# Patient Record
Sex: FEMALE | Race: WHITE | Employment: OTHER | URBAN - METROPOLITAN AREA
[De-identification: names, ages, dates, MRNs, and addresses within clinical notes are randomized per-mention and may not be internally consistent; named-entity substitution may affect disease eponyms.]

---

## 2017-09-03 ENCOUNTER — APPOINTMENT (OUTPATIENT)
Dept: CT IMAGING | Facility: HOSPITAL | Age: 72
End: 2017-09-03
Attending: EMERGENCY MEDICINE
Payer: COMMERCIAL

## 2017-09-03 ENCOUNTER — HOSPITAL ENCOUNTER (EMERGENCY)
Facility: HOSPITAL | Age: 72
Discharge: HOME | End: 2017-09-03
Attending: EMERGENCY MEDICINE
Payer: COMMERCIAL

## 2017-09-03 VITALS
BODY MASS INDEX: 15.64 KG/M2 | WEIGHT: 85 LBS | HEIGHT: 62 IN | DIASTOLIC BLOOD PRESSURE: 60 MMHG | SYSTOLIC BLOOD PRESSURE: 93 MMHG | HEART RATE: 80 BPM | OXYGEN SATURATION: 92 % | TEMPERATURE: 99 F | RESPIRATION RATE: 14 BRPM
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
## 2017-09-03 DIAGNOSIS — S09.90XA CLOSED HEAD INJURY, INITIAL ENCOUNTER: ICD-10-CM

## 2017-09-03 PROCEDURE — 99284 EMERGENCY DEPT VISIT MOD MDM: CPT

## 2017-09-03 PROCEDURE — 70450 CT HEAD/BRAIN W/O DYE: CPT | Performed by: EMERGENCY MEDICINE

## 2017-09-03 RX ORDER — ACETAMINOPHEN 500 MG
TABLET ORAL
Status: DISCONTINUED
Start: 2017-09-03 | End: 2017-09-03

## 2017-09-03 RX ORDER — DESVENLAFAXINE 100 MG/1
100 TABLET, EXTENDED RELEASE ORAL DAILY
COMMUNITY

## 2017-09-03 RX ORDER — ACETAMINOPHEN 500 MG
1000 TABLET ORAL ONCE
Status: COMPLETED | OUTPATIENT
Start: 2017-09-03 | End: 2017-09-03
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
## 2017-09-03 NOTE — ED NOTES
Pt sitting up on stretcher stating she feels fine. Family stating pt lives on TV dinners once daily and drinks squirt all day long. Pt has an MD in [facility] . Family asking if she can have IV fluids because she doesn't drink anything but soda.  md updated
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
## 2017-09-03 NOTE — ED NOTES
Pt resting on cart with family at bedside. Pt updated to plan for discharge. IV removed with cath intact and bandage placed. DC papers given to patient. Aware of f/u care and cond to return to the ER with.  No questions, home with daughters and to stay with

## 2017-09-21 ENCOUNTER — APPOINTMENT (OUTPATIENT)
Dept: GENERAL RADIOLOGY | Age: 72
DRG: 190 | End: 2017-09-21
Payer: MEDICARE

## 2017-09-21 ENCOUNTER — APPOINTMENT (OUTPATIENT)
Dept: CT IMAGING | Age: 72
DRG: 190 | End: 2017-09-21
Payer: MEDICARE

## 2017-09-21 ENCOUNTER — HOSPITAL ENCOUNTER (INPATIENT)
Age: 72
LOS: 4 days | Discharge: HOME HEALTH | DRG: 190 | End: 2017-09-25
Attending: FAMILY MEDICINE | Admitting: OPHTHALMOLOGY
Payer: MEDICARE

## 2017-09-21 DIAGNOSIS — J18.9 PNEUMONIA DUE TO ORGANISM: Primary | ICD-10-CM

## 2017-09-21 DIAGNOSIS — J44.1 COPD EXACERBATION (HCC): ICD-10-CM

## 2017-09-21 PROBLEM — D72.825 BANDEMIA: Status: ACTIVE | Noted: 2017-09-21

## 2017-09-21 PROBLEM — R63.4 WEIGHT LOSS, NON-INTENTIONAL: Status: ACTIVE | Noted: 2017-09-21

## 2017-09-21 PROBLEM — J96.21 ACUTE ON CHRONIC RESPIRATORY FAILURE WITH HYPOXIA (HCC): Status: ACTIVE | Noted: 2017-09-21

## 2017-09-21 LAB
ALBUMIN SERPL-MCNC: 3.6 G/DL (ref 3.5–5.1)
ALLEN TEST: POSITIVE
ALP BLD-CCNC: 76 U/L (ref 38–126)
ALT SERPL-CCNC: 13 U/L (ref 11–66)
ANION GAP SERPL CALCULATED.3IONS-SCNC: 14 MEQ/L (ref 8–16)
ANISOCYTOSIS: ABNORMAL
AST SERPL-CCNC: 22 U/L (ref 5–40)
BANDED NEUTROPHILS ABSOLUTE COUNT: 0.9 THOU/MM3
BANDS PRESENT: 4 %
BASE EXCESS (CALCULATED): 2.9 MMOL/L (ref -2.5–2.5)
BASOPHILS # BLD: 0 %
BASOPHILS ABSOLUTE: 0 THOU/MM3 (ref 0–0.1)
BILIRUB SERPL-MCNC: 0.6 MG/DL (ref 0.3–1.2)
BILIRUBIN DIRECT: < 0.2 MG/DL (ref 0–0.3)
BUN BLDV-MCNC: 9 MG/DL (ref 7–22)
CALCIUM SERPL-MCNC: 9.2 MG/DL (ref 8.5–10.5)
CHLORIDE BLD-SCNC: 96 MEQ/L (ref 98–111)
CO2: 28 MEQ/L (ref 23–33)
COLLECTED BY:: ABNORMAL
CREAT SERPL-MCNC: 0.7 MG/DL (ref 0.4–1.2)
D-DIMER QUANTITATIVE: 723 NG/ML FEU (ref 0–500)
DEVICE: ABNORMAL
DIFFERENTIAL, MANUAL: NORMAL
EOSINOPHIL # BLD: 1 %
EOSINOPHILS ABSOLUTE: 0.2 THOU/MM3 (ref 0–0.4)
FLU A ANTIGEN: NEGATIVE
FLU B ANTIGEN: NEGATIVE
FOLATE: 11.8 NG/ML (ref 4.8–24.2)
GFR SERPL CREATININE-BSD FRML MDRD: 82 ML/MIN/1.73M2
GLUCOSE BLD-MCNC: 116 MG/DL (ref 70–108)
HCO3: 27 MMOL/L (ref 23–28)
HCT VFR BLD CALC: 45.2 % (ref 37–47)
HEMOGLOBIN: 15.1 GM/DL (ref 12–16)
IFIO2: 4
LYMPHOCYTES # BLD: 7 %
LYMPHOCYTES ABSOLUTE: 1.6 THOU/MM3 (ref 1–4.8)
MCH RBC QN AUTO: 31.5 PG (ref 27–31)
MCHC RBC AUTO-ENTMCNC: 33.4 GM/DL (ref 33–37)
MCV RBC AUTO: 94.5 FL (ref 81–99)
MONOCYTES # BLD: 7 %
MONOCYTES ABSOLUTE: 1.6 THOU/MM3 (ref 0.4–1.3)
NUCLEATED RED BLOOD CELLS: 0 /100 WBC
O2 SATURATION: 91 %
OSMOLALITY CALCULATION: 275.3 MOSMOL/KG (ref 275–300)
PCO2: 40 MMHG (ref 35–45)
PDW BLD-RTO: 15.8 % (ref 11.5–14.5)
PH BLOOD GAS: 7.44 (ref 7.35–7.45)
PLATELET # BLD: 313 THOU/MM3 (ref 130–400)
PLATELET ESTIMATE: ADEQUATE
PMV BLD AUTO: 8.7 MCM (ref 7.4–10.4)
PO2: 58 MMHG (ref 71–104)
POTASSIUM SERPL-SCNC: 4.5 MEQ/L (ref 3.5–5.2)
PROCALCITONIN: 0.67 NG/ML (ref 0.01–0.09)
PROCALCITONIN: 0.76 NG/ML (ref 0.01–0.09)
RBC # BLD: 4.78 MILL/MM3 (ref 4.2–5.4)
RBC # BLD: NORMAL 10*6/UL
SEG NEUTROPHILS: 81 %
SEGMENTED NEUTROPHILS ABSOLUTE COUNT: 19 THOU/MM3 (ref 1.8–7.7)
SMUDGE CELLS: ABNORMAL
SODIUM BLD-SCNC: 138 MEQ/L (ref 135–145)
SOURCE, BLOOD GAS: ABNORMAL
TOTAL PROTEIN: 6.9 G/DL (ref 6.1–8)
TROPONIN T: < 0.01 NG/ML
VITAMIN B-12: 404 PG/ML (ref 211–911)
WBC # BLD: 23.5 THOU/MM3 (ref 4.8–10.8)

## 2017-09-21 PROCEDURE — 6360000004 HC RX CONTRAST MEDICATION: Performed by: FAMILY MEDICINE

## 2017-09-21 PROCEDURE — 80053 COMPREHEN METABOLIC PANEL: CPT

## 2017-09-21 PROCEDURE — 82746 ASSAY OF FOLIC ACID SERUM: CPT

## 2017-09-21 PROCEDURE — 87040 BLOOD CULTURE FOR BACTERIA: CPT

## 2017-09-21 PROCEDURE — 93005 ELECTROCARDIOGRAM TRACING: CPT | Performed by: FAMILY MEDICINE

## 2017-09-21 PROCEDURE — 87804 INFLUENZA ASSAY W/OPTIC: CPT

## 2017-09-21 PROCEDURE — 84484 ASSAY OF TROPONIN QUANT: CPT

## 2017-09-21 PROCEDURE — 99222 1ST HOSP IP/OBS MODERATE 55: CPT | Performed by: OPHTHALMOLOGY

## 2017-09-21 PROCEDURE — 85025 COMPLETE CBC W/AUTO DIFF WBC: CPT

## 2017-09-21 PROCEDURE — 82248 BILIRUBIN DIRECT: CPT

## 2017-09-21 PROCEDURE — 71010 XR CHEST PORTABLE: CPT

## 2017-09-21 PROCEDURE — 82607 VITAMIN B-12: CPT

## 2017-09-21 PROCEDURE — 6370000000 HC RX 637 (ALT 250 FOR IP): Performed by: FAMILY MEDICINE

## 2017-09-21 PROCEDURE — 82803 BLOOD GASES ANY COMBINATION: CPT

## 2017-09-21 PROCEDURE — 36600 WITHDRAWAL OF ARTERIAL BLOOD: CPT

## 2017-09-21 PROCEDURE — 94640 AIRWAY INHALATION TREATMENT: CPT

## 2017-09-21 PROCEDURE — 85379 FIBRIN DEGRADATION QUANT: CPT

## 2017-09-21 PROCEDURE — 36415 COLL VENOUS BLD VENIPUNCTURE: CPT

## 2017-09-21 PROCEDURE — 71275 CT ANGIOGRAPHY CHEST: CPT

## 2017-09-21 PROCEDURE — 84145 PROCALCITONIN (PCT): CPT

## 2017-09-21 PROCEDURE — 99285 EMERGENCY DEPT VISIT HI MDM: CPT

## 2017-09-21 PROCEDURE — 1200000000 HC SEMI PRIVATE

## 2017-09-21 PROCEDURE — 2580000003 HC RX 258: Performed by: FAMILY MEDICINE

## 2017-09-21 PROCEDURE — 6360000002 HC RX W HCPCS: Performed by: FAMILY MEDICINE

## 2017-09-21 RX ORDER — ACETAMINOPHEN 325 MG/1
650 TABLET ORAL EVERY 4 HOURS PRN
Status: DISCONTINUED | OUTPATIENT
Start: 2017-09-21 | End: 2017-09-25 | Stop reason: HOSPADM

## 2017-09-21 RX ORDER — PREDNISONE 20 MG/1
40 TABLET ORAL ONCE
Status: COMPLETED | OUTPATIENT
Start: 2017-09-21 | End: 2017-09-21

## 2017-09-21 RX ORDER — PANTOPRAZOLE SODIUM 40 MG/1
40 TABLET, DELAYED RELEASE ORAL
Status: DISCONTINUED | OUTPATIENT
Start: 2017-09-22 | End: 2017-09-22

## 2017-09-21 RX ORDER — IPRATROPIUM BROMIDE AND ALBUTEROL SULFATE 2.5; .5 MG/3ML; MG/3ML
1 SOLUTION RESPIRATORY (INHALATION) ONCE
Status: COMPLETED | OUTPATIENT
Start: 2017-09-21 | End: 2017-09-21

## 2017-09-21 RX ORDER — DESVENLAFAXINE 100 MG/1
100 TABLET, EXTENDED RELEASE ORAL DAILY
Status: DISCONTINUED | OUTPATIENT
Start: 2017-09-22 | End: 2017-09-25 | Stop reason: HOSPADM

## 2017-09-21 RX ORDER — AZITHROMYCIN 250 MG/1
500 TABLET, FILM COATED ORAL ONCE
Status: COMPLETED | OUTPATIENT
Start: 2017-09-21 | End: 2017-09-21

## 2017-09-21 RX ORDER — MORPHINE SULFATE 2 MG/ML
2 INJECTION, SOLUTION INTRAMUSCULAR; INTRAVENOUS EVERY 4 HOURS PRN
Status: DISCONTINUED | OUTPATIENT
Start: 2017-09-21 | End: 2017-09-22

## 2017-09-21 RX ORDER — ONDANSETRON 2 MG/ML
4 INJECTION INTRAMUSCULAR; INTRAVENOUS EVERY 6 HOURS PRN
Status: DISCONTINUED | OUTPATIENT
Start: 2017-09-21 | End: 2017-09-25 | Stop reason: HOSPADM

## 2017-09-21 RX ORDER — IPRATROPIUM BROMIDE AND ALBUTEROL SULFATE 2.5; .5 MG/3ML; MG/3ML
1 SOLUTION RESPIRATORY (INHALATION)
Status: DISCONTINUED | OUTPATIENT
Start: 2017-09-22 | End: 2017-09-25

## 2017-09-21 RX ORDER — ARIPIPRAZOLE 10 MG/1
10 TABLET ORAL DAILY
Status: DISCONTINUED | OUTPATIENT
Start: 2017-09-22 | End: 2017-09-25 | Stop reason: HOSPADM

## 2017-09-21 RX ORDER — 0.9 % SODIUM CHLORIDE 0.9 %
500 INTRAVENOUS SOLUTION INTRAVENOUS ONCE
Status: DISCONTINUED | OUTPATIENT
Start: 2017-09-21 | End: 2017-09-21

## 2017-09-21 RX ORDER — 0.9 % SODIUM CHLORIDE 0.9 %
1000 INTRAVENOUS SOLUTION INTRAVENOUS ONCE
Status: COMPLETED | OUTPATIENT
Start: 2017-09-21 | End: 2017-09-21

## 2017-09-21 RX ADMIN — IOPAMIDOL 80 ML: 755 INJECTION, SOLUTION INTRAVENOUS at 18:43

## 2017-09-21 RX ADMIN — PREDNISONE 40 MG: 20 TABLET ORAL at 17:21

## 2017-09-21 RX ADMIN — CEFTRIAXONE 1 G: 1 INJECTION, POWDER, FOR SOLUTION INTRAMUSCULAR; INTRAVENOUS at 19:14

## 2017-09-21 RX ADMIN — IPRATROPIUM BROMIDE AND ALBUTEROL SULFATE 1 AMPULE: .5; 3 SOLUTION RESPIRATORY (INHALATION) at 16:38

## 2017-09-21 RX ADMIN — AZITHROMYCIN 500 MG: 250 TABLET, FILM COATED ORAL at 17:21

## 2017-09-21 RX ADMIN — SODIUM CHLORIDE 1000 ML: 9 INJECTION, SOLUTION INTRAVENOUS at 19:14

## 2017-09-21 ASSESSMENT — ENCOUNTER SYMPTOMS
COLOR CHANGE: 0
COUGH: 1
ABDOMINAL PAIN: 0
SORE THROAT: 0
SHORTNESS OF BREATH: 1
CHEST TIGHTNESS: 1
APNEA: 0
TROUBLE SWALLOWING: 0
BACK PAIN: 0
WHEEZING: 1
DIARRHEA: 0
VOMITING: 0
STRIDOR: 0
NAUSEA: 0

## 2017-09-22 PROBLEM — E43 SEVERE MALNUTRITION (HCC): Chronic | Status: ACTIVE | Noted: 2017-09-22

## 2017-09-22 LAB
ANION GAP SERPL CALCULATED.3IONS-SCNC: 10 MEQ/L (ref 8–16)
BUN BLDV-MCNC: 11 MG/DL (ref 7–22)
CALCIUM SERPL-MCNC: 9.1 MG/DL (ref 8.5–10.5)
CHLORIDE BLD-SCNC: 100 MEQ/L (ref 98–111)
CO2: 30 MEQ/L (ref 23–33)
CREAT SERPL-MCNC: 0.6 MG/DL (ref 0.4–1.2)
GFR SERPL CREATININE-BSD FRML MDRD: > 90 ML/MIN/1.73M2
GLUCOSE BLD-MCNC: 110 MG/DL (ref 70–108)
HCT VFR BLD CALC: 40.2 % (ref 37–47)
HEMOGLOBIN: 13.3 GM/DL (ref 12–16)
LACTIC ACID: 1.2 MMOL/L (ref 0.5–2.2)
LV EF: 53 %
LVEF MODALITY: NORMAL
MAGNESIUM: 1.9 MG/DL (ref 1.6–2.4)
MCH RBC QN AUTO: 31.4 PG (ref 27–31)
MCHC RBC AUTO-ENTMCNC: 33 GM/DL (ref 33–37)
MCV RBC AUTO: 95.1 FL (ref 81–99)
PDW BLD-RTO: 15 % (ref 11.5–14.5)
PLATELET # BLD: 265 THOU/MM3 (ref 130–400)
PMV BLD AUTO: 8.2 MCM (ref 7.4–10.4)
POTASSIUM SERPL-SCNC: 4 MEQ/L (ref 3.5–5.2)
RBC # BLD: 4.23 MILL/MM3 (ref 4.2–5.4)
SODIUM BLD-SCNC: 140 MEQ/L (ref 135–145)
WBC # BLD: 18.1 THOU/MM3 (ref 4.8–10.8)

## 2017-09-22 PROCEDURE — 6370000000 HC RX 637 (ALT 250 FOR IP): Performed by: OPHTHALMOLOGY

## 2017-09-22 PROCEDURE — G8987 SELF CARE CURRENT STATUS: HCPCS

## 2017-09-22 PROCEDURE — 93306 TTE W/DOPPLER COMPLETE: CPT

## 2017-09-22 PROCEDURE — 6360000002 HC RX W HCPCS: Performed by: INTERNAL MEDICINE

## 2017-09-22 PROCEDURE — G8988 SELF CARE GOAL STATUS: HCPCS

## 2017-09-22 PROCEDURE — 6370000000 HC RX 637 (ALT 250 FOR IP): Performed by: INTERNAL MEDICINE

## 2017-09-22 PROCEDURE — 97166 OT EVAL MOD COMPLEX 45 MIN: CPT

## 2017-09-22 PROCEDURE — 83605 ASSAY OF LACTIC ACID: CPT

## 2017-09-22 PROCEDURE — 6370000000 HC RX 637 (ALT 250 FOR IP): Performed by: FAMILY MEDICINE

## 2017-09-22 PROCEDURE — 94640 AIRWAY INHALATION TREATMENT: CPT

## 2017-09-22 PROCEDURE — 36415 COLL VENOUS BLD VENIPUNCTURE: CPT

## 2017-09-22 PROCEDURE — 80048 BASIC METABOLIC PNL TOTAL CA: CPT

## 2017-09-22 PROCEDURE — 83735 ASSAY OF MAGNESIUM: CPT

## 2017-09-22 PROCEDURE — 99232 SBSQ HOSP IP/OBS MODERATE 35: CPT | Performed by: INTERNAL MEDICINE

## 2017-09-22 PROCEDURE — 85027 COMPLETE CBC AUTOMATED: CPT

## 2017-09-22 PROCEDURE — 2580000003 HC RX 258: Performed by: OPHTHALMOLOGY

## 2017-09-22 PROCEDURE — 2700000000 HC OXYGEN THERAPY PER DAY

## 2017-09-22 PROCEDURE — 97110 THERAPEUTIC EXERCISES: CPT

## 2017-09-22 PROCEDURE — 6360000002 HC RX W HCPCS: Performed by: OPHTHALMOLOGY

## 2017-09-22 PROCEDURE — 1200000000 HC SEMI PRIVATE

## 2017-09-22 RX ORDER — AZITHROMYCIN 250 MG/1
250 TABLET, FILM COATED ORAL DAILY
Status: DISCONTINUED | OUTPATIENT
Start: 2017-09-22 | End: 2017-09-25 | Stop reason: HOSPADM

## 2017-09-22 RX ORDER — FAMOTIDINE 20 MG/1
20 TABLET, FILM COATED ORAL DAILY
Status: DISCONTINUED | OUTPATIENT
Start: 2017-09-22 | End: 2017-09-25 | Stop reason: HOSPADM

## 2017-09-22 RX ORDER — BENZONATATE 100 MG/1
100 CAPSULE ORAL 3 TIMES DAILY PRN
Status: DISCONTINUED | OUTPATIENT
Start: 2017-09-22 | End: 2017-09-25 | Stop reason: HOSPADM

## 2017-09-22 RX ORDER — PERMETHRIN 50 MG/G
CREAM TOPICAL ONCE
Status: COMPLETED | OUTPATIENT
Start: 2017-09-22 | End: 2017-09-22

## 2017-09-22 RX ORDER — NICOTINE 21 MG/24HR
1 PATCH, TRANSDERMAL 24 HOURS TRANSDERMAL DAILY
Status: DISCONTINUED | OUTPATIENT
Start: 2017-09-22 | End: 2017-09-25 | Stop reason: HOSPADM

## 2017-09-22 RX ORDER — DIPHENHYDRAMINE HCL 25 MG
25 TABLET ORAL EVERY 6 HOURS PRN
Status: DISCONTINUED | OUTPATIENT
Start: 2017-09-22 | End: 2017-09-23 | Stop reason: CLARIF

## 2017-09-22 RX ORDER — GUAIFENESIN 600 MG/1
600 TABLET, EXTENDED RELEASE ORAL 2 TIMES DAILY
Status: DISCONTINUED | OUTPATIENT
Start: 2017-09-22 | End: 2017-09-25 | Stop reason: HOSPADM

## 2017-09-22 RX ADMIN — BENZONATATE 100 MG: 100 CAPSULE ORAL at 21:01

## 2017-09-22 RX ADMIN — AZITHROMYCIN 250 MG: 250 TABLET, FILM COATED ORAL at 17:41

## 2017-09-22 RX ADMIN — CEFTRIAXONE 1 G: 1 INJECTION, POWDER, FOR SOLUTION INTRAMUSCULAR; INTRAVENOUS at 16:55

## 2017-09-22 RX ADMIN — IPRATROPIUM BROMIDE AND ALBUTEROL SULFATE 1 AMPULE: .5; 3 SOLUTION RESPIRATORY (INHALATION) at 21:11

## 2017-09-22 RX ADMIN — DIPHENHYDRAMINE HCL 25 MG: 25 TABLET ORAL at 22:53

## 2017-09-22 RX ADMIN — IPRATROPIUM BROMIDE AND ALBUTEROL SULFATE 1 AMPULE: .5; 3 SOLUTION RESPIRATORY (INHALATION) at 09:58

## 2017-09-22 RX ADMIN — GUAIFENESIN 600 MG: 600 TABLET, EXTENDED RELEASE ORAL at 21:01

## 2017-09-22 RX ADMIN — BENZONATATE 100 MG: 100 CAPSULE ORAL at 11:32

## 2017-09-22 RX ADMIN — FAMOTIDINE 20 MG: 20 TABLET, FILM COATED ORAL at 10:07

## 2017-09-22 RX ADMIN — GUAIFENESIN 600 MG: 600 TABLET, EXTENDED RELEASE ORAL at 10:07

## 2017-09-22 RX ADMIN — PERMETHRIN: 50 CREAM TOPICAL at 17:53

## 2017-09-22 RX ADMIN — IPRATROPIUM BROMIDE AND ALBUTEROL SULFATE 1 AMPULE: .5; 3 SOLUTION RESPIRATORY (INHALATION) at 13:35

## 2017-09-22 RX ADMIN — ARIPIPRAZOLE 10 MG: 10 TABLET ORAL at 09:09

## 2017-09-22 RX ADMIN — ENOXAPARIN SODIUM 30 MG: 30 INJECTION SUBCUTANEOUS at 09:09

## 2017-09-22 RX ADMIN — DESVENLAFAXINE SUCCINATE 100 MG: 100 TABLET, EXTENDED RELEASE ORAL at 09:09

## 2017-09-22 ASSESSMENT — PAIN SCALES - GENERAL: PAINLEVEL_OUTOF10: 0

## 2017-09-23 PROBLEM — E43 SEVERE PROTEIN-CALORIE MALNUTRITION (GOMEZ: LESS THAN 60% OF STANDARD WEIGHT) (HCC): Status: ACTIVE | Noted: 2017-09-22

## 2017-09-23 PROBLEM — B85.0 HEAD LICE: Status: ACTIVE | Noted: 2017-09-23

## 2017-09-23 PROBLEM — J96.01 ACUTE RESPIRATORY FAILURE WITH HYPOXIA (HCC): Status: ACTIVE | Noted: 2017-09-21

## 2017-09-23 PROCEDURE — 99232 SBSQ HOSP IP/OBS MODERATE 35: CPT | Performed by: INTERNAL MEDICINE

## 2017-09-23 PROCEDURE — 6360000002 HC RX W HCPCS: Performed by: OPHTHALMOLOGY

## 2017-09-23 PROCEDURE — 6370000000 HC RX 637 (ALT 250 FOR IP): Performed by: INTERNAL MEDICINE

## 2017-09-23 PROCEDURE — 94640 AIRWAY INHALATION TREATMENT: CPT

## 2017-09-23 PROCEDURE — 1200000000 HC SEMI PRIVATE

## 2017-09-23 PROCEDURE — 2580000003 HC RX 258: Performed by: OPHTHALMOLOGY

## 2017-09-23 PROCEDURE — 6360000002 HC RX W HCPCS: Performed by: INTERNAL MEDICINE

## 2017-09-23 PROCEDURE — 2700000000 HC OXYGEN THERAPY PER DAY

## 2017-09-23 PROCEDURE — 6370000000 HC RX 637 (ALT 250 FOR IP): Performed by: OPHTHALMOLOGY

## 2017-09-23 RX ORDER — LANOLIN ALCOHOL/MO/W.PET/CERES
6 CREAM (GRAM) TOPICAL NIGHTLY PRN
Status: DISCONTINUED | OUTPATIENT
Start: 2017-09-23 | End: 2017-09-25 | Stop reason: HOSPADM

## 2017-09-23 RX ADMIN — IPRATROPIUM BROMIDE AND ALBUTEROL SULFATE 1 AMPULE: .5; 3 SOLUTION RESPIRATORY (INHALATION) at 22:49

## 2017-09-23 RX ADMIN — BENZONATATE 100 MG: 100 CAPSULE ORAL at 17:32

## 2017-09-23 RX ADMIN — IPRATROPIUM BROMIDE AND ALBUTEROL SULFATE 1 AMPULE: .5; 3 SOLUTION RESPIRATORY (INHALATION) at 09:43

## 2017-09-23 RX ADMIN — IPRATROPIUM BROMIDE AND ALBUTEROL SULFATE 1 AMPULE: .5; 3 SOLUTION RESPIRATORY (INHALATION) at 13:39

## 2017-09-23 RX ADMIN — GUAIFENESIN 600 MG: 600 TABLET, EXTENDED RELEASE ORAL at 20:07

## 2017-09-23 RX ADMIN — CEFTRIAXONE 1 G: 1 INJECTION, POWDER, FOR SOLUTION INTRAMUSCULAR; INTRAVENOUS at 18:07

## 2017-09-23 RX ADMIN — IPRATROPIUM BROMIDE AND ALBUTEROL SULFATE 1 AMPULE: .5; 3 SOLUTION RESPIRATORY (INHALATION) at 18:02

## 2017-09-23 RX ADMIN — ENOXAPARIN SODIUM 30 MG: 30 INJECTION SUBCUTANEOUS at 11:46

## 2017-09-23 RX ADMIN — AZITHROMYCIN 250 MG: 250 TABLET, FILM COATED ORAL at 17:32

## 2017-09-23 RX ADMIN — ARIPIPRAZOLE 10 MG: 10 TABLET ORAL at 08:35

## 2017-09-23 RX ADMIN — GUAIFENESIN 600 MG: 600 TABLET, EXTENDED RELEASE ORAL at 08:35

## 2017-09-23 RX ADMIN — ACETAMINOPHEN 650 MG: 325 TABLET ORAL at 20:24

## 2017-09-23 RX ADMIN — BENZONATATE 100 MG: 100 CAPSULE ORAL at 04:34

## 2017-09-23 RX ADMIN — FAMOTIDINE 20 MG: 20 TABLET, FILM COATED ORAL at 08:35

## 2017-09-23 RX ADMIN — DESVENLAFAXINE SUCCINATE 100 MG: 100 TABLET, EXTENDED RELEASE ORAL at 08:35

## 2017-09-23 ASSESSMENT — PAIN SCALES - GENERAL
PAINLEVEL_OUTOF10: 0
PAINLEVEL_OUTOF10: 3
PAINLEVEL_OUTOF10: 0

## 2017-09-24 PROCEDURE — 6360000002 HC RX W HCPCS: Performed by: INTERNAL MEDICINE

## 2017-09-24 PROCEDURE — 1200000000 HC SEMI PRIVATE

## 2017-09-24 PROCEDURE — 94640 AIRWAY INHALATION TREATMENT: CPT

## 2017-09-24 PROCEDURE — 2700000000 HC OXYGEN THERAPY PER DAY

## 2017-09-24 PROCEDURE — 6370000000 HC RX 637 (ALT 250 FOR IP): Performed by: INTERNAL MEDICINE

## 2017-09-24 PROCEDURE — 6370000000 HC RX 637 (ALT 250 FOR IP): Performed by: OPHTHALMOLOGY

## 2017-09-24 PROCEDURE — 99232 SBSQ HOSP IP/OBS MODERATE 35: CPT | Performed by: INTERNAL MEDICINE

## 2017-09-24 RX ORDER — CEPHALEXIN 500 MG/1
500 CAPSULE ORAL EVERY 8 HOURS SCHEDULED
Status: DISCONTINUED | OUTPATIENT
Start: 2017-09-24 | End: 2017-09-25 | Stop reason: HOSPADM

## 2017-09-24 RX ADMIN — IPRATROPIUM BROMIDE AND ALBUTEROL SULFATE 1 AMPULE: .5; 3 SOLUTION RESPIRATORY (INHALATION) at 21:24

## 2017-09-24 RX ADMIN — CEPHALEXIN 500 MG: 500 CAPSULE ORAL at 16:45

## 2017-09-24 RX ADMIN — ENOXAPARIN SODIUM 30 MG: 30 INJECTION SUBCUTANEOUS at 09:15

## 2017-09-24 RX ADMIN — BENZONATATE 100 MG: 100 CAPSULE ORAL at 09:13

## 2017-09-24 RX ADMIN — BENZONATATE 100 MG: 100 CAPSULE ORAL at 16:45

## 2017-09-24 RX ADMIN — GUAIFENESIN 600 MG: 600 TABLET, EXTENDED RELEASE ORAL at 21:07

## 2017-09-24 RX ADMIN — CEPHALEXIN 500 MG: 500 CAPSULE ORAL at 21:07

## 2017-09-24 RX ADMIN — AZITHROMYCIN 250 MG: 250 TABLET, FILM COATED ORAL at 16:45

## 2017-09-24 RX ADMIN — IPRATROPIUM BROMIDE AND ALBUTEROL SULFATE 1 AMPULE: .5; 3 SOLUTION RESPIRATORY (INHALATION) at 12:54

## 2017-09-24 RX ADMIN — DESVENLAFAXINE SUCCINATE 100 MG: 100 TABLET, EXTENDED RELEASE ORAL at 09:13

## 2017-09-24 RX ADMIN — IPRATROPIUM BROMIDE AND ALBUTEROL SULFATE 1 AMPULE: .5; 3 SOLUTION RESPIRATORY (INHALATION) at 07:58

## 2017-09-24 RX ADMIN — ARIPIPRAZOLE 10 MG: 10 TABLET ORAL at 09:13

## 2017-09-24 RX ADMIN — Medication 6 MG: at 21:08

## 2017-09-24 RX ADMIN — Medication 6 MG: at 00:56

## 2017-09-24 RX ADMIN — FAMOTIDINE 20 MG: 20 TABLET, FILM COATED ORAL at 09:13

## 2017-09-24 RX ADMIN — IPRATROPIUM BROMIDE AND ALBUTEROL SULFATE 1 AMPULE: .5; 3 SOLUTION RESPIRATORY (INHALATION) at 16:03

## 2017-09-24 RX ADMIN — GUAIFENESIN 600 MG: 600 TABLET, EXTENDED RELEASE ORAL at 09:13

## 2017-09-24 RX ADMIN — BENZONATATE 100 MG: 100 CAPSULE ORAL at 21:07

## 2017-09-24 RX ADMIN — Medication: at 10:00

## 2017-09-24 ASSESSMENT — PAIN SCALES - GENERAL
PAINLEVEL_OUTOF10: 0
PAINLEVEL_OUTOF10: 0

## 2017-09-25 VITALS
WEIGHT: 81.9 LBS | DIASTOLIC BLOOD PRESSURE: 76 MMHG | SYSTOLIC BLOOD PRESSURE: 143 MMHG | HEART RATE: 100 BPM | OXYGEN SATURATION: 90 % | RESPIRATION RATE: 18 BRPM | BODY MASS INDEX: 15.07 KG/M2 | TEMPERATURE: 97.6 F | HEIGHT: 62 IN

## 2017-09-25 LAB
BASOPHILS # BLD: 0.4 %
BASOPHILS ABSOLUTE: 0 THOU/MM3 (ref 0–0.1)
EOSINOPHIL # BLD: 7.6 %
EOSINOPHILS ABSOLUTE: 0.9 THOU/MM3 (ref 0–0.4)
HCT VFR BLD CALC: 41.3 % (ref 37–47)
HEMOGLOBIN: 13.6 GM/DL (ref 12–16)
LYMPHOCYTES # BLD: 15.6 %
LYMPHOCYTES ABSOLUTE: 1.8 THOU/MM3 (ref 1–4.8)
MCH RBC QN AUTO: 31.5 PG (ref 27–31)
MCHC RBC AUTO-ENTMCNC: 33.1 GM/DL (ref 33–37)
MCV RBC AUTO: 95.2 FL (ref 81–99)
MONOCYTES # BLD: 8.2 %
MONOCYTES ABSOLUTE: 1 THOU/MM3 (ref 0.4–1.3)
NUCLEATED RED BLOOD CELLS: 0 /100 WBC
PDW BLD-RTO: 14.5 % (ref 11.5–14.5)
PLATELET # BLD: 317 THOU/MM3 (ref 130–400)
PMV BLD AUTO: 8.4 MCM (ref 7.4–10.4)
PROCALCITONIN: 0.23 NG/ML (ref 0.01–0.09)
RBC # BLD: 4.33 MILL/MM3 (ref 4.2–5.4)
RBC # BLD: NORMAL 10*6/UL
SEG NEUTROPHILS: 68.2 %
SEGMENTED NEUTROPHILS ABSOLUTE COUNT: 8 THOU/MM3 (ref 1.8–7.7)
WBC # BLD: 11.8 THOU/MM3 (ref 4.8–10.8)

## 2017-09-25 PROCEDURE — 84145 PROCALCITONIN (PCT): CPT

## 2017-09-25 PROCEDURE — 94761 N-INVAS EAR/PLS OXIMETRY MLT: CPT

## 2017-09-25 PROCEDURE — 97110 THERAPEUTIC EXERCISES: CPT

## 2017-09-25 PROCEDURE — 6370000000 HC RX 637 (ALT 250 FOR IP): Performed by: INTERNAL MEDICINE

## 2017-09-25 PROCEDURE — 97161 PT EVAL LOW COMPLEX 20 MIN: CPT

## 2017-09-25 PROCEDURE — 6370000000 HC RX 637 (ALT 250 FOR IP): Performed by: OPHTHALMOLOGY

## 2017-09-25 PROCEDURE — 99232 SBSQ HOSP IP/OBS MODERATE 35: CPT | Performed by: INTERNAL MEDICINE

## 2017-09-25 PROCEDURE — 36415 COLL VENOUS BLD VENIPUNCTURE: CPT

## 2017-09-25 PROCEDURE — G8978 MOBILITY CURRENT STATUS: HCPCS

## 2017-09-25 PROCEDURE — G8979 MOBILITY GOAL STATUS: HCPCS

## 2017-09-25 PROCEDURE — 85025 COMPLETE CBC W/AUTO DIFF WBC: CPT

## 2017-09-25 RX ORDER — DEXAMETHASONE 1.5 MG/1
3 TABLET ORAL
Qty: 6 TABLET | Refills: 0 | Status: SHIPPED | OUTPATIENT
Start: 2017-09-25 | End: 2017-09-28

## 2017-09-25 RX ORDER — NICOTINE 21 MG/24HR
1 PATCH, TRANSDERMAL 24 HOURS TRANSDERMAL DAILY
Qty: 30 PATCH | Refills: 3 | Status: ON HOLD | COMMUNITY
Start: 2017-09-25 | End: 2017-12-18 | Stop reason: HOSPADM

## 2017-09-25 RX ORDER — CEPHALEXIN 500 MG/1
500 CAPSULE ORAL EVERY 8 HOURS SCHEDULED
Qty: 15 CAPSULE | Refills: 0 | Status: ON HOLD | OUTPATIENT
Start: 2017-09-25 | End: 2017-12-17 | Stop reason: ALTCHOICE

## 2017-09-25 RX ORDER — ALBUTEROL SULFATE 90 UG/1
2 AEROSOL, METERED RESPIRATORY (INHALATION) EVERY 6 HOURS PRN
Status: DISCONTINUED | OUTPATIENT
Start: 2017-09-25 | End: 2017-09-25 | Stop reason: HOSPADM

## 2017-09-25 RX ORDER — LANOLIN ALCOHOL/MO/W.PET/CERES
6 CREAM (GRAM) TOPICAL NIGHTLY PRN
Refills: 3 | COMMUNITY
Start: 2017-09-25 | End: 2019-09-13

## 2017-09-25 RX ORDER — ALBUTEROL SULFATE 90 UG/1
2 AEROSOL, METERED RESPIRATORY (INHALATION) 4 TIMES DAILY
Qty: 1 INHALER | Refills: 3 | Status: SHIPPED | OUTPATIENT
Start: 2017-09-25 | End: 2019-06-27 | Stop reason: SDUPTHER

## 2017-09-25 RX ADMIN — CEPHALEXIN 500 MG: 500 CAPSULE ORAL at 08:03

## 2017-09-25 RX ADMIN — FAMOTIDINE 20 MG: 20 TABLET, FILM COATED ORAL at 08:03

## 2017-09-25 RX ADMIN — BENZONATATE 100 MG: 100 CAPSULE ORAL at 08:03

## 2017-09-25 RX ADMIN — DESVENLAFAXINE SUCCINATE 100 MG: 100 TABLET, EXTENDED RELEASE ORAL at 08:03

## 2017-09-25 RX ADMIN — ARIPIPRAZOLE 10 MG: 10 TABLET ORAL at 08:03

## 2017-09-25 RX ADMIN — GUAIFENESIN 600 MG: 600 TABLET, EXTENDED RELEASE ORAL at 08:03

## 2017-09-25 ASSESSMENT — PAIN SCALES - GENERAL
PAINLEVEL_OUTOF10: 0
PAINLEVEL_OUTOF10: 8

## 2017-09-26 ENCOUNTER — APPOINTMENT (OUTPATIENT)
Dept: GENERAL RADIOLOGY | Age: 72
End: 2017-09-26
Payer: MEDICARE

## 2017-09-26 ENCOUNTER — HOSPITAL ENCOUNTER (EMERGENCY)
Age: 72
Discharge: HOME | End: 2017-09-26
Attending: FAMILY MEDICINE
Payer: MEDICARE

## 2017-09-26 VITALS
RESPIRATION RATE: 20 BRPM | HEART RATE: 100 BPM | TEMPERATURE: 98.4 F | OXYGEN SATURATION: 95 % | HEIGHT: 62 IN | SYSTOLIC BLOOD PRESSURE: 113 MMHG | BODY MASS INDEX: 14.91 KG/M2 | WEIGHT: 81 LBS | DIASTOLIC BLOOD PRESSURE: 78 MMHG

## 2017-09-26 DIAGNOSIS — J18.9 CAP (COMMUNITY ACQUIRED PNEUMONIA): Primary | ICD-10-CM

## 2017-09-26 DIAGNOSIS — J44.0 CHRONIC OBSTRUCTIVE PULMONARY DISEASE WITH ACUTE LOWER RESPIRATORY INFECTION (HCC): ICD-10-CM

## 2017-09-26 LAB
ALBUMIN SERPL-MCNC: 3.9 G/DL (ref 3.5–5.1)
ALLEN TEST: POSITIVE
ALP BLD-CCNC: 75 U/L (ref 38–126)
ALT SERPL-CCNC: 19 U/L (ref 11–66)
ANION GAP SERPL CALCULATED.3IONS-SCNC: 19 MEQ/L (ref 8–16)
ANISOCYTOSIS: ABNORMAL
AST SERPL-CCNC: 21 U/L (ref 5–40)
BASE EXCESS (CALCULATED): 4.8 MMOL/L (ref -2.5–2.5)
BASOPHILS # BLD: 0.4 %
BASOPHILS ABSOLUTE: 0.1 THOU/MM3 (ref 0–0.1)
BILIRUB SERPL-MCNC: 0.3 MG/DL (ref 0.3–1.2)
BUN BLDV-MCNC: 12 MG/DL (ref 7–22)
CALCIUM SERPL-MCNC: 9.8 MG/DL (ref 8.5–10.5)
CHLORIDE BLD-SCNC: 94 MEQ/L (ref 98–111)
CO2: 28 MEQ/L (ref 23–33)
COLLECTED BY:: ABNORMAL
CREAT SERPL-MCNC: 0.6 MG/DL (ref 0.4–1.2)
DEVICE: ABNORMAL
EKG ATRIAL RATE: 110 BPM
EKG P AXIS: 80 DEGREES
EKG P-R INTERVAL: 128 MS
EKG Q-T INTERVAL: 310 MS
EKG QRS DURATION: 68 MS
EKG QTC CALCULATION (BAZETT): 419 MS
EKG R AXIS: 5 DEGREES
EKG T AXIS: 53 DEGREES
EKG VENTRICULAR RATE: 110 BPM
EOSINOPHIL # BLD: 3.7 %
EOSINOPHILS ABSOLUTE: 0.5 THOU/MM3 (ref 0–0.4)
GFR SERPL CREATININE-BSD FRML MDRD: > 90 ML/MIN/1.73M2
GLUCOSE BLD-MCNC: 89 MG/DL (ref 70–108)
HCO3: 30 MMOL/L (ref 23–28)
HCT VFR BLD CALC: 43.3 % (ref 37–47)
HEMOGLOBIN: 14.4 GM/DL (ref 12–16)
IFIO2: 5
LACTIC ACID: 1.1 MMOL/L (ref 0.5–2.2)
LYMPHOCYTES # BLD: 18.3 %
LYMPHOCYTES ABSOLUTE: 2.7 THOU/MM3 (ref 1–4.8)
MAGNESIUM: 2.1 MG/DL (ref 1.6–2.4)
MCH RBC QN AUTO: 31.2 PG (ref 27–31)
MCHC RBC AUTO-ENTMCNC: 33.3 GM/DL (ref 33–37)
MCV RBC AUTO: 93.8 FL (ref 81–99)
MONOCYTES # BLD: 6.6 %
MONOCYTES ABSOLUTE: 1 THOU/MM3 (ref 0.4–1.3)
NUCLEATED RED BLOOD CELLS: 0 /100 WBC
O2 SATURATION: 89 %
OSMOLALITY CALCULATION: 280.5 MOSMOL/KG (ref 275–300)
PCO2: 43 MMHG (ref 35–45)
PDW BLD-RTO: 15.1 % (ref 11.5–14.5)
PH BLOOD GAS: 7.45 (ref 7.35–7.45)
PLATELET # BLD: 403 THOU/MM3 (ref 130–400)
PMV BLD AUTO: 8.4 MCM (ref 7.4–10.4)
PO2: 55 MMHG (ref 71–104)
POTASSIUM SERPL-SCNC: 4.2 MEQ/L (ref 3.5–5.2)
RBC # BLD: 4.61 MILL/MM3 (ref 4.2–5.4)
RBC # BLD: NORMAL 10*6/UL
SEG NEUTROPHILS: 71 %
SEGMENTED NEUTROPHILS ABSOLUTE COUNT: 10.5 THOU/MM3 (ref 1.8–7.7)
SODIUM BLD-SCNC: 141 MEQ/L (ref 135–145)
SOURCE, BLOOD GAS: ABNORMAL
TOTAL PROTEIN: 7.7 G/DL (ref 6.1–8)
TROPONIN T: < 0.01 NG/ML
WBC # BLD: 14.8 THOU/MM3 (ref 4.8–10.8)

## 2017-09-26 PROCEDURE — 80053 COMPREHEN METABOLIC PANEL: CPT

## 2017-09-26 PROCEDURE — 82803 BLOOD GASES ANY COMBINATION: CPT

## 2017-09-26 PROCEDURE — 83735 ASSAY OF MAGNESIUM: CPT

## 2017-09-26 PROCEDURE — 93005 ELECTROCARDIOGRAM TRACING: CPT | Performed by: PHYSICIAN ASSISTANT

## 2017-09-26 PROCEDURE — 36415 COLL VENOUS BLD VENIPUNCTURE: CPT

## 2017-09-26 PROCEDURE — 94640 AIRWAY INHALATION TREATMENT: CPT

## 2017-09-26 PROCEDURE — 87040 BLOOD CULTURE FOR BACTERIA: CPT

## 2017-09-26 PROCEDURE — 71020 XR CHEST STANDARD TWO VW: CPT

## 2017-09-26 PROCEDURE — 83605 ASSAY OF LACTIC ACID: CPT

## 2017-09-26 PROCEDURE — 6370000000 HC RX 637 (ALT 250 FOR IP): Performed by: PHYSICIAN ASSISTANT

## 2017-09-26 PROCEDURE — 36600 WITHDRAWAL OF ARTERIAL BLOOD: CPT

## 2017-09-26 PROCEDURE — 85025 COMPLETE CBC W/AUTO DIFF WBC: CPT

## 2017-09-26 PROCEDURE — 99285 EMERGENCY DEPT VISIT HI MDM: CPT

## 2017-09-26 PROCEDURE — 84484 ASSAY OF TROPONIN QUANT: CPT

## 2017-09-26 RX ORDER — IPRATROPIUM BROMIDE AND ALBUTEROL SULFATE 2.5; .5 MG/3ML; MG/3ML
1 SOLUTION RESPIRATORY (INHALATION) ONCE
Status: COMPLETED | OUTPATIENT
Start: 2017-09-26 | End: 2017-09-26

## 2017-09-26 RX ORDER — ALBUTEROL SULFATE 2.5 MG/3ML
SOLUTION RESPIRATORY (INHALATION)
Status: DISCONTINUED
Start: 2017-09-26 | End: 2017-09-26 | Stop reason: HOSPADM

## 2017-09-26 RX ADMIN — IPRATROPIUM BROMIDE AND ALBUTEROL SULFATE 1 AMPULE: .5; 3 SOLUTION RESPIRATORY (INHALATION) at 13:12

## 2017-09-26 ASSESSMENT — ENCOUNTER SYMPTOMS
COUGH: 0
COLOR CHANGE: 0
NAUSEA: 0
SHORTNESS OF BREATH: 1
EYE REDNESS: 0
PHOTOPHOBIA: 0
STRIDOR: 0
DIARRHEA: 0
VOMITING: 0
FACIAL SWELLING: 0
EYE DISCHARGE: 0
RHINORRHEA: 0
ABDOMINAL DISTENTION: 0

## 2017-09-27 LAB
BLOOD CULTURE, ROUTINE: NORMAL
BLOOD CULTURE, ROUTINE: NORMAL
EKG ATRIAL RATE: 118 BPM
EKG P AXIS: 84 DEGREES
EKG P-R INTERVAL: 116 MS
EKG Q-T INTERVAL: 298 MS
EKG QRS DURATION: 72 MS
EKG QTC CALCULATION (BAZETT): 417 MS
EKG R AXIS: 106 DEGREES
EKG T AXIS: 82 DEGREES
EKG VENTRICULAR RATE: 118 BPM

## 2017-10-02 LAB
BLOOD CULTURE, ROUTINE: NORMAL
BLOOD CULTURE, ROUTINE: NORMAL

## 2017-12-16 ENCOUNTER — HOSPITAL ENCOUNTER (INPATIENT)
Age: 72
LOS: 3 days | Discharge: HOME | DRG: 189 | End: 2017-12-19
Attending: INTERNAL MEDICINE | Admitting: FAMILY MEDICINE
Payer: MEDICARE

## 2017-12-16 ENCOUNTER — APPOINTMENT (OUTPATIENT)
Dept: GENERAL RADIOLOGY | Age: 72
DRG: 189 | End: 2017-12-16
Payer: MEDICARE

## 2017-12-16 DIAGNOSIS — J18.9 COMMUNITY ACQUIRED PNEUMONIA OF RIGHT MIDDLE LOBE OF LUNG: ICD-10-CM

## 2017-12-16 DIAGNOSIS — J44.1 CHRONIC OBSTRUCTIVE PULMONARY DISEASE WITH ACUTE EXACERBATION (HCC): ICD-10-CM

## 2017-12-16 DIAGNOSIS — R09.02 HYPOXIA: ICD-10-CM

## 2017-12-16 DIAGNOSIS — Z72.0 TOBACCO ABUSE: ICD-10-CM

## 2017-12-16 PROBLEM — J44.9 COPD (CHRONIC OBSTRUCTIVE PULMONARY DISEASE) (HCC): Status: ACTIVE | Noted: 2017-12-16

## 2017-12-16 LAB
ALBUMIN SERPL-MCNC: 4.2 G/DL (ref 3.5–5.1)
ALLEN TEST: POSITIVE
ALP BLD-CCNC: 74 U/L (ref 38–126)
ALT SERPL-CCNC: 12 U/L (ref 11–66)
AMPHETAMINE+METHAMPHETAMINE URINE SCREEN: NEGATIVE
ANION GAP SERPL CALCULATED.3IONS-SCNC: 11 MEQ/L (ref 8–16)
AST SERPL-CCNC: 19 U/L (ref 5–40)
BACTERIA: ABNORMAL /HPF
BARBITURATE QUANTITATIVE URINE: NEGATIVE
BASE EXCESS (CALCULATED): 4.6 MMOL/L (ref -2.5–2.5)
BASOPHILS # BLD: 0.4 %
BASOPHILS ABSOLUTE: 0 THOU/MM3 (ref 0–0.1)
BENZODIAZEPINE QUANTITATIVE URINE: NEGATIVE
BILIRUB SERPL-MCNC: 0.6 MG/DL (ref 0.3–1.2)
BILIRUBIN URINE: NEGATIVE
BLOOD, URINE: NEGATIVE
BUN BLDV-MCNC: 6 MG/DL (ref 7–22)
CALCIUM SERPL-MCNC: 9.5 MG/DL (ref 8.5–10.5)
CANNABINOID QUANTITATIVE URINE: NEGATIVE
CASTS 2: ABNORMAL /LPF
CASTS UA: ABNORMAL /LPF
CHARACTER, URINE: ABNORMAL
CHLORIDE BLD-SCNC: 96 MEQ/L (ref 98–111)
CO2: 31 MEQ/L (ref 23–33)
COCAINE METABOLITE QUANTITATIVE URINE: NEGATIVE
COLLECTED BY:: ABNORMAL
COLOR: YELLOW
CREAT SERPL-MCNC: 0.6 MG/DL (ref 0.4–1.2)
CRYSTALS, UA: ABNORMAL
DEVICE: ABNORMAL
EKG ATRIAL RATE: 82 BPM
EKG P AXIS: 76 DEGREES
EKG P-R INTERVAL: 122 MS
EKG Q-T INTERVAL: 340 MS
EKG QRS DURATION: 72 MS
EKG QTC CALCULATION (BAZETT): 397 MS
EKG R AXIS: 67 DEGREES
EKG T AXIS: 66 DEGREES
EKG VENTRICULAR RATE: 82 BPM
EOSINOPHIL # BLD: 0.3 %
EOSINOPHILS ABSOLUTE: 0 THOU/MM3 (ref 0–0.4)
EPITHELIAL CELLS, UA: ABNORMAL /HPF
FLU A ANTIGEN: NEGATIVE
FLU B ANTIGEN: NEGATIVE
GFR SERPL CREATININE-BSD FRML MDRD: > 90 ML/MIN/1.73M2
GLUCOSE BLD-MCNC: 99 MG/DL (ref 70–108)
GLUCOSE URINE: NEGATIVE MG/DL
HCO3: 29 MMOL/L (ref 23–28)
HCT VFR BLD CALC: 46.6 % (ref 37–47)
HEMOGLOBIN: 15.6 GM/DL (ref 12–16)
KETONES, URINE: NEGATIVE
LACTIC ACID, SEPSIS: 1.1 MMOL/L (ref 0.5–1.9)
LEUKOCYTE ESTERASE, URINE: NEGATIVE
LYMPHOCYTES # BLD: 20.4 %
LYMPHOCYTES ABSOLUTE: 2.4 THOU/MM3 (ref 1–4.8)
MCH RBC QN AUTO: 31 PG (ref 27–31)
MCHC RBC AUTO-ENTMCNC: 33.5 GM/DL (ref 33–37)
MCV RBC AUTO: 92.5 FL (ref 81–99)
MISCELLANEOUS 2: ABNORMAL
MONOCYTES # BLD: 7.3 %
MONOCYTES ABSOLUTE: 0.9 THOU/MM3 (ref 0.4–1.3)
NITRITE, URINE: NEGATIVE
NUCLEATED RED BLOOD CELLS: 0 /100 WBC
O2 SATURATION: 86 %
OPIATES, URINE: NEGATIVE
OSMOLALITY CALCULATION: 273.3 MOSMOL/KG (ref 275–300)
OXYCODONE: NEGATIVE
PCO2: 42 MMHG (ref 35–45)
PDW BLD-RTO: 14 % (ref 11.5–14.5)
PH BLOOD GAS: 7.45 (ref 7.35–7.45)
PH UA: 6
PHENCYCLIDINE QUANTITATIVE URINE: NEGATIVE
PLATELET # BLD: 258 THOU/MM3 (ref 130–400)
PMV BLD AUTO: 8.5 MCM (ref 7.4–10.4)
PO2: 50 MMHG (ref 71–104)
POTASSIUM SERPL-SCNC: 3.7 MEQ/L (ref 3.5–5.2)
PROTEIN UA: 30
RBC # BLD: 5.04 MILL/MM3 (ref 4.2–5.4)
RBC URINE: ABNORMAL /HPF
RENAL EPITHELIAL, UA: ABNORMAL
SEG NEUTROPHILS: 71.6 %
SEGMENTED NEUTROPHILS ABSOLUTE COUNT: 8.6 THOU/MM3 (ref 1.8–7.7)
SODIUM BLD-SCNC: 138 MEQ/L (ref 135–145)
SOURCE, BLOOD GAS: ABNORMAL
SPECIFIC GRAVITY, URINE: 1.01 (ref 1–1.03)
TOTAL PROTEIN: 7.3 G/DL (ref 6.1–8)
TROPONIN T: < 0.01 NG/ML
UROBILINOGEN, URINE: 1 EU/DL
WBC # BLD: 12 THOU/MM3 (ref 4.8–10.8)
WBC UA: ABNORMAL /HPF
YEAST: ABNORMAL

## 2017-12-16 PROCEDURE — 36415 COLL VENOUS BLD VENIPUNCTURE: CPT

## 2017-12-16 PROCEDURE — 36600 WITHDRAWAL OF ARTERIAL BLOOD: CPT

## 2017-12-16 PROCEDURE — 85025 COMPLETE CBC W/AUTO DIFF WBC: CPT

## 2017-12-16 PROCEDURE — 93005 ELECTROCARDIOGRAM TRACING: CPT

## 2017-12-16 PROCEDURE — 80053 COMPREHEN METABOLIC PANEL: CPT

## 2017-12-16 PROCEDURE — 87040 BLOOD CULTURE FOR BACTERIA: CPT

## 2017-12-16 PROCEDURE — 81001 URINALYSIS AUTO W/SCOPE: CPT

## 2017-12-16 PROCEDURE — 94640 AIRWAY INHALATION TREATMENT: CPT

## 2017-12-16 PROCEDURE — 6370000000 HC RX 637 (ALT 250 FOR IP): Performed by: INTERNAL MEDICINE

## 2017-12-16 PROCEDURE — 71020 XR CHEST STANDARD TWO VW: CPT

## 2017-12-16 PROCEDURE — 1200000003 HC TELEMETRY R&B

## 2017-12-16 PROCEDURE — 84484 ASSAY OF TROPONIN QUANT: CPT

## 2017-12-16 PROCEDURE — 99222 1ST HOSP IP/OBS MODERATE 55: CPT | Performed by: FAMILY MEDICINE

## 2017-12-16 PROCEDURE — 80307 DRUG TEST PRSMV CHEM ANLYZR: CPT

## 2017-12-16 PROCEDURE — 6360000002 HC RX W HCPCS: Performed by: INTERNAL MEDICINE

## 2017-12-16 PROCEDURE — 83605 ASSAY OF LACTIC ACID: CPT

## 2017-12-16 PROCEDURE — 87804 INFLUENZA ASSAY W/OPTIC: CPT

## 2017-12-16 PROCEDURE — 99285 EMERGENCY DEPT VISIT HI MDM: CPT

## 2017-12-16 PROCEDURE — 82803 BLOOD GASES ANY COMBINATION: CPT

## 2017-12-16 RX ORDER — IPRATROPIUM BROMIDE AND ALBUTEROL SULFATE 2.5; .5 MG/3ML; MG/3ML
1 SOLUTION RESPIRATORY (INHALATION) ONCE
Status: COMPLETED | OUTPATIENT
Start: 2017-12-16 | End: 2017-12-16

## 2017-12-16 RX ORDER — ACETYLCYSTEINE 200 MG/ML
600 SOLUTION ORAL; RESPIRATORY (INHALATION) ONCE
Status: COMPLETED | OUTPATIENT
Start: 2017-12-16 | End: 2017-12-16

## 2017-12-16 RX ADMIN — IPRATROPIUM BROMIDE AND ALBUTEROL SULFATE 1 AMPULE: .5; 3 SOLUTION RESPIRATORY (INHALATION) at 17:09

## 2017-12-16 RX ADMIN — ACETYLCYSTEINE 600 MG: 200 INHALANT RESPIRATORY (INHALATION) at 17:10

## 2017-12-16 RX ADMIN — IPRATROPIUM BROMIDE AND ALBUTEROL SULFATE 1 AMPULE: .5; 3 SOLUTION RESPIRATORY (INHALATION) at 17:10

## 2017-12-16 ASSESSMENT — ENCOUNTER SYMPTOMS
EYE PAIN: 0
BACK PAIN: 0
SHORTNESS OF BREATH: 1
ABDOMINAL PAIN: 0
SORE THROAT: 0
EYE DISCHARGE: 0
RHINORRHEA: 0
WHEEZING: 0
DIARRHEA: 0
NAUSEA: 0
COUGH: 1
VOMITING: 0

## 2017-12-16 NOTE — ED NOTES
Patient resting quietly in cot with family and respiratory at bedside. Patient currently receiving breathing treatment. Denies any pain. Will continue to monitor.       Sil Barillas RN  12/16/17 1293

## 2017-12-16 NOTE — ED PROVIDER NOTES
[facility]  eMERGENCY dEPARTMENT eNCOUnter          [address] [facility]       Chief Complaint   Patient presents with    Cough    Fatigue       Nurses Notes reviewed and I agree except as noted in the HPI. HISTORY OF PRESENT ILLNESS    Francheska Munoz is a 67 y.o. female who presents to the Emergency Department for the evaluation of cough and shortness of breath. The patient has a history of COPD. She smokes up to one pack of cigarettes per day and has done so for at least 50 years. The patient was admitted on 9/21 of this year with pneumonia for a 5 day stay. She was discharged to [address] [place] [address] for 3 weeks following this and was on oxygen. She has not been on oxygen since she has been back home. Daughter is concerned patient's O2 level is too low again. Patient has had this cough for the past 3 to 4 days which is nonproductive. She reports chest heaviness as well. Patient received both her pneumonia and influenza vaccinations this year. Daughter states the patient did not smoke during her stay at the nursing home, but started back up again as soon as she arrived back to her own residence. Patient denies fever, chills, nausea, or vomiting. The HPI was provided by the patient and her daughter at bedside. REVIEW OF SYSTEMS     Review of Systems   Constitutional: Negative for appetite change, chills, fatigue and fever. HENT: Negative for congestion, ear pain, rhinorrhea and sore throat. Eyes: Negative for pain, discharge and visual disturbance. Respiratory: Positive for cough and shortness of breath. Negative for wheezing. Cardiovascular: Positive for chest pain (heaviness). Negative for palpitations and leg swelling. Gastrointestinal: Negative for abdominal pain, diarrhea, nausea and vomiting. Genitourinary: Negative for difficulty urinating, dysuria and vaginal discharge. Musculoskeletal: Negative for arthralgias, back pain, joint swelling and neck pain. normal. Right eye exhibits no discharge. Left eye exhibits no discharge. No scleral icterus. Neck: Normal range of motion. Neck supple. No JVD present. Cardiovascular: Normal rate, regular rhythm and normal heart sounds. Pulmonary/Chest: Effort normal. No stridor. No respiratory distress. She has decreased breath sounds (no air movement). Abdominal: Soft. She exhibits no distension. Musculoskeletal: Normal range of motion. She exhibits no edema. Neurological: She is alert and oriented to person, place, and time. She exhibits normal muscle tone. GCS eye subscore is 4. GCS verbal subscore is 5. GCS motor subscore is 6. Skin: Skin is warm and dry. She is not diaphoretic. No erythema. Psychiatric: She has a normal mood and affect. Her behavior is normal.   Nursing note and vitals reviewed. DIAGNOSTIC RESULTS     EKG: All EKG's are interpreted by the Emergency Department Physician who either signs or Co-signs this chart in the absence of a cardiologist.Normal sinus rhythm, bilateral atrial enlargement  Ventricular rate 82  OR interval 122 ms  QRS duration 72 ms  QTc interval 397 ms    RADIOLOGY: non-plain film images(s) such as CT, Ultrasound and MRI are read by the radiologist.    XR CHEST STANDARD (2 VW)   Final Result   1. Hyperinflation and biapical fibrotic emphysematous changes are demonstrated. Findings are likely related to COPD in the appropriate clinical setting. 2. Ill-defined irregular opacity at the right suprahilar region. This may represent focal fibrotic scarring. However, cannot exclude underlying lesion. Consider further evaluation with contrast-enhanced chest CT. **This report has been created using voice recognition software. It may contain minor errors which are inherent in voice recognition technology. **      Final report electronically signed by Dr. Meche Cordova on 12/16/2017 5:04 PM          LABS:   Labs Reviewed   CBC WITH AUTO DIFFERENTIAL - Abnormal; Notable for the following:        Result Value    WBC 12.0 (*)     Segs Absolute 8.6 (*)     All other components within normal limits   COMPREHENSIVE METABOLIC PANEL - Abnormal; Notable for the following:     BUN 6 (*)     Chloride 96 (*)     All other components within normal limits   BLOOD GAS, ARTERIAL - Abnormal; Notable for the following:     PO2 50 (*)     HCO3 29 (*)     Base Excess (Calculated) 4.6 (*)     All other components within normal limits   OSMOLALITY - Abnormal; Notable for the following:     Osmolality Calc 273.3 (*)     All other components within normal limits   URINE WITH REFLEXED MICRO - Abnormal; Notable for the following:     Protein, UA 30 (*)     Character, Urine CLOUDY (*)     All other components within normal limits   RAPID INFLUENZA A/B ANTIGENS   CULTURE BLOOD #1   CULTURE BLOOD #2   TROPONIN   LACTATE, SEPSIS   URINE DRUG SCREEN   ANION GAP   GLOMERULAR FILTRATION RATE, ESTIMATED       EMERGENCY DEPARTMENT COURSE:   Vitals:    Vitals:    12/16/17 1652 12/16/17 1724 12/16/17 1954 12/16/17 1956   BP: 124/67 115/72  106/61   Pulse: 94 99  92   Resp: 18 18  20   Temp: 98 °F (36.7 °C)      TempSrc: Oral      SpO2: 94% 98% (!) 86% 94%   Weight: 83 lb (37.6 kg)      Height: 5' 2\" (1.575 m)        4:25 PM: The patient was seen and evaluated. Appropriate labs and imaging have been ordered. The patient is being given breathing treatment for symptom control. Patient was hypoxic in the emergency department, patient has restarted her smoking and seems to be in COPD exacerbation. For this reason patient will be admitted by Dr. Yohannes Gutierrez for further workup and management. All of patient's lab of the testing that reviewed discussed with the admitting physician and also with the patient and the daughter in the room. CRITICAL CARE:       CONSULTS:  Dr. Elva Velasco:  None    FINAL IMPRESSION      1. Chronic obstructive pulmonary disease with acute exacerbation ([address]    2.  Hypoxia DISPOSITION/PLAN   Admit    PATIENT REFERRED TO:  No follow-up provider specified. DISCHARGE MEDICATIONS:  New Prescriptions    No medications on file       (Please note that portions of this note were completed with a voice recognition program.  Efforts were made to edit the dictations but occasionally words are mis-transcribed.)    Scribe:  Signed by: Micah Tomlinson, 12/16/17 4:25 PM Scribing for and in the presence of Nivia Iverson MD    Provider:  I personally performed the services described in the documentation, reviewed and edited the documentation which was dictated to the scribe in my presence, and it accurately records my words and actions.     Nivia Iverson MD 12/16/17 8:37 PM                      Nivia Iverson MD  12/16/17 1217       Nivia Iverson MD  12/16/17 7051

## 2017-12-17 ENCOUNTER — APPOINTMENT (OUTPATIENT)
Dept: CT IMAGING | Age: 72
DRG: 189 | End: 2017-12-17
Payer: MEDICARE

## 2017-12-17 PROBLEM — D72.829 LEUKOCYTOSIS: Status: ACTIVE | Noted: 2017-12-17

## 2017-12-17 PROBLEM — J44.1 CHRONIC OBSTRUCTIVE PULMONARY DISEASE WITH ACUTE EXACERBATION (HCC): Status: ACTIVE | Noted: 2017-12-16

## 2017-12-17 LAB
ANION GAP SERPL CALCULATED.3IONS-SCNC: 13 MEQ/L (ref 8–16)
BASOPHILS # BLD: 0.7 %
BASOPHILS ABSOLUTE: 0.1 THOU/MM3 (ref 0–0.1)
BUN BLDV-MCNC: 8 MG/DL (ref 7–22)
CALCIUM SERPL-MCNC: 8.8 MG/DL (ref 8.5–10.5)
CHLORIDE BLD-SCNC: 100 MEQ/L (ref 98–111)
CO2: 28 MEQ/L (ref 23–33)
CREAT SERPL-MCNC: 0.5 MG/DL (ref 0.4–1.2)
D-DIMER QUANTITATIVE: 403 NG/ML FEU (ref 0–500)
EOSINOPHIL # BLD: 0.9 %
EOSINOPHILS ABSOLUTE: 0.1 THOU/MM3 (ref 0–0.4)
GFR SERPL CREATININE-BSD FRML MDRD: > 90 ML/MIN/1.73M2
GLUCOSE BLD-MCNC: 103 MG/DL (ref 70–108)
HCT VFR BLD CALC: 44.2 % (ref 37–47)
HEMOGLOBIN: 14.8 GM/DL (ref 12–16)
INR BLD: 1.2 (ref 0.85–1.13)
LYMPHOCYTES # BLD: 17.8 %
LYMPHOCYTES ABSOLUTE: 1.9 THOU/MM3 (ref 1–4.8)
MCH RBC QN AUTO: 31.1 PG (ref 27–31)
MCHC RBC AUTO-ENTMCNC: 33.5 GM/DL (ref 33–37)
MCV RBC AUTO: 92.9 FL (ref 81–99)
MONOCYTES # BLD: 7.1 %
MONOCYTES ABSOLUTE: 0.7 THOU/MM3 (ref 0.4–1.3)
NUCLEATED RED BLOOD CELLS: 0 /100 WBC
OSMOLALITY CALCULATION: 279.8 MOSMOL/KG (ref 275–300)
PDW BLD-RTO: 13.8 % (ref 11.5–14.5)
PLATELET # BLD: 235 THOU/MM3 (ref 130–400)
PMV BLD AUTO: 8.4 MCM (ref 7.4–10.4)
POTASSIUM SERPL-SCNC: 3.9 MEQ/L (ref 3.5–5.2)
PROCALCITONIN: 0.05 NG/ML (ref 0.01–0.09)
RBC # BLD: 4.76 MILL/MM3 (ref 4.2–5.4)
SEG NEUTROPHILS: 73.5 %
SEGMENTED NEUTROPHILS ABSOLUTE COUNT: 7.7 THOU/MM3 (ref 1.8–7.7)
SODIUM BLD-SCNC: 141 MEQ/L (ref 135–145)
TROPONIN T: < 0.01 NG/ML
WBC # BLD: 10.5 THOU/MM3 (ref 4.8–10.8)

## 2017-12-17 PROCEDURE — 85379 FIBRIN DEGRADATION QUANT: CPT

## 2017-12-17 PROCEDURE — 2700000000 HC OXYGEN THERAPY PER DAY

## 2017-12-17 PROCEDURE — 6360000002 HC RX W HCPCS

## 2017-12-17 PROCEDURE — 99231 SBSQ HOSP IP/OBS SF/LOW 25: CPT | Performed by: INTERNAL MEDICINE

## 2017-12-17 PROCEDURE — 6370000000 HC RX 637 (ALT 250 FOR IP): Performed by: INTERNAL MEDICINE

## 2017-12-17 PROCEDURE — 85610 PROTHROMBIN TIME: CPT

## 2017-12-17 PROCEDURE — 85025 COMPLETE CBC W/AUTO DIFF WBC: CPT

## 2017-12-17 PROCEDURE — 80048 BASIC METABOLIC PNL TOTAL CA: CPT

## 2017-12-17 PROCEDURE — 94669 MECHANICAL CHEST WALL OSCILL: CPT

## 2017-12-17 PROCEDURE — 1200000003 HC TELEMETRY R&B

## 2017-12-17 PROCEDURE — 6370000000 HC RX 637 (ALT 250 FOR IP): Performed by: NURSE PRACTITIONER

## 2017-12-17 PROCEDURE — 36415 COLL VENOUS BLD VENIPUNCTURE: CPT

## 2017-12-17 PROCEDURE — 6370000000 HC RX 637 (ALT 250 FOR IP): Performed by: FAMILY MEDICINE

## 2017-12-17 PROCEDURE — 84145 PROCALCITONIN (PCT): CPT

## 2017-12-17 PROCEDURE — 99223 1ST HOSP IP/OBS HIGH 75: CPT | Performed by: INTERNAL MEDICINE

## 2017-12-17 PROCEDURE — 6360000004 HC RX CONTRAST MEDICATION: Performed by: NURSE PRACTITIONER

## 2017-12-17 PROCEDURE — 84484 ASSAY OF TROPONIN QUANT: CPT

## 2017-12-17 PROCEDURE — 94640 AIRWAY INHALATION TREATMENT: CPT

## 2017-12-17 PROCEDURE — 2580000003 HC RX 258: Performed by: FAMILY MEDICINE

## 2017-12-17 PROCEDURE — 6360000002 HC RX W HCPCS: Performed by: FAMILY MEDICINE

## 2017-12-17 PROCEDURE — APPSS45 APP SPLIT SHARED TIME 31-45 MINUTES: Performed by: NURSE PRACTITIONER

## 2017-12-17 PROCEDURE — 71260 CT THORAX DX C+: CPT

## 2017-12-17 RX ORDER — LANOLIN ALCOHOL/MO/W.PET/CERES
6 CREAM (GRAM) TOPICAL NIGHTLY PRN
Status: DISCONTINUED | OUTPATIENT
Start: 2017-12-17 | End: 2017-12-18 | Stop reason: SDUPTHER

## 2017-12-17 RX ORDER — ALBUTEROL SULFATE 90 UG/1
2 AEROSOL, METERED RESPIRATORY (INHALATION) 4 TIMES DAILY
Status: DISCONTINUED | OUTPATIENT
Start: 2017-12-17 | End: 2017-12-17

## 2017-12-17 RX ORDER — IPRATROPIUM BROMIDE AND ALBUTEROL SULFATE 2.5; .5 MG/3ML; MG/3ML
1 SOLUTION RESPIRATORY (INHALATION) EVERY 4 HOURS PRN
Status: DISCONTINUED | OUTPATIENT
Start: 2017-12-17 | End: 2017-12-19 | Stop reason: HOSPADM

## 2017-12-17 RX ORDER — ONDANSETRON 2 MG/ML
4 INJECTION INTRAMUSCULAR; INTRAVENOUS EVERY 6 HOURS PRN
Status: DISCONTINUED | OUTPATIENT
Start: 2017-12-17 | End: 2017-12-19 | Stop reason: HOSPADM

## 2017-12-17 RX ORDER — DESVENLAFAXINE 100 MG/1
100 TABLET, EXTENDED RELEASE ORAL DAILY
Status: DISCONTINUED | OUTPATIENT
Start: 2017-12-17 | End: 2017-12-19 | Stop reason: HOSPADM

## 2017-12-17 RX ORDER — NICOTINE 21 MG/24HR
1 PATCH, TRANSDERMAL 24 HOURS TRANSDERMAL DAILY
Status: DISCONTINUED | OUTPATIENT
Start: 2017-12-17 | End: 2017-12-19 | Stop reason: HOSPADM

## 2017-12-17 RX ORDER — PREDNISONE 20 MG/1
40 TABLET ORAL DAILY
Status: DISCONTINUED | OUTPATIENT
Start: 2017-12-17 | End: 2017-12-19 | Stop reason: HOSPADM

## 2017-12-17 RX ORDER — ACETAMINOPHEN 325 MG/1
650 TABLET ORAL EVERY 4 HOURS PRN
Status: DISCONTINUED | OUTPATIENT
Start: 2017-12-17 | End: 2017-12-19 | Stop reason: HOSPADM

## 2017-12-17 RX ORDER — ARIPIPRAZOLE 10 MG/1
10 TABLET ORAL DAILY
Status: DISCONTINUED | OUTPATIENT
Start: 2017-12-17 | End: 2017-12-19 | Stop reason: HOSPADM

## 2017-12-17 RX ORDER — NICOTINE 21 MG/24HR
1 PATCH, TRANSDERMAL 24 HOURS TRANSDERMAL DAILY
Status: DISCONTINUED | OUTPATIENT
Start: 2017-12-17 | End: 2017-12-17

## 2017-12-17 RX ORDER — SODIUM CHLORIDE 0.9 % (FLUSH) 0.9 %
10 SYRINGE (ML) INJECTION EVERY 12 HOURS SCHEDULED
Status: DISCONTINUED | OUTPATIENT
Start: 2017-12-17 | End: 2017-12-19 | Stop reason: HOSPADM

## 2017-12-17 RX ORDER — IPRATROPIUM BROMIDE AND ALBUTEROL SULFATE 2.5; .5 MG/3ML; MG/3ML
1 SOLUTION RESPIRATORY (INHALATION)
Status: DISCONTINUED | OUTPATIENT
Start: 2017-12-17 | End: 2017-12-19 | Stop reason: HOSPADM

## 2017-12-17 RX ORDER — SODIUM CHLORIDE 0.9 % (FLUSH) 0.9 %
10 SYRINGE (ML) INJECTION PRN
Status: DISCONTINUED | OUTPATIENT
Start: 2017-12-17 | End: 2017-12-19 | Stop reason: HOSPADM

## 2017-12-17 RX ORDER — LEVOFLOXACIN 5 MG/ML
500 INJECTION, SOLUTION INTRAVENOUS EVERY 24 HOURS
Status: DISCONTINUED | OUTPATIENT
Start: 2017-12-17 | End: 2017-12-17

## 2017-12-17 RX ADMIN — IPRATROPIUM BROMIDE AND ALBUTEROL SULFATE 1 AMPULE: .5; 3 SOLUTION RESPIRATORY (INHALATION) at 12:55

## 2017-12-17 RX ADMIN — DESVENLAFAXINE SUCCINATE 100 MG: 100 TABLET, FILM COATED, EXTENDED RELEASE ORAL at 09:50

## 2017-12-17 RX ADMIN — Medication 10 ML: at 09:51

## 2017-12-17 RX ADMIN — ARIPIPRAZOLE 10 MG: 10 TABLET ORAL at 09:49

## 2017-12-17 RX ADMIN — Medication 10 ML: at 20:03

## 2017-12-17 RX ADMIN — ENOXAPARIN SODIUM 30 MG: 30 INJECTION SUBCUTANEOUS at 09:50

## 2017-12-17 RX ADMIN — ALBUTEROL SULFATE 2 PUFF: 90 AEROSOL, METERED RESPIRATORY (INHALATION) at 09:10

## 2017-12-17 RX ADMIN — IPRATROPIUM BROMIDE AND ALBUTEROL SULFATE 1 AMPULE: .5; 3 SOLUTION RESPIRATORY (INHALATION) at 17:38

## 2017-12-17 RX ADMIN — LEVOFLOXACIN 500 MG: 5 INJECTION, SOLUTION INTRAVENOUS at 02:26

## 2017-12-17 RX ADMIN — PREDNISONE 40 MG: 20 TABLET ORAL at 16:59

## 2017-12-17 RX ADMIN — IOPAMIDOL 70 ML: 755 INJECTION, SOLUTION INTRAVENOUS at 11:06

## 2017-12-17 RX ADMIN — IPRATROPIUM BROMIDE AND ALBUTEROL SULFATE 1 AMPULE: .5; 3 SOLUTION RESPIRATORY (INHALATION) at 22:29

## 2017-12-17 NOTE — CONSULTS
pulmonary disease) ([facility]     Depression     Pneumonia       Past Surgical History           Procedure Laterality Date    TONSILLECTOMY       Diet    DIET GENERAL;  Allergies    Review of patient's allergies indicates no known allergies. Social History     Social History     Social History    Marital status:      Spouse name: N/A    Number of children: N/A    Years of education: N/A     Occupational History    Not on file. Social History Main Topics    Smoking status: Current Every Day Smoker     Packs/day: 0.50     Years: 56.00     Types: Cigarettes    Smokeless tobacco: Never Used    Alcohol use No    Drug use: No    Sexual activity: No     Other Topics Concern    Not on file     Social History Narrative    No narrative on file     Family History    History reviewed. No pertinent family history. Sleep History    No prior history  ROS    General/Constitutional: No recent loss of weight or appetite changes. No fever or chills. HENT: Negative. Eyes: Negative. Upper respiratory tract: No nasal stuffiness or post nasal drip. Lower respiratory tract/ lungs: + cough but no sputum production. No hemoptysis. Cardiovascular: No palpitations, chest pain or edema. Gastrointestinal: No nausea or vomiting. Neurological: No focal neurological weakness. Extremities: No tenderness. Musculoskeletal: No complaints. Genitourinary: No complaints. Hematological: Negative. Denies easy buising  Skin: No itching.   Meds    Current Medications    sodium chloride flush  10 mL Intravenous 2 times per day    nicotine  1 patch Transdermal Daily    ARIPiprazole  10 mg Oral Daily    albuterol sulfate HFA  2 puff Inhalation 4x Daily    desvenlafaxine succinate  100 mg Oral Daily    levofloxacin  500 mg Intravenous Q24H    enoxaparin  30 mg Subcutaneous Daily     ipratropium-albuterol, sodium chloride flush, acetaminophen, magnesium hydroxide, ondansetron, melatonin  IV Drips/Infusions     Vitals 12/16/17   1704  12/17/17   0649   NA  138  141   K  3.7  3.9   CL  96*  100   CO2  31  28   BUN  6*  8   CREATININE  0.6  0.5   GLUCOSE  99  103   CALCIUM  9.5  8.8     LFT  Recent Labs      12/16/17   1704   AST  19   ALT  12   BILITOT  0.6   ALKPHOS  74     TROP  Lab Results   Component Value Date    TROPONINT < 0.010 12/17/2017    TROPONINT < 0.010 12/17/2017    TROPONINT < 0.010 12/16/2017     BNP  No results found for: PROBNP  D-Dimer  Lab Results   Component Value Date    DDIMER 403.00 12/17/2017     Lactic Acid  No results for input(s): LACTA in the last 72 hours. INR  Recent Labs      12/17/17   0649   INR  1.20*     PTT  No results for input(s): APTT in the last 72 hours. Glucose  No results for input(s): POCGLU in the last 72 hours. UA Recent Labs      12/16/17   1852   PHUR  6.0   COLORU  YELLOW   PROTEINU  30*   BLOODU  NEGATIVE   RBCUA  3-5   WBCUA  0-2   BACTERIA  NONE   NITRU  NEGATIVE   GLUCOSEU  NEGATIVE   BILIRUBINUR  NEGATIVE   UROBILINOGEN  1.0   KETUA  NEGATIVE   . PFTs   No prior  Echo 9/22/17      Normal left ventricle size and systolic function. Ejection fraction was   estimated at 50 to 55 %. There were no regional left ventricular wall   motion abnormalities and wall thickness was within normal limits.  E/A flow reversal noted. Suggestive of diastolic dysfunction.   Left atrial size was normal.   Moderately dilated right ventricle.   Leaflets exhibited mildly increased thickness and mildly reduced cuspal   separation of the aortic valve.   The mitral valve structure was normal with normal leaflet separation.   DOPPLER: The transmitral velocity was within the normal range with no   evidence for mitral stenosis.   Moderate tricuspid regurgitation visualized.   Pulmonary systolic pressure Moderately increased.  and is estimated at   55-60 mmHg.   No evidence of any pericardial effusion.      Mitral Valve   The mitral valve structure was normal with normal leaflet separation.   DOPPLER: The transmitral velocity was within the normal range with no   evidence for mitral stenosis.      Aortic Valve   Leaflets exhibited mildly increased thickness and mildly reduced cuspal   separation of the aortic valve.      Tricuspid Valve   Moderate tricuspid regurgitation visualized.   Pulmonary systolic pressure Moderately increased. and is estimated at   55-60 mmHg.      Left Atrium   Left atrial size was normal.      Left Ventricle   Normal left ventricle size and systolic function. Ejection fraction was   estimated at 50 to 55 %. There were no regional left ventricular wall   motion abnormalities and wall thickness was within normal limits.  E/A flow reversal noted. Suggestive of diastolic dysfunction.      Right Atrium   Mildly enlarged right atrium size.      Right Ventricle   Moderately dilated right ventricle.      Pericardial Effusion   No evidence of any pericardial effusion. Cultures    Blood cultures x 2: No growth preliminary  Influenza A/B: Negative  Procalcitonin  Lab Results   Component Value Date    PROCAL 0.05 12/17/2017    PROCAL 0.23 09/25/2017    PROCAL 0.76 09/21/2017     Radiology    CXR      CXR 12/16/17   1. Hyperinflation and biapical fibrotic emphysematous changes are demonstrated. Findings are likely related to COPD in the appropriate clinical setting. 2. Ill-defined irregular opacity at the right suprahilar region. This may represent focal fibrotic scarring. However, cannot exclude underlying lesion. Consider further evaluation with contrast-enhanced chest CT. CT Scans      CT chest 9/21/17   1.   Negative for pulmonary emboli. 2.  Early patchy infiltrate consistent with pneumonia in the left upper lobe and left lower lobe. 3.  Atelectasis versus mild patchy infiltrate in the medial basilar segment of the right lower lobe.        (See actual reports for details)    Assessment   Cough  Acute hypoxic respiratory failure  COPD no signs of clear exacerbation  Continued Nicotine dependence  Hx of pneumonia treated in 9/2017 no clear infiltrates at this time  Chronic diastolic HF  Mod TR with mod PAH  Protein calorie malnutrition  Full Code  Recommendations   Repeat CT chest with contrast  Hold on ATB and steroids  Continue DuoNebs with IS/Acapella  Nicotine patch  Nutrition consult  DVT prophylaxis    Thank you for the consult and allowing us to participate in the care of your patient. Case discussed with nurse and patient/Dr. Wilmer Ray. Questions and concerns addressed. Meds and Orders reviewed. Electronically signed by     Sully Horta CNP on 12/17/2017 at 8:52 AM    Attending Addendum:   Pt seen and examined, agree with above. 67year old female, heavy active smoking history. Admitted with hypoxia and worsening respiratory symptoms. Exam: Cachectic appearing, diminished breath sounds but no wheezing. CT chest personally reviewed, centrilobular emphysema. Assessment:  1. Acute respiratory failure, hypoxic  2. Acute exac of COPD, severe underlying emphysema at baseline  3. Pulmonary cachexia  4. Tobacco dependence    Plan:  -Poor respiratory reserve due to severe baseline untreated COPD. Will start treatment for an acute exac with prednisone; recommend a 5-7 day course.  Continue bronchodilators, supplemental oxygen, pulmonary toileting  -On empiric levaquin, recommend a short five day course   -Wean FIO2 as tolerated, anticipate need for oxygen at discharge given her prior history from September admission  -Counseled on tobacco cessation, on nicotine patch  -Optimize nutritional status  -Would benefit from outpatient follow up with pulmonary for further workup of lung disease

## 2017-12-17 NOTE — ED NOTES
Pt reassessed. Resting on cot, respirations even and unlabored. Pt's oxygen turned down to 2 L at this time via NC, will continue to monitor. Updated on POC, waiting on an inpatient bed, no complaints. SR up, call light in reach, telemetry continued, family at bedside, will continue to monitor.      Stuart Rodriguez RN  12/16/17 [id number]

## 2017-12-17 NOTE — ED NOTES
Pt reassessed. Resting/sleeping on cot, respirations even and unlabored. Updated family member on POC. SR up x2, call light in reach, telemetry continued, will continue to monitor.      Adelia Hansen RN  12/16/17 [id number]

## 2017-12-17 NOTE — H&P
History & Physical    Patient:  Amina Hendrickson  YOB: 1945  Date of Service: 12/16/2017  MRN: [id number]   Acct:  [de-identified]   Primary Care Physician: Prabha Charles CNP    Chief Complaint: Cough, Fatigue    History of Present Illness:   History obtained from chart review and the patient. The patient is a 67 y.o. female with  COPD, Depression who presents to the Emergency Department for the evaluation of cough and shortness of breath. The patient was admitted on 9/21 of this year with pneumonia for a 5 day stay. Per ED records, patient's daughter mentioned 02 sat being low but unsure what the reading was. Patient has not been on oxygen for the past 3 weeks. Additionally, patient has had a cough for the past 3 to 4 days which is non productive. She reports chest heaviness but denies diaphoresis, nausea, vomiting, diarrhea, abdominal pain, dysuria, fever or chills. Pulse ox while in the ED was as low as 86% on room air. Sh was promply placed on oxygen. Patient admits to smoking 1 ppd x > 50 yrs. ABGs was normal except for p02 of 50. CXR showed COPD. Patient is being admitted for further evaluation and treatment. Past Medical History:        Diagnosis Date    COPD (chronic obstructive pulmonary disease) ([facility], [place] [address]     Depression        Past Surgical History:        Procedure Laterality Date    TONSILLECTOMY         Home Medications:   No current facility-administered medications on file prior to encounter.       Current Outpatient Prescriptions on File Prior to Encounter   Medication Sig Dispense Refill    albuterol sulfate  (90 Base) MCG/ACT inhaler Inhale 2 puffs into the lungs 4 times daily Use spacer 1 Inhaler 3    cephALEXin (KEFLEX) 500 MG capsule Take 1 capsule by mouth every 8 hours 15 capsule 0    nicotine (NICODERM CQ) 14 MG/24HR Place 1 patch onto the skin daily 30 patch 3    melatonin 3 MG TABS tablet Take 2 tablets by mouth nightly as needed (sleep)  3    ARIPiprazole (ABILIFY) 10 MG tablet Take 10 mg by mouth daily      desvenlafaxine succinate (PRISTIQ) 50 MG TB24 extended release tablet Take 100 mg by mouth daily          Allergies:  Review of patient's allergies indicates no known allergies. Social History:    reports that she has been smoking Cigarettes. She has a 28.00 pack-year smoking history. She has never used smokeless tobacco. She reports that she does not drink alcohol or use drugs. Family History:   History reviewed. No pertinent family history. Review of systems:  Constitutional: no fever, no night sweats, no fatigue  Head: no headache, no head injury, no migranes. Eye: no blurring of vision, no double vision. Ears: no hearing difficulty, no tinnitus  Mouth/throat: no ulceration, dental caries, dysphagia  Lungs:+ cough, + shortness of breath, no wheeze  CVS: no palpitation, no chest pain, + shortness of breath  GI: no abdominal pain, no nausea , no vomiting, no constipation  RADHA: no dysuria, frequency and urgency, no hematuria, no kidney stones  Musculoskeletal: no joint pain, swelling , stiffness  Endocrine: no polyuria, polydypsia, no cold or heat intolerence  Hematology: no anemia, no easy brusing or bleeding, no hx of clotting disorder  Dermatology: no skin rash, no eczema, no prurities,  Psychiatry: no depression, no anxiety,no panic attacks, no suicide ideation  Neurology: no syncope, no seizures, no numbness or tingling of hands, no numbness or tingling of feet, no paresis    10 point review of systems completed, all other than noted above are negative. Vitals:   Vitals:    12/16/17 2259   BP: (!) 104/58   Pulse: 76   Resp: 18   Temp:    SpO2: 96%      BMI: Body mass index is 15.18 kg/m².                 Exam:  Physical Examination: General appearance -  alert, ill appearing, cachectic, and in no distress  Mental status - alert, oriented to person, place, and time  Neck - supple, no significant adenopathy, no JVD, or carotid bruits  Chest - diminished but clear breath sounds to auscultation, no wheezes, rales or rhonchi, symmetric air entry  Heart - normal rate, regular rhythm, normal S1, S2, no murmurs, rubs, clicks or gallops  Abdomen - soft, nontender, nondistended, no masses or organomegaly  Neurological - alert, oriented, normal speech, no focal findings or movement disorder noted  Musculoskeletal - no joint tenderness, deformity or swelling  Extremities - peripheral pulses normal, no pedal edema, no clubbing or cyanosis  Skin - normal coloration and turgor, no rashes, no suspicious skin lesions noted      Review of Labs and Diagnostic Testing:    Recent Results (from the past 24 hour(s))   Rapid influenza A/B antigens    Collection Time: 12/16/17  4:56 PM   Result Value Ref Range    Flu A Antigen NEGATIVE NEGATIVE    Flu B Antigen NEGATIVE NEGATIVE   CBC auto differential    Collection Time: 12/16/17  5:04 PM   Result Value Ref Range    WBC 12.0 (H) 4.8 - 10.8 thou/mm3    RBC 5.04 4.20 - 5.40 mill/mm3    Hemoglobin 15.6 12.0 - 16.0 gm/dl    Hematocrit 46.6 37.0 - 47.0 %    MCV 92.5 81.0 - 99.0 fL    MCH 31.0 27.0 - 31.0 pg    MCHC 33.5 33.0 - 37.0 gm/dl    RDW 14.0 11.5 - 14.5 %    Platelets 249 542 - 016 thou/mm3    MPV 8.5 7.4 - 10.4 mcm    Seg Neutrophils 71.6 %    Lymphocytes 20.4 %    Monocytes 7.3 %    Eosinophils 0.3 %    Basophils 0.4 %    nRBC 0 /100 wbc    Segs Absolute 8.6 (H) 1.8 - 7.7 thou/mm3    Lymphocytes # 2.4 1.0 - 4.8 thou/mm3    Monocytes # 0.9 0.4 - 1.3 thou/mm3    Eosinophils # 0.0 0.0 - 0.4 thou/mm3    Basophils # 0.0 0.0 - 0.1 thou/mm3   Comprehensive Metabolic Panel    Collection Time: 12/16/17  5:04 PM   Result Value Ref Range    Glucose 99 70 - 108 mg/dL    CREATININE 0.6 0.4 - 1.2 mg/dL    BUN 6 (L) 7 - 22 mg/dL    Sodium 138 135 - 145 meq/L    Potassium 3.7 3.5 - 5.2 meq/L    Chloride 96 (L) 98 - 111 meq/L    CO2 31 23 - 33 meq/L    Calcium 9.5 8.5 - 10.5 mg/dL    AST 19 5 - 40 U/L    Alkaline NEGATIVE NEGATIVE    Leukocyte Esterase, Urine NEGATIVE NEGATIVE    Color, UA YELLOW STRAW-YELL    Character, Urine CLOUDY (A) CLEAR-SL C    RBC, UA 3-5 0-2/hpf /hpf    WBC, UA 0-2 0-4/hpf /hpf    Epi Cells 0-2 3-5/hpf /hpf    Bacteria, UA NONE FEW/NONE S /hpf    Casts UA 4-8 HYALINE NONE SEEN /lpf    Crystals NONE SEEN NONE SEEN    Renal Epithelial, Urine NONE SEEN NONE SEEN    Yeast, UA NONE SEEN NONE SEEN    CASTS 2 NONE SEEN NONE SEEN /lpf    MISCELLANEOUS 2 NONE SEEN    EKG SOB    Collection Time: 12/16/17  9:35 PM   Result Value Ref Range    Ventricular Rate 82 BPM    Atrial Rate 82 BPM    P-R Interval 122 ms    QRS Duration 72 ms    Q-T Interval 340 ms    QTc Calculation (Bazett) 397 ms    P Axis 76 degrees    R Axis 67 degrees    T Axis 66 degrees       Radiology:     Xr Chest Standard (2 Vw)    Result Date: 12/16/2017  PROCEDURE: XR CHEST STANDARD TWO VW CLINICAL INFORMATION: COPD,  COMPARISON: Chest x-ray dated 9/26/2017. TECHNIQUE:  PA and lateral chest x-ray  FINDINGS: The cardiomediastinal silhouette appears within normal limits. There is an ill-defined irregular opacity at the right suprahilar region. No other focal consolidation, pleural effusion or pneumothorax is seen. There is hyperinflation. There is diffuse osteopenia. There are fibroemphysematous changes demonstrated at the apices. 1. Hyperinflation and biapical fibrotic emphysematous changes are demonstrated. Findings are likely related to COPD in the appropriate clinical setting. 2. Ill-defined irregular opacity at the right suprahilar region. This may represent focal fibrotic scarring. However, cannot exclude underlying lesion. Consider further evaluation with contrast-enhanced chest CT. This report has been created using voice recognition software. It may contain minor errors which are inherent in voice recognition technology.    Final report electronically signed by Dr. Jerrell Benitez on 12/16/2017 5:04 PM        EKG: Normal sinus rhythm  Biatrial enlargement      Assessment / Plan:    1. Hypoxia, poss d/t #2, r/o PE- D Dimer, ABGs, duonebs prn, Pulm consult  2. COPD (chronic obstructive pulmonary disease) ([address]- duonebs prn, Levaquin, procalcitonin, pulse ox to keep 02 > 92 %  3. Tobacco abuse- cessation counseling, Nicotine patch  4.    Leukocytosis, poss d/t #2- CXR, UA, procalcitonin, lactic acid, blood cultures, monitor CBC      Dispo: Admit, duonebs, Levaquin, await pending labs, Pulm consult. Hospitalist service will follow.         DVT prophylaxis: [x] Lovenox                                 [] SCDs                                 [] SQ Heparin                                 [] Encourage ambulation, low risk for DVT, no chemical or mechanical prophylaxis necessary              [] Already on Anticoagulation                Anticipated Disposition upon discharge: [] Home                                                                         [x] Home with Home Health                                                                         [] [facility]                                                                         [] [address]          Electronically signed by Sarah Smith DO on 12/16/2017 at 11:32 PM

## 2017-12-17 NOTE — ED NOTES
Pt reassessed. Pt 86% on RA. Pt placed on 3 L of oxygen via NC at this time. Pt resting on cot, respirations labored some, productive moist cough heard. Lung sounds diminished bilaterally and some expiratory wheezing heard on right side. Pt given something to eat and drink at this time per verbal order from Dr. Des Chris. Pt and family updated on POC, will be admitted, no complaints. SR up x2, call light in reach, telemetry continued, will continue to monitor.      Doroteo Cobos RN  12/16/17 2006

## 2017-12-18 PROCEDURE — 99406 BEHAV CHNG SMOKING 3-10 MIN: CPT | Performed by: NURSE PRACTITIONER

## 2017-12-18 PROCEDURE — 99232 SBSQ HOSP IP/OBS MODERATE 35: CPT | Performed by: INTERNAL MEDICINE

## 2017-12-18 PROCEDURE — 6370000000 HC RX 637 (ALT 250 FOR IP): Performed by: NURSE PRACTITIONER

## 2017-12-18 PROCEDURE — 6370000000 HC RX 637 (ALT 250 FOR IP): Performed by: INTERNAL MEDICINE

## 2017-12-18 PROCEDURE — 6360000002 HC RX W HCPCS

## 2017-12-18 PROCEDURE — 2580000003 HC RX 258: Performed by: FAMILY MEDICINE

## 2017-12-18 PROCEDURE — APPNB30 APP NON BILLABLE TIME 0-30 MINS: Performed by: NURSE PRACTITIONER

## 2017-12-18 PROCEDURE — APPSS30 APP SPLIT SHARED TIME 16-30 MINUTES: Performed by: NURSE PRACTITIONER

## 2017-12-18 PROCEDURE — 94669 MECHANICAL CHEST WALL OSCILL: CPT

## 2017-12-18 PROCEDURE — 94640 AIRWAY INHALATION TREATMENT: CPT

## 2017-12-18 PROCEDURE — 6370000000 HC RX 637 (ALT 250 FOR IP): Performed by: FAMILY MEDICINE

## 2017-12-18 PROCEDURE — 1200000003 HC TELEMETRY R&B

## 2017-12-18 RX ORDER — ALBUTEROL SULFATE 2.5 MG/3ML
2.5 SOLUTION RESPIRATORY (INHALATION) EVERY 6 HOURS PRN
Qty: 3 PACKAGE | Refills: 6 | Status: ON HOLD | OUTPATIENT
Start: 2017-12-18 | End: 2018-12-04 | Stop reason: ALTCHOICE

## 2017-12-18 RX ORDER — NICOTINE 21 MG/24HR
1 PATCH, TRANSDERMAL 24 HOURS TRANSDERMAL DAILY
Qty: 30 PATCH | Refills: 3 | Status: SHIPPED | OUTPATIENT
Start: 2017-12-19 | End: 2018-02-26 | Stop reason: ALTCHOICE

## 2017-12-18 RX ORDER — LEVOFLOXACIN 250 MG/1
250 TABLET ORAL DAILY
Status: DISCONTINUED | OUTPATIENT
Start: 2017-12-18 | End: 2017-12-18

## 2017-12-18 RX ORDER — UREA 10 %
6 LOTION (ML) TOPICAL NIGHTLY PRN
Status: DISCONTINUED | OUTPATIENT
Start: 2017-12-18 | End: 2017-12-19 | Stop reason: HOSPADM

## 2017-12-18 RX ORDER — IPRATROPIUM BROMIDE AND ALBUTEROL SULFATE 2.5; .5 MG/3ML; MG/3ML
1 SOLUTION RESPIRATORY (INHALATION) EVERY 4 HOURS
Qty: 360 ML | Refills: 3 | Status: SHIPPED | OUTPATIENT
Start: 2017-12-18 | End: 2017-12-18 | Stop reason: HOSPADM

## 2017-12-18 RX ORDER — LEVOFLOXACIN 500 MG/1
500 TABLET, FILM COATED ORAL DAILY
Status: DISCONTINUED | OUTPATIENT
Start: 2017-12-18 | End: 2017-12-18

## 2017-12-18 RX ADMIN — PREDNISONE 40 MG: 20 TABLET ORAL at 08:13

## 2017-12-18 RX ADMIN — IPRATROPIUM BROMIDE AND ALBUTEROL SULFATE 1 AMPULE: .5; 3 SOLUTION RESPIRATORY (INHALATION) at 21:16

## 2017-12-18 RX ADMIN — Medication 10 ML: at 08:14

## 2017-12-18 RX ADMIN — IPRATROPIUM BROMIDE AND ALBUTEROL SULFATE 1 AMPULE: .5; 3 SOLUTION RESPIRATORY (INHALATION) at 13:32

## 2017-12-18 RX ADMIN — IPRATROPIUM BROMIDE AND ALBUTEROL SULFATE 1 AMPULE: .5; 3 SOLUTION RESPIRATORY (INHALATION) at 17:24

## 2017-12-18 RX ADMIN — ENOXAPARIN SODIUM 30 MG: 30 INJECTION SUBCUTANEOUS at 08:18

## 2017-12-18 RX ADMIN — ARIPIPRAZOLE 10 MG: 10 TABLET ORAL at 08:13

## 2017-12-18 RX ADMIN — IPRATROPIUM BROMIDE AND ALBUTEROL SULFATE 1 AMPULE: .5; 3 SOLUTION RESPIRATORY (INHALATION) at 10:05

## 2017-12-18 RX ADMIN — Medication 10 ML: at 20:48

## 2017-12-18 RX ADMIN — DESVENLAFAXINE SUCCINATE 100 MG: 100 TABLET, FILM COATED, EXTENDED RELEASE ORAL at 08:13

## 2017-12-18 ASSESSMENT — PAIN SCALES - GENERAL
PAINLEVEL_OUTOF10: 0
PAINLEVEL_OUTOF10: 0

## 2017-12-18 NOTE — PLAN OF CARE
Problem: Nutrition  Goal: Optimal nutrition therapy  Outcome: Ongoing  Nutrition Problem: Severe malnutrition, in context of chronic illness  Intervention: Food and/or Nutrient Delivery: Continue current diet, Start ONS, Vitamin Supplement  Nutritional Goals: Pt will consume 50% or more of meals and ONS during LOS

## 2017-12-18 NOTE — PROGRESS NOTES
Hospitalist Progress Note    Patient:  Carlie Ren      Unit/Bed:6K-27/027-A    YOB: 1945    MRN: [id number]       Acct: [de-identified]     PCP: Mohit Rachel CNP    Date of Admission: 12/16/2017    Chief Complaint: Cough and shortness of breath. Hospital Course: 67-year-old female with a history of COPD and depression were presented with [place] [id number].. Complained of cough and shortness of breath. The symptoms have lasted for about 4 days. The cough is nonproductive. It's associated with shortness of breath and wheezing. The patient denies fevers and chills. She was hypoxemic at presentation. The patient has a long-standing history of nicotine abuse. We discussed the importance of nicotine cessation. Subjective: Improved with less shortness of breath. Still dependant on oxgen, smokes still      Medications:  Reviewed    Infusion Medications    Scheduled Medications    sodium chloride flush  10 mL Intravenous 2 times per day    ARIPiprazole  10 mg Oral Daily    desvenlafaxine succinate  100 mg Oral Daily    enoxaparin  30 mg Subcutaneous Daily    ipratropium-albuterol  1 ampule Inhalation Q4H WA    nicotine  1 patch Transdermal Daily    predniSONE  40 mg Oral Daily     PRN Meds: melatonin, ipratropium-albuterol, sodium chloride flush, acetaminophen, magnesium hydroxide, ondansetron      Intake/Output Summary (Last 24 hours) at 12/18/17 1204  Last data filed at 12/18/17 0832   Gross per 24 hour   Intake              820 ml   Output                0 ml   Net              820 ml       Diet:  DIET GENERAL;    Exam:  BP (!) 103/58   Pulse 89   Temp 97.4 °F (36.3 °C) (Oral)   Resp 18   Ht 5' 2\" (1.575 m)   Wt 83 lb 12.8 oz (38 kg)   SpO2 (!) 85%   BMI 15.33 kg/m²     General appearance: No apparent distress, appears stated age and cooperative. HENT: Normocephalic and atraumatic. Oropharyngeal exam unremarkable. Eyes: Pupils equal, round, and reactive to light. Conjunctivae/corneas clear. Neck: Supple, with full range of motion. No jugular venous distention. Trachea midline. Respiratory:  Normal respiratory effort. Clear to auscultation, bilaterally without Rales/Wheezes/Rhonchi. Cardiovascular: Regular rate and rhythm with normal S1/S2 without murmurs, rubs or gallops. Abdomen: Soft, non-tender, non-distended with normal bowel sounds. Musculoskeletal: No clubbing, cyanosis or edema bilaterally. Full range of motion without deformity. Skin: Skin color, texture, turgor normal.  No rashes or lesions. Neurologic:  Neurovascularly intact without any focal sensory/motor deficits. Cranial nerves: II-XII intact, grossly non-focal.  Psychiatric: Alert and oriented, thought content appropriate, normal insight  Capillary Refill: Brisk,< 3 seconds   Peripheral Pulses: +2 palpable, equal bilaterally       Labs:   Recent Labs      12/16/17   1704  12/17/17   0649   WBC  12.0*  10.5   HGB  15.6  14.8   HCT  46.6  44.2   PLT  258  235     Recent Labs      12/16/17   1704  12/17/17   0649   NA  138  141   K  3.7  3.9   CL  96*  100   CO2  31  28   BUN  6*  8   CREATININE  0.6  0.5   CALCIUM  9.5  8.8     Recent Labs      12/16/17   1704   AST  19   ALT  12   BILITOT  0.6   ALKPHOS  74     Recent Labs      12/17/17   0649   INR  1.20*     No results for input(s): Luiz Parker in the last 72 hours. Urinalysis:      Lab Results   Component Value Date    NITRU NEGATIVE 12/16/2017    WBCUA 0-2 12/16/2017    BACTERIA NONE 12/16/2017    RBCUA 3-5 12/16/2017    BLOODU NEGATIVE 12/16/2017    SPECGRAV 1.008 04/18/2017    GLUCOSEU NEGATIVE 12/16/2017       Radiology:  CT CHEST W CONTRAST   Final Result   1. No acute intrathoracic findings. 2. Chronic lung disease. Final report electronically signed by Dr. Davonte Buck on 12/17/2017 11:44 AM      XR CHEST STANDARD (2 VW)   Final Result   1. Hyperinflation and biapical fibrotic emphysematous changes are demonstrated.  Findings are likely related to COPD in the appropriate clinical setting. 2. Ill-defined irregular opacity at the right suprahilar region. This may represent focal fibrotic scarring. However, cannot exclude underlying lesion. Consider further evaluation with contrast-enhanced chest CT. **This report has been created using voice recognition software. It may contain minor errors which are inherent in voice recognition technology. **      Final report electronically signed by Dr. Kanika Mercedes on 12/16/2017 5:04 PM          Diet: DIET GENERAL;    DVT prophylaxis: [x] Lovenox                                 [] SCDs                                 [] SQ Heparin                                 [] Encourage ambulation           [] Already on Anticoagulation     Disposition:    [x] Home       [] TCU       [] Rehab       [] Psych       [] SNF       [] [place]       [] Other-    Code Status: Full Code    PT/OT Eval Status: Underway. Assessment/Plan:    Anticipated Discharge in : 1-2 days. Active Hospital Problems    Diagnosis Date Noted    Leukocytosis [D72.829] 12/17/2017    Hypoxia [R09.02] 12/16/2017    Chronic obstructive pulmonary disease with acute exacerbation (HCC) [J44.1] 12/16/2017    Tobacco abuse [Z72.0] 12/16/2017   acute resp failure with hypoxia    1. COPD exacerbation: Patient is on steroids and breathing treatments. She is slowly responding. Medication will be continued. No need for antibiotics at this point. She needs to quit smoking. Keep her on telemetry. 2. Hypoxemia: Due to #1 above. Responded to supplemental oxygen.     Highly likely will need oxygen when she is going home  Need to quit smoking completely\    Home oxygen evisabela wilson        Electronically signed by Tao Grullon MD on 12/18/2017 at 12:04 PM

## 2017-12-18 NOTE — PROGRESS NOTES
Hospitalist Progress Note    Patient:  Con Talamantes      Unit/Bed:6K-27/027-A    YOB: 1945    MRN: [id number]       Acct: [de-identified]     PCP: Nila Whittaker CNP    Date of Admission: 12/16/2017    Chief Complaint: Cough and shortness of breath. Hospital Course: 41-year-old female with a history of COPD and depression were presented with [place] [id number].. Complained of cough and shortness of breath. The symptoms have lasted for about 4 days. The cough is nonproductive. It's associated with shortness of breath and wheezing. The patient denies fevers and chills. She was hypoxemic at presentation. The patient has a long-standing history of nicotine abuse. We discussed the importance of nicotine cessation. Subjective: Improved with less shortness of breath. Still coughing. No fever or chills. Medications:  Reviewed    Infusion Medications    Scheduled Medications    sodium chloride flush  10 mL Intravenous 2 times per day    ARIPiprazole  10 mg Oral Daily    desvenlafaxine succinate  100 mg Oral Daily    enoxaparin  30 mg Subcutaneous Daily    ipratropium-albuterol  1 ampule Inhalation Q4H WA    nicotine  1 patch Transdermal Daily    predniSONE  40 mg Oral Daily     PRN Meds: ipratropium-albuterol, sodium chloride flush, acetaminophen, magnesium hydroxide, ondansetron, melatonin      Intake/Output Summary (Last 24 hours) at 12/17/17 2355  Last data filed at 12/17/17 2137   Gross per 24 hour   Intake             1030 ml   Output                0 ml   Net             1030 ml       Diet:  DIET GENERAL;    Exam:  BP (!) 106/57   Pulse 90   Temp 96.8 °F (36 °C) (Axillary)   Resp 18   Ht 5' 2\" (1.575 m)   Wt 80 lb 9.6 oz (36.6 kg)   SpO2 92%   BMI 14.74 kg/m²     General appearance: No apparent distress, appears stated age and cooperative. HENT: Normocephalic and atraumatic. Oropharyngeal exam unremarkable. Eyes: Pupils equal, round, and reactive to light. Conjunctivae/corneas clear. Neck: Supple, with full range of motion. No jugular venous distention. Trachea midline. Respiratory:  Normal respiratory effort. Clear to auscultation, bilaterally without Rales/Wheezes/Rhonchi. Cardiovascular: Regular rate and rhythm with normal S1/S2 without murmurs, rubs or gallops. Abdomen: Soft, non-tender, non-distended with normal bowel sounds. Musculoskeletal: No clubbing, cyanosis or edema bilaterally. Full range of motion without deformity. Skin: Skin color, texture, turgor normal.  No rashes or lesions. Neurologic:  Neurovascularly intact without any focal sensory/motor deficits. Cranial nerves: II-XII intact, grossly non-focal.  Psychiatric: Alert and oriented, thought content appropriate, normal insight  Capillary Refill: Brisk,< 3 seconds   Peripheral Pulses: +2 palpable, equal bilaterally       Labs:   Recent Labs      12/16/17   1704  12/17/17   0649   WBC  12.0*  10.5   HGB  15.6  14.8   HCT  46.6  44.2   PLT  258  235     Recent Labs      12/16/17   1704  12/17/17   0649   NA  138  141   K  3.7  3.9   CL  96*  100   CO2  31  28   BUN  6*  8   CREATININE  0.6  0.5   CALCIUM  9.5  8.8     Recent Labs      12/16/17   1704   AST  19   ALT  12   BILITOT  0.6   ALKPHOS  74     Recent Labs      12/17/17   0649   INR  1.20*     No results for input(s): Ozzy Leaks in the last 72 hours. Urinalysis:    Lab Results   Component Value Date    NITRU NEGATIVE 12/16/2017    WBCUA 0-2 12/16/2017    BACTERIA NONE 12/16/2017    RBCUA 3-5 12/16/2017    BLOODU NEGATIVE 12/16/2017    SPECGRAV 1.008 04/18/2017    GLUCOSEU NEGATIVE 12/16/2017       Radiology:  CT CHEST W CONTRAST   Final Result   1. No acute intrathoracic findings. 2. Chronic lung disease. Final report electronically signed by Dr. Carrillo Jeter on 12/17/2017 11:44 AM      XR CHEST STANDARD (2 VW)   Final Result   1. Hyperinflation and biapical fibrotic emphysematous changes are demonstrated.  Findings

## 2017-12-18 NOTE — PLAN OF CARE
Problem: Respiratory  Goal: No pulmonary complications  Outcome: Ongoing  Lungs diminished and clear. On 2L satting 90%. Will monitor  Goal: Supplemental O2 requirements decreased  Outcome: Ongoing  Patient still requires 2L O2 but wears none at home. Will attempt to wean O2  Goal: Agreement to quit smoking  Outcome: Ongoing  Patient says that she \"better quit\" when asked about readiness. Will reinforce need to quit and continue to educate. Problem: Musculor/Skeletal Functional Status  Goal: Absence of falls  Outcome: Ongoing  No falls noted this shift. Continue falling star program. Bed alarm on, bed in low position. Call light and personal belongings in reach. Patient uses call light appropriately. Problem: Intellectual/Education/Knowledge Deficit  Goal: Teaching initiated upon admission  Outcome: Ongoing  Continuing to educate patient on breathing tx, antibiotics, and need to quit smoking. Problem: Discharge Planning:  Goal: Discharged to appropriate level of care  Discharged to appropriate level of care   Outcome: Ongoing  Patient will return home at discharge. Will monitor needs. Problem: Risk for Impaired Skin Integrity  Goal: Tissue integrity - skin and mucous membranes  Structural intactness and normal physiological function of skin and  mucous membranes. Outcome: Ongoing  No breakdown this shift. Patient turns self and walks occasionally. Will monitor    Problem: Pain:  Goal: Patient's pain/discomfort is manageable  Patient's pain/discomfort is manageable  Outcome: Ongoing  Denies pain this shift. PRN tylenol available. Will monitor    Problem: Falls - Risk of:  Goal: Will remain free from falls  Will remain free from falls  Outcome: Ongoing  No falls noted this shift. Continue falling star program. Bed alarm on, bed in low position. Call light and personal belongings in reach. Patient uses call light appropriately. Comments: Care plan reviewed with patient.   Patient verbalizes understanding of the plan of care and contribute to goal setting.

## 2017-12-18 NOTE — PROGRESS NOTES
productive  Denies increased SOB  Remains Afebrile  94% on RA  Cultures negative to date     Meds    Current Medications    sodium chloride flush  10 mL Intravenous 2 times per day    ARIPiprazole  10 mg Oral Daily    desvenlafaxine succinate  100 mg Oral Daily    enoxaparin  30 mg Subcutaneous Daily    ipratropium-albuterol  1 ampule Inhalation Q4H WA    nicotine  1 patch Transdermal Daily    predniSONE  40 mg Oral Daily     melatonin, ipratropium-albuterol, sodium chloride flush, acetaminophen, magnesium hydroxide, ondansetron  IV Drips/Infusions     Vitals    Vitals    height is 5' 2\" (1.575 m) and weight is 83 lb 12.8 oz (38 kg). Her oral temperature is 97.4 °F (36.3 °C). Her blood pressure is 103/58 (abnormal) and her pulse is 89. Her respiration is 18 and oxygen saturation is 93%. O2 Flow Rate (L/min): 1 L/min  I/O    Intake/Output Summary (Last 24 hours) at 12/18/17 0941  Last data filed at 12/18/17 3047   Gross per 24 hour   Intake              820 ml   Output                0 ml   Net              820 ml     Patient Vitals for the past 96 hrs (Last 3 readings):   Weight   12/18/17 0345 83 lb 12.8 oz (38 kg)   12/17/17 0059 80 lb 9.6 oz (36.6 kg)   12/16/17 1652 83 lb (37.6 kg)     Exam   Constitutional: Patient appears thin and malnourished, but no acute distress. Head: Normocephalic and atraumatic. Neck: Neck supple. No JVD or tracheal deviation present. Cardiovascular: Regular rate, regular rhythm, S1 and S2 with no murmur. No peripheral edema. Pulmonary/Chest: Normal effort with diminished but clear breath sounds. No stridor. No respiratory distress. Abdominal: Soft. Bowel sounds audible. No distension or tenderness to palpation. Musculoskeletal: Moves all extremities. Neurological: Patient is alert and oriented to person, place, and time. Skin: Skin is warm and dry.       Labs   ABG  Lab Results   Component Value Date    PH 7.45 12/16/2017    PO2 50 12/16/2017    PCO2 42 separation.   DOPPLER: The transmitral velocity was within the normal range with no   evidence for mitral stenosis.   Moderate tricuspid regurgitation visualized.   Pulmonary systolic pressure Moderately increased. and is estimated at   55-60 mmHg.   No evidence of any pericardial effusion.      Mitral Valve   The mitral valve structure was normal with normal leaflet separation.   DOPPLER: The transmitral velocity was within the normal range with no   evidence for mitral stenosis.      Aortic Valve   Leaflets exhibited mildly increased thickness and mildly reduced cuspal   separation of the aortic valve.      Tricuspid Valve   Moderate tricuspid regurgitation visualized.   Pulmonary systolic pressure Moderately increased. and is estimated at   55-60 mmHg.      Left Atrium   Left atrial size was normal.      Left Ventricle   Normal left ventricle size and systolic function. Ejection fraction was   estimated at 50 to 55 %. There were no regional left ventricular wall   motion abnormalities and wall thickness was within normal limits.  E/A flow reversal noted. Suggestive of diastolic dysfunction.      Right Atrium   Mildly enlarged right atrium size.      Right Ventricle   Moderately dilated right ventricle.      Pericardial Effusion   No evidence of any pericardial effusion. Cultures    Blood cultures x 2: No growth preliminary  Influenza A/B: Negative    Procalcitonin  Lab Results   Component Value Date    PROCAL 0.05 12/17/2017    PROCAL 0.23 09/25/2017    PROCAL 0.76 09/21/2017     Radiology    CXR      CXR 12/16/17   1. Hyperinflation and biapical fibrotic emphysematous changes are demonstrated. Findings are likely related to COPD in the appropriate clinical setting. 2. Ill-defined irregular opacity at the right suprahilar region. This may represent focal fibrotic scarring. However, cannot exclude underlying lesion. Consider further evaluation with contrast-enhanced chest CT.        CT Scans    CT chest

## 2017-12-18 NOTE — PLAN OF CARE
Problem: Impaired respiratory status  Goal: Lung sounds clear or within normal limits for patient  Outcome: Ongoing  Duoneb Q4 WA, Acapella Q4 WA, PD&P Daily to improve breath sounds.

## 2017-12-18 NOTE — CARE COORDINATION
12/18/17, 1:25 PM      Star Thomson       Admitted from: ER 12/16/2017/ 1600 Hospital day: 2   Location: -27/027-A Reason for admit: Hypoxia [R09.02] Status: IP   Admit order signed?: yes  PMH:  has a past medical history of COPD (chronic obstructive pulmonary disease) ([address] Depression; and Pneumonia. Medications:  Scheduled Meds:   sodium chloride flush  10 mL Intravenous 2 times per day    ARIPiprazole  10 mg Oral Daily    desvenlafaxine succinate  100 mg Oral Daily    enoxaparin  30 mg Subcutaneous Daily    ipratropium-albuterol  1 ampule Inhalation Q4H WA    nicotine  1 patch Transdermal Daily    predniSONE  40 mg Oral Daily     Continuous Infusions:    Pertinent Info/Orders/Treatment Plan:  Duonebs, po steroids. Pulm following- attempt to wean oxygen if able. Plan home oxygen evaluation tomorrow per Dr. Burt Holstein. Diet: DIET GENERAL;   DVT Prophylaxis: Lovenox  Smoking status:  reports that she has been smoking Cigarettes. She has a 28.00 pack-year smoking history. She has never used smokeless tobacco.   Influenza Vaccination Screening Completed: yes  Pneumonia Vaccination Screening Completed: yes  Core measures: vte  PCP: Artur Keene CNP  Readmission:   no  Risk Score: 6.25     Discharge Planning  Current Residence:  Private Residence  Living Arrangements:  Alone   Support Systems:  Children  Current Services PTA:     Potential Assistance Needed:  N/A  Potential Assistance Purchasing Medications:  No  Does patient want to participate in local refill/ meds to beds program?  No  Type of Home Care Services:  None  Patient expects to be discharged to:  [facility]  Expected Discharge date:  12/19/17  Follow Up Appointment: Best Day/ Time: Tuesday PM    Discharge Plan: Spoke with Michelle Veras, she plans return to her apartment in the [facility] at discharge. She was recently discharged from an [facility], but no services were lined up by [facility]. Michelle Veras denies need for [facility] at this time.   Pulmonology has stated

## 2017-12-19 VITALS
HEART RATE: 85 BPM | OXYGEN SATURATION: 93 % | WEIGHT: 82.31 LBS | RESPIRATION RATE: 16 BRPM | TEMPERATURE: 97.4 F | DIASTOLIC BLOOD PRESSURE: 64 MMHG | HEIGHT: 62 IN | BODY MASS INDEX: 15.15 KG/M2 | SYSTOLIC BLOOD PRESSURE: 108 MMHG

## 2017-12-19 PROCEDURE — 2580000003 HC RX 258: Performed by: FAMILY MEDICINE

## 2017-12-19 PROCEDURE — APPSS30 APP SPLIT SHARED TIME 16-30 MINUTES: Performed by: NURSE PRACTITIONER

## 2017-12-19 PROCEDURE — 6360000002 HC RX W HCPCS

## 2017-12-19 PROCEDURE — 6370000000 HC RX 637 (ALT 250 FOR IP): Performed by: NURSE PRACTITIONER

## 2017-12-19 PROCEDURE — 94761 N-INVAS EAR/PLS OXIMETRY MLT: CPT

## 2017-12-19 PROCEDURE — 99232 SBSQ HOSP IP/OBS MODERATE 35: CPT | Performed by: INTERNAL MEDICINE

## 2017-12-19 PROCEDURE — 94640 AIRWAY INHALATION TREATMENT: CPT

## 2017-12-19 PROCEDURE — 94669 MECHANICAL CHEST WALL OSCILL: CPT

## 2017-12-19 PROCEDURE — 6370000000 HC RX 637 (ALT 250 FOR IP): Performed by: FAMILY MEDICINE

## 2017-12-19 PROCEDURE — 99238 HOSP IP/OBS DSCHRG MGMT 30/<: CPT | Performed by: HOSPITALIST

## 2017-12-19 PROCEDURE — 94667 MNPJ CHEST WALL 1ST: CPT

## 2017-12-19 RX ORDER — PREDNISONE 20 MG/1
40 TABLET ORAL DAILY
Qty: 6 TABLET | Refills: 0 | Status: SHIPPED | OUTPATIENT
Start: 2017-12-20 | End: 2017-12-23

## 2017-12-19 RX ADMIN — ENOXAPARIN SODIUM 30 MG: 30 INJECTION SUBCUTANEOUS at 10:10

## 2017-12-19 RX ADMIN — IPRATROPIUM BROMIDE AND ALBUTEROL SULFATE 1 AMPULE: .5; 3 SOLUTION RESPIRATORY (INHALATION) at 08:53

## 2017-12-19 RX ADMIN — DESVENLAFAXINE SUCCINATE 100 MG: 100 TABLET, FILM COATED, EXTENDED RELEASE ORAL at 10:05

## 2017-12-19 RX ADMIN — ARIPIPRAZOLE 10 MG: 10 TABLET ORAL at 10:05

## 2017-12-19 RX ADMIN — IPRATROPIUM BROMIDE AND ALBUTEROL SULFATE 1 AMPULE: .5; 3 SOLUTION RESPIRATORY (INHALATION) at 13:04

## 2017-12-19 RX ADMIN — IPRATROPIUM BROMIDE AND ALBUTEROL SULFATE 1 AMPULE: .5; 3 SOLUTION RESPIRATORY (INHALATION) at 16:26

## 2017-12-19 RX ADMIN — Medication 10 ML: at 10:11

## 2017-12-19 RX ADMIN — PREDNISONE 40 MG: 20 TABLET ORAL at 10:05

## 2017-12-19 NOTE — PROGRESS NOTES
Patient is mobile in her home and is requiring portable O2, Dr Collin Carreon informed and is in agreement of O2 eval and patient going home with portable O2.

## 2017-12-19 NOTE — PROGRESS NOTES
separation.   DOPPLER: The transmitral velocity was within the normal range with no   evidence for mitral stenosis.   Moderate tricuspid regurgitation visualized.   Pulmonary systolic pressure Moderately increased. and is estimated at   55-60 mmHg.   No evidence of any pericardial effusion.      Mitral Valve   The mitral valve structure was normal with normal leaflet separation.   DOPPLER: The transmitral velocity was within the normal range with no   evidence for mitral stenosis.      Aortic Valve   Leaflets exhibited mildly increased thickness and mildly reduced cuspal   separation of the aortic valve.      Tricuspid Valve   Moderate tricuspid regurgitation visualized.   Pulmonary systolic pressure Moderately increased. and is estimated at   55-60 mmHg.      Left Atrium   Left atrial size was normal.      Left Ventricle   Normal left ventricle size and systolic function. Ejection fraction was   estimated at 50 to 55 %. There were no regional left ventricular wall   motion abnormalities and wall thickness was within normal limits.  E/A flow reversal noted. Suggestive of diastolic dysfunction.      Right Atrium   Mildly enlarged right atrium size.      Right Ventricle   Moderately dilated right ventricle.      Pericardial Effusion   No evidence of any pericardial effusion. Cultures    Blood cultures x 2: No growth preliminary  Influenza A/B: Negative    Procalcitonin  Lab Results   Component Value Date    PROCAL 0.05 12/17/2017    PROCAL 0.23 09/25/2017    PROCAL 0.76 09/21/2017     Radiology    CXR      CXR 12/16/17   1. Hyperinflation and biapical fibrotic emphysematous changes are demonstrated. Findings are likely related to COPD in the appropriate clinical setting. 2. Ill-defined irregular opacity at the right suprahilar region. This may represent focal fibrotic scarring. However, cannot exclude underlying lesion. Consider further evaluation with contrast-enhanced chest CT.        CT Scans    CT chest 12/17/17   1. No acute intrathoracic findings. 2. Chronic lung disease. CT chest 9/21/17   1.   Negative for pulmonary emboli. 2.  Early patchy infiltrate consistent with pneumonia in the left upper lobe and left lower lobe. 3.  Atelectasis versus mild patchy infiltrate in the medial basilar segment of the right lower lobe. (See actual reports for details)    Assessment   COPD exacerbation  Acute hypoxic respiratory failure  Continued Nicotine dependence  Recent CAP: Repeat CT scan with resolved infiltrates. Chronic diastolic HF  Mod TR with mod PAH  Protein calorie malnutrition  Deconditioned  Full Code  Recommendations   Continue Prednisone for 7 days  Home O2 evaluation pending  She has an abluterol HFA inhaler for home  Plan for home with Spiriva and Albuterol Nebulizers  Plan for outpatient PFT's and CXR in 6 weeks and follow up with me    Marcos Zavala was evaluated today and a DME order was entered for a nebulizer compressor in order to administer Albuterol due to the diagnosis of COPD. The need for this equipment and treatment was discussed with the patient and she understands and is in agreement. Case discussed with nurse and patient/Dr. Mauri Koyanagi. Questions and concerns addressed. Meds and Orders reviewed. Electronically signed by     Josie Dubose CNP on 12/19/2017 at 9:07 AM     On supplemental oxygen via [place]  Feels better  Motivated not to resume smoking  Would DC on Spiriva and Albuterol  Complete 10 days of prednisone  Will see in office with PFTs  Supplemental oxygen as per home oxygen eval    Patient seen and examined independently by me. Above discussed and I agree with Vandana Coles CNP note Also see my additional comments. Labs, cultures, and radiographs when available were reviewed. Changes were made in the orders as necessary. I discussed patient concerns with Milton ELAINE and instructions were given. Respiratory care issues addressed.   Please see our orders

## 2017-12-19 NOTE — PROGRESS NOTES
Spoke with [facility] home medical supplies at . They are working on setting patient up with home oxygen prior to discharge at this time.

## 2017-12-19 NOTE — DISCHARGE SUMMARY
cooperative. HEENT:  Normal cephalic, atraumatic without obvious deformity. Pupils equal, round, and reactive to light. Extra ocular muscles intact. Conjunctivae/corneas clear. Neck: Supple, with full range of motion. No jugular venous distention. Trachea midline. Respiratory:  Decreased breath sounds with scattered wheezes  Cardiovascular:  Regular rate and rhythm with normal S1/S2 without murmurs, rubs or gallops. Abdomen: Soft, non-tender, non-distended with normal bowel sounds. Musculoskeletal:  No clubbing, cyanosis or edema bilaterally. Full range of motion without deformity. Skin: Skin color, texture, turgor normal.  No rashes or lesions. Neurologic:  Neurovascularly intact without any focal sensory/motor deficits. Cranial nerves: II-XII intact, grossly non-focal.  Psychiatric:  Alert and oriented, thought content appropriate, normal insight  Capillary Refill: Brisk,< 3 seconds   Peripheral Pulses: +2 palpable, equal bilaterally       Labs: For convenience and continuity at follow-up the following most recent labs are provided:      CBC:    Lab Results   Component Value Date    WBC 10.5 12/17/2017    HGB 14.8 12/17/2017    HCT 44.2 12/17/2017     12/17/2017       Renal:  Lab Results   Component Value Date     12/17/2017    K 3.9 12/17/2017     12/17/2017    CO2 28 12/17/2017    BUN 8 12/17/2017    CREATININE 0.5 12/17/2017    CALCIUM 8.8 12/17/2017         Significant Diagnostic Studies    Radiology:   CT CHEST W CONTRAST   Final Result   1. No acute intrathoracic findings. 2. Chronic lung disease. Final report electronically signed by Dr. Sean Otto on 12/17/2017 11:44 AM      XR CHEST STANDARD (2 VW)   Final Result   1. Hyperinflation and biapical fibrotic emphysematous changes are demonstrated. Findings are likely related to COPD in the appropriate clinical setting. 2. Ill-defined irregular opacity at the right suprahilar region.  This may represent focal fibrotic (sleep)             nicotine (NICODERM CQ) 21 MG/24HR  Place 1 patch onto the skin daily             predniSONE (DELTASONE) 20 MG tablet  Take 2 tablets by mouth daily for 3 days             tiotropium (SPIRIVA HANDIHALER) 18 MCG inhalation capsule  Inhale 1 capsule into the lungs daily                 Time Spent on discharge is more than 30 minutes in the examination, evaluation, counseling and review of medications and discharge plan. Signed: Thank you Anna Lockhart CNP for the opportunity to be involved in this patient's care.     Electronically signed by Tim Iglesias MD on 12/19/2017 at 11:12 AM

## 2017-12-19 NOTE — PLAN OF CARE
Problem: Impaired respiratory status  Goal: Lung sounds clear or within normal limits for patient  Outcome: Ongoing  Pt had no questions on purpose or side effects of medication. Will continue with treatments to help improve aeration t/o lungs.

## 2017-12-19 NOTE — FLOWSHEET NOTE
12/18/17 1545   Encounter Summary   Services provided to: Patient   Referral/Consult From: [address] [facility]iting Yes  (12/18)   Complexity of Encounter Moderate   Length of Encounter 15 minutes   Spiritual Assessment Completed Yes   Spiritual/Buddhism   Type Spiritual support   Grief and Life Adjustment   Type New Diagnosis; Adjustment to illness   Assessment Approachable; Anxious   Intervention Nurtured hope;Discussed illness/injury and it's impact; Discussed relationship with God   Outcome Expressed gratitude;Engaged in conversation   12/18/17- Patient was sitting up in her bed watching TV when this staff approached. This staff introduced the purpose for the contact. O: Patient explained she is a [place] and feels very much at peace in her relationship with God. She explained that her  knows she is here and has been in to see her already. A: This staff offered prayer and continued support. Patient was accepting for this behavior and was stated she is looking forward to future visits. P: Continued support and encouragement would be welcomed.

## 2017-12-19 NOTE — CARE COORDINATION
12/19/17, 3:00 PM    Discharge plan discussed by  and . Discharge plan reviewed with patient/ family. Patient/ family verbalize understanding of discharge plan and are in agreement with plan. Understanding was demonstrated using the teach back method. Services After Discharge  Services At/After Discharge: [facility])   IMM Letter  IMM Letter given to Patient/Family/Significant other/Guardian/POA/by[de-identified] RN  IMM Letter date given[de-identified] 12/17/17  IMM Letter time given[de-identified] 18     SW met with pt to discuss discharge POC, physician gave order for [place]. Pt requested [facility], referral completed with Angela Oh. Pt also qualified for home O2, pt wanted  [facility]. SW left message with [facility] regarding Home O2 and Nebulizer. AGNIESZKA Mckeon.

## 2017-12-19 NOTE — PLAN OF CARE
Problem: Impaired respiratory status  Goal: Lung sounds clear or within normal limits for patient  Outcome: Met This Shift  Breath sounds are clear t/o pre and post Duoneb tx

## 2017-12-19 NOTE — PROGRESS NOTES
Discharge AVS faxed to [facility] home health at this time at [phone]. UPdated their staff on patient's discharge. All belongings collected and sent home with patient. Patient left with daughter and on oxygen via nasal cannula. [facility] medical to meet at home with additional equipment.

## 2017-12-22 LAB
BLOOD CULTURE, ROUTINE: NORMAL
BLOOD CULTURE, ROUTINE: NORMAL

## 2017-12-28 ENCOUNTER — TELEPHONE (OUTPATIENT)
Dept: PULMONOLOGY | Age: 72
End: 2017-12-28

## 2017-12-28 DIAGNOSIS — J44.1 CHRONIC OBSTRUCTIVE PULMONARY DISEASE WITH ACUTE EXACERBATION (HCC): Primary | ICD-10-CM

## 2018-01-22 ENCOUNTER — HOSPITAL ENCOUNTER (OUTPATIENT)
Dept: PULMONOLOGY | Age: 73
Discharge: HOME | End: 2018-01-22

## 2018-01-29 ENCOUNTER — HOSPITAL ENCOUNTER (OUTPATIENT)
Dept: PULMONOLOGY | Age: 73
Discharge: HOME | End: 2018-01-29
Payer: MEDICARE

## 2018-01-29 DIAGNOSIS — Z72.0 TOBACCO ABUSE: ICD-10-CM

## 2018-01-29 DIAGNOSIS — J44.1 CHRONIC OBSTRUCTIVE PULMONARY DISEASE WITH ACUTE EXACERBATION (HCC): ICD-10-CM

## 2018-01-29 PROCEDURE — 94729 DIFFUSING CAPACITY: CPT

## 2018-01-29 PROCEDURE — 94060 EVALUATION OF WHEEZING: CPT

## 2018-01-29 PROCEDURE — 94726 PLETHYSMOGRAPHY LUNG VOLUMES: CPT

## 2018-02-26 ENCOUNTER — OFFICE VISIT (OUTPATIENT)
Dept: PULMONOLOGY | Age: 73
End: 2018-02-26
Payer: MEDICARE

## 2018-02-26 VITALS
HEART RATE: 78 BPM | TEMPERATURE: 97.1 F | BODY MASS INDEX: 17.66 KG/M2 | WEIGHT: 96 LBS | DIASTOLIC BLOOD PRESSURE: 68 MMHG | HEIGHT: 62 IN | OXYGEN SATURATION: 91 % | SYSTOLIC BLOOD PRESSURE: 120 MMHG

## 2018-02-26 DIAGNOSIS — J44.9 CHRONIC OBSTRUCTIVE PULMONARY DISEASE, UNSPECIFIED COPD TYPE (HCC): Primary | ICD-10-CM

## 2018-02-26 DIAGNOSIS — J96.11 CHRONIC RESPIRATORY FAILURE WITH HYPOXIA (HCC): ICD-10-CM

## 2018-02-26 PROCEDURE — 99214 OFFICE O/P EST MOD 30 MIN: CPT | Performed by: NURSE PRACTITIONER

## 2018-02-26 ASSESSMENT — ENCOUNTER SYMPTOMS
COUGH: 0
SINUS PAIN: 0
BLURRED VISION: 0
DOUBLE VISION: 0
NAUSEA: 0
SPUTUM PRODUCTION: 0
HEMOPTYSIS: 0
ORTHOPNEA: 0
SHORTNESS OF BREATH: 0
VOMITING: 0
WHEEZING: 0
DIARRHEA: 0
SORE THROAT: 0

## 2018-11-21 ENCOUNTER — HOSPITAL ENCOUNTER (OUTPATIENT)
Dept: WOMENS IMAGING | Age: 73
Discharge: HOME | End: 2018-11-21
Payer: MEDICARE

## 2018-11-21 ENCOUNTER — HOSPITAL ENCOUNTER (OUTPATIENT)
Age: 73
Discharge: HOME | End: 2018-11-21
Payer: MEDICARE

## 2018-11-21 ENCOUNTER — HOSPITAL ENCOUNTER (OUTPATIENT)
Dept: GENERAL RADIOLOGY | Age: 73
Discharge: HOME | End: 2018-11-21
Payer: MEDICARE

## 2018-11-21 DIAGNOSIS — J44.9 CHRONIC OBSTRUCTIVE PULMONARY DISEASE, UNSPECIFIED COPD TYPE (HCC): ICD-10-CM

## 2018-11-21 DIAGNOSIS — Z12.11 ENCOUNTER FOR COLORECTAL CANCER SCREENING: ICD-10-CM

## 2018-11-21 DIAGNOSIS — Z12.31 ENCOUNTER FOR SCREENING MAMMOGRAM FOR BREAST CANCER: ICD-10-CM

## 2018-11-21 DIAGNOSIS — R06.02 SHORTNESS OF BREATH: ICD-10-CM

## 2018-11-21 DIAGNOSIS — Z12.31 VISIT FOR SCREENING MAMMOGRAM: ICD-10-CM

## 2018-11-21 DIAGNOSIS — Z12.12 ENCOUNTER FOR COLORECTAL CANCER SCREENING: ICD-10-CM

## 2018-11-21 PROCEDURE — 77063 BREAST TOMOSYNTHESIS BI: CPT

## 2018-11-21 PROCEDURE — 71046 X-RAY EXAM CHEST 2 VIEWS: CPT

## 2018-12-04 ENCOUNTER — HOSPITAL ENCOUNTER (INPATIENT)
Age: 73
LOS: 3 days | Discharge: HOME HEALTH | DRG: 189 | End: 2018-12-07
Attending: FAMILY MEDICINE | Admitting: HOSPITALIST
Payer: MEDICARE

## 2018-12-04 ENCOUNTER — APPOINTMENT (OUTPATIENT)
Dept: CT IMAGING | Age: 73
DRG: 189 | End: 2018-12-04
Payer: MEDICARE

## 2018-12-04 DIAGNOSIS — R91.8 LUNG NODULES: ICD-10-CM

## 2018-12-04 DIAGNOSIS — J18.9 PNEUMONIA DUE TO ORGANISM: ICD-10-CM

## 2018-12-04 DIAGNOSIS — J44.1 COPD EXACERBATION (HCC): Primary | ICD-10-CM

## 2018-12-04 PROBLEM — J96.22 ACUTE ON CHRONIC RESPIRATORY FAILURE WITH HYPOXIA AND HYPERCAPNIA (HCC): Status: ACTIVE | Noted: 2017-09-21

## 2018-12-04 PROBLEM — R63.6 PATIENT UNDERWEIGHT: Status: ACTIVE | Noted: 2018-12-04

## 2018-12-04 PROBLEM — E87.20 LACTIC ACIDOSIS: Status: ACTIVE | Noted: 2018-12-04

## 2018-12-04 PROBLEM — F17.200 TOBACCO USE DISORDER: Status: ACTIVE | Noted: 2017-12-16

## 2018-12-04 PROBLEM — R65.10 SIRS (SYSTEMIC INFLAMMATORY RESPONSE SYNDROME) (HCC): Status: ACTIVE | Noted: 2018-12-04

## 2018-12-04 LAB
ALBUMIN SERPL-MCNC: 3.6 G/DL (ref 3.5–5.1)
ALLEN TEST: POSITIVE
ALLEN TEST: POSITIVE
ALP BLD-CCNC: 84 U/L (ref 38–126)
ALT SERPL-CCNC: 11 U/L (ref 11–66)
AMORPHOUS: ABNORMAL
ANION GAP SERPL CALCULATED.3IONS-SCNC: 19 MEQ/L (ref 8–16)
APTT: 33.4 SECONDS (ref 22–38)
AST SERPL-CCNC: 15 U/L (ref 5–40)
BACTERIA: ABNORMAL /HPF
BASE EXCESS (CALCULATED): 3.6 MMOL/L (ref -2.5–2.5)
BASE EXCESS (CALCULATED): 4.4 MMOL/L (ref -2.5–2.5)
BASOPHILS # BLD: 0.2 %
BASOPHILS ABSOLUTE: 0 THOU/MM3 (ref 0–0.1)
BILIRUB SERPL-MCNC: 0.5 MG/DL (ref 0.3–1.2)
BILIRUBIN URINE: NEGATIVE
BLOOD, URINE: NEGATIVE
BUN BLDV-MCNC: 10 MG/DL (ref 7–22)
CALCIUM SERPL-MCNC: 9.8 MG/DL (ref 8.5–10.5)
CASTS 2: ABNORMAL /LPF
CASTS UA: ABNORMAL /LPF
CHARACTER, URINE: CLEAR
CHLORIDE BLD-SCNC: 96 MEQ/L (ref 98–111)
CO2: 26 MEQ/L (ref 23–33)
COLLECTED BY:: ABNORMAL
COLLECTED BY:: ABNORMAL
COLOR: YELLOW
CREAT SERPL-MCNC: 0.8 MG/DL (ref 0.4–1.2)
CRYSTALS, UA: ABNORMAL
DEVICE: ABNORMAL
DEVICE: ABNORMAL
EKG ATRIAL RATE: 136 BPM
EKG P AXIS: 75 DEGREES
EKG P-R INTERVAL: 112 MS
EKG Q-T INTERVAL: 258 MS
EKG QRS DURATION: 74 MS
EKG QTC CALCULATION (BAZETT): 388 MS
EKG R AXIS: 126 DEGREES
EKG T AXIS: 76 DEGREES
EKG VENTRICULAR RATE: 136 BPM
EOSINOPHIL # BLD: 0 %
EOSINOPHILS ABSOLUTE: 0 THOU/MM3 (ref 0–0.4)
EPITHELIAL CELLS, UA: ABNORMAL /HPF
ERYTHROCYTE [DISTWIDTH] IN BLOOD BY AUTOMATED COUNT: 13.6 % (ref 11.5–14.5)
ERYTHROCYTE [DISTWIDTH] IN BLOOD BY AUTOMATED COUNT: 47.1 FL (ref 35–45)
FLU A ANTIGEN: NEGATIVE
FLU B ANTIGEN: NEGATIVE
GFR SERPL CREATININE-BSD FRML MDRD: 70 ML/MIN/1.73M2
GLUCOSE BLD-MCNC: 146 MG/DL (ref 70–108)
GLUCOSE URINE: NEGATIVE MG/DL
HCO3: 31 MMOL/L (ref 23–28)
HCO3: 33 MMOL/L (ref 23–28)
HCT VFR BLD CALC: 47.5 % (ref 37–47)
HEMOGLOBIN: 15.4 GM/DL (ref 12–16)
IFIO2: 35
IFIO2: 5
IMMATURE GRANS (ABS): 0.17 THOU/MM3 (ref 0–0.07)
IMMATURE GRANULOCYTES: 0.8 %
INR BLD: 1.24 (ref 0.85–1.13)
KETONES, URINE: NEGATIVE
LACTIC ACID, SEPSIS: 2.4 MMOL/L (ref 0.5–1.9)
LACTIC ACID, SEPSIS: 3.3 MMOL/L (ref 0.5–1.9)
LACTIC ACID: 2.3 MMOL/L (ref 0.5–2.2)
LEUKOCYTE ESTERASE, URINE: NEGATIVE
LYMPHOCYTES # BLD: 5 %
LYMPHOCYTES ABSOLUTE: 1.1 THOU/MM3 (ref 1–4.8)
MCH RBC QN AUTO: 30.9 PG (ref 26–33)
MCHC RBC AUTO-ENTMCNC: 32.4 GM/DL (ref 32.2–35.5)
MCV RBC AUTO: 95.2 FL (ref 81–99)
MISCELLANEOUS 2: ABNORMAL
MODE: ABNORMAL
MONOCYTES # BLD: 5.7 %
MONOCYTES ABSOLUTE: 1.3 THOU/MM3 (ref 0.4–1.3)
MRSA SCREEN RT-PCR: NEGATIVE
NITRITE, URINE: NEGATIVE
NUCLEATED RED BLOOD CELLS: 0 /100 WBC
O2 SATURATION: 96 %
O2 SATURATION: 96 %
OSMOLALITY CALCULATION: 282.9 MOSMOL/KG (ref 275–300)
PCO2: 60 MMHG (ref 35–45)
PCO2: 63 MMHG (ref 35–45)
PH BLOOD GAS: 7.33 (ref 7.35–7.45)
PH BLOOD GAS: 7.33 (ref 7.35–7.45)
PH UA: 5
PLATELET # BLD: 301 THOU/MM3 (ref 130–400)
PMV BLD AUTO: 10 FL (ref 9.4–12.4)
PO2: 89 MMHG (ref 71–104)
PO2: 91 MMHG (ref 71–104)
POTASSIUM SERPL-SCNC: 4.1 MEQ/L (ref 3.5–5.2)
PRO-BNP: 870.5 PG/ML (ref 0–900)
PROCALCITONIN: 0.27 NG/ML (ref 0.01–0.09)
PROTEIN UA: 30
RBC # BLD: 4.99 MILL/MM3 (ref 4.2–5.4)
RBC URINE: ABNORMAL /HPF
RENAL EPITHELIAL, UA: ABNORMAL
SEG NEUTROPHILS: 88.3 %
SEGMENTED NEUTROPHILS ABSOLUTE COUNT: 19.5 THOU/MM3 (ref 1.8–7.7)
SET PEEP: 5 MMHG
SET PRESS SUPP: 7 CMH2O
SODIUM BLD-SCNC: 141 MEQ/L (ref 135–145)
SOURCE, BLOOD GAS: ABNORMAL
SOURCE, BLOOD GAS: ABNORMAL
SPECIFIC GRAVITY, URINE: > 1.03 (ref 1–1.03)
TOTAL PROTEIN: 7.7 G/DL (ref 6.1–8)
TROPONIN T: < 0.01 NG/ML
UROBILINOGEN, URINE: 0.2 EU/DL
VANCOMYCIN RESISTANT ENTEROCOCCUS: POSITIVE
WBC # BLD: 22.1 THOU/MM3 (ref 4.8–10.8)
WBC UA: ABNORMAL /HPF
YEAST: ABNORMAL

## 2018-12-04 PROCEDURE — 93010 ELECTROCARDIOGRAM REPORT: CPT | Performed by: NUCLEAR MEDICINE

## 2018-12-04 PROCEDURE — 87641 MR-STAPH DNA AMP PROBE: CPT

## 2018-12-04 PROCEDURE — 87500 VANOMYCIN DNA AMP PROBE: CPT

## 2018-12-04 PROCEDURE — 83880 ASSAY OF NATRIURETIC PEPTIDE: CPT

## 2018-12-04 PROCEDURE — 80053 COMPREHEN METABOLIC PANEL: CPT

## 2018-12-04 PROCEDURE — 81001 URINALYSIS AUTO W/SCOPE: CPT

## 2018-12-04 PROCEDURE — 6370000000 HC RX 637 (ALT 250 FOR IP): Performed by: INTERNAL MEDICINE

## 2018-12-04 PROCEDURE — 36600 WITHDRAWAL OF ARTERIAL BLOOD: CPT

## 2018-12-04 PROCEDURE — 2580000003 HC RX 258: Performed by: FAMILY MEDICINE

## 2018-12-04 PROCEDURE — 2060000000 HC ICU INTERMEDIATE R&B

## 2018-12-04 PROCEDURE — 6360000002 HC RX W HCPCS: Performed by: FAMILY MEDICINE

## 2018-12-04 PROCEDURE — 87147 CULTURE TYPE IMMUNOLOGIC: CPT

## 2018-12-04 PROCEDURE — 87040 BLOOD CULTURE FOR BACTERIA: CPT

## 2018-12-04 PROCEDURE — 84484 ASSAY OF TROPONIN QUANT: CPT

## 2018-12-04 PROCEDURE — 94640 AIRWAY INHALATION TREATMENT: CPT

## 2018-12-04 PROCEDURE — 96365 THER/PROPH/DIAG IV INF INIT: CPT

## 2018-12-04 PROCEDURE — 2709999900 HC NON-CHARGEABLE SUPPLY

## 2018-12-04 PROCEDURE — 85025 COMPLETE CBC W/AUTO DIFF WBC: CPT

## 2018-12-04 PROCEDURE — 99223 1ST HOSP IP/OBS HIGH 75: CPT | Performed by: INTERNAL MEDICINE

## 2018-12-04 PROCEDURE — 84145 PROCALCITONIN (PCT): CPT

## 2018-12-04 PROCEDURE — 99285 EMERGENCY DEPT VISIT HI MDM: CPT

## 2018-12-04 PROCEDURE — 87449 NOS EACH ORGANISM AG IA: CPT

## 2018-12-04 PROCEDURE — 87804 INFLUENZA ASSAY W/OPTIC: CPT

## 2018-12-04 PROCEDURE — 71275 CT ANGIOGRAPHY CHEST: CPT

## 2018-12-04 PROCEDURE — 93005 ELECTROCARDIOGRAM TRACING: CPT | Performed by: FAMILY MEDICINE

## 2018-12-04 PROCEDURE — 85730 THROMBOPLASTIN TIME PARTIAL: CPT

## 2018-12-04 PROCEDURE — 2580000003 HC RX 258: Performed by: INTERNAL MEDICINE

## 2018-12-04 PROCEDURE — 2700000000 HC OXYGEN THERAPY PER DAY

## 2018-12-04 PROCEDURE — 87081 CULTURE SCREEN ONLY: CPT

## 2018-12-04 PROCEDURE — 82803 BLOOD GASES ANY COMBINATION: CPT

## 2018-12-04 PROCEDURE — 96375 TX/PRO/DX INJ NEW DRUG ADDON: CPT

## 2018-12-04 PROCEDURE — 6360000002 HC RX W HCPCS: Performed by: INTERNAL MEDICINE

## 2018-12-04 PROCEDURE — 83605 ASSAY OF LACTIC ACID: CPT

## 2018-12-04 PROCEDURE — 36415 COLL VENOUS BLD VENIPUNCTURE: CPT

## 2018-12-04 PROCEDURE — 94660 CPAP INITIATION&MGMT: CPT

## 2018-12-04 PROCEDURE — 6360000004 HC RX CONTRAST MEDICATION: Performed by: FAMILY MEDICINE

## 2018-12-04 PROCEDURE — 85610 PROTHROMBIN TIME: CPT

## 2018-12-04 PROCEDURE — 87899 AGENT NOS ASSAY W/OPTIC: CPT

## 2018-12-04 RX ORDER — LANOLIN ALCOHOL/MO/W.PET/CERES
6 CREAM (GRAM) TOPICAL NIGHTLY PRN
Status: DISCONTINUED | OUTPATIENT
Start: 2018-12-04 | End: 2018-12-07 | Stop reason: HOSPADM

## 2018-12-04 RX ORDER — ALBUTEROL SULFATE 90 UG/1
2 AEROSOL, METERED RESPIRATORY (INHALATION) EVERY 4 HOURS PRN
Status: DISCONTINUED | OUTPATIENT
Start: 2018-12-04 | End: 2018-12-07 | Stop reason: HOSPADM

## 2018-12-04 RX ORDER — SODIUM CHLORIDE 0.9 % (FLUSH) 0.9 %
10 SYRINGE (ML) INJECTION EVERY 12 HOURS SCHEDULED
Status: DISCONTINUED | OUTPATIENT
Start: 2018-12-04 | End: 2018-12-07 | Stop reason: HOSPADM

## 2018-12-04 RX ORDER — AMITRIPTYLINE HYDROCHLORIDE 10 MG/1
10 TABLET, FILM COATED ORAL NIGHTLY
Status: DISCONTINUED | OUTPATIENT
Start: 2018-12-04 | End: 2018-12-07 | Stop reason: HOSPADM

## 2018-12-04 RX ORDER — GUAIFENESIN 600 MG/1
600 TABLET, EXTENDED RELEASE ORAL 2 TIMES DAILY
Status: DISCONTINUED | OUTPATIENT
Start: 2018-12-04 | End: 2018-12-07 | Stop reason: HOSPADM

## 2018-12-04 RX ORDER — ALBUTEROL SULFATE 2.5 MG/3ML
2.5 SOLUTION RESPIRATORY (INHALATION)
Status: DISCONTINUED | OUTPATIENT
Start: 2018-12-04 | End: 2018-12-07 | Stop reason: HOSPADM

## 2018-12-04 RX ORDER — PROMETHAZINE HYDROCHLORIDE AND CODEINE PHOSPHATE 6.25; 1 MG/5ML; MG/5ML
5 SYRUP ORAL EVERY 4 HOURS PRN
Status: DISCONTINUED | OUTPATIENT
Start: 2018-12-04 | End: 2018-12-07 | Stop reason: HOSPADM

## 2018-12-04 RX ORDER — 0.9 % SODIUM CHLORIDE 0.9 %
1000 INTRAVENOUS SOLUTION INTRAVENOUS ONCE
Status: COMPLETED | OUTPATIENT
Start: 2018-12-04 | End: 2018-12-04

## 2018-12-04 RX ORDER — IPRATROPIUM BROMIDE AND ALBUTEROL SULFATE 2.5; .5 MG/3ML; MG/3ML
1 SOLUTION RESPIRATORY (INHALATION)
Status: DISCONTINUED | OUTPATIENT
Start: 2018-12-04 | End: 2018-12-07 | Stop reason: HOSPADM

## 2018-12-04 RX ORDER — LEVOFLOXACIN 250 MG/1
250 TABLET ORAL DAILY
Status: DISCONTINUED | OUTPATIENT
Start: 2018-12-05 | End: 2018-12-05

## 2018-12-04 RX ORDER — SODIUM CHLORIDE 9 MG/ML
INJECTION, SOLUTION INTRAVENOUS CONTINUOUS
Status: ACTIVE | OUTPATIENT
Start: 2018-12-04 | End: 2018-12-05

## 2018-12-04 RX ORDER — ALBUTEROL SULFATE 2.5 MG/3ML
2.5 SOLUTION RESPIRATORY (INHALATION) ONCE
Status: COMPLETED | OUTPATIENT
Start: 2018-12-04 | End: 2018-12-04

## 2018-12-04 RX ORDER — FAMOTIDINE 20 MG/1
20 TABLET, FILM COATED ORAL 2 TIMES DAILY
Status: DISCONTINUED | OUTPATIENT
Start: 2018-12-04 | End: 2018-12-04

## 2018-12-04 RX ORDER — AMITRIPTYLINE HYDROCHLORIDE 10 MG/1
10 TABLET, FILM COATED ORAL NIGHTLY
Status: ON HOLD | COMMUNITY
End: 2021-12-08 | Stop reason: HOSPADM

## 2018-12-04 RX ORDER — LEVOFLOXACIN 500 MG/1
500 TABLET, FILM COATED ORAL DAILY
Status: DISCONTINUED | OUTPATIENT
Start: 2018-12-05 | End: 2018-12-04

## 2018-12-04 RX ORDER — ONDANSETRON 2 MG/ML
4 INJECTION INTRAMUSCULAR; INTRAVENOUS EVERY 6 HOURS PRN
Status: DISCONTINUED | OUTPATIENT
Start: 2018-12-04 | End: 2018-12-07 | Stop reason: HOSPADM

## 2018-12-04 RX ORDER — METHYLPREDNISOLONE SODIUM SUCCINATE 40 MG/ML
40 INJECTION, POWDER, LYOPHILIZED, FOR SOLUTION INTRAMUSCULAR; INTRAVENOUS EVERY 6 HOURS
Status: COMPLETED | OUTPATIENT
Start: 2018-12-04 | End: 2018-12-05

## 2018-12-04 RX ORDER — DESVENLAFAXINE 100 MG/1
100 TABLET, EXTENDED RELEASE ORAL DAILY
Status: DISCONTINUED | OUTPATIENT
Start: 2018-12-04 | End: 2018-12-07 | Stop reason: HOSPADM

## 2018-12-04 RX ORDER — 0.9 % SODIUM CHLORIDE 0.9 %
30 INTRAVENOUS SOLUTION INTRAVENOUS ONCE
Status: DISCONTINUED | OUTPATIENT
Start: 2018-12-04 | End: 2018-12-04

## 2018-12-04 RX ORDER — SODIUM CHLORIDE 0.9 % (FLUSH) 0.9 %
10 SYRINGE (ML) INJECTION PRN
Status: DISCONTINUED | OUTPATIENT
Start: 2018-12-04 | End: 2018-12-07 | Stop reason: HOSPADM

## 2018-12-04 RX ORDER — NICOTINE 21 MG/24HR
1 PATCH, TRANSDERMAL 24 HOURS TRANSDERMAL DAILY
Status: DISCONTINUED | OUTPATIENT
Start: 2018-12-04 | End: 2018-12-07 | Stop reason: HOSPADM

## 2018-12-04 RX ORDER — FAMOTIDINE 20 MG/1
20 TABLET, FILM COATED ORAL NIGHTLY
Status: DISCONTINUED | OUTPATIENT
Start: 2018-12-04 | End: 2018-12-07 | Stop reason: HOSPADM

## 2018-12-04 RX ORDER — METHYLPREDNISOLONE SODIUM SUCCINATE 125 MG/2ML
125 INJECTION, POWDER, LYOPHILIZED, FOR SOLUTION INTRAMUSCULAR; INTRAVENOUS ONCE
Status: COMPLETED | OUTPATIENT
Start: 2018-12-04 | End: 2018-12-04

## 2018-12-04 RX ORDER — ARIPIPRAZOLE 10 MG/1
10 TABLET ORAL DAILY
Status: DISCONTINUED | OUTPATIENT
Start: 2018-12-04 | End: 2018-12-07 | Stop reason: HOSPADM

## 2018-12-04 RX ORDER — LEVOFLOXACIN 5 MG/ML
500 INJECTION, SOLUTION INTRAVENOUS ONCE
Status: COMPLETED | OUTPATIENT
Start: 2018-12-04 | End: 2018-12-04

## 2018-12-04 RX ORDER — PREDNISONE 20 MG/1
40 TABLET ORAL DAILY
Status: DISCONTINUED | OUTPATIENT
Start: 2018-12-05 | End: 2018-12-07 | Stop reason: HOSPADM

## 2018-12-04 RX ADMIN — FAMOTIDINE 20 MG: 20 TABLET ORAL at 20:12

## 2018-12-04 RX ADMIN — IOPAMIDOL 80 ML: 755 INJECTION, SOLUTION INTRAVENOUS at 11:50

## 2018-12-04 RX ADMIN — ARIPIPRAZOLE 10 MG: 10 TABLET ORAL at 17:47

## 2018-12-04 RX ADMIN — SODIUM CHLORIDE 1000 ML: 9 INJECTION, SOLUTION INTRAVENOUS at 12:35

## 2018-12-04 RX ADMIN — METHYLPREDNISOLONE SODIUM SUCCINATE 125 MG: 125 INJECTION, POWDER, FOR SOLUTION INTRAMUSCULAR; INTRAVENOUS at 10:51

## 2018-12-04 RX ADMIN — AMITRIPTYLINE HYDROCHLORIDE 10 MG: 10 TABLET, FILM COATED ORAL at 20:12

## 2018-12-04 RX ADMIN — DESVENLAFAXINE SUCCINATE 100 MG: 100 TABLET, EXTENDED RELEASE ORAL at 20:12

## 2018-12-04 RX ADMIN — ALBUTEROL SULFATE 2.5 MG: 2.5 SOLUTION RESPIRATORY (INHALATION) at 11:28

## 2018-12-04 RX ADMIN — SODIUM CHLORIDE: 9 INJECTION, SOLUTION INTRAVENOUS at 16:16

## 2018-12-04 RX ADMIN — GUAIFENESIN 600 MG: 600 TABLET, EXTENDED RELEASE ORAL at 20:12

## 2018-12-04 RX ADMIN — LEVOFLOXACIN 500 MG: 5 INJECTION, SOLUTION INTRAVENOUS at 13:24

## 2018-12-04 RX ADMIN — SODIUM CHLORIDE: 9 INJECTION, SOLUTION INTRAVENOUS at 17:53

## 2018-12-04 RX ADMIN — IPRATROPIUM BROMIDE AND ALBUTEROL SULFATE 1 AMPULE: .5; 3 SOLUTION RESPIRATORY (INHALATION) at 18:57

## 2018-12-04 RX ADMIN — METHYLPREDNISOLONE SODIUM SUCCINATE 40 MG: 40 INJECTION, POWDER, FOR SOLUTION INTRAMUSCULAR; INTRAVENOUS at 17:47

## 2018-12-04 ASSESSMENT — ENCOUNTER SYMPTOMS
DIARRHEA: 0
RHINORRHEA: 0
CHEST TIGHTNESS: 1
CONSTIPATION: 0
VOMITING: 0
SHORTNESS OF BREATH: 1
ABDOMINAL PAIN: 0
BACK PAIN: 0
SORE THROAT: 0
ABDOMINAL DISTENTION: 0
COUGH: 1
WHEEZING: 1
STRIDOR: 0
NAUSEA: 0

## 2018-12-04 ASSESSMENT — PAIN SCALES - GENERAL
PAINLEVEL_OUTOF10: 0

## 2018-12-04 NOTE — ED TRIAGE NOTES
Pt to room via EMS for complaints of SOB. Pt with labored breathing, using accessory muscles. Pt is alert and oriented. States she's been coughing and feeling more short of breath over the last 3-4 days.

## 2018-12-04 NOTE — PROGRESS NOTES
Pt arrived in 4K 18 from ED. Complaints: Shortness of breath. IV normal saline infusing into the forearm left, condition patent and no redness at a rate of 500 mls/ hour with about 100 mls remaining in the bag. The best day to schedule a follow up Dr appointment is:  Monday a.m.       The patient is interested in [facility]. [facility] to [facility] program?:  Yes

## 2018-12-04 NOTE — H&P
succinate (PRISTIQ) 50 MG TB24 extended release tablet Take 100 mg by mouth daily    Yes Historical Provider, MD       Allergy(ies):  Spiriva handihaler [tiotropium bromide monohydrate]    Social History:  TOBACCO:  reports that she quit smoking about a year ago. Her smoking use included Cigarettes. She has a 28.00 pack-year smoking history. She has never used smokeless tobacco.  ETOH:  reports that she does not drink alcohol. Family History:  Family History   Problem Relation Age of Onset    Family history unknown: Yes       Review of Systems:  Pertinent positives are listed in HPI. At least 10-point ROS reviewed and were negative. Vitals and physical examination:  /68   Pulse 113   Temp 99 °F (37.2 °C) (Oral)   Resp 26   SpO2 96%   Gen/overall appearance: Not in acute distress. Alert. Oriented. Cachectic  Head: Normocephalic, atraumatic  Eyes: EOMI, good acuity  ENT: Oral mucosa moist  Neck: No JVD, thyromegaly  CVS: Nml S1S2, no MRG. Slightly tachycardic  Pulm: Expiratory wheezes present. Slightly tachypneic  Gastrointestinal: Soft, NT/ND, +BS  Musculoskeletal: No edema. Warm  Neuro: No focal deficit. Moves extremity spontaneously. Psychiatry: Appropriate affect. Not agitated. Skin: Warm, dry with normal turgor. No rash  Capillary refill: Brisk,< 3 seconds   Peripheral Pulses: +2 palpable, equal bilaterally      Labs/imaging/EKG:  CBC:   Recent Labs      12/04/18   1052   WBC  22.1*   HGB  15.4   PLT  301     BMP:    Recent Labs      12/04/18   1051   NA  141   K  4.1   CL  96*   CO2  26   BUN  10   CREATININE  0.8   GLUCOSE  146*     Hepatic:   Recent Labs      12/04/18   1051   AST  15   ALT  11   BILITOT  0.5   ALKPHOS  84       Xr Chest Standard (2 Vw)    Result Date: 11/21/2018  PROCEDURE: XR CHEST (2 VW) CLINICAL INFORMATION: Shortness of breath, COPD TECHNIQUE: PA and lateral chest radiographs.  COMPARISON: PA and lateral chest radiographs 12/16/2017 FINDINGS: Cardiomediastinal silhouette

## 2018-12-05 ENCOUNTER — APPOINTMENT (OUTPATIENT)
Dept: GENERAL RADIOLOGY | Age: 73
DRG: 189 | End: 2018-12-05
Payer: MEDICARE

## 2018-12-05 PROBLEM — E43 SEVERE MALNUTRITION (HCC): Status: ACTIVE | Noted: 2018-12-05

## 2018-12-05 LAB
ALBUMIN SERPL-MCNC: 2.7 G/DL (ref 3.5–5.1)
ALLEN TEST: POSITIVE
ALP BLD-CCNC: 65 U/L (ref 38–126)
ALT SERPL-CCNC: 12 U/L (ref 11–66)
ANION GAP SERPL CALCULATED.3IONS-SCNC: 8 MEQ/L (ref 8–16)
AST SERPL-CCNC: 19 U/L (ref 5–40)
BASE EXCESS (CALCULATED): 3.3 MMOL/L (ref -2.5–2.5)
BASOPHILS # BLD: 0.1 %
BASOPHILS ABSOLUTE: 0 THOU/MM3 (ref 0–0.1)
BILIRUB SERPL-MCNC: 0.3 MG/DL (ref 0.3–1.2)
BUN BLDV-MCNC: 13 MG/DL (ref 7–22)
CALCIUM SERPL-MCNC: 8.8 MG/DL (ref 8.5–10.5)
CHLORIDE BLD-SCNC: 104 MEQ/L (ref 98–111)
CO2: 29 MEQ/L (ref 23–33)
COLLECTED BY:: ABNORMAL
CREAT SERPL-MCNC: 0.6 MG/DL (ref 0.4–1.2)
DEVICE: ABNORMAL
EKG ATRIAL RATE: 115 BPM
EKG P AXIS: 68 DEGREES
EKG P-R INTERVAL: 174 MS
EKG Q-T INTERVAL: 290 MS
EKG QRS DURATION: 74 MS
EKG QTC CALCULATION (BAZETT): 401 MS
EKG R AXIS: 39 DEGREES
EKG T AXIS: 59 DEGREES
EKG VENTRICULAR RATE: 115 BPM
EOSINOPHIL # BLD: 0 %
EOSINOPHILS ABSOLUTE: 0 THOU/MM3 (ref 0–0.4)
ERYTHROCYTE [DISTWIDTH] IN BLOOD BY AUTOMATED COUNT: 13.7 % (ref 11.5–14.5)
ERYTHROCYTE [DISTWIDTH] IN BLOOD BY AUTOMATED COUNT: 50.4 FL (ref 35–45)
GFR SERPL CREATININE-BSD FRML MDRD: > 90 ML/MIN/1.73M2
GLUCOSE BLD-MCNC: 142 MG/DL (ref 70–108)
HCO3: 29 MMOL/L (ref 23–28)
HCT VFR BLD CALC: 40 % (ref 37–47)
HEMOGLOBIN: 12.7 GM/DL (ref 12–16)
IFIO2: 3
IMMATURE GRANS (ABS): 0.13 THOU/MM3 (ref 0–0.07)
IMMATURE GRANULOCYTES: 0.9 %
LACTIC ACID: 1.5 MMOL/L (ref 0.5–2.2)
LYMPHOCYTES # BLD: 6.2 %
LYMPHOCYTES ABSOLUTE: 0.9 THOU/MM3 (ref 1–4.8)
MAGNESIUM: 1.8 MG/DL (ref 1.6–2.4)
MCH RBC QN AUTO: 31.3 PG (ref 26–33)
MCHC RBC AUTO-ENTMCNC: 31.8 GM/DL (ref 32.2–35.5)
MCV RBC AUTO: 98.5 FL (ref 81–99)
MONOCYTES # BLD: 2.6 %
MONOCYTES ABSOLUTE: 0.4 THOU/MM3 (ref 0.4–1.3)
NUCLEATED RED BLOOD CELLS: 0 /100 WBC
O2 SATURATION: 98 %
PCO2: 45 MMHG (ref 35–45)
PH BLOOD GAS: 7.41 (ref 7.35–7.45)
PHOSPHORUS: 2.3 MG/DL (ref 2.4–4.7)
PLATELET # BLD: 236 THOU/MM3 (ref 130–400)
PMV BLD AUTO: 9.9 FL (ref 9.4–12.4)
PO2: 109 MMHG (ref 71–104)
POTASSIUM SERPL-SCNC: 5.6 MEQ/L (ref 3.5–5.2)
PROCALCITONIN: 0.38 NG/ML (ref 0.01–0.09)
RBC # BLD: 4.06 MILL/MM3 (ref 4.2–5.4)
SEG NEUTROPHILS: 90.2 %
SEGMENTED NEUTROPHILS ABSOLUTE COUNT: 13.4 THOU/MM3 (ref 1.8–7.7)
SODIUM BLD-SCNC: 141 MEQ/L (ref 135–145)
SOURCE, BLOOD GAS: ABNORMAL
TOTAL PROTEIN: 5.6 G/DL (ref 6.1–8)
WBC # BLD: 14.9 THOU/MM3 (ref 4.8–10.8)

## 2018-12-05 PROCEDURE — 94760 N-INVAS EAR/PLS OXIMETRY 1: CPT

## 2018-12-05 PROCEDURE — 36415 COLL VENOUS BLD VENIPUNCTURE: CPT

## 2018-12-05 PROCEDURE — 93010 ELECTROCARDIOGRAM REPORT: CPT | Performed by: NUCLEAR MEDICINE

## 2018-12-05 PROCEDURE — 82803 BLOOD GASES ANY COMBINATION: CPT

## 2018-12-05 PROCEDURE — 84100 ASSAY OF PHOSPHORUS: CPT

## 2018-12-05 PROCEDURE — 2580000003 HC RX 258: Performed by: INTERNAL MEDICINE

## 2018-12-05 PROCEDURE — 97166 OT EVAL MOD COMPLEX 45 MIN: CPT

## 2018-12-05 PROCEDURE — 99223 1ST HOSP IP/OBS HIGH 75: CPT | Performed by: INTERNAL MEDICINE

## 2018-12-05 PROCEDURE — 2060000000 HC ICU INTERMEDIATE R&B

## 2018-12-05 PROCEDURE — G8988 SELF CARE GOAL STATUS: HCPCS

## 2018-12-05 PROCEDURE — 36600 WITHDRAWAL OF ARTERIAL BLOOD: CPT

## 2018-12-05 PROCEDURE — G8987 SELF CARE CURRENT STATUS: HCPCS

## 2018-12-05 PROCEDURE — 83735 ASSAY OF MAGNESIUM: CPT

## 2018-12-05 PROCEDURE — 99233 SBSQ HOSP IP/OBS HIGH 50: CPT | Performed by: HOSPITALIST

## 2018-12-05 PROCEDURE — 6360000002 HC RX W HCPCS: Performed by: INTERNAL MEDICINE

## 2018-12-05 PROCEDURE — 6370000000 HC RX 637 (ALT 250 FOR IP): Performed by: INTERNAL MEDICINE

## 2018-12-05 PROCEDURE — 94640 AIRWAY INHALATION TREATMENT: CPT

## 2018-12-05 PROCEDURE — 2709999900 HC NON-CHARGEABLE SUPPLY

## 2018-12-05 PROCEDURE — 83605 ASSAY OF LACTIC ACID: CPT

## 2018-12-05 PROCEDURE — 93005 ELECTROCARDIOGRAM TRACING: CPT | Performed by: HOSPITALIST

## 2018-12-05 PROCEDURE — 71046 X-RAY EXAM CHEST 2 VIEWS: CPT

## 2018-12-05 PROCEDURE — 85025 COMPLETE CBC W/AUTO DIFF WBC: CPT

## 2018-12-05 PROCEDURE — 84145 PROCALCITONIN (PCT): CPT

## 2018-12-05 PROCEDURE — 94667 MNPJ CHEST WALL 1ST: CPT

## 2018-12-05 PROCEDURE — 2700000000 HC OXYGEN THERAPY PER DAY

## 2018-12-05 PROCEDURE — 80053 COMPREHEN METABOLIC PANEL: CPT

## 2018-12-05 RX ORDER — SODIUM CHLORIDE, SODIUM LACTATE, POTASSIUM CHLORIDE, AND CALCIUM CHLORIDE .6; .31; .03; .02 G/100ML; G/100ML; G/100ML; G/100ML
1000 INJECTION, SOLUTION INTRAVENOUS ONCE
Status: COMPLETED | OUTPATIENT
Start: 2018-12-05 | End: 2018-12-05

## 2018-12-05 RX ORDER — M-VIT,TX,IRON,MINS/CALC/FOLIC 27MG-0.4MG
1 TABLET ORAL
Status: DISCONTINUED | OUTPATIENT
Start: 2018-12-05 | End: 2018-12-07 | Stop reason: HOSPADM

## 2018-12-05 RX ADMIN — FAMOTIDINE 20 MG: 20 TABLET ORAL at 19:43

## 2018-12-05 RX ADMIN — METHYLPREDNISOLONE SODIUM SUCCINATE 40 MG: 40 INJECTION, POWDER, FOR SOLUTION INTRAMUSCULAR; INTRAVENOUS at 00:20

## 2018-12-05 RX ADMIN — Medication 2 PUFF: at 21:15

## 2018-12-05 RX ADMIN — LEVOFLOXACIN 250 MG: 250 TABLET, FILM COATED ORAL at 08:02

## 2018-12-05 RX ADMIN — DESVENLAFAXINE SUCCINATE 100 MG: 100 TABLET, EXTENDED RELEASE ORAL at 08:02

## 2018-12-05 RX ADMIN — GUAIFENESIN 600 MG: 600 TABLET, EXTENDED RELEASE ORAL at 08:03

## 2018-12-05 RX ADMIN — PREDNISONE 40 MG: 20 TABLET ORAL at 08:02

## 2018-12-05 RX ADMIN — SODIUM CHLORIDE, POTASSIUM CHLORIDE, SODIUM LACTATE AND CALCIUM CHLORIDE 1000 ML: 600; 310; 30; 20 INJECTION, SOLUTION INTRAVENOUS at 03:54

## 2018-12-05 RX ADMIN — Medication 2 PUFF: at 09:49

## 2018-12-05 RX ADMIN — Medication 6 MG: at 22:42

## 2018-12-05 RX ADMIN — IPRATROPIUM BROMIDE AND ALBUTEROL SULFATE 1 AMPULE: .5; 3 SOLUTION RESPIRATORY (INHALATION) at 21:15

## 2018-12-05 RX ADMIN — SODIUM CHLORIDE: 9 INJECTION, SOLUTION INTRAVENOUS at 06:41

## 2018-12-05 RX ADMIN — ARIPIPRAZOLE 10 MG: 10 TABLET ORAL at 08:03

## 2018-12-05 RX ADMIN — MULTIPLE VITAMINS W/ MINERALS TAB 1 TABLET: TAB at 13:13

## 2018-12-05 RX ADMIN — ENOXAPARIN SODIUM 30 MG: 30 INJECTION SUBCUTANEOUS at 19:45

## 2018-12-05 RX ADMIN — IPRATROPIUM BROMIDE AND ALBUTEROL SULFATE 1 AMPULE: .5; 3 SOLUTION RESPIRATORY (INHALATION) at 07:51

## 2018-12-05 RX ADMIN — IPRATROPIUM BROMIDE AND ALBUTEROL SULFATE 1 AMPULE: .5; 3 SOLUTION RESPIRATORY (INHALATION) at 15:56

## 2018-12-05 RX ADMIN — AMITRIPTYLINE HYDROCHLORIDE 10 MG: 10 TABLET, FILM COATED ORAL at 19:43

## 2018-12-05 RX ADMIN — GUAIFENESIN 600 MG: 600 TABLET, EXTENDED RELEASE ORAL at 19:43

## 2018-12-05 RX ADMIN — IPRATROPIUM BROMIDE AND ALBUTEROL SULFATE 1 AMPULE: .5; 3 SOLUTION RESPIRATORY (INHALATION) at 12:07

## 2018-12-05 ASSESSMENT — PAIN SCALES - GENERAL
PAINLEVEL_OUTOF10: 0
PAINLEVEL_OUTOF10: 0

## 2018-12-05 NOTE — PROGRESS NOTES
Hospitalist Progress Note    Patient:  Steve Bridges      Unit/Bed:4K-18/018-A    YOB: 1945    MRN: [id number]       Acct: [de-identified]     PCP: SHARONA Baer CNP    Date of Admission: 12/4/2018    Assessment/Plan:    1. COPD exacerbation. Improving SaO2 at 93% on 2 lpm. Will titrate down oxygen. No change in cough/sputum frequency, color and consistency. Ne fever. CXR nil acute. Stopped Abx. Otherwise CCM. Pulmonology also consulted by admitting team. Pt on home O2 at 1 lpm at rest and 3 with activity. Pulmonary rehab eval was also ordered. 2. Acute on chronic respiratory failure. Secondary to COPD exacerbation. Improving. 3. Leukocytosis 22k, subjective fever (afebrile here) and elevated procalcitonin. CT-chest unremarkable for infiltrate. Will obtain 2 sets of blood cultures, check antigen studies for Legionella and Strep. Received levaquin in ER; which stopped now  4. Lactic acidosis. Likely secondary to dehydration. Normalized now. 5. Tobacco use disorder. Unrepentant. Nicoderm patch ordered. Counseled about cessation. 6. Abnormal finding on CTA-chest with \"few nodular densities\" for which follow up CT-chest in 2 months was recommended. This could be arranged on follow up with pulm or with PCP  7. Severe Malnutrition: dietician consulted.         Expected discharge date:  1-2 days       Disposition:      [x] Home                             [] TCU                             [] Rehab                             [] Psych                             [] SNF                             [] [facility]                             [] Other-    Chief Complaint:   Chief Complaint   Patient presents with    Shortness of Breath    Cough    Fever       Hospital Course: Patient was seen, examined and the medical chart was reviewed thoroughly today. In summary, 75 y. o.female admitted overnight for ACOPDE. Subjective (past 24 hours):   active smoker. No fever/chills. symmetrical A/PROM bilat U/L extremities   Skin: No rashes or lesions. No edema  Neurologic:  CN II-XII intact. NL symmetrical reflexes. NL gait and stance. NL Cerebellar exam. Power 5/5 all muscle groups U/L extremities. Toes downgoing  Psychiatric: Alert and oriented, thought content appropriate, normal insight  Capillary Refill: Brisk,< 3 seconds   Peripheral Pulses: +2 palpable, equal bilaterally         Labs:   Recent Labs      12/04/18   1052  12/05/18   0456   WBC  22.1*  14.9*   HGB  15.4  12.7   HCT  47.5*  40.0   PLT  301  236     Recent Labs      12/04/18   1051  12/05/18   0456   NA  141  141   K  4.1  5.6*   CL  96*  104   CO2  26  29   BUN  10  13   CREATININE  0.8  0.6   CALCIUM  9.8  8.8   PHOS   --   2.3*     Recent Labs      12/04/18   1051  12/05/18   0456   AST  15  19   ALT  11  12   BILITOT  0.5  0.3   ALKPHOS  84  65     Recent Labs      12/04/18   1052   INR  1.24*     No results for input(s): Kaylyn Goes in the last 72 hours. Urinalysis:    Lab Results   Component Value Date    NITRU NEGATIVE 12/04/2018    WBCUA 2-4 12/04/2018    BACTERIA FEW 12/04/2018    RBCUA 0-2 12/04/2018    BLOODU NEGATIVE 12/04/2018    SPECGRAV 1.008 04/18/2017    GLUCOSEU NEGATIVE 12/04/2018       Radiology:  Xr Chest Standard (2 Vw)    Result Date: 12/5/2018  PROCEDURE: XR CHEST (2 VW) CLINICAL INFORMATION: Cutoff, shortness of breath TECHNIQUE: PA and lateral chest radiographs. COMPARISON: PA and lateral chest radiographs 11/21/2018 FINDINGS: Cardiomediastinal silhouette is within normal limits. Lungs are hyperinflated with flattening of both hemidiaphragms. Stable reticular opacities at the bilateral lung apices are likely secondary to scarring. Small nodular densities seen on CTA of the chest from 12/4/2018 are not visualized on the current study. There are no lung consolidations. Degenerative changes in the thoracic spine are stable. Soft tissues are unremarkable. 1. No acute intrathoracic process. The heart size is normal. There is mild coronary calcification. No pericardial effusion. There are no pleural effusions. There is no mediastinal, axillary or hilar adenopathy. There is mild emphysema. A few nodular densities are seen bilaterally. Largest of these involves the right lower lobe superiorly, where there is a 5 mm such opacity. A definite confluent infiltrate is not suggested. No suspicious osseous lesions are present. There are no suspicious findings in the imaged aspects of the upper abdomen. No pulmonary emboli. There are a few nodular densities which could be followed in 2 months if clinically indicated. **This report has been created using voice recognition software. It may contain minor errors which are inherent in voice recognition technology. ** Final report electronically signed by Dr. Nathaniel Walton on 12/4/2018 12:17 PM    [facility] Digital Screen Self Referral W Or Wo Cad Bilateral    Result Date: 11/21/2018  IMPRESSION: Mammogram BI-RADS: 0: Incomplete: Needs Additional Imaging 1. The nodular density in the left breast appears indeterminate. Spot compression and lateromedial views as well as a possible ultrasound are recommended. 2. The patient has been or will be contacted. #[id number] - [facility] DIGITAL SCREEN SELF REFERRAL W OR WO CAD BILATERAL BILATERAL DIGITAL SCREENING MAMMOGRAM 3D/2D WITH CAD: 11/21/2018 CLINICAL: Tomosynthesis. Self referred screening mammogram.  Comparison is made to exam dated:  12/22/2016 mammogram - [address] [facility] [address]. The tissue of both breasts is extremely dense, which lowers the sensitivity of mammography. Current study was also evaluated with a Computer Aided Detection (CAD) system. There are benign scattered calcifications both breasts. There is a nodular density in the left breast posterior depth upper region seen on the mediolateral oblique view only.   No other significant masses, calcifications, or other findings are seen in either breast.

## 2018-12-05 NOTE — PROGRESS NOTES
or bed rest, unable to position self  Pulmonary Function Test   []None available   []Normal   [x]Obstructive  []Restrictive   [x]Diffusion defect        LAB  Hematology:   Lab Results   Component Value Date    WBC 14.9 2018    RBC 4.06 2018    HGB 12.7 2018    HCT 40.0 2018     2018     Chemistry:   Lab Results   Component Value Date    PH 7.41 2018    PO2 109 2018    PCO2 45 2018    HCO3 29 2018    O2SAT 98 2018       Home pulmonary medications: 2-4 L O2 nightly and PRN, 2 puffs albuterol 4x daily   Home Bipap or CPAP No  Pulmonary Diagnosis COPD     [facility] RESPIRATORY ASSESSMENT PROGRAM (RAP)  30 kg and over      Patient Name: Julia [facility] Room#: 8I-73/[id number] : 1945     Admitting diagnosis: COPD exacerbation ([facility] [J44.1]      ASSESSMENT     Vitals  Pulse: 94   Resp: 16  BP: (!) 98/55  SpO2: 93 %  Temp: 96 °F (35.6 °C)  Breath Sounds: diminshed end expiratory wheezing  Comment: Pt sitting in bed during assessment with no complaints     PEF   Predicted: 369  Personal Best: unknown Pt actual: 100     Inspiratory Capacity:   Preoperative/predictive value: 1800 ml   33% of value: 594 ml      75% of value: 1350 ml   10 ml/kg of IBW: 501 ml   Patient's Actual Inspiratory Capacity: 500 ml    CARE PLAN  All Care Plan selections must be based on the approved algorithms located on line in the Policy and Procedures under Respiratory Assessment Adult Protocol Handbook    INDICATIONS FOR THERAPY BASED ON HISTORY AND ASSESSMENT  Bronchial Hygiene Goal: Improvement in sputum mobilization in patients with ineffective airway clearance. Reverse the atelectasis.   [] Atelectasis caused by mucus plugging     [] Chronic mucucillary clearance disorder (example, cystic fibrosis, bronchiectasis, chronic bronchitis)  [] Improve cough effectiveness (example neuromuscular disease, spinal cord injury, etc.)  [] Audible secretions unable to clear on own   [x] No recommended  []Mechanical Insufflation exsufflation  Volume expansion  [x]Incentive Spirometry  []EZPAP  []Metaneb  []No therapy recommended  Inhaled Medication  [x]HHN  [x]MDI  []Two hour continuous  []Metaneb   [] No therapy recommended  Oxygenation  [x]Nasal cannula  []Mask    []CPAP  []High flow   [] No therapy recommended                  BRONCHODILATOR ASSESSMENT SCORE  Score 0 1 2 3 4   Breath Sounds []  Normal or no wheezing with diminished bases [x]  End expiratory wheeze or slightly diminished []  Pronounced exp. wheeze []  Insp. & Exp. wheeze []  Absent or near absent   Dyspnea and level of distress   []  None, respiratory rate   12-20, regular pattern [x]  Only on exertion or increased respiratory rate 21-25 []  Mild dyspnea at rest, irregular breathing pattern respiratory rate 26-30 []  Moderate  use of accessory muscles, prolonged expiration respiratory rate 31-35 []  Severe    use of accessory muscles, respiratory rate   > 35   Peak flow []  > 80% []  70-80% []  60-69% []  50-59% [x]  <50%  or unable to perform   Total Score []  0-1 []  2-5 []  6-8 []  9-10 []  11-12   Frequency  Every 4 hours PRN for wheezing or increased respiratory distress Three times a day and Q4 PRN for wheezing or increased   respiratory distress Four times a day and Q 4 PRN  for wheezing or increased   respiratory distress Q 4 hours  Contact physician for a continuous treatment and  Iprotropium Bromide if indicated   Reassess Score No need  Every day Every day Every day  After treatment     ASSESSMENT OUTCOMES   Bronchial Hygiene   [] An increased (or decreased) volume of expectorated sputum    [] Improved chest x-ray  [] Patients subjective response (easier clearance of secretions)      [] Improved breath sounds  [] Improvement in gas exchange       [] Return of patient to home regime   [] Improvement in ventilator variables    [] Improvement in patient reported symptoms, such as dyspnea  [x] Other: pt ordered on acapella

## 2018-12-05 NOTE — CONSULTS
[facility]    Patient - Maritza Branham   MRN -  [id number]   [facility] # - [de-identified]   - 1945      Date of Admission -  2018 10:23 AM  Date of evaluation -  2018  Room - [facility] Collin Rubi MD Primary Care Physician - Portia Garza, APRN - CNP   Chief Complaint   SOB  Active Hospital Problem List      Active Hospital Problems    Diagnosis Date Noted    Lactic acidosis [E87.2] 2018    SIRS (systemic inflammatory response syndrome) ([facility] [R65.10] 2018    Patient underweight [R63.6] 2018    Leukocytosis [D72.829] 2017    COPD exacerbation ([facility] [J44.1] 2017    Tobacco use disorder [F17.200] 2017    History of head lice [Q82.0]     Acute on chronic respiratory failure with hypoxia and hypercapnia (HCC) [K81.61, J96.22] 2017     HPI   Maritza Branham is a 75 y.o. female presents to ED with 2 week h/o worsening SOB, non productive cough, weakness, slow but steady wt loss, daily smoker. ABGs with acute on chronic respiratory failure, on home oxygen  Past Medical History         Diagnosis Date    Chronic respiratory failure with hypoxia (HCC)     COPD (chronic obstructive pulmonary disease) (HCC)     Depression     Pneumonia       Past Surgical History           Procedure Laterality Date    COLONOSCOPY      TONSILLECTOMY       Diet    DIET GENERAL;  Allergies    Spiriva handihaler [tiotropium bromide monohydrate]  Social History     Social History     Social History    Marital status:      Spouse name: N/A    Number of children: 2    Years of education: N/A     Occupational History    Not on file.      Social History Main Topics    Smoking status: Former Smoker     Packs/day: 0.60     Years: 56.00     Types: Cigarettes    Smokeless tobacco: Never Used    Alcohol use No    Drug use: No    Sexual activity: No     Other Topics Concern   

## 2018-12-06 PROCEDURE — 99233 SBSQ HOSP IP/OBS HIGH 50: CPT | Performed by: HOSPITALIST

## 2018-12-06 PROCEDURE — 94668 MNPJ CHEST WALL SBSQ: CPT

## 2018-12-06 PROCEDURE — 6370000000 HC RX 637 (ALT 250 FOR IP): Performed by: INTERNAL MEDICINE

## 2018-12-06 PROCEDURE — 2060000000 HC ICU INTERMEDIATE R&B

## 2018-12-06 PROCEDURE — 2709999900 HC NON-CHARGEABLE SUPPLY

## 2018-12-06 PROCEDURE — 2700000000 HC OXYGEN THERAPY PER DAY

## 2018-12-06 PROCEDURE — 2580000003 HC RX 258: Performed by: INTERNAL MEDICINE

## 2018-12-06 PROCEDURE — 99232 SBSQ HOSP IP/OBS MODERATE 35: CPT | Performed by: INTERNAL MEDICINE

## 2018-12-06 PROCEDURE — APPSS30 APP SPLIT SHARED TIME 16-30 MINUTES: Performed by: NURSE PRACTITIONER

## 2018-12-06 PROCEDURE — 6360000002 HC RX W HCPCS: Performed by: INTERNAL MEDICINE

## 2018-12-06 PROCEDURE — 94640 AIRWAY INHALATION TREATMENT: CPT

## 2018-12-06 PROCEDURE — 94760 N-INVAS EAR/PLS OXIMETRY 1: CPT

## 2018-12-06 RX ADMIN — ARIPIPRAZOLE 10 MG: 10 TABLET ORAL at 08:13

## 2018-12-06 RX ADMIN — Medication 10 ML: at 21:28

## 2018-12-06 RX ADMIN — IPRATROPIUM BROMIDE AND ALBUTEROL SULFATE 1 AMPULE: .5; 3 SOLUTION RESPIRATORY (INHALATION) at 17:11

## 2018-12-06 RX ADMIN — GUAIFENESIN 600 MG: 600 TABLET, EXTENDED RELEASE ORAL at 08:13

## 2018-12-06 RX ADMIN — DESVENLAFAXINE SUCCINATE 100 MG: 100 TABLET, EXTENDED RELEASE ORAL at 08:13

## 2018-12-06 RX ADMIN — IPRATROPIUM BROMIDE AND ALBUTEROL SULFATE 1 AMPULE: .5; 3 SOLUTION RESPIRATORY (INHALATION) at 22:16

## 2018-12-06 RX ADMIN — Medication 10 ML: at 08:17

## 2018-12-06 RX ADMIN — Medication 6 MG: at 23:41

## 2018-12-06 RX ADMIN — Medication 2 PUFF: at 22:24

## 2018-12-06 RX ADMIN — PREDNISONE 40 MG: 20 TABLET ORAL at 08:13

## 2018-12-06 RX ADMIN — FAMOTIDINE 20 MG: 20 TABLET ORAL at 21:27

## 2018-12-06 RX ADMIN — IPRATROPIUM BROMIDE AND ALBUTEROL SULFATE 1 AMPULE: .5; 3 SOLUTION RESPIRATORY (INHALATION) at 12:15

## 2018-12-06 RX ADMIN — AMITRIPTYLINE HYDROCHLORIDE 10 MG: 10 TABLET, FILM COATED ORAL at 21:27

## 2018-12-06 RX ADMIN — GUAIFENESIN 600 MG: 600 TABLET, EXTENDED RELEASE ORAL at 21:27

## 2018-12-06 RX ADMIN — ENOXAPARIN SODIUM 30 MG: 30 INJECTION SUBCUTANEOUS at 21:28

## 2018-12-06 RX ADMIN — Medication 2 PUFF: at 08:26

## 2018-12-06 RX ADMIN — IPRATROPIUM BROMIDE AND ALBUTEROL SULFATE 1 AMPULE: .5; 3 SOLUTION RESPIRATORY (INHALATION) at 08:26

## 2018-12-06 RX ADMIN — MULTIPLE VITAMINS W/ MINERALS TAB 1 TABLET: TAB at 13:12

## 2018-12-06 ASSESSMENT — PAIN SCALES - GENERAL
PAINLEVEL_OUTOF10: 0
PAINLEVEL_OUTOF10: 0

## 2018-12-06 NOTE — PROGRESS NOTES
TROPONINT < 0.010 12/17/2017     BNP  Lab Results   Component Value Date    PROBNP 870.5 12/04/2018     D-Dimer  Lab Results   Component Value Date    DDIMER 403.00 12/17/2017     Lactic Acid  Recent Labs      12/04/18   2027  12/05/18   0850   LACTA  2.3*  1.5     INR  Recent Labs      12/04/18   1052   INR  1.24*     PTT  Recent Labs      12/04/18   1052   APTT  33.4     Glucose  No results for input(s): POCGLU in the last 72 hours. UA Recent Labs      12/04/18   1357   PHUR  5.0   COLORU  YELLOW   PROTEINU  30*   BLOODU  NEGATIVE   RBCUA  0-2   WBCUA  2-4   BACTERIA  FEW   NITRU  NEGATIVE   GLUCOSEU  NEGATIVE   BILIRUBINUR  NEGATIVE   UROBILINOGEN  0.2   KETUA  NEGATIVE   . Echo 9/22/17       Summary   Normal left ventricle size and systolic function. Ejection fraction was   estimated at 50 to 55 %. There were no regional left ventricular wall   motion abnormalities and wall thickness was within normal limits.  E/A flow reversal noted. Suggestive of diastolic dysfunction.   Left atrial size was normal.   Moderately dilated right ventricle.   Leaflets exhibited mildly increased thickness and mildly reduced cuspal   separation of the aortic valve.   The mitral valve structure was normal with normal leaflet separation.   DOPPLER: The transmitral velocity was within the normal range with no   evidence for mitral stenosis.   Moderate tricuspid regurgitation visualized.   Pulmonary systolic pressure Moderately increased. and is estimated at   55-60 mmHg.   No evidence of any pericardial effusion.     Cultures    Procalcitonin   Lab Results   Component Value Date    PROCAL 0.38 12/05/2018    PROCAL 0.27 12/04/2018    PROCAL 0.05 12/17/2017   Influenza A/B (-)  BC x 2 (-) to date  Strep/legionella antigens Pending  Urine (-)    Radiology    CXR    12/5/18    COMPARISON: PA and lateral chest radiographs 11/21/2018       FINDINGS: Cardiomediastinal silhouette is within normal limits.  Lungs are hyperinflated with details)                  [address] Problems    Diagnosis Date Noted    Severe malnutrition ([facility] [E43] 12/05/2018     Class: Chronic    Lactic acidosis [E87.2] 12/04/2018    SIRS (systemic inflammatory response syndrome) ([facility] [R65.10] 12/04/2018    Patient underweight [R63.6] 12/04/2018    Leukocytosis [D72.829] 12/17/2017    COPD exacerbation ([facility] [J44.1] 12/16/2017    Tobacco use disorder [F17.200] 12/16/2017    History of head lice [K04.3] 00/74/0025    Acute on chronic respiratory failure with hypoxia and hypercapnia (HCC) [B14.12, J96.22] 09/21/2017     Assessment and Plan   Severe COPD with acute exacerbation/bronchitis  Elevated WBC/PCT level  Severe diffusion impairment due to Emphysema  Centrilobular Emphysema  Acute on chronic respiratory failure with hypoxia and hypercarbia  Nicotine dependence with no desire to quit  Faint patchy lung nodules (? scarring, pneumonia or malignancy) needs CT chest in 3 months  Abnormal mammogram with left breast nodule  Wasting syndrome/pulmonary cachexia  Full Code    Agree with current management complete 5 days steroids/antibiotics  Home 02 evaluation  Continue BiPAP PRN  Nutritional support  Smoking cessation with nicotine patch  Home oxygen eval before D/C  Poor compliance with OP follow up, would re initiate COPD regiment (she was using Ventolin 4 times per day, allergic reaction to Spiriva with rash and itching in the past)  Pulmonary rehab, eval/RAP  I spent over 30 minutes with patient and daughter reviewing CT findings and answering many questions the daughter had. I went into detail on explaining that 2 things prolong the prognosis of emphysema: Quitting smoking and wearing oxygen.  Furthermore, I expressed to patient that if she has no desire to quit smoking, then she needs to consider her high risk of malignancy and decide whether she would want to even treat it if found or rather focus on quality and continue

## 2018-12-06 NOTE — PROGRESS NOTES
[facility] RESPIRATORY ASSESSMENT PROGRAM (RAP)  30 kg and over      Patient Name: Shayne Torres Room#: 0Q-95/788-E : 1945     Admitting diagnosis: COPD exacerbation ([address] [J44.1]      ASSESSMENT     Vitals  Pulse: 89   Resp: 20  BP: (!) 140/68  SpO2: 92 %  Temp: 97.5 °F (36.4 °C)  Breath Sounds: continues with expiratory wheezes ;scattered t/o improved from yesterday. Comment: Pt sitting in chair during assessment weaned to 1lpm NC. No respiratory complaints states she has plans for discharge tomorrow. PEF   Predicted: 369  Personal Best: unknown Pt actual: 100    Inspiratory Capacity:   Preoperative/predictive value: 1800 ml   33% of value: 594 ml      75% of value: 1350 ml   10 ml/kg of IBW: 501 ml   Patient's Actual Inspiratory Capacity: 500 ml    CARE PLAN  All Care Plan selections must be based on the approved algorithms located on line in the Policy and Procedures under Respiratory Assessment Adult Protocol Handbook    INDICATIONS FOR THERAPY BASED ON HISTORY AND ASSESSMENT  Bronchial Hygiene Goal: Improvement in sputum mobilization in patients with ineffective airway clearance. Reverse the atelectasis. [] Atelectasis caused by mucus plugging     [] Chronic mucucillary clearance disorder (example, cystic fibrosis, bronchiectasis, chronic bronchitis)  [x] Improve cough effectiveness (example neuromuscular disease, spinal cord injury, etc.)  [] Audible secretions unable to clear on own   [] No indications  Volume Expansion Goal: Prevent atelectasis, Absence or improvement in signs of atelectasis, improve respiratory muscle performance  [] Preoperative screening of patients at risk for post-operative complications to obtain baseline flow or volume. [] Restrictive lung defect associated with a dysfunctional diaphragm or involving the respiratory musculature. Example: Patients with inspiratory capacity < 2.5L, patients with neuromuscular disease and patients with spinal cord injury.    [] Presence []  Pronounced exp. wheeze []  Insp. & Exp. wheeze []  Absent or near absent   Dyspnea and level of distress   [x]  None, respiratory rate   12-20, regular pattern []  Only on exertion or increased respiratory rate 21-25 []  Mild dyspnea at rest, irregular breathing pattern respiratory rate 26-30 []  Moderate  use of accessory muscles, prolonged expiration respiratory rate 31-35 []  Severe    use of accessory muscles, respiratory rate   > 35   Peak flow []  > 80% []  70-80% []  60-69% []  50-59% [x]  <50%  or unable to perform   Total Score []  0-1 [x]  2-5 []  6-8 []  9-10 []  11-12   Frequency  Every 4 hours PRN for wheezing or increased respiratory distress Three times a day and Q4 PRN for wheezing or increased   respiratory distress Four times a day and Q 4 PRN  for wheezing or increased   respiratory distress Q 4 hours  Contact physician for a continuous treatment and  Iprotropium Bromide if indicated   Reassess Score No need  Every day Every day Every day  After treatment     ASSESSMENT OUTCOMES   Bronchial Hygiene   [] An increased (or decreased) volume of expectorated sputum    [] Improved chest x-ray  [] Patients subjective response (easier clearance of secretions)      [] Improved breath sounds  [] Improvement in gas exchange       [] Return of patient to home regime   [] Improvement in ventilator variables    [x] Improvement in patient reported symptoms, such as dyspnea  [x] Other: Pt states her cough does feel better today,will continue with encouraging Q1H WA acapella  . Volume Expansion  [] Decrease respiratory rate   [] Resolution of fever    [] Normal pulse rate    [] Improved breath sounds   [] Improved chest x-ray     [] Improved arterial oxygenation  [] Return of IC to 75% of preop/predicted goal or an increase to 15 ml/kg of ideal body weight   [x] Other: will continue with encouragement of IS, but does have poor technique despite proper instructions able to achieve only 500 ml  .     Inhaled

## 2018-12-06 NOTE — PROGRESS NOTES
[] Other-    Chief Complaint:   Chief Complaint   Patient presents with    Shortness of Breath    Cough    Fever       Hospital Course: Patient was seen, examined and the medical chart was reviewed thoroughly today. In summary, 75 y. o.female admitted overnight for ACOPDE. Subjective (past 24 hours):   Feeling much better today. Medications:  Reviewed    Infusion Medications     Scheduled Medications    therapeutic multivitamin-minerals  1 tablet Oral Lunch    mometasone-formoterol  2 puff Inhalation BID    amitriptyline  10 mg Oral Nightly    ARIPiprazole  10 mg Oral Daily    desvenlafaxine succinate  100 mg Oral Daily    sodium chloride flush  10 mL Intravenous 2 times per day    ipratropium-albuterol  1 ampule Inhalation Q4H WA    predniSONE  40 mg Oral Daily    guaiFENesin  600 mg Oral BID    nicotine  1 patch Transdermal Daily    famotidine  20 mg Oral Nightly    enoxaparin  30 mg Subcutaneous Q24H     PRN Meds: albuterol sulfate HFA, melatonin, sodium chloride flush, magnesium hydroxide, ondansetron, albuterol, promethazine-codeine      Intake/Output Summary (Last 24 hours) at 12/06/18 1845  Last data filed at 12/06/18 1427   Gross per 24 hour   Intake          1760.15 ml   Output             1775 ml   Net           -14.85 ml       Diet:  DIET GENERAL;  Dietary Nutrition Supplements: Standard High Calorie Oral Supplement    Exam:  /61   Pulse 104   Temp 97.5 °F (36.4 °C) (Oral)   Resp 20   Ht 5' 2\" (1.575 m)   Wt 83 lb 11.2 oz (38 kg)   SpO2 92%   BMI 15.31 kg/m²     General appearance: Cachetic/ + muscle wasting, A&O x3, Not ill or toxic, in no apparent distress  HEENT:  Normal cephalic, atraumatic without obvious deformity. Pupils equal, round, and reactive to light. Extra ocular muscles intact. Conjunctivae/corneas clear. Neck: Supple, with full range of motion. No jugular venous distention. Trachea midline. Respiratory:  Normal respiratory effort.  NL A/E

## 2018-12-07 VITALS
BODY MASS INDEX: 15.09 KG/M2 | OXYGEN SATURATION: 86 % | HEART RATE: 92 BPM | DIASTOLIC BLOOD PRESSURE: 76 MMHG | RESPIRATION RATE: 18 BRPM | HEIGHT: 62 IN | SYSTOLIC BLOOD PRESSURE: 134 MMHG | TEMPERATURE: 97 F | WEIGHT: 82 LBS

## 2018-12-07 LAB — MRSA SCREEN: NORMAL

## 2018-12-07 PROCEDURE — G8979 MOBILITY GOAL STATUS: HCPCS

## 2018-12-07 PROCEDURE — G8978 MOBILITY CURRENT STATUS: HCPCS

## 2018-12-07 PROCEDURE — 99232 SBSQ HOSP IP/OBS MODERATE 35: CPT | Performed by: HOSPITALIST

## 2018-12-07 PROCEDURE — 6370000000 HC RX 637 (ALT 250 FOR IP): Performed by: INTERNAL MEDICINE

## 2018-12-07 PROCEDURE — 97530 THERAPEUTIC ACTIVITIES: CPT

## 2018-12-07 PROCEDURE — 97161 PT EVAL LOW COMPLEX 20 MIN: CPT

## 2018-12-07 PROCEDURE — 94640 AIRWAY INHALATION TREATMENT: CPT

## 2018-12-07 PROCEDURE — 2580000003 HC RX 258: Performed by: INTERNAL MEDICINE

## 2018-12-07 PROCEDURE — G8980 MOBILITY D/C STATUS: HCPCS

## 2018-12-07 PROCEDURE — APPSS30 APP SPLIT SHARED TIME 16-30 MINUTES: Performed by: NURSE PRACTITIONER

## 2018-12-07 RX ORDER — IPRATROPIUM BROMIDE AND ALBUTEROL SULFATE 2.5; .5 MG/3ML; MG/3ML
3 SOLUTION RESPIRATORY (INHALATION) EVERY 6 HOURS PRN
Qty: 360 ML | Refills: 2 | Status: SHIPPED | OUTPATIENT
Start: 2018-12-07 | End: 2019-06-27 | Stop reason: SDUPTHER

## 2018-12-07 RX ORDER — RANITIDINE 150 MG/1
150 TABLET ORAL 2 TIMES DAILY
Qty: 60 TABLET | Refills: 3 | Status: SHIPPED | OUTPATIENT
Start: 2018-12-07 | End: 2019-03-29

## 2018-12-07 RX ORDER — PREDNISONE 20 MG/1
40 TABLET ORAL DAILY
Qty: 8 TABLET | Refills: 0 | Status: SHIPPED | OUTPATIENT
Start: 2018-12-07 | End: 2018-12-11 | Stop reason: ALTCHOICE

## 2018-12-07 RX ORDER — M-VIT,TX,IRON,MINS/CALC/FOLIC 27MG-0.4MG
1 TABLET ORAL
Qty: 30 TABLET | Refills: 3 | Status: ON HOLD | OUTPATIENT
Start: 2018-12-07 | End: 2022-04-20

## 2018-12-07 RX ORDER — NICOTINE 21 MG/24HR
1 PATCH, TRANSDERMAL 24 HOURS TRANSDERMAL DAILY
Qty: 30 PATCH | Refills: 3 | Status: SHIPPED | OUTPATIENT
Start: 2018-12-07 | End: 2019-09-13

## 2018-12-07 RX ADMIN — DESVENLAFAXINE SUCCINATE 100 MG: 100 TABLET, EXTENDED RELEASE ORAL at 08:47

## 2018-12-07 RX ADMIN — GUAIFENESIN 600 MG: 600 TABLET, EXTENDED RELEASE ORAL at 08:47

## 2018-12-07 RX ADMIN — PREDNISONE 40 MG: 20 TABLET ORAL at 08:47

## 2018-12-07 RX ADMIN — Medication 2 PUFF: at 08:09

## 2018-12-07 RX ADMIN — ARIPIPRAZOLE 10 MG: 10 TABLET ORAL at 08:47

## 2018-12-07 RX ADMIN — Medication 10 ML: at 08:47

## 2018-12-07 RX ADMIN — IPRATROPIUM BROMIDE AND ALBUTEROL SULFATE 1 AMPULE: .5; 3 SOLUTION RESPIRATORY (INHALATION) at 08:08

## 2018-12-07 ASSESSMENT — PAIN SCALES - GENERAL
PAINLEVEL_OUTOF10: 0
PAINLEVEL_OUTOF10: 0

## 2018-12-07 NOTE — PROGRESS NOTES
Discharge teaching and instructions for diagnosis/procedure of COPD Ex completed with patient using teachback method. AVS reviewed. Prescriptions sent up from outpatient pharmacy. Patient voiced understanding regarding prescriptions, follow up appointments, and care of self at home. Discharged in a wheelchair to  home with support and home health per family. Pt was unable to get home O2 tank from home and did not wish for us to attempt to get one for her transport. Educated on importance of use of oxygen and risk of going home without oxygen, pt understands risks and states she is willing to take those risks.

## 2018-12-07 NOTE — DISCHARGE INSTR - COC
acidosis E87.2    SIRS (systemic inflammatory response syndrome) (HCC) R65.10    Patient underweight R63.6    Severe malnutrition (HCC) E43    Lung nodules R91.8    Wasting syndrome (HCC) R64       Isolation/Infection:   Isolation          Contact        Patient Infection Status     Infection Encounter Level? Added Added By Resolved Resolved By Review Date Onset Date    VRE No 18 Deanna Willams RN        Feces PCR 2018          Nurse Assessment:  Last Vital Signs: /76   Pulse 92   Temp 97 °F (36.1 °C) (Oral)   Resp 18   Ht 5' 2\" (1.575 m)   Wt 82 lb (37.2 kg)   SpO2 (!) 86% Comment: Home O2 eval in progress  BMI 15.00 kg/m²     Last documented pain score (0-10 scale): Pain Level: 0  Last Weight:   Wt Readings from Last 1 Encounters:   18 82 lb (37.2 kg)     Mental Status:  {IP PT MENTAL STATUS:85708}    IV Access:  { DANIEL IV ACCESS:601490065}    Nursing Mobility/ADLs:  Walking   {[facility] DME WMJX:923367579}  Transfer  {[facility] DME JNRO:508104570}  Bathing  {[facility] DME GQQF:486618655}  Dressing  {[facility] DME BRYAN:095553560}  Toileting  {[facility] DME WTD}  Feeding  {[facility] DME KWGN:005070925}  Med Admin  {[facility] DME UBUP:869771998}  Med Delivery   {[facility] MED Delivery:615977818}    Wound Care Documentation and Therapy:        Elimination:  Continence:   · Bowel: {YES / T}  · Bladder: {YES / GW:79749}  Urinary Catheter: {Urinary Catheter:773841077}   Colostomy/Ileostomy/Ileal Conduit: {YES / WS:92558}       Date of Last BM: ***    Intake/Output Summary (Last 24 hours) at 18 1220  Last data filed at 18 0308   Gross per 24 hour   Intake              740 ml   Output             3000 ml   Net            -2260 ml     I/O last 3 completed shifts:   In: 1080 [P.O.:1080]  Out: 3000 [Urine:3000]    Safety Concerns:     [address] Safety Concerns:327851192}    Impairments/Disabilities:      [address] Impairments/Disabilities:597082871}    Nutrition Therapy:  Current Nutrition Therapy:   508 Jacey SANCHEZ

## 2018-12-07 NOTE — PROGRESS NOTES
[facility]    Patient - Maribell Solis,  Age - 75 y.o.    - 1945      Room Number - Panchito Jackson MD Primary Care Physician - Zayda Rosario, APRN - CNP   MRN -  [id number]   Mariano # - [de-identified]  Date of Admission -  2018 10:23 AM  Hospital Day - 3    Chief Complaint   Following for severe COPD  HPI   Maribell Solis is a 75 y.o. female presents to ED with 2 week h/o worsening SOB, non productive cough, weakness, slow but steady wt loss, daily smoker. ABGs with acute on chronic respiratory failure, on home oxygen  Past 24 hours, ROS   -Afebrile   -Stable on 1LPM requiring 2LPM with activity  -OOB to chair  -Denies any chest pain, fever, or chills. All other systems reviewed  Objective    Vitals    height is 5' 2\" (1.575 m) and weight is 82 lb (37.2 kg). Her oral temperature is 97 °F (36.1 °C). Her blood pressure is 134/76 and her pulse is 92. Her respiration is 18 and oxygen saturation is 86% (abnormal). O2 Flow Rate (L/min): 1 L/min  I/O    Intake/Output Summary (Last 24 hours) at 18 1157  Last data filed at 18 0308   Gross per 24 hour   Intake              740 ml   Output             3000 ml   Net            -2260 ml     Patient Vitals for the past 96 hrs (Last 3 readings):   Weight   18 0300 82 lb (37.2 kg)   18 0320 83 lb 11.2 oz (38 kg)   18 0309 70 lb 8 oz (32 kg)     Exam      Physical Exam   Constitutional: No distress on O2 1LPM. Patient appears frail and chronically ill. Head: Normocephalic and atraumatic. Mouth/Throat: Oropharynx is clear and moist.  No oral thrush. Eyes: Conjunctivae are normal. Pupils are equal, round. No scleral icterus. Neck: Neck supple. No tracheal deviation present. Cardiovascular: HRRR. S1 and S2 with no murmur, rub, or gallop. No peripheral edema  Pulmonary/Chest: Respirations easy and unlabored.  Normal effort with bilateral air entry, Results   Component Value Date    DDIMER 403.00 12/17/2017     Lactic Acid  Recent Labs      12/04/18 2027 12/05/18   0850   LACTA  2.3*  1.5     INR  No results for input(s): INR, PROTIME in the last 72 hours. PTT  No results for input(s): APTT in the last 72 hours. Glucose  No results for input(s): POCGLU in the last 72 hours. UA   Recent Labs      12/04/18   1357   PHUR  5.0   COLORU  YELLOW   PROTEINU  30*   BLOODU  NEGATIVE   RBCUA  0-2   WBCUA  2-4   BACTERIA  FEW   NITRU  NEGATIVE   GLUCOSEU  NEGATIVE   BILIRUBINUR  NEGATIVE   UROBILINOGEN  0.2   KETUA  NEGATIVE   . Echo 9/22/17       Summary   Normal left ventricle size and systolic function. Ejection fraction was   estimated at 50 to 55 %. There were no regional left ventricular wall   motion abnormalities and wall thickness was within normal limits.  E/A flow reversal noted. Suggestive of diastolic dysfunction.   Left atrial size was normal.   Moderately dilated right ventricle.   Leaflets exhibited mildly increased thickness and mildly reduced cuspal   separation of the aortic valve.   The mitral valve structure was normal with normal leaflet separation.   DOPPLER: The transmitral velocity was within the normal range with no   evidence for mitral stenosis.   Moderate tricuspid regurgitation visualized.   Pulmonary systolic pressure Moderately increased. and is estimated at   55-60 mmHg.   No evidence of any pericardial effusion.     Cultures    Procalcitonin   Lab Results   Component Value Date    PROCAL 0.38 12/05/2018    PROCAL 0.27 12/04/2018    PROCAL 0.05 12/17/2017   Influenza A/B (-)  BC x 2 (-) to date  Strep/legionella antigens Pending  Urine (-)    Radiology    CXR    12/5/18    COMPARISON: PA and lateral chest radiographs 11/21/2018       FINDINGS: Cardiomediastinal silhouette is within normal limits. Lungs are hyperinflated with flattening of both hemidiaphragms.  Stable reticular opacities at the bilateral lung apices are likely

## 2018-12-07 NOTE — DISCHARGE SUMMARY
CTA CHEST W WO CONTRAST   Final Result       No pulmonary emboli. There are a few nodular densities which could be followed in 2 months if clinically indicated. **This report has been created using voice recognition software. It may contain minor errors which are inherent in voice recognition technology. **      Final report electronically signed by Dr. Laren Rinne on 12/4/2018 12:17 PM             Consults:     IP CONSULT TO CASE MANAGEMENT  IP CONSULT TO PULMONOLOGY  IP CONSULT TO SPIRITUAL SERVICES  PULMONARY REHAB EVALUATION  IP CONSULT TO DIETITIAN  PULMONARY REHAB EVALUATION  IP CONSULT TO SOCIAL WORK    Disposition:    [x] Home with Home Health for PT/OT/Nursing aid       [] TCU       [] Rehab       [] Psych       [] SNF       [] [place]       [] Other-    Condition at Discharge: Stable    Code Status:  Full Code     Patient Instructions:    Discharge lab work: N/A  Activity: activity as tolerated  Diet: DIET GENERAL;  Dietary Nutrition Supplements: Standard High Calorie Oral Supplement      Follow-up visits:   SHARONA Salmeron - CNP  [phone]      staff-please s/u w/i [address] [facility], [address] [place] [address],[address]  [address] [place] [address] Road [facility] [address]    On 12/11/2018  Appointment on Tuesday, December 11th at 1:00 PM. This is for follow-up and portable oxygen concentrator fitting. Please take photo ID, insurance cards, and medications to appointment.           Discharge Medications:      Jesús Anthony   Home Medication Instructions ZPV:[id number]    Printed on:12/07/18 9520   Medication Information                      albuterol sulfate  (90 Base) MCG/ACT inhaler  Inhale 2 puffs into the lungs 4 times daily Use spacer             amitriptyline (ELAVIL) 10 MG tablet  Take 10 mg by mouth nightly             ARIPiprazole (ABILIFY) 10 MG tablet  Take 10 mg by mouth daily             desvenlafaxine succinate about 5 mm for which follow up CT-chest in 2 months was recommended. This could be arranged on follow up with pulm or with PCP. Pt and daughter were updated about this. 7. Abnormal screening mamogram: done Nov 22, 2018 which showed:   benign scattered calcifications both breasts.     There is a nodular density in the left breast posterior depth    upper region seen on the mediolateral oblique view only.      Will inform Gen Sx about findings with arrangements for early OP follow-up  8. Severe Malnutrition: Seen by dietician       Patient and daughter were updated about the treatment plan, all the questions and concerns were addressed. Alarming signs and symptoms to return to ED were explained in length. Laura Carter was evaluated today and a DME order was entered for a nebulizer compressor in order to administer Albuterol and Ipratropium due to the diagnosis of COPD. The need for this equipment and treatment was discussed with the patient and she understands and is in agreement. Patient was evaluated today for the diagnosis of COPD. I entered a DME order for home oxygen because the diagnosis and testing requires the patient to have supplemental oxygen. Condition will improve or be benefited by oxygen use. The patient is  able to perform good mobility in a home setting and therefore does require the use of a portable oxygen system. The need for this equipment was discussed with the patient and she understands and is in agreement. Signed: Thank you SHARONA Brar CNP for the opportunity to be involved in this patient's care.     Electronically signed by Kathy Anderson MD on 12/7/2018 at 7:20 AM

## 2018-12-07 NOTE — CARE COORDINATION
DISCHARGE BARRIERS  12/7/18, 10:32 AM    Reason for Referral: new HH and info on free meals    Anticipated Needs/Discharge Plan: Home alone with new Interim HH. Information given on [facility] services for home. Patient had questions about her Medicaid, SW spoke with Nima Ku in Public Benefits, patient has [facility]. SW updated patient and explained qualification and coverage. Patient thankful for the information.      Electronically signed by Cherilyn Aschoff, MSW, BERNIEW on 12/7/2018 at 10:32 AM

## 2018-12-07 NOTE — PROGRESS NOTES
mobility     Discharge Recommendations:  Discharge Recommendations: Home with assist PRN    Patient Education:  Patient Education: energy conservation for safety with walking longer distances and negotiating stairs    Equipment Recommendations:  Equipment Needed: No    Safety:  Type of devices: All fall risk precautions in place, Call light within reach, Left in bed, Patient at risk for falls, None    Plan:  Times per week: none    Goals:  Patient goals : return home  Short term goals  Time Frame for Short term goals: pt at [facility] with supervision. no further acute PT warranted    Evaluation Complexity: Based on the findings of patient history, examination, clinical presentation, and decision making during this evaluation, the evaluation of Laura Carter  is of low complexity. PT G-Codes  Functional Assessment Tool Used: [facility]  Functional Limitation: Mobility: Walking and moving around  Mobility: Walking and Moving Around Current Status (): At least 1 percent but less than 20 percent impaired, limited or restricted  Mobility: Walking and Moving Around Goal Status (): At least 1 percent but less than 20 percent impaired, limited or restricted  Mobility: Walking and Moving Around Discharge Status ():  At least 1 percent but less than 20 percent impaired, limited or restricted     AM-PAC Inpatient Mobility without Stair Climbing Raw Score : 19  AM-PAC Inpatient without Stair Climbing T-Scale Score : 56.04  Mobility Inpatient CMS 0-100% Score: 12.25  Mobility Inpatient without Stair CMS G-Code Modifier : AMAIRANI Ibrahim, SPT

## 2018-12-08 LAB
LEGIONELLA URINARY AG: NEGATIVE
STREP PNEUMO AG, UR: NEGATIVE

## 2018-12-10 LAB
BLOOD CULTURE, ROUTINE: NORMAL
BLOOD CULTURE, ROUTINE: NORMAL

## 2018-12-11 ENCOUNTER — OFFICE VISIT (OUTPATIENT)
Dept: PULMONOLOGY | Age: 73
End: 2018-12-11
Payer: MEDICARE

## 2018-12-11 VITALS
HEART RATE: 101 BPM | HEIGHT: 62 IN | OXYGEN SATURATION: 93 % | DIASTOLIC BLOOD PRESSURE: 62 MMHG | WEIGHT: 74.8 LBS | SYSTOLIC BLOOD PRESSURE: 110 MMHG | RESPIRATION RATE: 18 BRPM | BODY MASS INDEX: 13.77 KG/M2 | TEMPERATURE: 97.8 F

## 2018-12-11 DIAGNOSIS — J44.9 COPD, SEVERE (HCC): Primary | ICD-10-CM

## 2018-12-11 DIAGNOSIS — J44.9 COPD, SEVERE (HCC): ICD-10-CM

## 2018-12-11 DIAGNOSIS — J96.21 ACUTE ON CHRONIC RESPIRATORY FAILURE WITH HYPOXIA AND HYPERCAPNIA (HCC): ICD-10-CM

## 2018-12-11 DIAGNOSIS — Z91.199 NON COMPLIANCE WITH MEDICAL TREATMENT: ICD-10-CM

## 2018-12-11 DIAGNOSIS — J96.22 ACUTE ON CHRONIC RESPIRATORY FAILURE WITH HYPOXIA AND HYPERCAPNIA (HCC): ICD-10-CM

## 2018-12-11 DIAGNOSIS — F17.200 TOBACCO USE DISORDER: ICD-10-CM

## 2018-12-11 DIAGNOSIS — E43 SEVERE PROTEIN-CALORIE MALNUTRITION (GOMEZ: LESS THAN 60% OF STANDARD WEIGHT) (HCC): ICD-10-CM

## 2018-12-11 DIAGNOSIS — R64 WASTING SYNDROME (HCC): ICD-10-CM

## 2018-12-11 DIAGNOSIS — R91.8 LUNG NODULES: ICD-10-CM

## 2018-12-11 PROCEDURE — 99214 OFFICE O/P EST MOD 30 MIN: CPT | Performed by: NURSE PRACTITIONER

## 2018-12-11 PROCEDURE — G8419 CALC BMI OUT NRM PARAM NOF/U: HCPCS | Performed by: NURSE PRACTITIONER

## 2018-12-11 PROCEDURE — G8484 FLU IMMUNIZE NO ADMIN: HCPCS | Performed by: NURSE PRACTITIONER

## 2018-12-11 PROCEDURE — 94618 PULMONARY STRESS TESTING: CPT | Performed by: NURSE PRACTITIONER

## 2018-12-11 PROCEDURE — 1111F DSCHRG MED/CURRENT MED MERGE: CPT | Performed by: NURSE PRACTITIONER

## 2018-12-11 PROCEDURE — 3023F SPIROM DOC REV: CPT | Performed by: NURSE PRACTITIONER

## 2018-12-11 PROCEDURE — G8400 PT W/DXA NO RESULTS DOC: HCPCS | Performed by: NURSE PRACTITIONER

## 2018-12-11 PROCEDURE — 1090F PRES/ABSN URINE INCON ASSESS: CPT | Performed by: NURSE PRACTITIONER

## 2018-12-11 PROCEDURE — 3017F COLORECTAL CA SCREEN DOC REV: CPT | Performed by: NURSE PRACTITIONER

## 2018-12-11 PROCEDURE — 1101F PT FALLS ASSESS-DOCD LE1/YR: CPT | Performed by: NURSE PRACTITIONER

## 2018-12-11 PROCEDURE — G8427 DOCREV CUR MEDS BY ELIG CLIN: HCPCS | Performed by: NURSE PRACTITIONER

## 2018-12-11 PROCEDURE — G8926 SPIRO NO PERF OR DOC: HCPCS | Performed by: NURSE PRACTITIONER

## 2018-12-11 PROCEDURE — 1123F ACP DISCUSS/DSCN MKR DOCD: CPT | Performed by: NURSE PRACTITIONER

## 2018-12-11 PROCEDURE — 4040F PNEUMOC VAC/ADMIN/RCVD: CPT | Performed by: NURSE PRACTITIONER

## 2018-12-11 ASSESSMENT — ENCOUNTER SYMPTOMS
CHEST TIGHTNESS: 0
COUGH: 1
STRIDOR: 0
GASTROINTESTINAL NEGATIVE: 1
WHEEZING: 0
SHORTNESS OF BREATH: 1
BACK PAIN: 0
SINUS PAIN: 0
EYES NEGATIVE: 1
ALLERGIC/IMMUNOLOGIC NEGATIVE: 1
DIARRHEA: 0
NAUSEA: 0
TROUBLE SWALLOWING: 0
VOICE CHANGE: 0

## 2018-12-11 NOTE — PROGRESS NOTES
Handihaler [Tiotropium Bromide Monohydrate] Itching     FAMILY HISTORY:  Family History   Problem Relation Age of Onset    Cancer Mother         mouth cancer    Other Father         rheumatic fever--paraplegia     CURRENT MEDICATIONS:  Current Outpatient Prescriptions   Medication Sig Dispense Refill    nicotine (NICODERM CQ) 21 MG/24HR Place 1 patch onto the skin daily 30 patch 3    Multiple Vitamins-Minerals (THERAPEUTIC MULTIVITAMIN-MINERALS) tablet Take 1 tablet by mouth Daily with lunch 30 tablet 3    ranitidine (ZANTAC) 150 MG tablet Take 1 tablet by mouth 2 times daily for 4 days 60 tablet 3    mometasone-formoterol (DULERA) 200-5 MCG/ACT inhaler Inhale 2 puffs into the lungs 2 times daily 18 g 0    ipratropium-albuterol (DUONEB) 0.5-2.5 (3) MG/3ML SOLN nebulizer solution Inhale 3 mLs into the lungs every 6 hours as needed for Shortness of Breath 360 mL 2    amitriptyline (ELAVIL) 10 MG tablet Take 10 mg by mouth nightly      OXYGEN Inhale 2-4 L into the lungs nightly Nightly and prn      albuterol sulfate  (90 Base) MCG/ACT inhaler Inhale 2 puffs into the lungs 4 times daily Use spacer 1 Inhaler 3    melatonin 3 MG TABS tablet Take 2 tablets by mouth nightly as needed (sleep)  3    ARIPiprazole (ABILIFY) 10 MG tablet Take 10 mg by mouth daily      desvenlafaxine succinate (PRISTIQ) 50 MG TB24 extended release tablet Take 100 mg by mouth daily        No current facility-administered medications for this visit. Ambrocio MOBLEY   Review of Systems   Constitutional: Negative for activity change, appetite change, chills, fever and unexpected weight change. HENT: Negative for congestion, postnasal drip, sinus pain, trouble swallowing and voice change. Eyes: Negative. Respiratory: Positive for cough and shortness of breath. Negative for chest tightness, wheezing and stridor. Cardiovascular: Negative for chest pain and leg swelling. Gastrointestinal: Negative.   Negative for diarrhea and nausea. Endocrine: Negative. Genitourinary: Negative. Musculoskeletal: Negative. Negative for arthralgias and back pain. Skin: Negative. Allergic/Immunologic: Negative. Neurological: Negative. Negative for dizziness, seizures and light-headedness. Hematological: Negative. Psychiatric/Behavioral: Negative. All other systems reviewed and are negative. Physical exam   /62 (Site: Left Upper Arm, Position: Sitting, Cuff Size: Medium Adult)   Pulse 101   Temp 97.8 °F (36.6 °C)   Resp 18   Ht 5' 2\" (1.575 m)   Wt 74 lb 12.8 oz (33.9 kg)   SpO2 93% Comment: on 2 LPM POC  BMI 13.68 kg/m²        Physical Exam   Constitutional: She is oriented to person, place, and time. Vital signs are normal. She appears well-developed and well-nourished. She appears cachectic. She has a sickly appearance. No distress. Nasal cannula in place. HENT:   Head: Normocephalic and atraumatic. Mouth/Throat: Oropharynx is clear and moist. No oropharyngeal exudate. Eyes: Pupils are equal, round, and reactive to light. Conjunctivae and EOM are normal.   Neck: Normal range of motion. Neck supple. No tracheal deviation present. No thyromegaly present. Cardiovascular: Normal rate, regular rhythm and normal heart sounds. No murmur heard. Pulmonary/Chest: Effort normal. No respiratory distress. She has decreased breath sounds. She has no wheezes. She has no rales. She exhibits no tenderness. Abdominal: Soft. Bowel sounds are normal. She exhibits no distension and no mass. There is no tenderness. There is no rebound and no guarding. Musculoskeletal: Normal range of motion. She exhibits no edema or tenderness. Lymphadenopathy:     She has no cervical adenopathy. Neurological: She is alert and oriented to person, place, and time. Skin: Skin is warm and dry. Psychiatric: She has a normal mood and affect.  Her behavior is normal. Judgment and thought content normal.   Nursing note and vitals

## 2019-01-02 ENCOUNTER — HOSPITAL ENCOUNTER (OUTPATIENT)
Dept: WOMENS IMAGING | Age: 74
Discharge: HOME | End: 2019-01-02
Payer: MEDICARE

## 2019-01-02 DIAGNOSIS — R92.2 BREAST DENSITY: ICD-10-CM

## 2019-01-02 PROCEDURE — G0279 TOMOSYNTHESIS, MAMMO: HCPCS

## 2019-01-02 PROCEDURE — 76642 ULTRASOUND BREAST LIMITED: CPT

## 2019-01-18 ENCOUNTER — NURSE TRIAGE (OUTPATIENT)
Dept: ADMINISTRATIVE | Age: 74
End: 2019-01-18

## 2019-01-22 ENCOUNTER — HOSPITAL ENCOUNTER (OUTPATIENT)
Age: 74
Setting detail: SPECIMEN
Discharge: HOME | End: 2019-01-22
Payer: MEDICARE

## 2019-01-22 LAB
ABSOLUTE EOS #: 0.13 K/UL (ref 0–0.44)
ABSOLUTE IMMATURE GRANULOCYTE: <0.03 K/UL (ref 0–0.3)
ABSOLUTE LYMPH #: 2.4 K/UL (ref 1.1–3.7)
ABSOLUTE MONO #: 0.47 K/UL (ref 0.1–1.2)
ALBUMIN SERPL-MCNC: 4.4 G/DL (ref 3.5–5.2)
ALBUMIN/GLOBULIN RATIO: 1.6 (ref 1–2.5)
ALP BLD-CCNC: 57 U/L (ref 35–104)
ALT SERPL-CCNC: 13 U/L (ref 5–33)
ANION GAP SERPL CALCULATED.3IONS-SCNC: 22 MMOL/L (ref 9–17)
AST SERPL-CCNC: 18 U/L
BASOPHILS # BLD: 0 % (ref 0–2)
BASOPHILS ABSOLUTE: 0.04 K/UL (ref 0–0.2)
BILIRUB SERPL-MCNC: 0.27 MG/DL (ref 0.3–1.2)
BUN BLDV-MCNC: 10 MG/DL (ref 8–23)
BUN/CREAT BLD: ABNORMAL (ref 9–20)
CALCIUM SERPL-MCNC: 9.5 MG/DL (ref 8.6–10.4)
CHLORIDE BLD-SCNC: 99 MMOL/L (ref 98–107)
CO2: 24 MMOL/L (ref 20–31)
CREAT SERPL-MCNC: 0.65 MG/DL (ref 0.5–0.9)
DIFFERENTIAL TYPE: ABNORMAL
EOSINOPHILS RELATIVE PERCENT: 1 % (ref 1–4)
GFR AFRICAN AMERICAN: >60 ML/MIN
GFR NON-AFRICAN AMERICAN: >60 ML/MIN
GFR SERPL CREATININE-BSD FRML MDRD: ABNORMAL ML/MIN/{1.73_M2}
GFR SERPL CREATININE-BSD FRML MDRD: ABNORMAL ML/MIN/{1.73_M2}
GLUCOSE BLD-MCNC: 92 MG/DL (ref 70–99)
HCT VFR BLD CALC: 50.3 % (ref 36.3–47.1)
HEMOGLOBIN: 16 G/DL (ref 11.9–15.1)
IMMATURE GRANULOCYTES: 0 %
LYMPHOCYTES # BLD: 26 % (ref 24–43)
MCH RBC QN AUTO: 30.2 PG (ref 25.2–33.5)
MCHC RBC AUTO-ENTMCNC: 31.8 G/DL (ref 28.4–34.8)
MCV RBC AUTO: 94.9 FL (ref 82.6–102.9)
MONOCYTES # BLD: 5 % (ref 3–12)
NRBC AUTOMATED: 0 PER 100 WBC
PDW BLD-RTO: 14.5 % (ref 11.8–14.4)
PLATELET # BLD: 284 K/UL (ref 138–453)
PLATELET ESTIMATE: ABNORMAL
PMV BLD AUTO: 10.8 FL (ref 8.1–13.5)
POTASSIUM SERPL-SCNC: 4.3 MMOL/L (ref 3.7–5.3)
RBC # BLD: 5.3 M/UL (ref 3.95–5.11)
RBC # BLD: ABNORMAL 10*6/UL
SEG NEUTROPHILS: 68 % (ref 36–65)
SEGMENTED NEUTROPHILS ABSOLUTE COUNT: 6.06 K/UL (ref 1.5–8.1)
SODIUM BLD-SCNC: 145 MMOL/L (ref 135–144)
TOTAL PROTEIN: 7.1 G/DL (ref 6.4–8.3)
WBC # BLD: 9.1 K/UL (ref 3.5–11.3)
WBC # BLD: ABNORMAL 10*3/UL

## 2019-01-23 LAB
FOLATE: 17.2 NG/ML
VITAMIN B-12: 384 PG/ML (ref 232–1245)

## 2019-02-08 ENCOUNTER — HOSPITAL ENCOUNTER (OUTPATIENT)
Dept: GENERAL RADIOLOGY | Age: 74
Discharge: HOME | End: 2019-02-08
Payer: MEDICARE

## 2019-02-08 ENCOUNTER — HOSPITAL ENCOUNTER (OUTPATIENT)
Age: 74
Discharge: HOME | End: 2019-02-08
Payer: MEDICARE

## 2019-02-08 DIAGNOSIS — R07.9 CHEST PAIN, UNSPECIFIED TYPE: ICD-10-CM

## 2019-02-08 LAB
EKG ATRIAL RATE: 95 BPM
EKG P AXIS: 88 DEGREES
EKG P-R INTERVAL: 132 MS
EKG Q-T INTERVAL: 340 MS
EKG QRS DURATION: 82 MS
EKG QTC CALCULATION (BAZETT): 427 MS
EKG R AXIS: 103 DEGREES
EKG T AXIS: 77 DEGREES
EKG VENTRICULAR RATE: 95 BPM

## 2019-02-08 PROCEDURE — 93005 ELECTROCARDIOGRAM TRACING: CPT | Performed by: NURSE PRACTITIONER

## 2019-02-08 PROCEDURE — 71046 X-RAY EXAM CHEST 2 VIEWS: CPT

## 2019-02-08 PROCEDURE — 93010 ELECTROCARDIOGRAM REPORT: CPT | Performed by: INTERNAL MEDICINE

## 2019-03-20 ENCOUNTER — HOSPITAL ENCOUNTER (OUTPATIENT)
Dept: CT IMAGING | Age: 74
Discharge: HOME | End: 2019-03-20
Payer: MEDICARE

## 2019-03-20 DIAGNOSIS — R91.8 LUNG NODULES: ICD-10-CM

## 2019-03-20 LAB — POC CREATININE WHOLE BLOOD: 0.8 MG/DL (ref 0.5–1.2)

## 2019-03-20 PROCEDURE — 71260 CT THORAX DX C+: CPT

## 2019-03-20 PROCEDURE — 82565 ASSAY OF CREATININE: CPT

## 2019-03-20 PROCEDURE — 6360000004 HC RX CONTRAST MEDICATION: Performed by: NURSE PRACTITIONER

## 2019-03-20 RX ADMIN — IOPAMIDOL 85 ML: 755 INJECTION, SOLUTION INTRAVENOUS at 15:55

## 2019-03-29 ENCOUNTER — OFFICE VISIT (OUTPATIENT)
Dept: PULMONOLOGY | Age: 74
End: 2019-03-29
Payer: MEDICARE

## 2019-03-29 VITALS
BODY MASS INDEX: 14.06 KG/M2 | TEMPERATURE: 98.1 F | OXYGEN SATURATION: 92 % | WEIGHT: 76.4 LBS | HEART RATE: 115 BPM | HEIGHT: 62 IN | SYSTOLIC BLOOD PRESSURE: 128 MMHG | DIASTOLIC BLOOD PRESSURE: 72 MMHG

## 2019-03-29 DIAGNOSIS — J44.9 COPD, SEVERE (HCC): Primary | ICD-10-CM

## 2019-03-29 DIAGNOSIS — F17.200 TOBACCO USE DISORDER: ICD-10-CM

## 2019-03-29 DIAGNOSIS — Z91.199 NON COMPLIANCE WITH MEDICAL TREATMENT: ICD-10-CM

## 2019-03-29 DIAGNOSIS — E43 SEVERE PROTEIN-CALORIE MALNUTRITION (GOMEZ: LESS THAN 60% OF STANDARD WEIGHT) (HCC): ICD-10-CM

## 2019-03-29 PROCEDURE — G8400 PT W/DXA NO RESULTS DOC: HCPCS | Performed by: NURSE PRACTITIONER

## 2019-03-29 PROCEDURE — 4040F PNEUMOC VAC/ADMIN/RCVD: CPT | Performed by: NURSE PRACTITIONER

## 2019-03-29 PROCEDURE — G8926 SPIRO NO PERF OR DOC: HCPCS | Performed by: NURSE PRACTITIONER

## 2019-03-29 PROCEDURE — 99214 OFFICE O/P EST MOD 30 MIN: CPT | Performed by: NURSE PRACTITIONER

## 2019-03-29 PROCEDURE — G8427 DOCREV CUR MEDS BY ELIG CLIN: HCPCS | Performed by: NURSE PRACTITIONER

## 2019-03-29 PROCEDURE — 1123F ACP DISCUSS/DSCN MKR DOCD: CPT | Performed by: NURSE PRACTITIONER

## 2019-03-29 PROCEDURE — G8419 CALC BMI OUT NRM PARAM NOF/U: HCPCS | Performed by: NURSE PRACTITIONER

## 2019-03-29 PROCEDURE — 3017F COLORECTAL CA SCREEN DOC REV: CPT | Performed by: NURSE PRACTITIONER

## 2019-03-29 PROCEDURE — 1090F PRES/ABSN URINE INCON ASSESS: CPT | Performed by: NURSE PRACTITIONER

## 2019-03-29 PROCEDURE — G8484 FLU IMMUNIZE NO ADMIN: HCPCS | Performed by: NURSE PRACTITIONER

## 2019-03-29 PROCEDURE — 3023F SPIROM DOC REV: CPT | Performed by: NURSE PRACTITIONER

## 2019-03-29 PROCEDURE — 1036F TOBACCO NON-USER: CPT | Performed by: NURSE PRACTITIONER

## 2019-03-29 RX ORDER — FLUTICASONE FUROATE AND VILANTEROL 200; 25 UG/1; UG/1
1 POWDER RESPIRATORY (INHALATION) DAILY
COMMUNITY

## 2019-03-29 ASSESSMENT — ENCOUNTER SYMPTOMS
SHORTNESS OF BREATH: 1
ALLERGIC/IMMUNOLOGIC NEGATIVE: 1
EYES NEGATIVE: 1
NAUSEA: 0
COUGH: 0
DIARRHEA: 0
STRIDOR: 0
WHEEZING: 0
BACK PAIN: 0
CHEST TIGHTNESS: 0

## 2019-04-06 ENCOUNTER — APPOINTMENT (OUTPATIENT)
Dept: CT IMAGING | Age: 74
End: 2019-04-06
Payer: MEDICARE

## 2019-04-06 ENCOUNTER — HOSPITAL ENCOUNTER (OUTPATIENT)
Age: 74
Setting detail: OBSERVATION
Discharge: HOME | End: 2019-04-08
Attending: INTERNAL MEDICINE | Admitting: INTERNAL MEDICINE
Payer: MEDICARE

## 2019-04-06 DIAGNOSIS — J43.9 PULMONARY EMPHYSEMA, UNSPECIFIED EMPHYSEMA TYPE (HCC): ICD-10-CM

## 2019-04-06 DIAGNOSIS — R63.4 WEIGHT LOSS: ICD-10-CM

## 2019-04-06 DIAGNOSIS — G45.9 TIA (TRANSIENT ISCHEMIC ATTACK): Primary | ICD-10-CM

## 2019-04-06 PROBLEM — R47.1 DYSARTHRIA: Status: ACTIVE | Noted: 2019-04-06

## 2019-04-06 LAB
ALBUMIN SERPL-MCNC: 3.7 G/DL (ref 3.5–5.1)
ALP BLD-CCNC: 52 U/L (ref 38–126)
ALT SERPL-CCNC: 14 U/L (ref 11–66)
ANION GAP SERPL CALCULATED.3IONS-SCNC: 10 MEQ/L (ref 8–16)
AST SERPL-CCNC: 16 U/L (ref 5–40)
BASOPHILS # BLD: 0.4 %
BASOPHILS ABSOLUTE: 0 THOU/MM3 (ref 0–0.1)
BILIRUB SERPL-MCNC: < 0.2 MG/DL (ref 0.3–1.2)
BILIRUBIN DIRECT: < 0.2 MG/DL (ref 0–0.3)
BUN BLDV-MCNC: 14 MG/DL (ref 7–22)
CALCIUM SERPL-MCNC: 9.2 MG/DL (ref 8.5–10.5)
CHLORIDE BLD-SCNC: 101 MEQ/L (ref 98–111)
CO2: 28 MEQ/L (ref 23–33)
CREAT SERPL-MCNC: 0.7 MG/DL (ref 0.4–1.2)
EKG ATRIAL RATE: 114 BPM
EKG P AXIS: 73 DEGREES
EKG P-R INTERVAL: 140 MS
EKG Q-T INTERVAL: 288 MS
EKG QRS DURATION: 72 MS
EKG QTC CALCULATION (BAZETT): 396 MS
EKG R AXIS: 52 DEGREES
EKG T AXIS: 71 DEGREES
EKG VENTRICULAR RATE: 114 BPM
EOSINOPHIL # BLD: 2.4 %
EOSINOPHILS ABSOLUTE: 0.2 THOU/MM3 (ref 0–0.4)
ERYTHROCYTE [DISTWIDTH] IN BLOOD BY AUTOMATED COUNT: 14.7 % (ref 11.5–14.5)
ERYTHROCYTE [DISTWIDTH] IN BLOOD BY AUTOMATED COUNT: 49.9 FL (ref 35–45)
ETHYL ALCOHOL, SERUM: < 0.01 %
GFR SERPL CREATININE-BSD FRML MDRD: 82 ML/MIN/1.73M2
GLUCOSE BLD-MCNC: 96 MG/DL (ref 70–108)
HCT VFR BLD CALC: 37.7 % (ref 37–47)
HEMOGLOBIN: 12.6 GM/DL (ref 12–16)
IMMATURE GRANS (ABS): 0.02 THOU/MM3 (ref 0–0.07)
IMMATURE GRANULOCYTES: 0.3 %
LIPASE: 16.1 U/L (ref 5.6–51.3)
LYMPHOCYTES # BLD: 28.8 %
LYMPHOCYTES ABSOLUTE: 2.3 THOU/MM3 (ref 1–4.8)
MAGNESIUM: 2 MG/DL (ref 1.6–2.4)
MCH RBC QN AUTO: 30.9 PG (ref 26–33)
MCHC RBC AUTO-ENTMCNC: 33.4 GM/DL (ref 32.2–35.5)
MCV RBC AUTO: 92.4 FL (ref 81–99)
MONOCYTES # BLD: 9.5 %
MONOCYTES ABSOLUTE: 0.8 THOU/MM3 (ref 0.4–1.3)
NUCLEATED RED BLOOD CELLS: 0 /100 WBC
OSMOLALITY CALCULATION: 277.9 MOSMOL/KG (ref 275–300)
PHOSPHORUS: 4 MG/DL (ref 2.4–4.7)
PLATELET # BLD: 232 THOU/MM3 (ref 130–400)
PMV BLD AUTO: 9.5 FL (ref 9.4–12.4)
POTASSIUM SERPL-SCNC: 4.3 MEQ/L (ref 3.5–5.2)
RBC # BLD: 4.08 MILL/MM3 (ref 4.2–5.4)
SEG NEUTROPHILS: 58.6 %
SEGMENTED NEUTROPHILS ABSOLUTE COUNT: 4.6 THOU/MM3 (ref 1.8–7.7)
SODIUM BLD-SCNC: 139 MEQ/L (ref 135–145)
TOTAL PROTEIN: 6.4 G/DL (ref 6.1–8)
TROPONIN T: < 0.01 NG/ML
TSH SERPL DL<=0.05 MIU/L-ACNC: 1.81 UIU/ML (ref 0.4–4.2)
WBC # BLD: 7.9 THOU/MM3 (ref 4.8–10.8)

## 2019-04-06 PROCEDURE — 99285 EMERGENCY DEPT VISIT HI MDM: CPT

## 2019-04-06 PROCEDURE — G0378 HOSPITAL OBSERVATION PER HR: HCPCS

## 2019-04-06 PROCEDURE — 80076 HEPATIC FUNCTION PANEL: CPT

## 2019-04-06 PROCEDURE — 85025 COMPLETE CBC W/AUTO DIFF WBC: CPT

## 2019-04-06 PROCEDURE — 83690 ASSAY OF LIPASE: CPT

## 2019-04-06 PROCEDURE — 2580000003 HC RX 258: Performed by: INTERNAL MEDICINE

## 2019-04-06 PROCEDURE — 84100 ASSAY OF PHOSPHORUS: CPT

## 2019-04-06 PROCEDURE — 99220 PR INITIAL OBSERVATION CARE/DAY 70 MINUTES: CPT | Performed by: INTERNAL MEDICINE

## 2019-04-06 PROCEDURE — 6360000002 HC RX W HCPCS: Performed by: INTERNAL MEDICINE

## 2019-04-06 PROCEDURE — 36415 COLL VENOUS BLD VENIPUNCTURE: CPT

## 2019-04-06 PROCEDURE — 93005 ELECTROCARDIOGRAM TRACING: CPT | Performed by: NURSE PRACTITIONER

## 2019-04-06 PROCEDURE — 70450 CT HEAD/BRAIN W/O DYE: CPT

## 2019-04-06 PROCEDURE — 2580000003 HC RX 258: Performed by: NURSE PRACTITIONER

## 2019-04-06 PROCEDURE — 84443 ASSAY THYROID STIM HORMONE: CPT

## 2019-04-06 PROCEDURE — 2709999900 HC NON-CHARGEABLE SUPPLY

## 2019-04-06 PROCEDURE — 6370000000 HC RX 637 (ALT 250 FOR IP): Performed by: INTERNAL MEDICINE

## 2019-04-06 PROCEDURE — 80048 BASIC METABOLIC PNL TOTAL CA: CPT

## 2019-04-06 PROCEDURE — 84484 ASSAY OF TROPONIN QUANT: CPT

## 2019-04-06 PROCEDURE — G0480 DRUG TEST DEF 1-7 CLASSES: HCPCS

## 2019-04-06 PROCEDURE — 83735 ASSAY OF MAGNESIUM: CPT

## 2019-04-06 PROCEDURE — 96372 THER/PROPH/DIAG INJ SC/IM: CPT

## 2019-04-06 RX ORDER — 0.9 % SODIUM CHLORIDE 0.9 %
1000 INTRAVENOUS SOLUTION INTRAVENOUS ONCE
Status: COMPLETED | OUTPATIENT
Start: 2019-04-06 | End: 2019-04-06

## 2019-04-06 RX ORDER — MEGESTROL ACETATE 40 MG/1
40 TABLET ORAL 2 TIMES DAILY
Status: DISCONTINUED | OUTPATIENT
Start: 2019-04-06 | End: 2019-04-08 | Stop reason: HOSPADM

## 2019-04-06 RX ORDER — SODIUM CHLORIDE 0.9 % (FLUSH) 0.9 %
10 SYRINGE (ML) INJECTION EVERY 12 HOURS SCHEDULED
Status: DISCONTINUED | OUTPATIENT
Start: 2019-04-06 | End: 2019-04-08 | Stop reason: HOSPADM

## 2019-04-06 RX ORDER — SODIUM CHLORIDE 0.9 % (FLUSH) 0.9 %
10 SYRINGE (ML) INJECTION PRN
Status: DISCONTINUED | OUTPATIENT
Start: 2019-04-06 | End: 2019-04-08 | Stop reason: HOSPADM

## 2019-04-06 RX ORDER — FLUTICASONE FUROATE AND VILANTEROL 100; 25 UG/1; UG/1
1 POWDER RESPIRATORY (INHALATION) DAILY
Status: DISCONTINUED | OUTPATIENT
Start: 2019-04-06 | End: 2019-04-08 | Stop reason: HOSPADM

## 2019-04-06 RX ORDER — M-VIT,TX,IRON,MINS/CALC/FOLIC 27MG-0.4MG
1 TABLET ORAL
Status: DISCONTINUED | OUTPATIENT
Start: 2019-04-06 | End: 2019-04-08 | Stop reason: HOSPADM

## 2019-04-06 RX ORDER — ONDANSETRON 2 MG/ML
4 INJECTION INTRAMUSCULAR; INTRAVENOUS EVERY 6 HOURS PRN
Status: DISCONTINUED | OUTPATIENT
Start: 2019-04-06 | End: 2019-04-08 | Stop reason: HOSPADM

## 2019-04-06 RX ORDER — AMITRIPTYLINE HYDROCHLORIDE 10 MG/1
10 TABLET, FILM COATED ORAL NIGHTLY
Status: DISCONTINUED | OUTPATIENT
Start: 2019-04-06 | End: 2019-04-08 | Stop reason: HOSPADM

## 2019-04-06 RX ORDER — LANOLIN ALCOHOL/MO/W.PET/CERES
6 CREAM (GRAM) TOPICAL NIGHTLY PRN
Status: DISCONTINUED | OUTPATIENT
Start: 2019-04-06 | End: 2019-04-08 | Stop reason: HOSPADM

## 2019-04-06 RX ORDER — SODIUM CHLORIDE 9 MG/ML
INJECTION, SOLUTION INTRAVENOUS CONTINUOUS
Status: DISCONTINUED | OUTPATIENT
Start: 2019-04-06 | End: 2019-04-07

## 2019-04-06 RX ORDER — ARIPIPRAZOLE 10 MG/1
10 TABLET ORAL DAILY
Status: DISCONTINUED | OUTPATIENT
Start: 2019-04-06 | End: 2019-04-08 | Stop reason: HOSPADM

## 2019-04-06 RX ORDER — ALBUTEROL SULFATE 90 UG/1
2 AEROSOL, METERED RESPIRATORY (INHALATION) 4 TIMES DAILY
Status: DISCONTINUED | OUTPATIENT
Start: 2019-04-06 | End: 2019-04-08 | Stop reason: HOSPADM

## 2019-04-06 RX ADMIN — MULTIPLE VITAMINS W/ MINERALS TAB 1 TABLET: TAB at 20:37

## 2019-04-06 RX ADMIN — SODIUM CHLORIDE 1000 ML: 9 INJECTION, SOLUTION INTRAVENOUS at 15:15

## 2019-04-06 RX ADMIN — AMITRIPTYLINE HYDROCHLORIDE 10 MG: 10 TABLET, FILM COATED ORAL at 20:37

## 2019-04-06 RX ADMIN — ARIPIPRAZOLE 10 MG: 10 TABLET ORAL at 20:37

## 2019-04-06 RX ADMIN — Medication 6 MG: at 22:55

## 2019-04-06 RX ADMIN — SODIUM CHLORIDE: 9 INJECTION, SOLUTION INTRAVENOUS at 20:38

## 2019-04-06 RX ADMIN — ENOXAPARIN SODIUM 40 MG: 40 INJECTION SUBCUTANEOUS at 20:46

## 2019-04-06 RX ADMIN — Medication 10 ML: at 20:39

## 2019-04-06 ASSESSMENT — ENCOUNTER SYMPTOMS
SORE THROAT: 0
ABDOMINAL DISTENTION: 0
RHINORRHEA: 0
CHEST TIGHTNESS: 0
SINUS PRESSURE: 0
EYE REDNESS: 0
BACK PAIN: 0
NAUSEA: 0
VOMITING: 0
WHEEZING: 0
CONSTIPATION: 0
DIARRHEA: 0
ABDOMINAL PAIN: 0
COUGH: 0
PHOTOPHOBIA: 0
COLOR CHANGE: 0
BLOOD IN STOOL: 0
SHORTNESS OF BREATH: 0
VOICE CHANGE: 0

## 2019-04-06 ASSESSMENT — PAIN SCALES - GENERAL
PAINLEVEL_OUTOF10: 0

## 2019-04-06 NOTE — PROGRESS NOTES
PT TO ROOM 8B 32 PER CART WITH C/O SLURRED SPEECH ON AND OFF AND FALLS X 2 IN LAST 48 HOURS, SKIN ASSESSMENT COMPLETED AND NO BRUSING OR SKIN ISSUES NOTED ON ADMISSION. ORINTED TO ROOM STEADY GAIT AND BED ALARM IS ON.

## 2019-04-06 NOTE — ED NOTES
Lab called back states lid came undone and urine will need recollected.       Jonathan Archer RN  04/06/19 3582

## 2019-04-06 NOTE — H&P
History & Physical        Patient:  Mauricio Campos  YOB: 1945    MRN: [id number]     Acct: [de-identified]    PCP: SHARONA Mccauley CNP    Date of Admission: 4/6/2019    Date of Service: Pt seen/examined on 4/6/2019   and placed in Observation. Chief Complaint:  Slurring of speech       History Of Present Illness:      75 y.o. female who presented to [address] [place] [address] with complains of having an episode of slurring of speech and starring off while playing a game with her friends yesterday afternoon. Pt has a pmh of COPD (on home 2L at rest and 4L with exertion) and depression. Pt denies having any extremity weakness, numbness, or facial droop. Pt reports episode occurred most of the day yesterday however has since resolved. Pt currently denies any dizziness, lightheadedness, chest pain, sob, burning with urination, fevers,chills, cough, diarrhea, blood in stool. Pt quite smoking 3 weeks ago. In the ED, HD stable. Labs WNL. Troponin negative. No ECG changes. CT head no acute changes. Will admit to Obs for TIA work-up. Past Medical History:          Diagnosis Date    Chronic respiratory failure with hypoxia (HCC)     COPD (chronic obstructive pulmonary disease) (HCC)     Depression     Pneumonia        Past Surgical History:          Procedure Laterality Date    COLONOSCOPY      TONSILLECTOMY         Medications Prior to Admission:      Prior to Admission medications    Medication Sig Start Date End Date Taking?  Authorizing Provider   MEGESTROL ACETATE PO Take by mouth 2 times daily    Historical Provider, MD   Fluticasone Furoate-Vilanterol (BREO ELLIPTA) 200-25 MCG/INH AEPB Inhale 1 puff into the lungs daily    Historical Provider, MD   nicotine (NICODERM CQ) 21 MG/24HR Place 1 patch onto the skin daily 12/7/18   Yahir Pugh MD   Multiple Vitamins-Minerals (THERAPEUTIC MULTIVITAMIN-MINERALS) tablet Take 1 tablet by mouth Daily with lunch 12/7/18 3/29/19 Kim Collado MD   ipratropium-albuterol (DUONEB) 0.5-2.5 (3) MG/3ML SOLN nebulizer solution Inhale 3 mLs into the lungs every 6 hours as needed for Shortness of Breath 18   Kim Collado MD   amitriptyline (ELAVIL) 10 MG tablet Take 10 mg by mouth nightly    Historical Provider, MD   OXYGEN Inhale 2-4 L into the lungs nightly Nightly and prn    Historical Provider, MD   albuterol sulfate  (90 Base) MCG/ACT inhaler Inhale 2 puffs into the lungs 4 times daily Use spacer 17   Darrick Chan MD   melatonin 3 MG TABS tablet Take 2 tablets by mouth nightly as needed (sleep) 17   Darrick Chan MD   ARIPiprazole (ABILIFY) 10 MG tablet Take 10 mg by mouth daily    Historical Provider, MD   desvenlafaxine succinate (PRISTIQ) 50 MG TB24 extended release tablet Take 100 mg by mouth daily     Historical Provider, MD       Allergies:  Spiriva handihaler [tiotropium bromide monohydrate]    Social History:     Social History     Socioeconomic History    Marital status:       Spouse name: Not on file    Number of children: 2    Years of education: Not on file    Highest education level: Not on file   Occupational History    Not on file   Social Needs    Financial resource strain: Not on file    Food insecurity:     Worry: Not on file     Inability: Not on file    Transportation needs:     Medical: Not on file     Non-medical: Not on file   Tobacco Use    Smoking status: Former Smoker     Packs/day: 2.00     Years: 56.00     Pack years: 112.00     Types: Cigarettes     Start date: 1962     Last attempt to quit: 3/8/2019     Years since quittin.0    Smokeless tobacco: Never Used   Substance and Sexual Activity    Alcohol use: No    Drug use: No    Sexual activity: Never   Lifestyle    Physical activity:     Days per week: Not on file     Minutes per session: Not on file    Stress: Not on file   Relationships    Social connections:     Talks on phone: Not on file     Gets together: Not on file     Attends Protestant service: Not on file     Active member of club or organization: Not on file     Attends meetings of clubs or organizations: Not on file     Relationship status: Not on file    Intimate partner violence:     Fear of current or ex partner: Not on file     Emotionally abused: Not on file     Physically abused: Not on file     Forced sexual activity: Not on file   Other Topics Concern    Not on file   Social History Narrative    Not on file       Family History:       Reviewed in detail and negative for DM, CAD, Cancer, CVA. Positive as follows:        Problem Relation Age of Onset    Cancer Mother         mouth cancer    Other Father         rheumatic fever--paraplegia       Diet:  No diet orders on file    REVIEW OF SYSTEMS:   Pertinent positives as noted in the HPI. All other systems reviewed and negative. PHYSICAL EXAM:    BP (!) 146/90   Pulse 114   Temp 98.3 °F (36.8 °C)   Resp 17   Wt 77 lb 6.4 oz (35.1 kg)   SpO2 95%   BMI 14.16 kg/m²     General appearance:  No apparent distress, appears stated age and cooperative. HEENT:  Normal cephalic, atraumatic without obvious deformity. Pupils equal, round, and reactive to light. Extra ocular muscles intact. Conjunctivae/corneas clear. Neck: Supple, with full range of motion. No jugular venous distention. Trachea midline. Respiratory:  Normal respiratory effort. Clear to auscultation, bilaterally without Rales/Wheezes/Rhonchi. Cardiovascular:  Regular rate and rhythm with normal S1/S2 without murmurs, rubs or gallops. Abdomen: Soft, non-tender, non-distended with normal bowel sounds. Musculoskeletal:  No clubbing, cyanosis or edema bilaterally. Full range of motion without deformity. Skin: Skin color, texture, turgor normal.  No rashes or lesions. Neurologic:  Neurovascularly intact without any focal sensory/motor deficits.  Cranial nerves: II-XII intact, grossly non-focal.  Psychiatric:  Alert and oriented, thought content appropriate, normal insight  Capillary Refill: Brisk,< 3 seconds   Peripheral Pulses: +2 palpable, equal bilaterally       Labs:     Recent Labs     04/06/19  1530   WBC 7.9   HGB 12.6   HCT 37.7        Recent Labs     04/06/19  1530      K 4.3      CO2 28   BUN 14   CREATININE 0.7   CALCIUM 9.2   PHOS 4.0     Recent Labs     04/06/19  1530   AST 16   ALT 14   BILIDIR <0.2   BILITOT <0.2*   ALKPHOS 52     No results for input(s): INR in the last 72 hours. No results for input(s): Delta Daily in the last 72 hours. Urinalysis:      Lab Results   Component Value Date    NITRU NEGATIVE 12/04/2018    WBCUA 2-4 12/04/2018    BACTERIA FEW 12/04/2018    RBCUA 0-2 12/04/2018    BLOODU NEGATIVE 12/04/2018    SPECGRAV 1.008 04/18/2017    GLUCOSEU NEGATIVE 12/04/2018       Radiology:     CT HEAD WO CONTRAST   Final Result   1. No mass effect or acute hemorrhage. 2. Mild periventricular small vessel ischemic changes and cerebral atrophy. **This report has been created using voice recognition software. It may contain minor errors which are inherent in voice recognition technology. **      Final report electronically signed by Dr. Riana Sosa on 4/6/2019 4:03 PM            Code Status: Prior      ASSESSMENT:    Active Hospital Problems    Diagnosis Date Noted    Dysarthria [R47.1] 04/06/2019       PLAN:    1. Slurring of Speech- will need to work-up for TIA/CVA. CT head showing no acute changes. MRI Brain, U/S Carotid, 2D Echo, and consult neurology. Lipid profile, HbA1c.   2. Protein Calorie Malnutrition- cont megace. Consult dietician. 3. Depression- cont home medications. 4. COPD- on home 2L at rest and 4L with exertion. Currently no wheezing or sob. Resume home inhalers. 5. Chronic Hypoxic Resp. Failure- history of COPD. On home 2L and 4L with exertion.        Thank you SHARONA Valencia - SAÚL for the opportunity to be involved in this

## 2019-04-06 NOTE — ED PROVIDER NOTES
[address]       Chief Complaint   Patient presents with    Weight Loss     14# since december       Nurses Notes reviewed and I agree except as noted inthe HPI. HISTORY OF PRESENT ILLNESS    Anita Chan is a 75 y.o. female with a past medical history of COPD, depression, and pneumonia. The patient presents to the ED for evaluation of slurred speech. The patient states that she was playing a dice game with her neighbor yesterday when her neighbor reported that the patient had slurred speech and was disorientated. Patient does not recall having slurred speech, but daughter at bedside states that she was not acting appropriately. Patient does not know how long or when the slurred speech was present. Patient denies any confusion or difficulty concentrating at this time. She reports generalized weakness but denies any unilateral or focal weakness. No urinary symptoms. The patient's daughter does report the patient having weight loss over the past year, however, since quitting smoking three weeks ago the patient has started regaining some weight. Patient denies any chest pain. The patient did have a fall two days ago but denies any head injury. No additional complaints or concerns at the time of initial evaluation. Symptom description:  Onset: yesterday   Symptom: slurred speech  Duration: unknown per patient  Aggravating factors: none  Timing: now resolved   Severity: mild     Experienced previously: no     HPI was provided by the patient. REVIEW OF SYSTEMS     Review of Systems   Constitutional: Negative for appetite change, chills, diaphoresis, fatigue, fever and unexpected weight change. HENT: Negative for congestion, hearing loss, postnasal drip, rhinorrhea, sinus pressure, sore throat and voice change. Eyes: Negative for photophobia, redness and visual disturbance. Respiratory: Negative for cough, chest tightness, shortness of breath and wheezing. Cardiovascular: Negative for chest pain and palpitations. Gastrointestinal: Negative for abdominal distention, abdominal pain, blood in stool, constipation, diarrhea, nausea and vomiting. Endocrine: Negative for cold intolerance, heat intolerance, polydipsia, polyphagia and polyuria. Genitourinary: Negative for difficulty urinating, dysuria, flank pain and frequency. Musculoskeletal: Negative for arthralgias, back pain, gait problem, joint swelling, neck pain and neck stiffness. Skin: Negative for color change and rash. Allergic/Immunologic: Negative for immunocompromised state. Neurological: Positive for speech difficulty (slurred, now resolved). Negative for dizziness, tremors, weakness, light-headedness, numbness and headaches. Hematological: Does not bruise/bleed easily. Psychiatric/Behavioral: Positive for decreased concentration (now resolved). Negative for behavioral problems, confusion, hallucinations, self-injury and suicidal ideas. The patient is not nervous/anxious. PAST MEDICAL HISTORY    has a past medical history of Chronic respiratory failure with hypoxia ([facility] COPD (chronic obstructive pulmonary disease) ([facility] [address] Depression, and Pneumonia. SURGICAL HISTORY      has a past surgical history that includes Tonsillectomy and Colonoscopy.     CURRENT MEDICATIONS       Current Discharge Medication List      CONTINUE these medications which have NOT CHANGED    Details   vitamin D (CHOLECALCIFEROL) 1000 UNIT TABS tablet Take 2,000 Units by mouth daily      MEGESTROL ACETATE PO Take 40 mg by mouth 2 times daily       Fluticasone Furoate-Vilanterol (BREO ELLIPTA) 200-25 MCG/INH AEPB Inhale 1 puff into the lungs daily      nicotine (NICODERM CQ) 21 MG/24HR Place 1 patch onto the skin daily  Qty: 30 patch, Refills: 3      ipratropium-albuterol (DUONEB) 0.5-2.5 (3) MG/3ML SOLN nebulizer solution Inhale 3 mLs into the lungs every 6 hours as needed for Shortness of Breath  Qty: 360 mL, (*)     RDW-SD 49.9 (*)     All other components within normal limits   HEPATIC FUNCTION PANEL - Abnormal; Notable for the following components: Total Bilirubin <0.2 (*)     All other components within normal limits   GLOMERULAR FILTRATION RATE, ESTIMATED - Abnormal; Notable for the following components:    Est, Glom Filt Rate 82 (*)     All other components within normal limits   BASIC METABOLIC PANEL   LIPASE   TROPONIN   MAGNESIUM   PHOSPHORUS   TSH WITHOUT REFLEX   ETHANOL   ANION GAP   OSMOLALITY   URINALYSIS WITH MICROSCOPIC   LIPID PANEL   HEMOGLOBIN A1C       EMERGENCYDEPARTMENT COURSE:   Vitals:    Vitals:    04/06/19 1448 04/06/19 1715 04/06/19 2030 04/06/19 2311   BP: (!) 146/90 (!) 142/76 127/70 127/65   Pulse: 114 98 103 98   Resp: 17 16 16 15   Temp: 98.3 °F (36.8 °C) 98.3 °F (36.8 °C) 98.5 °F (36.9 °C) 98.1 °F (36.7 °C)   TempSrc:  Oral Oral Oral   SpO2: 95% 96% 93% 91%   Weight: 77 lb 6.4 oz (35.1 kg)      Height:  5' 2\" (1.575 m)         MDM    Patient was seen and evaluated in the emergency department, patient appeared to be in no acute distress. Vital signs were reviewed, no significant abnormalities were noted. Physical exam was completed, no neurologic abnormalities were noted. She is alert, oriented, no pupillary defects were noted. Lungs are clear to auscultation, no murmurs clicks or rubs are noted. Labs and imaging were performed. No significant abnormalities were noted. Patient appeared to have some TIA-like symptoms, I think further workup is needed at this time. I discussed my findings with Dr. Squires Staff who agrees to admit the patient for further treatment. All here in the emergency room patient remained in stable course is appropriate for admission.     Medications   albuterol sulfate  (90 Base) MCG/ACT inhaler 2 puff (2 puffs Inhalation Not Given 4/6/19 2106)   amitriptyline (ELAVIL) tablet 10 mg (10 mg Oral Given 4/6/19 2037)   ARIPiprazole (ABILIFY) tablet 10 mg (10 [place] 4/6/19 3:29 PM Scribing for and in thepresence of Nam Aguirre CNP. Signed by: Micah Frank, 04/06/19 11:55 PM    Provider:  Dax Khanna performed the services described in the documentation, reviewed and edited the documentation which was dictated to the scribe in my presence, and it accurately records my words andactions.     Deion Aguirre CNP 4/6/19 11:55 PM             SHARONA Ordonez - SAÚL  04/06/19 8509

## 2019-04-07 ENCOUNTER — APPOINTMENT (OUTPATIENT)
Dept: MRI IMAGING | Age: 74
End: 2019-04-07
Payer: MEDICARE

## 2019-04-07 ENCOUNTER — APPOINTMENT (OUTPATIENT)
Dept: INTERVENTIONAL RADIOLOGY/VASCULAR | Age: 74
End: 2019-04-07
Payer: MEDICARE

## 2019-04-07 LAB
AVERAGE GLUCOSE: 114 MG/DL (ref 70–126)
BACTERIA: NORMAL
BILIRUBIN URINE: NEGATIVE
BLOOD, URINE: NEGATIVE
CASTS: NORMAL /LPF
CASTS: NORMAL /LPF
CHARACTER, URINE: CLEAR
CHOLESTEROL, TOTAL: 169 MG/DL (ref 100–199)
COLOR: YELLOW
CRYSTALS: NORMAL
EPITHELIAL CELLS, UA: NORMAL /HPF
GLUCOSE, URINE: NEGATIVE MG/DL
HBA1C MFR BLD: 5.8 % (ref 4.4–6.4)
HDLC SERPL-MCNC: 67 MG/DL
KETONES, URINE: NEGATIVE
LDL CHOLESTEROL CALCULATED: 87 MG/DL
LEUKOCYTE ESTERASE, URINE: NEGATIVE
MISCELLANEOUS LAB TEST RESULT: NORMAL
NITRITE, URINE: NEGATIVE
PH UA: 7.5 (ref 5–9)
PROTEIN UA: NEGATIVE MG/DL
RBC URINE: NORMAL /HPF
RENAL EPITHELIAL, UA: NORMAL
SPECIFIC GRAVITY UA: 1.01 (ref 1–1.03)
TRIGL SERPL-MCNC: 75 MG/DL (ref 0–199)
UROBILINOGEN, URINE: 0.2 EU/DL (ref 0–1)
WBC UA: NORMAL /HPF
YEAST: NORMAL

## 2019-04-07 PROCEDURE — 93010 ELECTROCARDIOGRAM REPORT: CPT | Performed by: INTERNAL MEDICINE

## 2019-04-07 PROCEDURE — 80061 LIPID PANEL: CPT

## 2019-04-07 PROCEDURE — 81001 URINALYSIS AUTO W/SCOPE: CPT

## 2019-04-07 PROCEDURE — G0378 HOSPITAL OBSERVATION PER HR: HCPCS

## 2019-04-07 PROCEDURE — 6370000000 HC RX 637 (ALT 250 FOR IP): Performed by: INTERNAL MEDICINE

## 2019-04-07 PROCEDURE — 83036 HEMOGLOBIN GLYCOSYLATED A1C: CPT

## 2019-04-07 PROCEDURE — 2709999900 HC NON-CHARGEABLE SUPPLY

## 2019-04-07 PROCEDURE — 93880 EXTRACRANIAL BILAT STUDY: CPT

## 2019-04-07 PROCEDURE — 94640 AIRWAY INHALATION TREATMENT: CPT

## 2019-04-07 PROCEDURE — 99225 PR SBSQ OBSERVATION CARE/DAY 25 MINUTES: CPT | Performed by: INTERNAL MEDICINE

## 2019-04-07 PROCEDURE — 70551 MRI BRAIN STEM W/O DYE: CPT

## 2019-04-07 PROCEDURE — 2580000003 HC RX 258: Performed by: INTERNAL MEDICINE

## 2019-04-07 PROCEDURE — 36415 COLL VENOUS BLD VENIPUNCTURE: CPT

## 2019-04-07 RX ORDER — ATORVASTATIN CALCIUM 10 MG/1
10 TABLET, FILM COATED ORAL NIGHTLY
Status: DISCONTINUED | OUTPATIENT
Start: 2019-04-07 | End: 2019-04-08 | Stop reason: HOSPADM

## 2019-04-07 RX ORDER — ASPIRIN 81 MG/1
81 TABLET, CHEWABLE ORAL DAILY
Status: DISCONTINUED | OUTPATIENT
Start: 2019-04-07 | End: 2019-04-08 | Stop reason: HOSPADM

## 2019-04-07 RX ADMIN — ARIPIPRAZOLE 10 MG: 10 TABLET ORAL at 09:35

## 2019-04-07 RX ADMIN — Medication 10 ML: at 23:44

## 2019-04-07 RX ADMIN — MEGESTROL ACETATE 40 MG: 40 TABLET ORAL at 20:05

## 2019-04-07 RX ADMIN — AMITRIPTYLINE HYDROCHLORIDE 10 MG: 10 TABLET, FILM COATED ORAL at 20:05

## 2019-04-07 RX ADMIN — MEGESTROL ACETATE 40 MG: 40 TABLET ORAL at 09:35

## 2019-04-07 RX ADMIN — ASPIRIN 81 MG 81 MG: 81 TABLET ORAL at 18:15

## 2019-04-07 RX ADMIN — Medication 2 PUFF: at 16:41

## 2019-04-07 RX ADMIN — SODIUM CHLORIDE: 9 INJECTION, SOLUTION INTRAVENOUS at 10:40

## 2019-04-07 RX ADMIN — Medication 6 MG: at 22:05

## 2019-04-07 RX ADMIN — MULTIPLE VITAMINS W/ MINERALS TAB 1 TABLET: TAB at 18:15

## 2019-04-07 ASSESSMENT — PAIN SCALES - GENERAL
PAINLEVEL_OUTOF10: 0

## 2019-04-07 NOTE — PROGRESS NOTES
Hospitalist Progress Note    Patient:  Rajani Huang      Unit/Bed:8B-32/032-A    YOB: 1945    MRN: [id number]       Acct: [de-identified]     PCP: SHARONA Javed CNP    Date of Admission: 4/6/2019    Assessment/Plan:    1. Slurred speech x hr 4.6, no other neuro sxs; suspect on nonneuro basis; consult neuro. Mri old lacunar infarct; asa and statin  2. Copd stable        Expected discharge date:  24h    Disposition:    [x] Home       [] TCU       [] Rehab       [] Psych       [] SNF       [] [facility]       [] Other-    Chief Complaint:See above      Hospital Course: No new events in the past 24 hours. Subjective (past 24 hours): feels well        Medications:  Reviewed    Infusion Medications   Scheduled Medications    aspirin  81 mg Oral Daily    atorvastatin  10 mg Oral Nightly    albuterol sulfate HFA  2 puff Inhalation 4x Daily    amitriptyline  10 mg Oral Nightly    ARIPiprazole  10 mg Oral Daily    fluticasone-vilanterol  1 puff Inhalation Daily    megestrol  40 mg Oral BID    therapeutic multivitamin-minerals  1 tablet Oral Lunch    sodium chloride flush  10 mL Intravenous 2 times per day    enoxaparin  40 mg Subcutaneous Daily     PRN Meds: melatonin, sodium chloride flush, magnesium hydroxide, ondansetron      Intake/Output Summary (Last 24 hours) at 4/7/2019 1236  Last data filed at 4/7/2019 0959  Gross per 24 hour   Intake 710 ml   Output --   Net 710 ml       Diet:  DIET GENERAL;    Exam:  /64   Pulse 90   Temp 97.4 °F (36.3 °C) (Oral)   Resp 16   Ht 5' 2\" (1.575 m)   Wt 77 lb 6.4 oz (35.1 kg)   SpO2 92%   BMI 14.16 kg/m²     General appearance: No apparent distress, appears stated age and cooperative. Chronically ill appearing    HEENT: Pupils equal, round, and reactive to light. Conjunctivae/corneas clear. Neck: Supple, with full range of motion. No jugular venous distention. Trachea midline.   Respiratory:  Normal respiratory effort. Breath sounds diminished bilaterally. Cardiovascular: Regular rate and rhythm with normal S1/S2 without murmurs, rubs or gallops. Abdomen: Soft, non-tender, non-distended with normal bowel sounds. Musculoskeletal: passive and active ROM x 4 extremities. Skin: Skin color, texture, turgor normal.  No rashes or lesions. Neurologic:  Neurovascularly intact without any focal sensory/motor deficits. Cranial nerves: II-XII intact, grossly non-focal.  Psychiatric: Alert and oriented, thought content appropriate, normal insight  Capillary Refill: Brisk,< 3 seconds   Peripheral Pulses: +2 palpable, equal bilaterally       Labs:   Recent Labs     04/06/19  1530   WBC 7.9   HGB 12.6   HCT 37.7        Recent Labs     04/06/19  1530      K 4.3      CO2 28   BUN 14   CREATININE 0.7   CALCIUM 9.2   PHOS 4.0     Recent Labs     04/06/19  1530   AST 16   ALT 14   BILIDIR <0.2   BILITOT <0.2*   ALKPHOS 52     No results for input(s): INR in the last 72 hours. No results for input(s): Floreen Pill in the last 72 hours. Microbiology:      Urinalysis:      Lab Results   Component Value Date    NITRU NEGATIVE 12/04/2018    WBCUA 2-4 12/04/2018    BACTERIA FEW 12/04/2018    RBCUA 0-2 12/04/2018    BLOODU NEGATIVE 12/04/2018    SPECGRAV 1.008 04/18/2017    GLUCOSEU NEGATIVE 12/04/2018       Radiology:  MRI BRAIN WO CONTRAST   Final Result       1. No evidence of acute intracranial abnormality. 2. Old small lacunar infarct in the left cerebellar hemisphere. **This report has been created using voice recognition software. It may contain minor errors which are inherent in voice recognition technology. **      Final report electronically signed by Dr. Caden Faust MD on 4/7/2019 9:34 AM      VL DUP CAROTID BILATERAL   Final Result    Mural plaque at the carotid bifurcations with less than 50% stenosis on the left and between 50 and 69% stenosis on the right by SRU

## 2019-04-07 NOTE — PLAN OF CARE
Problem: Falls - Risk of:  Goal: Will remain free from falls  Description  Will remain free from falls  Outcome: Ongoing  Note:   Patient absent of falls this shift, fall band intact, bed alarm set, falling star magnet in place. Problem: Discharge Planning:  Goal: Discharged to appropriate level of care  Description  Discharged to appropriate level of care  Outcome: Ongoing  Note:   Patient plans to return home at discharge. Patient has no new needs at home. Problem: Verbal Communication - Impaired:  Goal: Functional communication will improve  Description  Functional communication will improve  Outcome: Ongoing  Note:   Patient has no slurred speech this shift. Problem: Pain:  Goal: Pain level will decrease  Description  Pain level will decrease  Outcome: Ongoing  Note:   Patient voices pain 0/10. Patients pain goal is 0/10. PRN pain medications given as ordered. Care plan reviewed with patient. Patient verbalize understanding of the plan of care and contribute to goal setting.

## 2019-04-08 VITALS
BODY MASS INDEX: 14.24 KG/M2 | HEART RATE: 95 BPM | WEIGHT: 77.4 LBS | TEMPERATURE: 98.1 F | HEIGHT: 62 IN | OXYGEN SATURATION: 93 % | RESPIRATION RATE: 18 BRPM | DIASTOLIC BLOOD PRESSURE: 62 MMHG | SYSTOLIC BLOOD PRESSURE: 99 MMHG

## 2019-04-08 LAB
LV EF: 55 %
LVEF MODALITY: NORMAL

## 2019-04-08 PROCEDURE — G0378 HOSPITAL OBSERVATION PER HR: HCPCS

## 2019-04-08 PROCEDURE — 6360000002 HC RX W HCPCS: Performed by: INTERNAL MEDICINE

## 2019-04-08 PROCEDURE — 93306 TTE W/DOPPLER COMPLETE: CPT

## 2019-04-08 PROCEDURE — 94640 AIRWAY INHALATION TREATMENT: CPT

## 2019-04-08 PROCEDURE — 6370000000 HC RX 637 (ALT 250 FOR IP): Performed by: INTERNAL MEDICINE

## 2019-04-08 PROCEDURE — 99223 1ST HOSP IP/OBS HIGH 75: CPT | Performed by: PSYCHIATRY & NEUROLOGY

## 2019-04-08 PROCEDURE — 2580000003 HC RX 258: Performed by: INTERNAL MEDICINE

## 2019-04-08 PROCEDURE — 96372 THER/PROPH/DIAG INJ SC/IM: CPT

## 2019-04-08 PROCEDURE — 99217 PR OBSERVATION CARE DISCHARGE MANAGEMENT: CPT | Performed by: INTERNAL MEDICINE

## 2019-04-08 PROCEDURE — 2700000000 HC OXYGEN THERAPY PER DAY

## 2019-04-08 PROCEDURE — 94760 N-INVAS EAR/PLS OXIMETRY 1: CPT

## 2019-04-08 RX ORDER — ATORVASTATIN CALCIUM 10 MG/1
10 TABLET, FILM COATED ORAL NIGHTLY
Qty: 30 TABLET | Refills: 3 | Status: SHIPPED | OUTPATIENT
Start: 2019-04-08

## 2019-04-08 RX ORDER — ATORVASTATIN CALCIUM 10 MG/1
10 TABLET, FILM COATED ORAL NIGHTLY
Status: DISCONTINUED | OUTPATIENT
Start: 2019-04-08 | End: 2019-04-08

## 2019-04-08 RX ADMIN — MEGESTROL ACETATE 40 MG: 40 TABLET ORAL at 08:48

## 2019-04-08 RX ADMIN — Medication 2 PUFF: at 09:10

## 2019-04-08 RX ADMIN — Medication 2 PUFF: at 17:37

## 2019-04-08 RX ADMIN — ATORVASTATIN CALCIUM 10 MG: 10 TABLET, FILM COATED ORAL at 19:03

## 2019-04-08 RX ADMIN — Medication 10 ML: at 08:49

## 2019-04-08 RX ADMIN — ENOXAPARIN SODIUM 40 MG: 40 INJECTION SUBCUTANEOUS at 08:48

## 2019-04-08 RX ADMIN — ASPIRIN 81 MG 81 MG: 81 TABLET ORAL at 08:48

## 2019-04-08 RX ADMIN — Medication 2 PUFF: at 13:06

## 2019-04-08 RX ADMIN — ARIPIPRAZOLE 10 MG: 10 TABLET ORAL at 08:48

## 2019-04-08 RX ADMIN — MULTIPLE VITAMINS W/ MINERALS TAB 1 TABLET: TAB at 14:18

## 2019-04-08 ASSESSMENT — PAIN SCALES - GENERAL: PAINLEVEL_OUTOF10: 0

## 2019-04-08 NOTE — PROGRESS NOTES
Educated on discharge instructions, medications, and follow up appointments. No further questions or concerns voiced at this time. Discharged home with daughter.

## 2019-04-08 NOTE — PROGRESS NOTES
Sitting in bed uneventful day. No difficulty with speech. Pleasant. Plan on d/c tomorrow after  Neurology see .

## 2019-04-08 NOTE — PROGRESS NOTES
Hospitalist Progress Note    Patient:  Everardo Gutiérrez      Unit/Bed:8B-32/032-A    YOB: 1945    MRN: [id number]       Acct: [de-identified]     PCP: SHARONA Mojica CNP    Date of Admission: 4/6/2019    Assessment/Plan:    1. Slurred speech x hr 4.6, no other neuro sxs; suspect on nonneuro basis; consulted neuro. Mri old lacunar infarct; asa and statin  2. Copd stable    3. Should be able to go home today if Neuro concurs        Expected discharge date:  24h    Disposition:    [x] Home       [] TCU       [] Rehab       [] Psych       [] SNF       [] [facility]       [] Other-    Chief Complaint:See above      Hospital Course: No new events in the past 24 hours. Subjective (past 24 hours): feels well        Medications:  Reviewed    Infusion Medications   Scheduled Medications    aspirin  81 mg Oral Daily    atorvastatin  10 mg Oral Nightly    albuterol sulfate HFA  2 puff Inhalation 4x Daily    amitriptyline  10 mg Oral Nightly    ARIPiprazole  10 mg Oral Daily    fluticasone-vilanterol  1 puff Inhalation Daily    megestrol  40 mg Oral BID    therapeutic multivitamin-minerals  1 tablet Oral Lunch    sodium chloride flush  10 mL Intravenous 2 times per day    enoxaparin  40 mg Subcutaneous Daily     PRN Meds: melatonin, sodium chloride flush, magnesium hydroxide, ondansetron      Intake/Output Summary (Last 24 hours) at 4/8/2019 1259  Last data filed at 4/8/2019 0848  Gross per 24 hour   Intake 2642.92 ml   Output --   Net 2642.92 ml       Diet:  DIET GENERAL;    Exam:  /64   Pulse 110   Temp 98 °F (36.7 °C)   Resp 18   Ht 5' 2\" (1.575 m)   Wt 77 lb 6.4 oz (35.1 kg)   SpO2 90%   BMI 14.16 kg/m²     General appearance: No apparent distress, appears stated age and cooperative. Chronically ill appearing    HEENT: Pupils equal, round, and reactive to light. Conjunctivae/corneas clear. Neck: Supple, with full range of motion.  No jugular venous distention. Trachea midline. Respiratory:  Normal respiratory effort. Breath sounds diminished bilaterally. Cardiovascular: Regular rate and rhythm with normal S1/S2 without murmurs, rubs or gallops. Abdomen: Soft, non-tender, non-distended with normal bowel sounds. Musculoskeletal: passive and active ROM x 4 extremities. Skin: Skin color, texture, turgor normal.  No rashes or lesions. Neurologic:  Neurovascularly intact without any focal sensory/motor deficits. Cranial nerves: II-XII intact, grossly non-focal.  Psychiatric: Alert and oriented, thought content appropriate, normal insight  Capillary Refill: Brisk,< 3 seconds   Peripheral Pulses: +2 palpable, equal bilaterally       Labs:   Recent Labs     04/06/19  1530   WBC 7.9   HGB 12.6   HCT 37.7        Recent Labs     04/06/19  1530      K 4.3      CO2 28   BUN 14   CREATININE 0.7   CALCIUM 9.2   PHOS 4.0     Recent Labs     04/06/19  1530   AST 16   ALT 14   BILIDIR <0.2   BILITOT <0.2*   ALKPHOS 52     No results for input(s): INR in the last 72 hours. No results for input(s): Julio Cesar Sarah in the last 72 hours. Microbiology:      Urinalysis:      Lab Results   Component Value Date    NITRU NEGATIVE 04/07/2019    WBCUA 0-2 04/07/2019    BACTERIA NONE 04/07/2019    RBCUA 0-2 04/07/2019    BLOODU NEGATIVE 04/07/2019    SPECGRAV 1.012 04/07/2019    GLUCOSEU NEGATIVE 12/04/2018       Radiology:  MRI BRAIN WO CONTRAST   Final Result       1. No evidence of acute intracranial abnormality. 2. Old small lacunar infarct in the left cerebellar hemisphere. **This report has been created using voice recognition software. It may contain minor errors which are inherent in voice recognition technology. **      Final report electronically signed by Dr. Jose Miguel Walker MD on 4/7/2019 9:34 AM      VL DUP CAROTID BILATERAL   Final Result    Mural plaque at the carotid bifurcations with less than 50% stenosis on the left and between 48 and 69% stenosis on the right by SRU consensus criteria for carotid stenosis. **This report has been created using voice recognition software. It may contain minor errors which are inherent in voice recognition technology. **      Final report electronically signed by Dr. Dang Child MD on 4/7/2019 10:47 AM      CT HEAD WO CONTRAST   Final Result   1. No mass effect or acute hemorrhage. 2. Mild periventricular small vessel ischemic changes and cerebral atrophy. **This report has been created using voice recognition software. It may contain minor errors which are inherent in voice recognition technology. **      Final report electronically signed by Dr. Cahty Reeder on 4/6/2019 4:03 PM          DVT prophylaxis: [x] Lovenox                                 [] SCDs                                 [] SQ Heparin                                 [] Encourage ambulation           [] Already on Anticoagulation     Code Status: Limited        Tele:   [x] yes             [] no    Active Hospital Problems    Diagnosis Date Noted    Dysarthria [R47.1] 04/06/2019    [facility] [J44.9] 12/16/2017       Electronically signed by Cherelle Gonzalez MD on 4/8/2019 at 12:59 PM

## 2019-04-08 NOTE — CONSULTS
NEUROLOGY CONSULT NOTE      Requesting Physician: Ashanti Loera MD    Reason for Consult:  Evaluate for TIA? History of Present Illness:  Debora Franco is a 75 y.o. female admitted to [address] [place] [address] on 2019.       74 yo woman with hx of COPD, came into the hospital due to slurred speech for a day. Symptoms resolved completely. MRI negative for stroke. Pt feels normal now. Reports no chest pain. No shortness of breath with exertion. Reports no neck pain. No vision changes. No dysphagia. No fever. No rash. No weight loss. Past Medical History:        Diagnosis Date    Chronic respiratory failure with hypoxia (HCC)     COPD (chronic obstructive pulmonary disease) ([facility])     Depression     Pneumonia            Procedure Laterality Date    COLONOSCOPY      TONSILLECTOMY         Allergies:     Allergies   Allergen Reactions    Spiriva Handihaler [Tiotropium Bromide Monohydrate] Itching        Current Medications:     aspirin chewable tablet 81 mg Daily   atorvastatin (LIPITOR) tablet 10 mg Nightly   albuterol sulfate  (90 Base) MCG/ACT inhaler 2 puff 4x Daily   amitriptyline (ELAVIL) tablet 10 mg Nightly   ARIPiprazole (ABILIFY) tablet 10 mg Daily   fluticasone-vilanterol (BREO ELLIPTA) 100-25 MCG/INH inhaler 1 puff Daily   megestrol (MEGACE) tablet 40 mg BID   melatonin tablet 6 mg Nightly PRN   therapeutic multivitamin-minerals 1 tablet Lunch   sodium chloride flush 0.9 % injection 10 mL 2 times per day   sodium chloride flush 0.9 % injection 10 mL PRN   magnesium hydroxide (MILK OF MAGNESIA) 400 MG/5ML suspension 30 mL Daily PRN   ondansetron (ZOFRAN) injection 4 mg Q6H PRN   enoxaparin (LOVENOX) injection 40 mg Daily        Social History:  Social History     Tobacco Use   Smoking Status Former Smoker    Packs/day: 2.00    Years: 56.00    Pack years: 112.00    Types: Cigarettes    Start date: 1962    Last attempt to quit: 3/8/2019    Years since quittin.0 Smokeless Tobacco Never Used     Social History     Substance and Sexual Activity   Alcohol Use No     Social History     Substance and Sexual Activity   Drug Use No         Family History:       Problem Relation Age of Onset    Cancer Mother         mouth cancer    Other Father         rheumatic fever--paraplegia       Review of Systems:  All systems reviewed are negative except what is mentioned in history of present illness. Physical Exam:  BP 99/62   Pulse 95   Temp 98.1 °F (36.7 °C) (Oral)   Resp 18   Ht 5' 2\" (1.575 m)   Wt 77 lb 6.4 oz (35.1 kg)   SpO2 93%   BMI 14.16 kg/m²  I Body mass index is 14.16 kg/m². I Wt Readings from Last 1 Encounters:   04/06/19 77 lb 6.4 oz (35.1 kg)           General appearance - alert, well appearing, and in no distress, oriented to  person, place, and time and weight  Mental status -Level of Alertness: awake  Orientation: person, place, time  Memory: normal  Fund of Knowledge: normal  Attention/Concentration: normal  Language: normal. Mood is normal  Neck - supple, no significant adenopathy, carotids upstroke normal bilaterally, no carotid bruits. There is no LAP in the neck. There is no thyroid enlargement. Neurological -   Cranial [id number]:   Cranial nerve II: Normal. There is full visual fields  Cranial nerve III: Pupils: equal, round, reactive to light   Cranial nerves III, IV, VI: Extraocular Movements: intact   Cranial nerve V: Facial sensation: intact   Cranial nerve VII:Facial strength: intact   Cranial nerve VIII: Hearing: intact   Cranial nerve IX: Palate Elevation intact bilaterally  Cranial nerve XI: Shoulder shrug intact bilaterally  Cranial nerve XII: Tongue midline   neck supple without rigidity  Motor exam is 5/5 in the upper and lower extremities. Normal muscle tone. There is no muscle atrophy.  There is no muscle fasciculation    Sensory is intact    Coordination: finger to nose intact  Gait and station normal    Abnormal movement speech. Recommendations:    - MRI brain neg for stroke  - likely TIA???  - con't aspirin 81mg qdaily  - US carotid showed bilateral mild stenosis, patient has never been on aspirin, both are less than 70% and patient does not have a stroke, hence, no CEA or stenting for now, patient need to be medically optimized starting with aspirin 81mg qdaily and statin daily with goal LDL of 70  -patient should also see her PCP to ensure her BP and glucose are well within good range. - pt can go home from neurology standpoint. 1. Case was discussed with primary service. 2. All questions were answered. It was my pleasure to evaluate Rosalinda Boo today. Please call with questions.       Electronically signed by Jena Finch MD on 4/8/2019 at 6:47 PM    Criselda Valle MD  Attending Neurologist/Neurointensivist

## 2019-04-08 NOTE — CARE COORDINATION
CASE MANAGEMENT OBSERVATION NOTE       4/8/19, 7:41 AM    Debora Franco       Admitted from: ED    4/6/2019/ 1443   [facility]: [facility]032[facility] Reason for admit: Dysarthria [R47.1]   Admit order signed?: yes    Procedure: na    4/7/19 CT head  1. No mass effect or acute hemorrhage. 2. Mild periventricular small vessel ischemic changes and cerebral atrophy. MRI Brain  1. No evidence of acute intracranial abnormality. 2. Old small lacunar infarct in the left cerebellar hemisphere. Pertinent Info/Orders/Treatment Plan:  Neuro checks, telemetry, neuro consult 4/6 no coverage over w/e, contact isolation for VRE, Echo pending, orthostatic b/p checks. PCP: SHARONA Reyes CNP    Discharge Plan: Spoke with Glenn Makayla; she lives home alone at [facility], she has PCP, home nebulizer, meals on wheels thru [employer] on M-W-F, she does not drive/daughter drives, is able to afford medicines, and has home oxygen thru [employer]. She uses her O2 at 1L/min at rest and 4 L/min with activity. She has a  and is able to afford this currently. 4/8/19, 10:16 AM    Discharge plan discussed by  and . Discharge plan reviewed with patient/ family. Patient/ family verbalize understanding of discharge plan and are in agreement with plan. Understanding was demonstrated using the teach back method.

## 2019-04-08 NOTE — PLAN OF CARE
Problem: Falls - Risk of:  Goal: Will remain free from falls  Description  Will remain free from falls  Outcome: Ongoing  Note:   Patient absent of falls this shift, fall band intact, bed alarm set, falling star magnet in place. Problem: Falls - Risk of:  Goal: Absence of physical injury  Description  Absence of physical injury  Outcome: Ongoing  Note:   Patient absent of physical injury this shift. Problem: Discharge Planning:  Goal: Discharged to appropriate level of care  Description  Discharged to appropriate level of care  Outcome: Ongoing  Note:   Patient plans to return home at discharge. Patient has no new needs at home. Problem: Verbal Communication - Impaired:  Goal: Functional communication will improve  Description  Functional communication will improve  Outcome: Ongoing  Note:   Speech clear this shift. Problem: Pain:  Goal: Pain level will decrease  Description  Pain level will decrease  Outcome: Ongoing  Note:   Patient voices pain 0/10. Patients pain goal is 0/10. PRN pain medications given as ordered. Care plan reviewed with patient. Patient verbalize understanding of the plan of care and contribute to goal setting.

## 2019-04-08 NOTE — PLAN OF CARE
Problem: Impaired respiratory status  Goal: Clear lung sounds  Outcome: Ongoing  Note:   Pt has diminished breath sounds. Txs to help improve lung aeration.

## 2019-04-08 NOTE — PLAN OF CARE
Problem: Falls - Risk of:  Goal: Will remain free from falls  Description  Will remain free from falls  Note:   No falls throughout shift. Falling star program within place. Assistance provided with ambulation. Call light within reach. Goal: Absence of physical injury  Description  Absence of physical injury  Note:   No falls throughout shift. Falling star program within place. Assistance provided with ambulation. Call light within reach. Problem: Discharge Planning:  Goal: Discharged to appropriate level of care  Description  Discharged to appropriate level of care  Note:   Plans to go home at discharge. Problem: Verbal Communication - Impaired:  Goal: Functional communication will improve  Description  Functional communication will improve  Note:   Speech WNL. Problem: Pain:  Goal: Pain level will decrease  Description  Pain level will decrease  Note:   Denies pain throughout shift. Goal: Control of acute pain  Description  Control of acute pain  Note:   Denies pain throughout shift. Goal: Control of chronic pain  Description  Control of chronic pain  Note:   Denies pain throughout shift. Care plan reviewed with patient. Patient verbalize understanding of the plan of care and contribute to goal setting.

## 2019-04-09 NOTE — DISCHARGE SUMMARY
Hospital Medicine Discharge Summary      Patient Identification:   Ger Kunz   :   MRN: [id number]   Account: [de-identified]      Patient's PCP: SHARONA Hernandez CNP    Admit Date: 2019     Discharge Date: 2019      Admitting Physician: Ilya Malik MD     Discharge Physician: Ilya Malik MD     Discharge Diagnoses: Active Hospital Problems    Diagnosis Date Noted    TIA (transient ischemic attack) [G45.9]     Dysarthria [R47.1] 2019    COPD [facility] [J44.9] 2017       The patient was seen and examined on day of discharge and this discharge summary is in conjunction with any daily progress note from day of discharge. Hospital Course:   Ger Kunz is a 75 y.o. female admitted to [facility] on 2019 for an episode of slurred speech and staring into space the day prior to admission. The pt has a hx of copd, on . She apparently waited till the following day to come to ER. Her cns workup was negative; an MRI showed an old cerebellar lacunar infarct. Carotid ultrasounds showed no significant stenosis. Merari Anderson She was seen by Dr Miguel Jules who felt it could be a TIA although it was difficult to assign a neurologic source. She was discharged on a statin and a baby aspirin can be added. She will follow up with her family physician. Exam:     Vitals:  Vitals:    19 0910 19 1100 19 1559 19 1700   BP:  104/64 (!) 90/51 99/62   Pulse:  110 95    Resp:  18 18    Temp:  98 °F (36.7 °C) 98.1 °F (36.7 °C)    TempSrc:   Oral    SpO2: 92% 90% 93%    Weight:       Height:         Weight: Weight: 77 lb 6.4 oz (35.1 kg)     24 hour intake/output:No intake or output data in the 24 hours ending 19 1735      General appearance:  No apparent distress, appears stated age and cooperative.   Chronically ill appearing    HEENT:  Normal cephalic, atraumatic without obvious deformity. Pupils equal, round, and reactive to light. Extra ocular muscles intact. Conjunctivae/corneas clear. Neck: Supple, with full range of motion. No jugular venous distention. Trachea midline. Respiratory:  Normal respiratory effort. Breath sounds diminished bilaterally. Cardiovascular:  Regular rate and rhythm with normal S1/S2 without murmurs, rubs or gallops. Abdomen: Soft, non-tender, non-distended with normal bowel sounds. Musculoskeletal:  No clubbing, cyanosis or edema bilaterally. Full range of motion without deformity. Skin: Skin color, texture, turgor normal.  No rashes or lesions. Neurologic:  Neurovascularly intact without any focal sensory/motor deficits. Cranial nerves: II-XII intact, grossly non-focal.  Psychiatric:  Alert and oriented, thought content appropriate, normal insight  Capillary Refill: Brisk,< 3 seconds   Peripheral Pulses: +2 palpable, equal bilaterally       Labs: For convenience and continuity at follow-up the following most recent labs are provided:      CBC:    Lab Results   Component Value Date    WBC 7.9 04/06/2019    HGB 12.6 04/06/2019    HCT 37.7 04/06/2019     04/06/2019       Renal:    Lab Results   Component Value Date     04/06/2019    K 4.3 04/06/2019     04/06/2019    CO2 28 04/06/2019    BUN 14 04/06/2019    CREATININE 0.7 04/06/2019    CALCIUM 9.2 04/06/2019    PHOS 4.0 04/06/2019         Significant Diagnostic Studies    Radiology:   RADIOLOGY REPORT   Final Result      MRI BRAIN WO CONTRAST   Final Result       1. No evidence of acute intracranial abnormality. 2. Old small lacunar infarct in the left cerebellar hemisphere. **This report has been created using voice recognition software. It may contain minor errors which are inherent in voice recognition technology. **      Final report electronically signed by Dr. Naresh Ferris MD on 4/7/2019 9:34 AM      VL DUP CAROTID BILATERAL   Final Result    Mural plaque at the carotid bifurcations with less than 50% stenosis on the left and between 50 and 69% stenosis on the right by SRU consensus criteria for carotid stenosis. **This report has been created using voice recognition software. It may contain minor errors which are inherent in voice recognition technology. **      Final report electronically signed by Dr. Louie Rose MD on 4/7/2019 10:47 AM      CT HEAD WO CONTRAST   Final Result   1. No mass effect or acute hemorrhage. 2. Mild periventricular small vessel ischemic changes and cerebral atrophy. **This report has been created using voice recognition software. It may contain minor errors which are inherent in voice recognition technology. **      Final report electronically signed by Dr. Lizzette Luna on 4/6/2019 4:03 PM             Consults:     IP CONSULT TO NEUROLOGY    Disposition: Home  Condition at Discharge: Stable    Code Status:  Prior     Patient Instructions:    Discharge lab work:    Activity: activity as tolerated  Diet: No diet orders on file      Follow-up visits:   SHARONA Garcia - SAÚL  Anuj Wagner [facility]  [phone]    Schedule an appointment as soon as possible for a visit in 1 week           Discharge Medications:      [place] Yuriy   Home Medication Instructions WXM:[id number]    Printed on:04/09/19 1223   Medication Information                      albuterol sulfate  (90 Base) MCG/ACT inhaler  Inhale 2 puffs into the lungs 4 times daily Use spacer             amitriptyline (ELAVIL) 10 MG tablet  Take 10 mg by mouth nightly             ARIPiprazole (ABILIFY) 10 MG tablet  Take 10 mg by mouth daily             atorvastatin (LIPITOR) 10 MG tablet  Take 1 tablet by mouth nightly             desvenlafaxine succinate (PRISTIQ) 50 MG TB24 extended release tablet  Take 100 mg by mouth daily              Fluticasone Furoate-Vilanterol (BREO ELLIPTA) 200-25 MCG/INH AEPB  Inhale 1 puff into the lungs daily             ipratropium-albuterol (DUONEB) 0.5-2.5 (3) MG/3ML SOLN nebulizer solution  Inhale 3 mLs into the lungs every 6 hours as needed for Shortness of Breath             MEGESTROL ACETATE PO  Take 40 mg by mouth 2 times daily              melatonin 3 MG TABS tablet  Take 2 tablets by mouth nightly as needed (sleep)             Multiple Vitamins-Minerals (THERAPEUTIC MULTIVITAMIN-MINERALS) tablet  Take 1 tablet by mouth Daily with lunch             nicotine (NICODERM CQ) 21 MG/24HR  Place 1 patch onto the skin daily             OXYGEN  Inhale 2-4 L into the lungs nightly Nightly and prn             vitamin D (CHOLECALCIFEROL) 1000 UNIT TABS tablet  Take 2,000 Units by mouth daily                 Time Spent on discharge is more than 20 minutes in the examination, evaluation, counseling and review of medications and discharge plan. Signed: Thank you SHARONA Arellano CNP for the opportunity to be involved in this patient's care.     Electronically signed by Inna Tamez MD on 4/9/2019 at 5:35 PM

## 2019-06-26 ENCOUNTER — APPOINTMENT (OUTPATIENT)
Dept: GENERAL RADIOLOGY | Age: 74
End: 2019-06-26
Payer: MEDICARE

## 2019-06-26 ENCOUNTER — HOSPITAL ENCOUNTER (EMERGENCY)
Age: 74
Discharge: HOME | End: 2019-06-27
Attending: EMERGENCY MEDICINE
Payer: MEDICARE

## 2019-06-26 VITALS
SYSTOLIC BLOOD PRESSURE: 112 MMHG | RESPIRATION RATE: 23 BRPM | WEIGHT: 77.4 LBS | DIASTOLIC BLOOD PRESSURE: 71 MMHG | BODY MASS INDEX: 14.16 KG/M2 | TEMPERATURE: 97.3 F | OXYGEN SATURATION: 97 % | HEART RATE: 120 BPM

## 2019-06-26 DIAGNOSIS — J44.1 COPD EXACERBATION (HCC): Primary | ICD-10-CM

## 2019-06-26 LAB
ALBUMIN SERPL-MCNC: 4.8 G/DL (ref 3.5–5.1)
ALLEN TEST: POSITIVE
ALP BLD-CCNC: 73 U/L (ref 38–126)
ALT SERPL-CCNC: 13 U/L (ref 11–66)
ANION GAP SERPL CALCULATED.3IONS-SCNC: 17 MEQ/L (ref 8–16)
AST SERPL-CCNC: 22 U/L (ref 5–40)
BASE EXCESS (CALCULATED): 1.4 MMOL/L (ref -2.5–2.5)
BASOPHILS # BLD: 0.3 %
BASOPHILS ABSOLUTE: 0.1 THOU/MM3 (ref 0–0.1)
BILIRUB SERPL-MCNC: 0.9 MG/DL (ref 0.3–1.2)
BILIRUBIN DIRECT: < 0.2 MG/DL (ref 0–0.3)
BUN BLDV-MCNC: 11 MG/DL (ref 7–22)
CALCIUM SERPL-MCNC: 10.1 MG/DL (ref 8.5–10.5)
CHLORIDE BLD-SCNC: 100 MEQ/L (ref 98–111)
CO2: 26 MEQ/L (ref 23–33)
COLLECTED BY:: NORMAL
CREAT SERPL-MCNC: 0.7 MG/DL (ref 0.4–1.2)
DEVICE: NORMAL
EKG ATRIAL RATE: 136 BPM
EKG P AXIS: 72 DEGREES
EKG P-R INTERVAL: 112 MS
EKG Q-T INTERVAL: 268 MS
EKG QRS DURATION: 74 MS
EKG QTC CALCULATION (BAZETT): 403 MS
EKG R AXIS: 8 DEGREES
EKG T AXIS: 54 DEGREES
EKG VENTRICULAR RATE: 136 BPM
EOSINOPHIL # BLD: 0.6 %
EOSINOPHILS ABSOLUTE: 0.1 THOU/MM3 (ref 0–0.4)
ERYTHROCYTE [DISTWIDTH] IN BLOOD BY AUTOMATED COUNT: 13.3 % (ref 11.5–14.5)
ERYTHROCYTE [DISTWIDTH] IN BLOOD BY AUTOMATED COUNT: 46.5 FL (ref 35–45)
GFR SERPL CREATININE-BSD FRML MDRD: 82 ML/MIN/1.73M2
GLUCOSE BLD-MCNC: 109 MG/DL (ref 70–108)
HCO3: 26 MMOL/L (ref 23–28)
HCT VFR BLD CALC: 48 % (ref 37–47)
HEMOGLOBIN: 16.1 GM/DL (ref 12–16)
IMMATURE GRANS (ABS): 0.06 THOU/MM3 (ref 0–0.07)
IMMATURE GRANULOCYTES: 0.3 %
LIPASE: 16.8 U/L (ref 5.6–51.3)
LYMPHOCYTES # BLD: 13 %
LYMPHOCYTES ABSOLUTE: 2.2 THOU/MM3 (ref 1–4.8)
MAGNESIUM: 1.8 MG/DL (ref 1.6–2.4)
MCH RBC QN AUTO: 31.7 PG (ref 26–33)
MCHC RBC AUTO-ENTMCNC: 33.5 GM/DL (ref 32.2–35.5)
MCV RBC AUTO: 94.5 FL (ref 81–99)
MONOCYTES # BLD: 5.4 %
MONOCYTES ABSOLUTE: 0.9 THOU/MM3 (ref 0.4–1.3)
NUCLEATED RED BLOOD CELLS: 0 /100 WBC
O2 SATURATION: 95 %
OSMOLALITY CALCULATION: 285 MOSMOL/KG (ref 275–300)
PCO2: 38 MMHG (ref 35–45)
PH BLOOD GAS: 7.44 (ref 7.35–7.45)
PLATELET # BLD: 284 THOU/MM3 (ref 130–400)
PMV BLD AUTO: 9.7 FL (ref 9.4–12.4)
PO2: 75 MMHG (ref 71–104)
POTASSIUM REFLEX MAGNESIUM: 4.4 MEQ/L (ref 3.5–5.2)
PRO-BNP: 52.9 PG/ML (ref 0–900)
RBC # BLD: 5.08 MILL/MM3 (ref 4.2–5.4)
SEG NEUTROPHILS: 80.4 %
SEGMENTED NEUTROPHILS ABSOLUTE COUNT: 13.9 THOU/MM3 (ref 1.8–7.7)
SODIUM BLD-SCNC: 143 MEQ/L (ref 135–145)
SOURCE, BLOOD GAS: NORMAL
TOTAL PROTEIN: 7.7 G/DL (ref 6.1–8)
TROPONIN T: < 0.01 NG/ML
WBC # BLD: 17.3 THOU/MM3 (ref 4.8–10.8)

## 2019-06-26 PROCEDURE — 6370000000 HC RX 637 (ALT 250 FOR IP): Performed by: EMERGENCY MEDICINE

## 2019-06-26 PROCEDURE — 82803 BLOOD GASES ANY COMBINATION: CPT

## 2019-06-26 PROCEDURE — 80076 HEPATIC FUNCTION PANEL: CPT

## 2019-06-26 PROCEDURE — 93005 ELECTROCARDIOGRAM TRACING: CPT | Performed by: EMERGENCY MEDICINE

## 2019-06-26 PROCEDURE — 96375 TX/PRO/DX INJ NEW DRUG ADDON: CPT

## 2019-06-26 PROCEDURE — 80048 BASIC METABOLIC PNL TOTAL CA: CPT

## 2019-06-26 PROCEDURE — 83880 ASSAY OF NATRIURETIC PEPTIDE: CPT

## 2019-06-26 PROCEDURE — 71045 X-RAY EXAM CHEST 1 VIEW: CPT

## 2019-06-26 PROCEDURE — 83690 ASSAY OF LIPASE: CPT

## 2019-06-26 PROCEDURE — 84484 ASSAY OF TROPONIN QUANT: CPT

## 2019-06-26 PROCEDURE — 83735 ASSAY OF MAGNESIUM: CPT

## 2019-06-26 PROCEDURE — 6360000002 HC RX W HCPCS: Performed by: EMERGENCY MEDICINE

## 2019-06-26 PROCEDURE — 36600 WITHDRAWAL OF ARTERIAL BLOOD: CPT

## 2019-06-26 PROCEDURE — 85025 COMPLETE CBC W/AUTO DIFF WBC: CPT

## 2019-06-26 PROCEDURE — 99285 EMERGENCY DEPT VISIT HI MDM: CPT

## 2019-06-26 PROCEDURE — 93010 ELECTROCARDIOGRAM REPORT: CPT | Performed by: INTERNAL MEDICINE

## 2019-06-26 PROCEDURE — 36415 COLL VENOUS BLD VENIPUNCTURE: CPT

## 2019-06-26 PROCEDURE — 96365 THER/PROPH/DIAG IV INF INIT: CPT

## 2019-06-26 PROCEDURE — 94640 AIRWAY INHALATION TREATMENT: CPT

## 2019-06-26 PROCEDURE — 2709999900 HC NON-CHARGEABLE SUPPLY

## 2019-06-26 PROCEDURE — 2580000003 HC RX 258: Performed by: EMERGENCY MEDICINE

## 2019-06-26 PROCEDURE — 87040 BLOOD CULTURE FOR BACTERIA: CPT

## 2019-06-26 RX ORDER — IPRATROPIUM BROMIDE AND ALBUTEROL SULFATE 2.5; .5 MG/3ML; MG/3ML
1 SOLUTION RESPIRATORY (INHALATION)
Status: DISCONTINUED | OUTPATIENT
Start: 2019-06-27 | End: 2019-06-27 | Stop reason: HOSPADM

## 2019-06-26 RX ORDER — METHYLPREDNISOLONE SODIUM SUCCINATE 125 MG/2ML
80 INJECTION, POWDER, LYOPHILIZED, FOR SOLUTION INTRAMUSCULAR; INTRAVENOUS ONCE
Status: COMPLETED | OUTPATIENT
Start: 2019-06-26 | End: 2019-06-26

## 2019-06-26 RX ORDER — IPRATROPIUM BROMIDE AND ALBUTEROL SULFATE 2.5; .5 MG/3ML; MG/3ML
1 SOLUTION RESPIRATORY (INHALATION) ONCE
Status: COMPLETED | OUTPATIENT
Start: 2019-06-26 | End: 2019-06-26

## 2019-06-26 RX ADMIN — IPRATROPIUM BROMIDE AND ALBUTEROL SULFATE 1 AMPULE: .5; 3 SOLUTION RESPIRATORY (INHALATION) at 21:25

## 2019-06-26 RX ADMIN — CEFTRIAXONE SODIUM 1 G: 1 INJECTION, POWDER, FOR SOLUTION INTRAMUSCULAR; INTRAVENOUS at 23:44

## 2019-06-26 RX ADMIN — METHYLPREDNISOLONE SODIUM SUCCINATE 80 MG: 125 INJECTION, POWDER, FOR SOLUTION INTRAMUSCULAR; INTRAVENOUS at 22:52

## 2019-06-26 ASSESSMENT — ENCOUNTER SYMPTOMS
NAUSEA: 0
EYE ITCHING: 0
CHOKING: 0
BACK PAIN: 0
EYE REDNESS: 0
COUGH: 1
VOICE CHANGE: 0
RHINORRHEA: 0
DIARRHEA: 0
BLOOD IN STOOL: 0
CHEST TIGHTNESS: 0
PHOTOPHOBIA: 0
SINUS PRESSURE: 0
EYE PAIN: 0
ABDOMINAL DISTENTION: 0
VOMITING: 0
CONSTIPATION: 0
EYE DISCHARGE: 0
SHORTNESS OF BREATH: 0
SORE THROAT: 0
WHEEZING: 1
ABDOMINAL PAIN: 0
TROUBLE SWALLOWING: 0

## 2019-06-27 RX ORDER — ALBUTEROL SULFATE 90 UG/1
2 AEROSOL, METERED RESPIRATORY (INHALATION) 4 TIMES DAILY
Qty: 32 G | Refills: 0 | Status: ON HOLD | OUTPATIENT
Start: 2019-06-27 | End: 2022-09-21

## 2019-06-27 RX ORDER — CETIRIZINE HYDROCHLORIDE 10 MG/1
5 TABLET ORAL DAILY
Qty: 15 TABLET | Refills: 0 | Status: SHIPPED | OUTPATIENT
Start: 2019-06-27 | End: 2019-07-27

## 2019-06-27 RX ORDER — IPRATROPIUM BROMIDE AND ALBUTEROL SULFATE 2.5; .5 MG/3ML; MG/3ML
3 SOLUTION RESPIRATORY (INHALATION) EVERY 6 HOURS PRN
Qty: 360 ML | Refills: 2 | Status: SHIPPED | OUTPATIENT
Start: 2019-06-27

## 2019-06-27 RX ORDER — LEVOFLOXACIN 500 MG/1
500 TABLET, FILM COATED ORAL DAILY
Qty: 10 TABLET | Refills: 0 | Status: SHIPPED | OUTPATIENT
Start: 2019-06-27 | End: 2019-07-07

## 2019-06-27 RX ORDER — PREDNISONE 20 MG/1
40 TABLET ORAL DAILY
Qty: 14 TABLET | Refills: 0 | Status: SHIPPED | OUTPATIENT
Start: 2019-06-27 | End: 2019-07-04

## 2019-06-27 RX ORDER — FLUTICASONE PROPIONATE 50 MCG
1 SPRAY, SUSPENSION (ML) NASAL DAILY
Qty: 1 BOTTLE | Refills: 0 | Status: SHIPPED | OUTPATIENT
Start: 2019-06-27 | End: 2019-09-13

## 2019-06-27 NOTE — ED PROVIDER NOTES
[facility] COMPLAINT       Chief Complaint   Patient presents with    Shortness of Breath    Cough       Nurses Notes reviewed and I agree except as noted inthe HPI. HISTORY OF PRESENT ILLNESS    Tresa Mirza is a 75 y.o. female who presents to the Emergency Department for the evaluation of weakness, wheezing, and coughing. Patient states she has been feeling weak for two weeks but that it has worsened today. She reports the coughing starting today. Patient says her O2 was 83% on 2L today. She denies any fever, swelling in her legs, and states she quit smoking. She notes having a normal appetite. She states she sees a pulmonologist, uses breathing treatments, and is compliant with her medications. Patient's family reports the patient not always using her O2 when she leaves her room. Patient has a history of chronic respiratory failure with hypoxia, COPD, depression, and pneumonia. No further complaints at initial encounter. The HPI was provided by the patient. REVIEW OF SYSTEMS     Review of Systems   Constitutional: Negative for activity change, appetite change, diaphoresis, fatigue and unexpected weight change. HENT: Negative for congestion, ear discharge, ear pain, hearing loss, rhinorrhea, sinus pressure, sore throat, trouble swallowing and voice change. Eyes: Negative for photophobia, pain, discharge, redness and itching. Respiratory: Positive for cough and wheezing. Negative for choking, chest tightness and shortness of breath. Cardiovascular: Negative for chest pain, palpitations and leg swelling. Gastrointestinal: Negative for abdominal distention, abdominal pain, blood in stool, constipation, diarrhea, nausea and vomiting. Endocrine: Negative for polydipsia, polyphagia and polyuria. Genitourinary: Negative for decreased urine volume, difficulty urinating, dysuria, enuresis, frequency, hematuria and urgency. Musculoskeletal: Negative for arthralgias, back pain, gait problem, myalgias, neck pain and neck stiffness. Skin: Negative for pallor and rash. Allergic/Immunologic: Negative for immunocompromised state. Neurological: Positive for weakness. Negative for dizziness, tremors, seizures, syncope, facial asymmetry, light-headedness, numbness and headaches. Hematological: Negative for adenopathy. Does not bruise/bleed easily. Psychiatric/Behavioral: Negative for agitation, hallucinations and suicidal ideas. The patient is not nervous/anxious. PAST MEDICAL HISTORY    has a past medical history of Chronic respiratory failure with hypoxia ([facility] COPD (chronic obstructive pulmonary disease) ([facility] Depression, and Pneumonia. SURGICAL HISTORY      has a past surgical history that includes Tonsillectomy and Colonoscopy.     CURRENT MEDICATIONS       Discharge Medication List as of 6/27/2019 12:11 AM      CONTINUE these medications which have NOT CHANGED    Details   atorvastatin (LIPITOR) 10 MG tablet Take 1 tablet by mouth nightly, Disp-30 tablet, R-3Normal      vitamin D (CHOLECALCIFEROL) 1000 UNIT TABS tablet Take 2,000 Units by mouth dailyHistorical Med      MEGESTROL ACETATE PO Take 40 mg by mouth 2 times daily Historical Med      Fluticasone Furoate-Vilanterol (BREO ELLIPTA) 200-25 MCG/INH AEPB Inhale 1 puff into the lungs dailyHistorical Med      nicotine (NICODERM CQ) 21 MG/24HR Place 1 patch onto the skin daily, Disp-30 patch, R-3Normal      amitriptyline (ELAVIL) 10 MG tablet Take 10 mg by mouth nightlyHistorical Med      OXYGEN Inhale 2-4 L into the lungs nightly Nightly and prnHistorical Med      melatonin 3 MG TABS tablet Take 2 tablets by mouth nightly as needed (sleep), R-3OTC      ARIPiprazole (ABILIFY) 10 MG tablet Take 10 mg by mouth dailyHistorical Med      desvenlafaxine succinate (PRISTIQ) 50 MG TB24 extended release tablet Take 100 mg by mouth daily Historical Med      Multiple Vitamins-Minerals (THERAPEUTIC MULTIVITAMIN-MINERALS) tablet Take 1 tablet by mouth Daily with lunch, Disp-30 tablet, R-3Normal                  is allergic to spiriva handihaler [tiotropium bromide monohydrate]. FAMILY HISTORY     indicated that her mother is . She indicated that her father is . family history includes Cancer in her mother; Other in her father. SOCIAL HISTORY      reports that she quit smoking about 3 months ago. Her smoking use included cigarettes. She started smoking about 57 years ago. She has a 112.00 pack-year smoking history. She has never used smokeless tobacco. She reports that she does not drink alcohol or use drugs. PHYSICAL EXAM     INITIAL VITALS:  weight is 77 lb 6.4 oz (35.1 kg). Her oral temperature is 97.3 °F (36.3 °C). Her blood pressure is 112/71 and her pulse is 120. Her respiration is 23 and oxygen saturation is 97%. Physical Exam   Constitutional: She is oriented to person, place, and time. She appears well-developed and well-nourished. No distress. HENT:   Head: Normocephalic and atraumatic. Right Ear: External ear normal.   Left Ear: External ear normal.   Nose: Nose normal.   Mouth/Throat: Oropharynx is clear and moist. No oropharyngeal exudate. Eyes: Pupils are equal, round, and reactive to light. Conjunctivae and EOM are normal. Right eye exhibits no discharge. Left eye exhibits no discharge. No scleral icterus. Neck: Normal range of motion. Neck supple. No JVD present. No tracheal deviation present. No thyromegaly present. Cardiovascular: Normal rate, regular rhythm, S1 normal, S2 normal, normal heart sounds and intact distal pulses. Exam reveals no gallop and no friction rub. No murmur heard. Pulmonary/Chest: Effort normal. No stridor. She has wheezes (bilaterally). She has no rhonchi. She has no rales. She exhibits no tenderness. Abdominal: Soft. Bowel sounds are normal. She exhibits no distension and no mass.  There is no Labs Reviewed   CBC WITH AUTO DIFFERENTIAL - Abnormal; Notable for the following components:       Result Value    WBC 17.3 (*)     Hemoglobin 16.1 (*)     Hematocrit 48.0 (*)     RDW-SD 46.5 (*)     Segs Absolute 13.9 (*)     All other components within normal limits   BASIC METABOLIC PANEL W/ REFLEX TO MG FOR LOW K - Abnormal; Notable for the following components:    Glucose 109 (*)     All other components within normal limits   ANION GAP - Abnormal; Notable for the following components:    Anion Gap 17.0 (*)     All other components within normal limits   GLOMERULAR FILTRATION RATE, ESTIMATED - Abnormal; Notable for the following components:    Est, Glom Filt Rate 82 (*)     All other components within normal limits   CULTURE BLOOD #1   CULTURE BLOOD #2   BRAIN NATRIURETIC PEPTIDE   TROPONIN   BLOOD GAS, ARTERIAL   HEPATIC FUNCTION PANEL   LIPASE   MAGNESIUM   OSMOLALITY   URINE RT REFLEX TO CULTURE       EMERGENCY DEPARTMENT COURSE:   Vitals:    Vitals:    06/26/19 2035 06/26/19 2036 06/26/19 2120 06/26/19 2242   BP:    112/71   Pulse: 152 132  120   Resp: 30 19 21 23   Temp: 97.3 °F (36.3 °C)      TempSrc: Oral      SpO2: (!) 88% 96% 96% 97%   Weight:         9:22 PM: The patient was seen and evaluated. I preformed a physical exam and ordered the appropriate studies. Patient presented with shortness of breath and a cough. Patient had a respiratory rate of 23 and a pulse of 120. On exam I appreciated wheezes bilaterally. Laboratory work was reviewed and discussed with the patient. Radiological studies showed no acute cardiopulmonary disease. COPD/emphysema. Patient was treated with Duoneb, Rocephin, and Solumedrol. I observed the patient's condition to improve. I discussed the plan to discharge and patient was amenable to my discharge. She was discharged home with Albuterol, Deltasone, Levaquin, Duoneb, Zyrtec, and Flonase. Patient was given specific instructions on when to return to the department.  She was discharged home in stable condition. The patient has what appears to be a COPD exacerbation. The patient is instructed to wear her oxygen continually. She is instructed to increase the oxygen should she experience any shortness of breath. Patient does have prescriptions for albuterol for her nebulizer as well as duo nebs she is instructed to use those as prescribed. Patient has been given a short course of antibiotics as well as prednisone she is instructed to take that as prescribed. The patient is got evidence of sinusitis which may exacerbate her COPD she has been given cetirizine and Flonase she is instructed to use those as prescribed. Patient is instructed to follow-up with her pulmonologist and call for an appointment within the next 1 to 2 days. She is also instructed to follow-up with her primary care physician. She is instructed to return to the nearest emergency room immediately for any new or worsening complaints. CRITICAL CARE:   none     CONSULTS:  none    PROCEDURES:  None    FINAL IMPRESSION      1. COPD exacerbation (HCC)          DISPOSITION/PLAN   discharge    PATIENT REFERRED TO:  [address] [facility]  [address]  [address]  [facility] [address]  Call in 2 days  To establish PCP    Oh Li, [address] [place] [address]  [address] [facility],[address]  [facility]    Call in 2 days        DISCHARGE MEDICATIONS:  Discharge Medication List as of 6/27/2019 12:11 AM      START taking these medications    Details   predniSONE (DELTASONE) 20 MG tablet Take 2 tablets by mouth daily for 7 days, Disp-14 tablet, R-0Print      levofloxacin (LEVAQUIN) 500 MG tablet Take 1 tablet by mouth daily for 10 days, Disp-10 tablet, R-0Print      cetirizine (ZYRTEC) 10 MG tablet Take 0.5 tablets by mouth daily, Disp-15 tablet, R-0Print      fluticasone (FLONASE) 50 MCG/ACT nasal spray 1 spray by Each Nostril route daily, Disp-1 Bottle, R-0Print (Please note that portions of this note were completed with a voice recognition program.Efforts were made to edit the dictations but occasionally words are mis-transcribed.)    Scribe:  Signed by: Micah Rodriguez, 6/26/199:22 PM Scribing for and in the presence of Toño Rodriguez DO    Provider:  I personally performed the services described in the documentation, reviewed and edited the documentation which was dictated to the scribe in mypresence, and it accurately records my words and actions.     Toño Rodriguez DO 6/26/19 6:24 AM                     Toño Rodriguez DO  06/27/19 2543

## 2019-06-27 NOTE — ED NOTES
Patient placed on 4L NC from non-rebreather.  Respiratory into room for breathing treatment and to obtain abg.      Keke Corbin RN  06/26/19 5085

## 2019-06-27 NOTE — ED TRIAGE NOTES
Patient presents to the ED with complaints of SOB and a cough \"for a couple weeks\". Patient denies pain. She states the cough started today and when she starts coughing, she becomes more SOB. Patient has a history of COPD and wears 2LNC at home. Patient states when she was at home her oxygen was 83% on room air. She is currently at 84%. EKG being completed.

## 2019-07-02 LAB
BLOOD CULTURE, ROUTINE: NORMAL
BLOOD CULTURE, ROUTINE: NORMAL

## 2019-09-09 ENCOUNTER — HOSPITAL ENCOUNTER (OUTPATIENT)
Dept: WOMENS IMAGING | Age: 74
Discharge: HOME | End: 2019-09-09
Payer: MEDICARE

## 2019-09-09 DIAGNOSIS — R92.2 BREAST DENSITY: ICD-10-CM

## 2019-09-09 PROCEDURE — G0279 TOMOSYNTHESIS, MAMMO: HCPCS

## 2019-09-13 ENCOUNTER — OFFICE VISIT (OUTPATIENT)
Dept: PULMONOLOGY | Age: 74
End: 2019-09-13
Payer: MEDICARE

## 2019-09-13 VITALS
SYSTOLIC BLOOD PRESSURE: 143 MMHG | TEMPERATURE: 97.8 F | HEIGHT: 62 IN | WEIGHT: 96.2 LBS | DIASTOLIC BLOOD PRESSURE: 95 MMHG | HEART RATE: 98 BPM | BODY MASS INDEX: 17.7 KG/M2 | OXYGEN SATURATION: 91 %

## 2019-09-13 DIAGNOSIS — J44.9 COPD, SEVERE (HCC): Primary | ICD-10-CM

## 2019-09-13 DIAGNOSIS — Z87.891 PERSONAL HISTORY OF TOBACCO USE: ICD-10-CM

## 2019-09-13 DIAGNOSIS — J96.11 CHRONIC RESPIRATORY FAILURE WITH HYPOXIA (HCC): ICD-10-CM

## 2019-09-13 DIAGNOSIS — E43 SEVERE PROTEIN-CALORIE MALNUTRITION (GOMEZ: LESS THAN 60% OF STANDARD WEIGHT) (HCC): ICD-10-CM

## 2019-09-13 PROCEDURE — G8419 CALC BMI OUT NRM PARAM NOF/U: HCPCS | Performed by: NURSE PRACTITIONER

## 2019-09-13 PROCEDURE — 99214 OFFICE O/P EST MOD 30 MIN: CPT | Performed by: NURSE PRACTITIONER

## 2019-09-13 PROCEDURE — 1090F PRES/ABSN URINE INCON ASSESS: CPT | Performed by: NURSE PRACTITIONER

## 2019-09-13 PROCEDURE — G8400 PT W/DXA NO RESULTS DOC: HCPCS | Performed by: NURSE PRACTITIONER

## 2019-09-13 PROCEDURE — G8427 DOCREV CUR MEDS BY ELIG CLIN: HCPCS | Performed by: NURSE PRACTITIONER

## 2019-09-13 PROCEDURE — 1036F TOBACCO NON-USER: CPT | Performed by: NURSE PRACTITIONER

## 2019-09-13 PROCEDURE — 3017F COLORECTAL CA SCREEN DOC REV: CPT | Performed by: NURSE PRACTITIONER

## 2019-09-13 PROCEDURE — G8926 SPIRO NO PERF OR DOC: HCPCS | Performed by: NURSE PRACTITIONER

## 2019-09-13 PROCEDURE — 94618 PULMONARY STRESS TESTING: CPT | Performed by: NURSE PRACTITIONER

## 2019-09-13 PROCEDURE — 3023F SPIROM DOC REV: CPT | Performed by: NURSE PRACTITIONER

## 2019-09-13 PROCEDURE — 1123F ACP DISCUSS/DSCN MKR DOCD: CPT | Performed by: NURSE PRACTITIONER

## 2019-09-13 PROCEDURE — G0296 VISIT TO DETERM LDCT ELIG: HCPCS | Performed by: NURSE PRACTITIONER

## 2019-09-13 PROCEDURE — 4040F PNEUMOC VAC/ADMIN/RCVD: CPT | Performed by: NURSE PRACTITIONER

## 2019-09-13 ASSESSMENT — ENCOUNTER SYMPTOMS
TROUBLE SWALLOWING: 0
EYES NEGATIVE: 1
STRIDOR: 0
BACK PAIN: 0
NAUSEA: 0
DIARRHEA: 0
ALLERGIC/IMMUNOLOGIC NEGATIVE: 1
SHORTNESS OF BREATH: 1
COUGH: 0
CHEST TIGHTNESS: 0
WHEEZING: 1

## 2019-09-13 NOTE — PROGRESS NOTES
[facility]     Patient: Hayder Adair, 75 y.o.   : 1945    Pt of Dr. Mauricio Padilla   Patient presents with    Follow-up     COPD 6 month f/u CXR-19    Other     Neck:13 MP:1        HPI  Jim Barkley is here for 6 month follow up for COPD  Presents today with her daughter. Was in ED in 2019 for COPD exacerbation. Not admitted. Family reports occasional wheeze,   Denies cough. Can ambulate on flat ground without dyspnea. Quit smoking in 2019- has seen improvement in breathing. Using O2 at 2LPM PRN with rest/ activity, and continuous at night. ( last 6 min walk she required 1lPM at rest, 2LPM with activity)   She is inquiring about a POC device. Sleeping well. Taking Megace, has successfully gained weight.  Went from 68 lbs 2019 to 91 lbs today   Past Medical hx   PMH:  Past Medical History:   Diagnosis Date    Chronic respiratory failure with hypoxia (HCC)     COPD (chronic obstructive pulmonary disease) (HCC)     Depression     Pneumonia      SURGICAL HISTORY:  Past Surgical History:   Procedure Laterality Date    COLONOSCOPY      TONSILLECTOMY       SOCIAL HISTORY:  Social History     Tobacco Use    Smoking status: Former Smoker     Packs/day: 2.00     Years: 56.00     Pack years: 112.00     Types: Cigarettes     Start date: 1962     Last attempt to quit: 3/8/2019     Years since quittin.5    Smokeless tobacco: Never Used   Substance Use Topics    Alcohol use: No    Drug use: No     ALLERGIES:  Allergies   Allergen Reactions    Spiriva Handihaler [Tiotropium Bromide Monohydrate] Itching     FAMILY HISTORY:  Family History   Problem Relation Age of Onset    Cancer Mother         mouth cancer    Other Father         rheumatic fever--paraplegia     CURRENT MEDICATIONS:  Current Outpatient Medications   Medication Sig Dispense Refill    ipratropium-albuterol (DUONEB) 0.5-2.5 (3) MG/3ML SOLN pulsating mode on POC and her SPO2 continued to drop. , could not keep her above 88% on 4LPM    The six minute walk test was repeated with oxygen ation supplementation of continuous flow portable O2. O2 started at 1LPM, the walk was resumed. The oxygen was titrated to 2LPM.  She ambulated a total of 864 ft with oxygen on the portable tank,s he did not have to stop during the walk     At the end of the test Queenie Abreu's oxygen saturation remained at 93% on 2LPM with exertion. She is mobile in the home and requires oxygen as outlined above. Nasal Oxygen order:  Continued need for oxygen. Needs 2LPM with activity and sleep     DME Medical Necessity Documentation    Sanjay Pyle was seen in the office on 9/13/2019 for the diagnosis COPD. I am prescribing oxygen because the diagnosis and testing requires the patient to have oxygen in the home.  her condition will improve or be benefited by oxygen use. The patient is able to perform good mobility in a home setting and therefore does require the use of a portable oxygen system. Assessment      Diagnosis Orders   1. COPD, severe ([address]  6 Minute Walk Test    CT LUNG SCREENING   2. Personal history of tobacco use  CO VISIT TO DISCUSS LUNG CA SCREEN W LDCT    CT Lung Screen (Annual)    CT LUNG SCREENING   3. Severe protein-calorie malnutrition Marcelina Dupes: less than 60% of standard weight) ([address]      IMPROVING    4.  Chronic respiratory failure with hypoxia (HCC)           Plan   -Encouragement to continue abstaining from smoking  -6MWT today: did not do well on POC, walk done on portable O2, requiring 2lPM with activity- updated order to fax to [facility]  -continue Cyndia Salle and Duonebs  -encouraged more frequent use of Duonebs to 4 times daily   -continue Megace per PCP   -annual LDCT lung screen in 6 months- orders placed     Will see Hilario Dumont in: 6 months    Electronically signed by SHARONA Fisher CNP on 9/13/2019 at 10:31 AM   Low Dose CT (LDCT) Lung

## 2019-10-01 ENCOUNTER — HOSPITAL ENCOUNTER (OUTPATIENT)
Age: 74
Setting detail: SPECIMEN
Discharge: HOME | End: 2019-10-01
Payer: MEDICARE

## 2019-10-01 LAB
ABSOLUTE EOS #: 0.24 K/UL (ref 0–0.44)
ABSOLUTE IMMATURE GRANULOCYTE: 0.04 K/UL (ref 0–0.3)
ABSOLUTE LYMPH #: 2.05 K/UL (ref 1.1–3.7)
ABSOLUTE MONO #: 0.69 K/UL (ref 0.1–1.2)
ANION GAP SERPL CALCULATED.3IONS-SCNC: 17 MMOL/L (ref 9–17)
BASOPHILS # BLD: 1 % (ref 0–2)
BASOPHILS ABSOLUTE: 0.05 K/UL (ref 0–0.2)
BUN BLDV-MCNC: 13 MG/DL (ref 8–23)
BUN/CREAT BLD: ABNORMAL (ref 9–20)
CALCIUM SERPL-MCNC: 9.8 MG/DL (ref 8.6–10.4)
CHLORIDE BLD-SCNC: 96 MMOL/L (ref 98–107)
CO2: 26 MMOL/L (ref 20–31)
CREAT SERPL-MCNC: 0.76 MG/DL (ref 0.5–0.9)
DIFFERENTIAL TYPE: ABNORMAL
EOSINOPHILS RELATIVE PERCENT: 3 % (ref 1–4)
FOLATE: 16.2 NG/ML
GFR AFRICAN AMERICAN: >60 ML/MIN
GFR NON-AFRICAN AMERICAN: >60 ML/MIN
GFR SERPL CREATININE-BSD FRML MDRD: ABNORMAL ML/MIN/{1.73_M2}
GFR SERPL CREATININE-BSD FRML MDRD: ABNORMAL ML/MIN/{1.73_M2}
GLUCOSE BLD-MCNC: 91 MG/DL (ref 70–99)
HCT VFR BLD CALC: 48.3 % (ref 36.3–47.1)
HEMOGLOBIN: 15 G/DL (ref 11.9–15.1)
IMMATURE GRANULOCYTES: 0 %
LYMPHOCYTES # BLD: 23 % (ref 24–43)
MCH RBC QN AUTO: 30 PG (ref 25.2–33.5)
MCHC RBC AUTO-ENTMCNC: 31.1 G/DL (ref 28.4–34.8)
MCV RBC AUTO: 96.6 FL (ref 82.6–102.9)
MONOCYTES # BLD: 8 % (ref 3–12)
NRBC AUTOMATED: 0 PER 100 WBC
PDW BLD-RTO: 14.2 % (ref 11.8–14.4)
PLATELET # BLD: 307 K/UL (ref 138–453)
PLATELET ESTIMATE: ABNORMAL
PMV BLD AUTO: 10.4 FL (ref 8.1–13.5)
POTASSIUM SERPL-SCNC: 4.3 MMOL/L (ref 3.7–5.3)
RBC # BLD: 5 M/UL (ref 3.95–5.11)
RBC # BLD: ABNORMAL 10*6/UL
SEG NEUTROPHILS: 65 % (ref 36–65)
SEGMENTED NEUTROPHILS ABSOLUTE COUNT: 5.89 K/UL (ref 1.5–8.1)
SODIUM BLD-SCNC: 139 MMOL/L (ref 135–144)
TSH SERPL DL<=0.05 MIU/L-ACNC: 1.58 MIU/L (ref 0.3–5)
VITAMIN B-12: 411 PG/ML (ref 232–1245)
WBC # BLD: 9 K/UL (ref 3.5–11.3)
WBC # BLD: ABNORMAL 10*3/UL

## 2019-10-19 ENCOUNTER — HOSPITAL ENCOUNTER (EMERGENCY)
Age: 74
Discharge: HOME | End: 2019-10-19
Attending: FAMILY MEDICINE
Payer: MEDICARE

## 2019-10-19 VITALS
DIASTOLIC BLOOD PRESSURE: 80 MMHG | HEART RATE: 92 BPM | OXYGEN SATURATION: 95 % | WEIGHT: 95 LBS | SYSTOLIC BLOOD PRESSURE: 155 MMHG | HEIGHT: 62 IN | BODY MASS INDEX: 17.48 KG/M2 | RESPIRATION RATE: 18 BRPM | TEMPERATURE: 97.5 F

## 2019-10-19 DIAGNOSIS — R42 DIZZINESS: Primary | ICD-10-CM

## 2019-10-19 DIAGNOSIS — R42 VERTIGO: ICD-10-CM

## 2019-10-19 LAB
ALBUMIN SERPL-MCNC: 3.9 G/DL (ref 3.5–5.1)
ALP BLD-CCNC: 56 U/L (ref 38–126)
ALT SERPL-CCNC: 12 U/L (ref 11–66)
ANION GAP SERPL CALCULATED.3IONS-SCNC: 11 MEQ/L (ref 8–16)
AST SERPL-CCNC: 16 U/L (ref 5–40)
BACTERIA: ABNORMAL /HPF
BASOPHILS # BLD: 0.4 %
BASOPHILS ABSOLUTE: 0 THOU/MM3 (ref 0–0.1)
BILIRUB SERPL-MCNC: 0.3 MG/DL (ref 0.3–1.2)
BILIRUBIN URINE: NEGATIVE
BLOOD, URINE: ABNORMAL
BUN BLDV-MCNC: 9 MG/DL (ref 7–22)
CALCIUM SERPL-MCNC: 9.7 MG/DL (ref 8.5–10.5)
CASTS 2: ABNORMAL /LPF
CASTS UA: ABNORMAL /LPF
CHARACTER, URINE: CLEAR
CHLORIDE BLD-SCNC: 100 MEQ/L (ref 98–111)
CO2: 27 MEQ/L (ref 23–33)
COLOR: YELLOW
CREAT SERPL-MCNC: 0.7 MG/DL (ref 0.4–1.2)
CRYSTALS, UA: ABNORMAL
EKG ATRIAL RATE: 102 BPM
EKG P AXIS: 66 DEGREES
EKG P-R INTERVAL: 132 MS
EKG Q-T INTERVAL: 324 MS
EKG QRS DURATION: 80 MS
EKG QTC CALCULATION (BAZETT): 422 MS
EKG R AXIS: 39 DEGREES
EKG T AXIS: 62 DEGREES
EKG VENTRICULAR RATE: 102 BPM
EOSINOPHIL # BLD: 3 %
EOSINOPHILS ABSOLUTE: 0.2 THOU/MM3 (ref 0–0.4)
EPITHELIAL CELLS, UA: ABNORMAL /HPF
ERYTHROCYTE [DISTWIDTH] IN BLOOD BY AUTOMATED COUNT: 13.8 % (ref 11.5–14.5)
ERYTHROCYTE [DISTWIDTH] IN BLOOD BY AUTOMATED COUNT: 48 FL (ref 35–45)
GFR SERPL CREATININE-BSD FRML MDRD: 82 ML/MIN/1.73M2
GLUCOSE BLD-MCNC: 85 MG/DL (ref 70–108)
GLUCOSE URINE: NEGATIVE MG/DL
HCT VFR BLD CALC: 42.2 % (ref 37–47)
HEMOGLOBIN: 13.5 GM/DL (ref 12–16)
IMMATURE GRANS (ABS): 0.03 THOU/MM3 (ref 0–0.07)
IMMATURE GRANULOCYTES: 0.4 %
KETONES, URINE: NEGATIVE
LEUKOCYTE ESTERASE, URINE: ABNORMAL
LYMPHOCYTES # BLD: 25.7 %
LYMPHOCYTES ABSOLUTE: 2 THOU/MM3 (ref 1–4.8)
MCH RBC QN AUTO: 30.4 PG (ref 26–33)
MCHC RBC AUTO-ENTMCNC: 32 GM/DL (ref 32.2–35.5)
MCV RBC AUTO: 95 FL (ref 81–99)
MISCELLANEOUS 2: ABNORMAL
MONOCYTES # BLD: 8.5 %
MONOCYTES ABSOLUTE: 0.7 THOU/MM3 (ref 0.4–1.3)
NITRITE, URINE: NEGATIVE
NUCLEATED RED BLOOD CELLS: 0 /100 WBC
OSMOLALITY CALCULATION: 273.6 MOSMOL/KG (ref 275–300)
PH UA: 7.5 (ref 5–9)
PLATELET # BLD: 247 THOU/MM3 (ref 130–400)
PMV BLD AUTO: 9.6 FL (ref 9.4–12.4)
POTASSIUM SERPL-SCNC: 4 MEQ/L (ref 3.5–5.2)
PRO-BNP: 33.9 PG/ML (ref 0–1800)
PROTEIN UA: NEGATIVE
RBC # BLD: 4.44 MILL/MM3 (ref 4.2–5.4)
RBC URINE: ABNORMAL /HPF
RENAL EPITHELIAL, UA: ABNORMAL
SEG NEUTROPHILS: 62 %
SEGMENTED NEUTROPHILS ABSOLUTE COUNT: 4.9 THOU/MM3 (ref 1.8–7.7)
SODIUM BLD-SCNC: 138 MEQ/L (ref 135–145)
SPECIFIC GRAVITY, URINE: 1.01 (ref 1–1.03)
TOTAL PROTEIN: 7 G/DL (ref 6.1–8)
TROPONIN T: < 0.01 NG/ML
TSH SERPL DL<=0.05 MIU/L-ACNC: 1.38 UIU/ML (ref 0.4–4.2)
UROBILINOGEN, URINE: 0.2 EU/DL (ref 0–1)
WBC # BLD: 7.9 THOU/MM3 (ref 4.8–10.8)
WBC UA: ABNORMAL /HPF
YEAST: ABNORMAL

## 2019-10-19 PROCEDURE — 85025 COMPLETE CBC W/AUTO DIFF WBC: CPT

## 2019-10-19 PROCEDURE — 83880 ASSAY OF NATRIURETIC PEPTIDE: CPT

## 2019-10-19 PROCEDURE — 6370000000 HC RX 637 (ALT 250 FOR IP): Performed by: FAMILY MEDICINE

## 2019-10-19 PROCEDURE — 80053 COMPREHEN METABOLIC PANEL: CPT

## 2019-10-19 PROCEDURE — 2580000003 HC RX 258: Performed by: FAMILY MEDICINE

## 2019-10-19 PROCEDURE — 87086 URINE CULTURE/COLONY COUNT: CPT

## 2019-10-19 PROCEDURE — 87077 CULTURE AEROBIC IDENTIFY: CPT

## 2019-10-19 PROCEDURE — 84484 ASSAY OF TROPONIN QUANT: CPT

## 2019-10-19 PROCEDURE — 93005 ELECTROCARDIOGRAM TRACING: CPT | Performed by: FAMILY MEDICINE

## 2019-10-19 PROCEDURE — 81001 URINALYSIS AUTO W/SCOPE: CPT

## 2019-10-19 PROCEDURE — 99284 EMERGENCY DEPT VISIT MOD MDM: CPT

## 2019-10-19 PROCEDURE — 93010 ELECTROCARDIOGRAM REPORT: CPT | Performed by: INTERNAL MEDICINE

## 2019-10-19 PROCEDURE — 36415 COLL VENOUS BLD VENIPUNCTURE: CPT

## 2019-10-19 PROCEDURE — 84443 ASSAY THYROID STIM HORMONE: CPT

## 2019-10-19 PROCEDURE — 87186 SC STD MICRODIL/AGAR DIL: CPT

## 2019-10-19 RX ORDER — 0.9 % SODIUM CHLORIDE 0.9 %
1000 INTRAVENOUS SOLUTION INTRAVENOUS ONCE
Status: COMPLETED | OUTPATIENT
Start: 2019-10-19 | End: 2019-10-19

## 2019-10-19 RX ORDER — MECLIZINE HYDROCHLORIDE CHEWABLE TABLETS 25 MG/1
25 TABLET, CHEWABLE ORAL ONCE
Status: COMPLETED | OUTPATIENT
Start: 2019-10-19 | End: 2019-10-19

## 2019-10-19 RX ADMIN — SODIUM CHLORIDE 1000 ML: 9 INJECTION, SOLUTION INTRAVENOUS at 13:58

## 2019-10-19 RX ADMIN — MECLIZINE HCL 25 MG: 25 TABLET, CHEWABLE ORAL at 13:58

## 2019-10-19 ASSESSMENT — ENCOUNTER SYMPTOMS
EYE DISCHARGE: 0
WHEEZING: 0
NAUSEA: 0
BACK PAIN: 0
RHINORRHEA: 0
COUGH: 0
SORE THROAT: 0
ABDOMINAL PAIN: 0
VOMITING: 0
DIARRHEA: 0
SHORTNESS OF BREATH: 0
EYE PAIN: 0

## 2019-10-21 LAB
ORGANISM: ABNORMAL
URINE CULTURE REFLEX: ABNORMAL

## 2019-10-22 ENCOUNTER — TELEPHONE (OUTPATIENT)
Dept: PHARMACY | Age: 74
End: 2019-10-22

## 2019-11-13 ENCOUNTER — APPOINTMENT (OUTPATIENT)
Dept: CT IMAGING | Age: 74
DRG: 871 | End: 2019-11-13
Payer: MEDICARE

## 2019-11-13 ENCOUNTER — APPOINTMENT (OUTPATIENT)
Dept: GENERAL RADIOLOGY | Age: 74
DRG: 871 | End: 2019-11-13
Payer: MEDICARE

## 2019-11-13 ENCOUNTER — HOSPITAL ENCOUNTER (INPATIENT)
Age: 74
LOS: 7 days | Discharge: HOME HEALTH | DRG: 871 | End: 2019-11-20
Attending: FAMILY MEDICINE | Admitting: INTERNAL MEDICINE
Payer: MEDICARE

## 2019-11-13 DIAGNOSIS — J44.9 CHRONIC OBSTRUCTIVE PULMONARY DISEASE, UNSPECIFIED COPD TYPE (HCC): ICD-10-CM

## 2019-11-13 DIAGNOSIS — E87.1 HYPONATREMIA: ICD-10-CM

## 2019-11-13 DIAGNOSIS — R47.89 GARBLED SPEECH: ICD-10-CM

## 2019-11-13 DIAGNOSIS — R41.82 ALTERED MENTAL STATUS, UNSPECIFIED ALTERED MENTAL STATUS TYPE: Primary | ICD-10-CM
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
## 2019-11-13 PROBLEM — R53.81 PHYSICAL DEBILITY: Status: ACTIVE | Noted: 2019-11-13

## 2019-11-13 PROBLEM — E43 SEVERE MALNUTRITION (HCC): Status: ACTIVE | Noted: 2019-11-13

## 2019-11-13 PROBLEM — E86.0 DEHYDRATION: Status: ACTIVE | Noted: 2019-11-13

## 2019-11-13 PROBLEM — R47.81 SLURRED SPEECH: Status: ACTIVE | Noted: 2019-11-13

## 2019-11-13 PROBLEM — J96.12 CHRONIC RESPIRATORY FAILURE WITH HYPERCAPNIA (HCC): Status: ACTIVE | Noted: 2019-11-13

## 2019-11-13 PROBLEM — E78.5 HYPERLIPIDEMIA: Status: ACTIVE | Noted: 2019-11-13

## 2019-11-13 PROBLEM — J96.21 ACUTE AND CHRONIC RESPIRATORY FAILURE WITH HYPOXIA (HCC): Status: ACTIVE | Noted: 2019-11-13

## 2019-11-13 PROBLEM — G93.41 ACUTE METABOLIC ENCEPHALOPATHY: Status: ACTIVE | Noted: 2019-11-13

## 2019-11-13 LAB
ACETAMINOPHEN LEVEL: < 5 UG/ML (ref 0–20)
ALBUMIN SERPL-MCNC: 3.5 G/DL (ref 3.5–5.1)
ALLEN TEST: ABNORMAL
ALP BLD-CCNC: 54 U/L (ref 38–126)
ALT SERPL-CCNC: 12 U/L (ref 11–66)
AMMONIA: 19 UMOL/L (ref 11–60)
AMPHETAMINE+METHAMPHETAMINE URINE SCREEN: NEGATIVE
ANION GAP SERPL CALCULATED.3IONS-SCNC: 12 MEQ/L (ref 8–16)
APTT: 31.4 SECONDS (ref 22–38)
AST SERPL-CCNC: 17 U/L (ref 5–40)
BACTERIA: ABNORMAL
BARBITURATE QUANTITATIVE URINE: NEGATIVE
BASE EXCESS (CALCULATED): 4.1 MMOL/L (ref -2.5–2.5)
BASOPHILS # BLD: 0.5 %
BASOPHILS ABSOLUTE: 0 THOU/MM3 (ref 0–0.1)
BENZODIAZEPINE QUANTITATIVE URINE: NEGATIVE
BILIRUB SERPL-MCNC: 0.2 MG/DL (ref 0.3–1.2)
BILIRUBIN DIRECT: < 0.2 MG/DL (ref 0–0.3)
BILIRUBIN URINE: ABNORMAL
BLOOD, URINE: NEGATIVE
BUN BLDV-MCNC: 8 MG/DL (ref 7–22)
CALCIUM SERPL-MCNC: 9.2 MG/DL (ref 8.5–10.5)
CANNABINOID QUANTITATIVE URINE: NEGATIVE
CASTS: ABNORMAL /LPF
CASTS: ABNORMAL /LPF
CHARACTER, URINE: CLEAR
CHLORIDE BLD-SCNC: 103 MEQ/L (ref 98–111)
CO2: 29 MEQ/L (ref 23–33)
COCAINE METABOLITE QUANTITATIVE URINE: NEGATIVE
COLLECTED BY:: ABNORMAL
COLOR: ABNORMAL
CREAT SERPL-MCNC: 0.8 MG/DL (ref 0.4–1.2)
CRYSTALS: ABNORMAL
DEVICE: ABNORMAL
EKG ATRIAL RATE: 113 BPM
EKG P AXIS: 76 DEGREES
EKG P-R INTERVAL: 144 MS
EKG Q-T INTERVAL: 318 MS
EKG QRS DURATION: 78 MS
EKG QTC CALCULATION (BAZETT): 436 MS
EKG R AXIS: 60 DEGREES
EKG T AXIS: 71 DEGREES
EKG VENTRICULAR RATE: 113 BPM
EOSINOPHIL # BLD: 3.3 %
EOSINOPHILS ABSOLUTE: 0.2 THOU/MM3 (ref 0–0.4)
EPITHELIAL CELLS, UA: ABNORMAL /HPF
ERYTHROCYTE [DISTWIDTH] IN BLOOD BY AUTOMATED COUNT: 13.5 % (ref 11.5–14.5)
ERYTHROCYTE [DISTWIDTH] IN BLOOD BY AUTOMATED COUNT: 48.1 FL (ref 35–45)
ETHYL ALCOHOL, SERUM: < 0.01 %
GLUCOSE BLD-MCNC: 82 MG/DL (ref 70–108)
GLUCOSE BLD-MCNC: 92 MG/DL (ref 70–108)
GLUCOSE, URINE: NEGATIVE MG/DL
HCO3: 30 MMOL/L (ref 23–28)
HCT VFR BLD CALC: 40.7 % (ref 37–47)
HEMOGLOBIN: 12.6 GM/DL (ref 12–16)
ICTOTEST: NEGATIVE
IMMATURE GRANS (ABS): 0.03 THOU/MM3 (ref 0–0.07)
IMMATURE GRANULOCYTES: 0.4 %
INR BLD: 1.26 (ref 0.85–1.13)
KETONES, URINE: NEGATIVE
LACTIC ACID, SEPSIS: 1 MMOL/L (ref 0.5–1.9)
LEUKOCYTE ESTERASE, URINE: NEGATIVE
LIPASE: 14.7 U/L (ref 5.6–51.3)
LYMPHOCYTES # BLD: 16.5 %
LYMPHOCYTES ABSOLUTE: 1.2 THOU/MM3 (ref 1–4.8)
MCH RBC QN AUTO: 29.8 PG (ref 26–33)
MCHC RBC AUTO-ENTMCNC: 31 GM/DL (ref 32.2–35.5)
MCV RBC AUTO: 96.2 FL (ref 81–99)
MISCELLANEOUS LAB TEST RESULT: ABNORMAL
MONOCYTES # BLD: 9.8 %
MONOCYTES ABSOLUTE: 0.7 THOU/MM3 (ref 0.4–1.3)
NITRITE, URINE: NEGATIVE
NUCLEATED RED BLOOD CELLS: 0 /100 WBC
O2 SATURATION: 100 %
OPIATES, URINE: NEGATIVE
OSMOLALITY CALCULATION: 284.8 MOSMOL/KG (ref 275–300)
OXYCODONE: NEGATIVE
PCO2: 51 MMHG (ref 35–45)
PH BLOOD GAS: 7.38 (ref 7.35–7.45)
PH UA: 5.5 (ref 5–9)
PHENCYCLIDINE QUANTITATIVE URINE: NEGATIVE
PLATELET # BLD: 287 THOU/MM3 (ref 130–400)
PMV BLD AUTO: 9.3 FL (ref 9.4–12.4)
PO2: 398 MMHG (ref 71–104)
POTASSIUM SERPL-SCNC: 4.1 MEQ/L (ref 3.5–5.2)
PRO-BNP: 174.8 PG/ML (ref 0–1800)
PROCALCITONIN: 0.06 NG/ML (ref 0.01–0.09)
PROTEIN UA: NEGATIVE MG/DL
RBC # BLD: 4.23 MILL/MM3 (ref 4.2–5.4)
RBC URINE: ABNORMAL /HPF
RENAL EPITHELIAL, UA: ABNORMAL
SALICYLATE, SERUM: < 0.3 MG/DL (ref 2–10)
SEG NEUTROPHILS: 69.5 %
SEGMENTED NEUTROPHILS ABSOLUTE COUNT: 5.1 THOU/MM3 (ref 1.8–7.7)
SODIUM BLD-SCNC: 144 MEQ/L (ref 135–145)
SOURCE, BLOOD GAS: ABNORMAL
SPECIFIC GRAVITY UA: 1.02 (ref 1–1.03)
TOTAL PROTEIN: 6.7 G/DL (ref 6.1–8)
TROPONIN T: < 0.01 NG/ML
UROBILINOGEN, URINE: 1 EU/DL (ref 0–1)
WBC # BLD: 7.3 THOU/MM3 (ref 4.8–10.8)
WBC UA: ABNORMAL /HPF
YEAST: ABNORMAL

## 2019-11-13 PROCEDURE — 83605 ASSAY OF LACTIC ACID: CPT

## 2019-11-13 PROCEDURE — 2709999900 HC NON-CHARGEABLE SUPPLY

## 2019-11-13 PROCEDURE — 2580000003 HC RX 258: Performed by: FAMILY MEDICINE

## 2019-11-13 PROCEDURE — 84145 PROCALCITONIN (PCT): CPT

## 2019-11-13 PROCEDURE — 80053 COMPREHEN METABOLIC PANEL: CPT

## 2019-11-13 PROCEDURE — 82248 BILIRUBIN DIRECT: CPT

## 2019-11-13 PROCEDURE — 82140 ASSAY OF AMMONIA: CPT

## 2019-11-13 PROCEDURE — 36415 COLL VENOUS BLD VENIPUNCTURE: CPT

## 2019-11-13 PROCEDURE — 99223 1ST HOSP IP/OBS HIGH 75: CPT | Performed by: INTERNAL MEDICINE

## 2019-11-13 PROCEDURE — 83880 ASSAY OF NATRIURETIC PEPTIDE: CPT

## 2019-11-13 PROCEDURE — 93010 ELECTROCARDIOGRAM REPORT: CPT | Performed by: INTERNAL MEDICINE

## 2019-11-13 PROCEDURE — 85730 THROMBOPLASTIN TIME PARTIAL: CPT

## 2019-11-13 PROCEDURE — 85025 COMPLETE CBC W/AUTO DIFF WBC: CPT

## 2019-11-13 PROCEDURE — 6370000000 HC RX 637 (ALT 250 FOR IP): Performed by: INTERNAL MEDICINE

## 2019-11-13 PROCEDURE — 84484 ASSAY OF TROPONIN QUANT: CPT

## 2019-11-13 PROCEDURE — 96360 HYDRATION IV INFUSION INIT: CPT

## 2019-11-13 PROCEDURE — G0480 DRUG TEST DEF 1-7 CLASSES: HCPCS

## 2019-11-13 PROCEDURE — 96361 HYDRATE IV INFUSION ADD-ON: CPT

## 2019-11-13 PROCEDURE — 82948 REAGENT STRIP/BLOOD GLUCOSE: CPT

## 2019-11-13 PROCEDURE — 1200000003 HC TELEMETRY R&B

## 2019-11-13 PROCEDURE — 2700000000 HC OXYGEN THERAPY PER DAY

## 2019-11-13 PROCEDURE — 94640 AIRWAY INHALATION TREATMENT: CPT

## 2019-11-13 PROCEDURE — 99285 EMERGENCY DEPT VISIT HI MDM: CPT

## 2019-11-13 PROCEDURE — 87086 URINE CULTURE/COLONY COUNT: CPT

## 2019-11-13 PROCEDURE — 81001 URINALYSIS AUTO W/SCOPE: CPT

## 2019-11-13 PROCEDURE — 83690 ASSAY OF LIPASE: CPT

## 2019-11-13 PROCEDURE — 85610 PROTHROMBIN TIME: CPT

## 2019-11-13 PROCEDURE — 2580000003 HC RX 258: Performed by: INTERNAL MEDICINE

## 2019-11-13 PROCEDURE — 93005 ELECTROCARDIOGRAM TRACING: CPT | Performed by: FAMILY MEDICINE

## 2019-11-13 PROCEDURE — 36600 WITHDRAWAL OF ARTERIAL BLOOD: CPT

## 2019-11-13 PROCEDURE — 80307 DRUG TEST PRSMV CHEM ANLYZR: CPT

## 2019-11-13 PROCEDURE — 94761 N-INVAS EAR/PLS OXIMETRY MLT: CPT

## 2019-11-13 PROCEDURE — 71045 X-RAY EXAM CHEST 1 VIEW: CPT

## 2019-11-13 PROCEDURE — 82803 BLOOD GASES ANY COMBINATION: CPT

## 2019-11-13 PROCEDURE — 70450 CT HEAD/BRAIN W/O DYE: CPT

## 2019-11-13 PROCEDURE — 87040 BLOOD CULTURE FOR BACTERIA: CPT

## 2019-11-13 RX ORDER — DESVENLAFAXINE 50 MG/1
50 TABLET, EXTENDED RELEASE ORAL DAILY
Status: DISCONTINUED | OUTPATIENT
Start: 2019-11-13 | End: 2019-11-20 | Stop reason: HOSPADM

## 2019-11-13 RX ORDER — SODIUM CHLORIDE 0.9 % (FLUSH) 0.9 %
10 SYRINGE (ML) INJECTION PRN
Status: DISCONTINUED | OUTPATIENT
Start: 2019-11-13 | End: 2019-11-20 | Stop reason: HOSPADM

## 2019-11-13 RX ORDER — ATORVASTATIN CALCIUM 10 MG/1
10 TABLET, FILM COATED ORAL DAILY
Status: ON HOLD | COMMUNITY
End: 2019-11-20 | Stop reason: SDUPTHER

## 2019-11-13 RX ORDER — ACETAMINOPHEN 325 MG/1
650 TABLET ORAL EVERY 4 HOURS PRN
Status: DISCONTINUED | OUTPATIENT
Start: 2019-11-13 | End: 2019-11-20 | Stop reason: HOSPADM

## 2019-11-13 RX ORDER — MEGESTROL ACETATE 40 MG/1
40 TABLET ORAL 2 TIMES DAILY
Status: ON HOLD | COMMUNITY
End: 2019-11-20 | Stop reason: SDUPTHER

## 2019-11-13 RX ORDER — ACETAMINOPHEN 160 MG
2000 TABLET,DISINTEGRATING ORAL DAILY
COMMUNITY

## 2019-11-13 RX ORDER — AMITRIPTYLINE HYDROCHLORIDE 10 MG/1
10 TABLET, FILM COATED ORAL NIGHTLY
Status: DISCONTINUED | OUTPATIENT
Start: 2019-11-13 | End: 2019-11-20 | Stop reason: HOSPADM

## 2019-11-13 RX ORDER — FLUTICASONE FUROATE AND VILANTEROL 100; 25 UG/1; UG/1
1 POWDER RESPIRATORY (INHALATION) DAILY
Status: ON HOLD | COMMUNITY
End: 2019-11-20 | Stop reason: SDUPTHER

## 2019-11-13 RX ORDER — FLUTICASONE FUROATE AND VILANTEROL 100; 25 UG/1; UG/1
1 POWDER RESPIRATORY (INHALATION) DAILY
Status: DISCONTINUED | OUTPATIENT
Start: 2019-11-13 | End: 2019-11-13 | Stop reason: CLARIF

## 2019-11-13 RX ORDER — ARIPIPRAZOLE 10 MG/1
10 TABLET ORAL DAILY
Status: ON HOLD | COMMUNITY
End: 2019-11-20 | Stop reason: HOSPADM

## 2019-11-13 RX ORDER — ALBUTEROL SULFATE 2.5 MG/3ML
2.5 SOLUTION RESPIRATORY (INHALATION) EVERY 6 HOURS PRN
Status: DISCONTINUED | OUTPATIENT
Start: 2019-11-13 | End: 2019-11-18

## 2019-11-13 RX ORDER — DESVENLAFAXINE 100 MG/1
100 TABLET, EXTENDED RELEASE ORAL DAILY
Status: ON HOLD | COMMUNITY
End: 2019-11-20 | Stop reason: HOSPADM

## 2019-11-13 RX ORDER — 0.9 % SODIUM CHLORIDE 0.9 %
500 INTRAVENOUS SOLUTION INTRAVENOUS ONCE
Status: COMPLETED | OUTPATIENT
Start: 2019-11-13 | End: 2019-11-13

## 2019-11-13 RX ORDER — ALBUTEROL SULFATE 90 UG/1
2 AEROSOL, METERED RESPIRATORY (INHALATION) EVERY 6 HOURS
Status: ON HOLD | COMMUNITY
End: 2019-11-20 | Stop reason: SDUPTHER

## 2019-11-13 RX ORDER — ARIPIPRAZOLE 5 MG/1
5 TABLET ORAL DAILY
Status: DISCONTINUED | OUTPATIENT
Start: 2019-11-13 | End: 2019-11-20 | Stop reason: HOSPADM

## 2019-11-13 RX ORDER — ONDANSETRON 2 MG/ML
4 INJECTION INTRAMUSCULAR; INTRAVENOUS EVERY 6 HOURS PRN
Status: DISCONTINUED | OUTPATIENT
Start: 2019-11-13 | End: 2019-11-20 | Stop reason: HOSPADM

## 2019-11-13 RX ORDER — AMITRIPTYLINE HYDROCHLORIDE 100 MG/1
100 TABLET, FILM COATED ORAL NIGHTLY
Status: ON HOLD | COMMUNITY
End: 2019-11-20 | Stop reason: HOSPADM

## 2019-11-13 RX ORDER — POLYETHYLENE GLYCOL 3350 17 G/17G
17 POWDER, FOR SOLUTION ORAL DAILY PRN
Status: DISCONTINUED | OUTPATIENT
Start: 2019-11-13 | End: 2019-11-20 | Stop reason: HOSPADM

## 2019-11-13 RX ORDER — IPRATROPIUM BROMIDE AND ALBUTEROL SULFATE 2.5; .5 MG/3ML; MG/3ML
1 SOLUTION RESPIRATORY (INHALATION) EVERY 6 HOURS PRN
Status: ON HOLD | COMMUNITY
End: 2019-11-20 | Stop reason: HOSPADM

## 2019-11-13 RX ORDER — SODIUM CHLORIDE 0.9 % (FLUSH) 0.9 %
10 SYRINGE (ML) INJECTION EVERY 12 HOURS SCHEDULED
Status: DISCONTINUED | OUTPATIENT
Start: 2019-11-13 | End: 2019-11-20 | Stop reason: HOSPADM

## 2019-11-13 RX ORDER — SODIUM CHLORIDE 9 MG/ML
INJECTION, SOLUTION INTRAVENOUS CONTINUOUS
Status: DISCONTINUED | OUTPATIENT
Start: 2019-11-13 | End: 2019-11-18

## 2019-11-13 RX ADMIN — SODIUM CHLORIDE 500 ML: 9 INJECTION, SOLUTION INTRAVENOUS at 15:20

## 2019-11-13 RX ADMIN — ARIPIPRAZOLE 5 MG: 5 TABLET ORAL at 21:58

## 2019-11-13 RX ADMIN — AMITRIPTYLINE HYDROCHLORIDE 10 MG: 10 TABLET, FILM COATED ORAL at 21:58

## 2019-11-13 RX ADMIN — DESVENLAFAXINE SUCCINATE 50 MG: 50 TABLET, FILM COATED, EXTENDED RELEASE ORAL at 21:58

## 2019-11-13 RX ADMIN — SODIUM CHLORIDE: 9 INJECTION, SOLUTION INTRAVENOUS at 18:39

## 2019-11-13 RX ADMIN — Medication 2 PUFF: at 21:59

## 2019-11-13 RX ADMIN — SODIUM CHLORIDE: 9 INJECTION, SOLUTION INTRAVENOUS at 15:56

## 2019-11-13 ASSESSMENT — ENCOUNTER SYMPTOMS
EYE DISCHARGE: 0
COUGH: 1
VOMITING: 0

## 2019-11-14 ENCOUNTER — APPOINTMENT (OUTPATIENT)
Dept: MRI IMAGING | Age: 74
DRG: 871 | End: 2019-11-14
Payer: MEDICARE

## 2019-11-14 LAB
ANION GAP SERPL CALCULATED.3IONS-SCNC: 11 MEQ/L (ref 8–16)
BUN BLDV-MCNC: 8 MG/DL (ref 7–22)
CALCIUM SERPL-MCNC: 8.2 MG/DL (ref 8.5–10.5)
CHLORIDE BLD-SCNC: 105 MEQ/L (ref 98–111)
CO2: 26 MEQ/L (ref 23–33)
CREAT SERPL-MCNC: 0.6 MG/DL (ref 0.4–1.2)
GFR SERPL CREATININE-BSD FRML MDRD: > 90 ML/MIN/1.73M2
GLUCOSE BLD-MCNC: 83 MG/DL (ref 70–108)
POTASSIUM SERPL-SCNC: 4.1 MEQ/L (ref 3.5–5.2)
SODIUM BLD-SCNC: 142 MEQ/L (ref 135–145)
TSH SERPL DL<=0.05 MIU/L-ACNC: 0.83 UIU/ML (ref 0.4–4.2)

## 2019-11-14 PROCEDURE — 1200000003 HC TELEMETRY R&B

## 2019-11-14 PROCEDURE — 97166 OT EVAL MOD COMPLEX 45 MIN: CPT

## 2019-11-14 PROCEDURE — 6360000002 HC RX W HCPCS: Performed by: NURSE PRACTITIONER

## 2019-11-14 PROCEDURE — 94640 AIRWAY INHALATION TREATMENT: CPT

## 2019-11-14 PROCEDURE — 2580000003 HC RX 258: Performed by: INTERNAL MEDICINE

## 2019-11-14 PROCEDURE — 70551 MRI BRAIN STEM W/O DYE: CPT

## 2019-11-14 PROCEDURE — 99222 1ST HOSP IP/OBS MODERATE 55: CPT | Performed by: NURSE PRACTITIONER

## 2019-11-14 PROCEDURE — 2580000003 HC RX 258: Performed by: NURSE PRACTITIONER

## 2019-11-14 PROCEDURE — 6370000000 HC RX 637 (ALT 250 FOR IP): Performed by: NURSE PRACTITIONER

## 2019-11-14 PROCEDURE — 97110 THERAPEUTIC EXERCISES: CPT

## 2019-11-14 PROCEDURE — 94760 N-INVAS EAR/PLS OXIMETRY 1: CPT

## 2019-11-14 PROCEDURE — 97530 THERAPEUTIC ACTIVITIES: CPT

## 2019-11-14 PROCEDURE — 6370000000 HC RX 637 (ALT 250 FOR IP): Performed by: INTERNAL MEDICINE

## 2019-11-14 PROCEDURE — 80048 BASIC METABOLIC PNL TOTAL CA: CPT

## 2019-11-14 PROCEDURE — 84443 ASSAY THYROID STIM HORMONE: CPT

## 2019-11-14 PROCEDURE — 36415 COLL VENOUS BLD VENIPUNCTURE: CPT

## 2019-11-14 PROCEDURE — 97162 PT EVAL MOD COMPLEX 30 MIN: CPT

## 2019-11-14 PROCEDURE — 6360000002 HC RX W HCPCS: Performed by: INTERNAL MEDICINE

## 2019-11-14 RX ORDER — DIPHENHYDRAMINE HYDROCHLORIDE, ZINC ACETATE 2; .1 G/100G; G/100G
CREAM TOPICAL 3 TIMES DAILY PRN
Status: DISCONTINUED | OUTPATIENT
Start: 2019-11-14 | End: 2019-11-20 | Stop reason: HOSPADM

## 2019-11-14 RX ADMIN — DIPHENHYDRAMINE HYDROCHLORIDE, ZINC ACETATE: 2; .1 CREAM TOPICAL at 15:02

## 2019-11-14 RX ADMIN — SODIUM CHLORIDE: 9 INJECTION, SOLUTION INTRAVENOUS at 15:01

## 2019-11-14 RX ADMIN — Medication 2 PUFF: at 21:13

## 2019-11-14 RX ADMIN — ARIPIPRAZOLE 5 MG: 5 TABLET ORAL at 07:38

## 2019-11-14 RX ADMIN — ENOXAPARIN SODIUM 30 MG: 30 INJECTION SUBCUTANEOUS at 07:38

## 2019-11-14 RX ADMIN — DESVENLAFAXINE SUCCINATE 50 MG: 50 TABLET, FILM COATED, EXTENDED RELEASE ORAL at 09:17

## 2019-11-14 RX ADMIN — Medication 2 PUFF: at 10:21

## 2019-11-14 RX ADMIN — CALCIUM GLUCONATE 1 G: 98 INJECTION, SOLUTION INTRAVENOUS at 16:31

## 2019-11-14 RX ADMIN — AMITRIPTYLINE HYDROCHLORIDE 10 MG: 10 TABLET, FILM COATED ORAL at 20:07

## 2019-11-15 LAB — URINE CULTURE, ROUTINE: NORMAL

## 2019-11-15 PROCEDURE — 1200000003 HC TELEMETRY R&B

## 2019-11-15 PROCEDURE — 99231 SBSQ HOSP IP/OBS SF/LOW 25: CPT | Performed by: NURSE PRACTITIONER

## 2019-11-15 PROCEDURE — 97110 THERAPEUTIC EXERCISES: CPT

## 2019-11-15 PROCEDURE — 97116 GAIT TRAINING THERAPY: CPT

## 2019-11-15 PROCEDURE — 6360000002 HC RX W HCPCS: Performed by: INTERNAL MEDICINE

## 2019-11-15 PROCEDURE — 2580000003 HC RX 258: Performed by: INTERNAL MEDICINE

## 2019-11-15 PROCEDURE — 94640 AIRWAY INHALATION TREATMENT: CPT

## 2019-11-15 PROCEDURE — 2700000000 HC OXYGEN THERAPY PER DAY

## 2019-11-15 PROCEDURE — 6370000000 HC RX 637 (ALT 250 FOR IP): Performed by: INTERNAL MEDICINE

## 2019-11-15 PROCEDURE — 94760 N-INVAS EAR/PLS OXIMETRY 1: CPT

## 2019-11-15 RX ADMIN — ENOXAPARIN SODIUM 30 MG: 30 INJECTION SUBCUTANEOUS at 08:23

## 2019-11-15 RX ADMIN — Medication 2 PUFF: at 18:03

## 2019-11-15 RX ADMIN — Medication 2 PUFF: at 07:20

## 2019-11-15 RX ADMIN — ARIPIPRAZOLE 5 MG: 5 TABLET ORAL at 08:23

## 2019-11-15 RX ADMIN — AMITRIPTYLINE HYDROCHLORIDE 10 MG: 10 TABLET, FILM COATED ORAL at 20:26

## 2019-11-15 RX ADMIN — SODIUM CHLORIDE: 9 INJECTION, SOLUTION INTRAVENOUS at 16:10

## 2019-11-15 RX ADMIN — DESVENLAFAXINE SUCCINATE 50 MG: 50 TABLET, FILM COATED, EXTENDED RELEASE ORAL at 08:23

## 2019-11-16 LAB
ANION GAP SERPL CALCULATED.3IONS-SCNC: 13 MEQ/L (ref 8–16)
BACTERIA: ABNORMAL
BASOPHILS # BLD: 0.4 %
BASOPHILS ABSOLUTE: 0 THOU/MM3 (ref 0–0.1)
BILIRUBIN URINE: NEGATIVE
BLOOD, URINE: NEGATIVE
BUN BLDV-MCNC: 7 MG/DL (ref 7–22)
CALCIUM SERPL-MCNC: 8.8 MG/DL (ref 8.5–10.5)
CASTS: ABNORMAL /LPF
CASTS: ABNORMAL /LPF
CHARACTER, URINE: CLEAR
CHLORIDE BLD-SCNC: 94 MEQ/L (ref 98–111)
CO2: 25 MEQ/L (ref 23–33)
COLOR: YELLOW
CREAT SERPL-MCNC: 0.6 MG/DL (ref 0.4–1.2)
CRYSTALS: ABNORMAL
EOSINOPHIL # BLD: 1.3 %
EOSINOPHILS ABSOLUTE: 0.1 THOU/MM3 (ref 0–0.4)
EPITHELIAL CELLS, UA: ABNORMAL /HPF
ERYTHROCYTE [DISTWIDTH] IN BLOOD BY AUTOMATED COUNT: 13.3 % (ref 11.5–14.5)
ERYTHROCYTE [DISTWIDTH] IN BLOOD BY AUTOMATED COUNT: 44.8 FL (ref 35–45)
GFR SERPL CREATININE-BSD FRML MDRD: > 90 ML/MIN/1.73M2
GLUCOSE BLD-MCNC: 105 MG/DL (ref 70–108)
GLUCOSE, URINE: NEGATIVE MG/DL
HCT VFR BLD CALC: 37.1 % (ref 37–47)
HEMOGLOBIN: 12.1 GM/DL (ref 12–16)
IMMATURE GRANS (ABS): 0.02 THOU/MM3 (ref 0–0.07)
IMMATURE GRANULOCYTES: 0.2 %
KETONES, URINE: NEGATIVE
LACTIC ACID, SEPSIS: 0.8 MMOL/L (ref 0.5–1.9)
LACTIC ACID, SEPSIS: 0.9 MMOL/L (ref 0.5–1.9)
LEUKOCYTE EST, POC: ABNORMAL
LYMPHOCYTES # BLD: 6.7 %
LYMPHOCYTES ABSOLUTE: 0.6 THOU/MM3 (ref 1–4.8)
MCH RBC QN AUTO: 29.7 PG (ref 26–33)
MCHC RBC AUTO-ENTMCNC: 32.6 GM/DL (ref 32.2–35.5)
MCV RBC AUTO: 91.2 FL (ref 81–99)
MISCELLANEOUS LAB TEST RESULT: ABNORMAL
MONOCYTES # BLD: 5.7 %
MONOCYTES ABSOLUTE: 0.5 THOU/MM3 (ref 0.4–1.3)
NITRITE, URINE: POSITIVE
NUCLEATED RED BLOOD CELLS: 0 /100 WBC
PH UA: 7.5 (ref 5–9)
PLATELET # BLD: 285 THOU/MM3 (ref 130–400)
PMV BLD AUTO: 9.5 FL (ref 9.4–12.4)
POTASSIUM SERPL-SCNC: 3.8 MEQ/L (ref 3.5–5.2)
PROCALCITONIN: 0.13 NG/ML (ref 0.01–0.09)
PROTEIN UA: NEGATIVE MG/DL
RBC # BLD: 4.07 MILL/MM3 (ref 4.2–5.4)
RBC URINE: ABNORMAL /HPF
RENAL EPITHELIAL, UA: ABNORMAL
SEG NEUTROPHILS: 85.7 %
SEGMENTED NEUTROPHILS ABSOLUTE COUNT: 8.1 THOU/MM3 (ref 1.8–7.7)
SODIUM BLD-SCNC: 132 MEQ/L (ref 135–145)
SPECIFIC GRAVITY UA: 1.01 (ref 1–1.03)
TROPONIN T: < 0.01 NG/ML
TROPONIN T: < 0.01 NG/ML
UROBILINOGEN, URINE: 1 EU/DL (ref 0–1)
WBC # BLD: 9.4 THOU/MM3 (ref 4.8–10.8)
WBC UA: ABNORMAL /HPF
YEAST: ABNORMAL

## 2019-11-16 PROCEDURE — 94640 AIRWAY INHALATION TREATMENT: CPT

## 2019-11-16 PROCEDURE — 2580000003 HC RX 258: Performed by: NURSE PRACTITIONER

## 2019-11-16 PROCEDURE — 85025 COMPLETE CBC W/AUTO DIFF WBC: CPT

## 2019-11-16 PROCEDURE — 93005 ELECTROCARDIOGRAM TRACING: CPT | Performed by: NURSE PRACTITIONER

## 2019-11-16 PROCEDURE — 6370000000 HC RX 637 (ALT 250 FOR IP): Performed by: INTERNAL MEDICINE

## 2019-11-16 PROCEDURE — 84484 ASSAY OF TROPONIN QUANT: CPT

## 2019-11-16 PROCEDURE — 36415 COLL VENOUS BLD VENIPUNCTURE: CPT

## 2019-11-16 PROCEDURE — 99233 SBSQ HOSP IP/OBS HIGH 50: CPT | Performed by: NURSE PRACTITIONER

## 2019-11-16 PROCEDURE — 2700000000 HC OXYGEN THERAPY PER DAY

## 2019-11-16 PROCEDURE — 6360000002 HC RX W HCPCS: Performed by: INTERNAL MEDICINE

## 2019-11-16 PROCEDURE — 80048 BASIC METABOLIC PNL TOTAL CA: CPT

## 2019-11-16 PROCEDURE — 81001 URINALYSIS AUTO W/SCOPE: CPT

## 2019-11-16 PROCEDURE — 87086 URINE CULTURE/COLONY COUNT: CPT

## 2019-11-16 PROCEDURE — 84145 PROCALCITONIN (PCT): CPT

## 2019-11-16 PROCEDURE — 87186 SC STD MICRODIL/AGAR DIL: CPT

## 2019-11-16 PROCEDURE — 1200000003 HC TELEMETRY R&B

## 2019-11-16 PROCEDURE — 87077 CULTURE AEROBIC IDENTIFY: CPT

## 2019-11-16 PROCEDURE — 83605 ASSAY OF LACTIC ACID: CPT

## 2019-11-16 RX ORDER — 0.9 % SODIUM CHLORIDE 0.9 %
250 INTRAVENOUS SOLUTION INTRAVENOUS ONCE
Status: COMPLETED | OUTPATIENT
Start: 2019-11-16 | End: 2019-11-16

## 2019-11-16 RX ADMIN — Medication 2 PUFF: at 08:56

## 2019-11-16 RX ADMIN — ARIPIPRAZOLE 5 MG: 5 TABLET ORAL at 09:14

## 2019-11-16 RX ADMIN — ACETAMINOPHEN 650 MG: 325 TABLET ORAL at 16:34

## 2019-11-16 RX ADMIN — SODIUM CHLORIDE 250 ML: 9 INJECTION, SOLUTION INTRAVENOUS at 13:54

## 2019-11-16 RX ADMIN — DESVENLAFAXINE SUCCINATE 50 MG: 50 TABLET, FILM COATED, EXTENDED RELEASE ORAL at 09:14

## 2019-11-16 RX ADMIN — ENOXAPARIN SODIUM 30 MG: 30 INJECTION SUBCUTANEOUS at 09:14

## 2019-11-16 RX ADMIN — AMITRIPTYLINE HYDROCHLORIDE 10 MG: 10 TABLET, FILM COATED ORAL at 21:02

## 2019-11-16 RX ADMIN — Medication 2 PUFF: at 17:46

## 2019-11-16 ASSESSMENT — PAIN SCALES - GENERAL: PAINLEVEL_OUTOF10: 0

## 2019-11-17 LAB
ANION GAP SERPL CALCULATED.3IONS-SCNC: 11 MEQ/L (ref 8–16)
BUN BLDV-MCNC: 6 MG/DL (ref 7–22)
CALCIUM SERPL-MCNC: 9 MG/DL (ref 8.5–10.5)
CHLORIDE BLD-SCNC: 96 MEQ/L (ref 98–111)
CO2: 26 MEQ/L (ref 23–33)
CREAT SERPL-MCNC: 0.5 MG/DL (ref 0.4–1.2)
EKG ATRIAL RATE: 115 BPM
EKG P AXIS: 65 DEGREES
EKG P-R INTERVAL: 124 MS
EKG Q-T INTERVAL: 282 MS
EKG QRS DURATION: 76 MS
EKG QTC CALCULATION (BAZETT): 390 MS
EKG R AXIS: 31 DEGREES
EKG T AXIS: 60 DEGREES
EKG VENTRICULAR RATE: 115 BPM
ERYTHROCYTE [DISTWIDTH] IN BLOOD BY AUTOMATED COUNT: 13.6 % (ref 11.5–14.5)
ERYTHROCYTE [DISTWIDTH] IN BLOOD BY AUTOMATED COUNT: 46.4 FL (ref 35–45)
GFR SERPL CREATININE-BSD FRML MDRD: > 90 ML/MIN/1.73M2
GLUCOSE BLD-MCNC: 156 MG/DL (ref 70–108)
HCT VFR BLD CALC: 38.8 % (ref 37–47)
HEMOGLOBIN: 12.4 GM/DL (ref 12–16)
MCH RBC QN AUTO: 29.5 PG (ref 26–33)
MCHC RBC AUTO-ENTMCNC: 32 GM/DL (ref 32.2–35.5)
MCV RBC AUTO: 92.4 FL (ref 81–99)
PLATELET # BLD: 265 THOU/MM3 (ref 130–400)
PMV BLD AUTO: 9.5 FL (ref 9.4–12.4)
POTASSIUM SERPL-SCNC: 3.9 MEQ/L (ref 3.5–5.2)
RBC # BLD: 4.2 MILL/MM3 (ref 4.2–5.4)
SODIUM BLD-SCNC: 133 MEQ/L (ref 135–145)
WBC # BLD: 7.2 THOU/MM3 (ref 4.8–10.8)

## 2019-11-17 PROCEDURE — 2580000003 HC RX 258: Performed by: NURSE PRACTITIONER

## 2019-11-17 PROCEDURE — 6360000002 HC RX W HCPCS: Performed by: INTERNAL MEDICINE

## 2019-11-17 PROCEDURE — 6360000002 HC RX W HCPCS: Performed by: NURSE PRACTITIONER

## 2019-11-17 PROCEDURE — 85027 COMPLETE CBC AUTOMATED: CPT

## 2019-11-17 PROCEDURE — 99231 SBSQ HOSP IP/OBS SF/LOW 25: CPT | Performed by: NURSE PRACTITIONER

## 2019-11-17 PROCEDURE — 93010 ELECTROCARDIOGRAM REPORT: CPT | Performed by: INTERNAL MEDICINE

## 2019-11-17 PROCEDURE — 94760 N-INVAS EAR/PLS OXIMETRY 1: CPT

## 2019-11-17 PROCEDURE — 94640 AIRWAY INHALATION TREATMENT: CPT

## 2019-11-17 PROCEDURE — 1200000003 HC TELEMETRY R&B

## 2019-11-17 PROCEDURE — 6370000000 HC RX 637 (ALT 250 FOR IP): Performed by: INTERNAL MEDICINE

## 2019-11-17 PROCEDURE — 80048 BASIC METABOLIC PNL TOTAL CA: CPT

## 2019-11-17 PROCEDURE — 2700000000 HC OXYGEN THERAPY PER DAY

## 2019-11-17 PROCEDURE — 36415 COLL VENOUS BLD VENIPUNCTURE: CPT

## 2019-11-17 RX ADMIN — ENOXAPARIN SODIUM 30 MG: 30 INJECTION SUBCUTANEOUS at 09:23

## 2019-11-17 RX ADMIN — AMITRIPTYLINE HYDROCHLORIDE 10 MG: 10 TABLET, FILM COATED ORAL at 21:41

## 2019-11-17 RX ADMIN — ARIPIPRAZOLE 5 MG: 5 TABLET ORAL at 09:23

## 2019-11-17 RX ADMIN — DESVENLAFAXINE SUCCINATE 50 MG: 50 TABLET, FILM COATED, EXTENDED RELEASE ORAL at 09:23

## 2019-11-17 RX ADMIN — CEFTRIAXONE SODIUM 1 G: 1 INJECTION, POWDER, FOR SOLUTION INTRAMUSCULAR; INTRAVENOUS at 21:47

## 2019-11-17 RX ADMIN — Medication 2 PUFF: at 17:11

## 2019-11-17 RX ADMIN — Medication 2 PUFF: at 08:12

## 2019-11-18 ENCOUNTER — APPOINTMENT (OUTPATIENT)
Dept: GENERAL RADIOLOGY | Age: 74
DRG: 871 | End: 2019-11-18
Payer: MEDICARE

## 2019-11-18 LAB
GFR SERPL CREATININE-BSD FRML MDRD: 70 ML/MIN/1.73M2
ORGANISM: ABNORMAL
URINE CULTURE, ROUTINE: ABNORMAL

## 2019-11-18 PROCEDURE — 1200000003 HC TELEMETRY R&B

## 2019-11-18 PROCEDURE — 6370000000 HC RX 637 (ALT 250 FOR IP): Performed by: NURSE PRACTITIONER

## 2019-11-18 PROCEDURE — 97535 SELF CARE MNGMENT TRAINING: CPT

## 2019-11-18 PROCEDURE — 6370000000 HC RX 637 (ALT 250 FOR IP): Performed by: INTERNAL MEDICINE

## 2019-11-18 PROCEDURE — 2700000000 HC OXYGEN THERAPY PER DAY

## 2019-11-18 PROCEDURE — 71045 X-RAY EXAM CHEST 1 VIEW: CPT

## 2019-11-18 PROCEDURE — 97110 THERAPEUTIC EXERCISES: CPT

## 2019-11-18 PROCEDURE — 6360000002 HC RX W HCPCS: Performed by: INTERNAL MEDICINE

## 2019-11-18 PROCEDURE — 94760 N-INVAS EAR/PLS OXIMETRY 1: CPT

## 2019-11-18 PROCEDURE — 2580000003 HC RX 258: Performed by: INTERNAL MEDICINE

## 2019-11-18 PROCEDURE — 99231 SBSQ HOSP IP/OBS SF/LOW 25: CPT | Performed by: NURSE PRACTITIONER

## 2019-11-18 PROCEDURE — 97116 GAIT TRAINING THERAPY: CPT

## 2019-11-18 PROCEDURE — 94640 AIRWAY INHALATION TREATMENT: CPT

## 2019-11-18 RX ORDER — MEGESTROL ACETATE 40 MG/1
40 TABLET ORAL 2 TIMES DAILY
Status: DISCONTINUED | OUTPATIENT
Start: 2019-11-18 | End: 2019-11-20 | Stop reason: HOSPADM

## 2019-11-18 RX ORDER — SULFAMETHOXAZOLE AND TRIMETHOPRIM 800; 160 MG/1; MG/1
1 TABLET ORAL EVERY 12 HOURS SCHEDULED
Status: DISCONTINUED | OUTPATIENT
Start: 2019-11-18 | End: 2019-11-20

## 2019-11-18 RX ORDER — ALBUTEROL SULFATE 2.5 MG/3ML
2.5 SOLUTION RESPIRATORY (INHALATION) EVERY 4 HOURS PRN
Status: DISCONTINUED | OUTPATIENT
Start: 2019-11-18 | End: 2019-11-20 | Stop reason: HOSPADM

## 2019-11-18 RX ADMIN — MEGESTROL ACETATE 40 MG: 40 TABLET ORAL at 21:55

## 2019-11-18 RX ADMIN — DESVENLAFAXINE SUCCINATE 50 MG: 50 TABLET, FILM COATED, EXTENDED RELEASE ORAL at 09:48

## 2019-11-18 RX ADMIN — SULFAMETHOXAZOLE AND TRIMETHOPRIM 1 TABLET: 800; 160 TABLET ORAL at 21:55

## 2019-11-18 RX ADMIN — Medication 2 PUFF: at 18:05

## 2019-11-18 RX ADMIN — ENOXAPARIN SODIUM 30 MG: 30 INJECTION SUBCUTANEOUS at 09:49

## 2019-11-18 RX ADMIN — Medication 2 PUFF: at 07:36

## 2019-11-18 RX ADMIN — AMITRIPTYLINE HYDROCHLORIDE 10 MG: 10 TABLET, FILM COATED ORAL at 21:55

## 2019-11-18 RX ADMIN — ARIPIPRAZOLE 5 MG: 5 TABLET ORAL at 09:48

## 2019-11-18 RX ADMIN — Medication 10 ML: at 21:55

## 2019-11-19 LAB
BLOOD CULTURE, ROUTINE: NORMAL
BLOOD CULTURE, ROUTINE: NORMAL

## 2019-11-19 PROCEDURE — 97530 THERAPEUTIC ACTIVITIES: CPT

## 2019-11-19 PROCEDURE — 94760 N-INVAS EAR/PLS OXIMETRY 1: CPT

## 2019-11-19 PROCEDURE — 97110 THERAPEUTIC EXERCISES: CPT

## 2019-11-19 PROCEDURE — 2700000000 HC OXYGEN THERAPY PER DAY

## 2019-11-19 PROCEDURE — 99231 SBSQ HOSP IP/OBS SF/LOW 25: CPT | Performed by: NURSE PRACTITIONER

## 2019-11-19 PROCEDURE — 6370000000 HC RX 637 (ALT 250 FOR IP): Performed by: NURSE PRACTITIONER

## 2019-11-19 PROCEDURE — 94640 AIRWAY INHALATION TREATMENT: CPT

## 2019-11-19 PROCEDURE — 1200000003 HC TELEMETRY R&B

## 2019-11-19 PROCEDURE — 6370000000 HC RX 637 (ALT 250 FOR IP): Performed by: INTERNAL MEDICINE

## 2019-11-19 PROCEDURE — 2580000003 HC RX 258: Performed by: INTERNAL MEDICINE

## 2019-11-19 PROCEDURE — 6360000002 HC RX W HCPCS: Performed by: INTERNAL MEDICINE

## 2019-11-19 PROCEDURE — 97116 GAIT TRAINING THERAPY: CPT

## 2019-11-19 RX ADMIN — SULFAMETHOXAZOLE AND TRIMETHOPRIM 1 TABLET: 800; 160 TABLET ORAL at 08:35

## 2019-11-19 RX ADMIN — SULFAMETHOXAZOLE AND TRIMETHOPRIM 1 TABLET: 800; 160 TABLET ORAL at 20:58

## 2019-11-19 RX ADMIN — MEGESTROL ACETATE 40 MG: 40 TABLET ORAL at 08:35

## 2019-11-19 RX ADMIN — ENOXAPARIN SODIUM 30 MG: 30 INJECTION SUBCUTANEOUS at 08:35

## 2019-11-19 RX ADMIN — Medication 2 PUFF: at 19:33

## 2019-11-19 RX ADMIN — MEGESTROL ACETATE 40 MG: 40 TABLET ORAL at 21:58

## 2019-11-19 RX ADMIN — AMITRIPTYLINE HYDROCHLORIDE 10 MG: 10 TABLET, FILM COATED ORAL at 20:58

## 2019-11-19 RX ADMIN — ARIPIPRAZOLE 5 MG: 5 TABLET ORAL at 08:35

## 2019-11-19 RX ADMIN — DESVENLAFAXINE SUCCINATE 50 MG: 50 TABLET, FILM COATED, EXTENDED RELEASE ORAL at 08:35

## 2019-11-19 RX ADMIN — Medication 10 ML: at 08:36

## 2019-11-19 RX ADMIN — Medication 10 ML: at 21:58

## 2019-11-19 RX ADMIN — Medication 2 PUFF: at 08:17

## 2019-11-20 VITALS
SYSTOLIC BLOOD PRESSURE: 119 MMHG | HEART RATE: 107 BPM | RESPIRATION RATE: 18 BRPM | HEIGHT: 62 IN | BODY MASS INDEX: 16.43 KG/M2 | TEMPERATURE: 97.9 F | WEIGHT: 89.3 LBS | OXYGEN SATURATION: 90 % | DIASTOLIC BLOOD PRESSURE: 65 MMHG

## 2019-11-20 PROBLEM — A41.9 SEPSIS SECONDARY TO UTI (HCC): Status: ACTIVE | Noted: 2019-11-20

## 2019-11-20 PROBLEM — N39.0 SEPSIS SECONDARY TO UTI (HCC): Status: ACTIVE | Noted: 2019-11-20

## 2019-11-20 PROCEDURE — 6370000000 HC RX 637 (ALT 250 FOR IP): Performed by: NURSE PRACTITIONER

## 2019-11-20 PROCEDURE — 94760 N-INVAS EAR/PLS OXIMETRY 1: CPT

## 2019-11-20 PROCEDURE — 6370000000 HC RX 637 (ALT 250 FOR IP): Performed by: INTERNAL MEDICINE

## 2019-11-20 PROCEDURE — 2580000003 HC RX 258: Performed by: INTERNAL MEDICINE

## 2019-11-20 PROCEDURE — 99239 HOSP IP/OBS DSCHRG MGMT >30: CPT | Performed by: INTERNAL MEDICINE

## 2019-11-20 PROCEDURE — 2700000000 HC OXYGEN THERAPY PER DAY

## 2019-11-20 PROCEDURE — 94640 AIRWAY INHALATION TREATMENT: CPT

## 2019-11-20 PROCEDURE — 6360000002 HC RX W HCPCS: Performed by: INTERNAL MEDICINE

## 2019-11-20 PROCEDURE — 97530 THERAPEUTIC ACTIVITIES: CPT

## 2019-11-20 RX ORDER — ALBUTEROL SULFATE 90 UG/1
2 AEROSOL, METERED RESPIRATORY (INHALATION) EVERY 6 HOURS
Qty: 1 INHALER | Refills: 3 | Status: SHIPPED | OUTPATIENT
Start: 2019-11-20

## 2019-11-20 RX ORDER — ATORVASTATIN CALCIUM 10 MG/1
10 TABLET, FILM COATED ORAL DAILY
Qty: 30 TABLET | Refills: 3 | Status: SHIPPED | OUTPATIENT
Start: 2019-11-20 | End: 2021-03-24

## 2019-11-20 RX ORDER — FEEDER CONTAINER WITH PUMP SET
1 EACH MISCELLANEOUS
Qty: 90 CAN | Refills: 0 | Status: SHIPPED | OUTPATIENT
Start: 2019-11-20

## 2019-11-20 RX ORDER — CEFDINIR 300 MG/1
300 CAPSULE ORAL EVERY 12 HOURS SCHEDULED
Qty: 10 CAPSULE | Refills: 0 | Status: SHIPPED | OUTPATIENT
Start: 2019-11-20 | End: 2019-11-25

## 2019-11-20 RX ORDER — AMITRIPTYLINE HYDROCHLORIDE 10 MG/1
10 TABLET, FILM COATED ORAL NIGHTLY
Qty: 30 TABLET | Refills: 3 | Status: SHIPPED | OUTPATIENT
Start: 2019-11-20 | End: 2019-11-20

## 2019-11-20 RX ORDER — POLYETHYLENE GLYCOL 3350 17 G/17G
17 POWDER, FOR SOLUTION ORAL DAILY PRN
Qty: 527 G | Refills: 1 | Status: SHIPPED | OUTPATIENT
Start: 2019-11-20 | End: 2019-12-20

## 2019-11-20 RX ORDER — AMITRIPTYLINE HYDROCHLORIDE 25 MG/1
50 TABLET, FILM COATED ORAL NIGHTLY
Qty: 60 TABLET | Refills: 0 | Status: ON HOLD | OUTPATIENT
Start: 2019-11-20 | End: 2021-12-08 | Stop reason: HOSPADM

## 2019-11-20 RX ORDER — ARIPIPRAZOLE 5 MG/1
10 TABLET ORAL DAILY
Qty: 60 TABLET | Refills: 0 | Status: ON HOLD | OUTPATIENT
Start: 2019-11-21 | End: 2021-12-08 | Stop reason: HOSPADM

## 2019-11-20 RX ORDER — DESVENLAFAXINE 50 MG/1
50 TABLET, EXTENDED RELEASE ORAL DAILY
Qty: 30 TABLET | Refills: 3 | Status: SHIPPED | OUTPATIENT
Start: 2019-11-21 | End: 2019-11-20

## 2019-11-20 RX ORDER — CEFDINIR 300 MG/1
300 CAPSULE ORAL EVERY 12 HOURS SCHEDULED
Status: DISCONTINUED | OUTPATIENT
Start: 2019-11-20 | End: 2019-11-20 | Stop reason: HOSPADM

## 2019-11-20 RX ORDER — FLUTICASONE FUROATE AND VILANTEROL 100; 25 UG/1; UG/1
1 POWDER RESPIRATORY (INHALATION) DAILY
Qty: 30 EACH | Refills: 0 | Status: SHIPPED | OUTPATIENT
Start: 2019-11-20 | End: 2021-12-04

## 2019-11-20 RX ORDER — ARIPIPRAZOLE 5 MG/1
5 TABLET ORAL DAILY
Qty: 30 TABLET | Refills: 3 | Status: SHIPPED | OUTPATIENT
Start: 2019-11-21 | End: 2019-11-20

## 2019-11-20 RX ORDER — DESVENLAFAXINE 50 MG/1
100 TABLET, EXTENDED RELEASE ORAL DAILY
Qty: 60 TABLET | Refills: 1 | Status: ON HOLD | OUTPATIENT
Start: 2019-11-21 | End: 2021-12-08 | Stop reason: HOSPADM

## 2019-11-20 RX ORDER — MEGESTROL ACETATE 40 MG/1
40 TABLET ORAL 2 TIMES DAILY
Qty: 60 TABLET | Refills: 1 | Status: ON HOLD | OUTPATIENT
Start: 2019-11-20 | End: 2022-10-11 | Stop reason: HOSPADM

## 2019-11-20 RX ADMIN — DESVENLAFAXINE SUCCINATE 50 MG: 50 TABLET, FILM COATED, EXTENDED RELEASE ORAL at 09:01

## 2019-11-20 RX ADMIN — ARIPIPRAZOLE 5 MG: 5 TABLET ORAL at 09:01

## 2019-11-20 RX ADMIN — Medication 10 ML: at 09:01

## 2019-11-20 RX ADMIN — MEGESTROL ACETATE 40 MG: 40 TABLET ORAL at 09:01

## 2019-11-20 RX ADMIN — ENOXAPARIN SODIUM 30 MG: 30 INJECTION SUBCUTANEOUS at 09:01

## 2019-11-20 RX ADMIN — CEFDINIR 300 MG: 300 CAPSULE ORAL at 10:36

## 2019-11-20 RX ADMIN — Medication 2 PUFF: at 07:40

## 2019-11-25 ENCOUNTER — NURSE ONLY (OUTPATIENT)
Dept: LAB | Age: 74
End: 2019-11-25

## 2019-11-25 DIAGNOSIS — E87.1 HYPONATREMIA: ICD-10-CM

## 2019-11-25 LAB
ANION GAP SERPL CALCULATED.3IONS-SCNC: 14 MEQ/L (ref 8–16)
BUN BLDV-MCNC: 11 MG/DL (ref 7–22)
CALCIUM SERPL-MCNC: 9.7 MG/DL (ref 8.5–10.5)
CHLORIDE BLD-SCNC: 98 MEQ/L (ref 98–111)
CO2: 26 MEQ/L (ref 23–33)
CREAT SERPL-MCNC: 0.6 MG/DL (ref 0.4–1.2)
GFR SERPL CREATININE-BSD FRML MDRD: > 90 ML/MIN/1.73M2
GLUCOSE BLD-MCNC: 94 MG/DL (ref 70–108)
POTASSIUM SERPL-SCNC: 4.2 MEQ/L (ref 3.5–5.2)
SODIUM BLD-SCNC: 138 MEQ/L (ref 135–145)

## 2019-12-13 PROBLEM — E86.0 DEHYDRATION: Status: RESOLVED | Noted: 2019-11-13 | Resolved: 2019-12-13

## 2019-12-17 ENCOUNTER — HOSPITAL ENCOUNTER (EMERGENCY)
Age: 74
Discharge: HOME | End: 2019-12-17
Payer: MEDICARE

## 2019-12-17 ENCOUNTER — APPOINTMENT (OUTPATIENT)
Dept: CT IMAGING | Age: 74
End: 2019-12-17
Payer: MEDICARE

## 2019-12-17 ENCOUNTER — APPOINTMENT (OUTPATIENT)
Dept: GENERAL RADIOLOGY | Age: 74
End: 2019-12-17
Payer: MEDICARE

## 2019-12-17 VITALS
OXYGEN SATURATION: 91 % | WEIGHT: 96 LBS | RESPIRATION RATE: 23 BRPM | HEIGHT: 62 IN | TEMPERATURE: 98.1 F | DIASTOLIC BLOOD PRESSURE: 69 MMHG | HEART RATE: 113 BPM | BODY MASS INDEX: 17.66 KG/M2 | SYSTOLIC BLOOD PRESSURE: 118 MMHG

## 2019-12-17 DIAGNOSIS — J44.1 COPD EXACERBATION (HCC): Primary | ICD-10-CM

## 2019-12-17 LAB
ALBUMIN SERPL-MCNC: 3.4 G/DL (ref 3.5–5.1)
ALP BLD-CCNC: 67 U/L (ref 38–126)
ALT SERPL-CCNC: 27 U/L (ref 11–66)
ANION GAP SERPL CALCULATED.3IONS-SCNC: 13 MEQ/L (ref 8–16)
AST SERPL-CCNC: 48 U/L (ref 5–40)
BASOPHILS # BLD: 0.6 %
BASOPHILS ABSOLUTE: 0.1 THOU/MM3 (ref 0–0.1)
BILIRUB SERPL-MCNC: 0.2 MG/DL (ref 0.3–1.2)
BUN BLDV-MCNC: 8 MG/DL (ref 7–22)
CALCIUM SERPL-MCNC: 9.5 MG/DL (ref 8.5–10.5)
CHLORIDE BLD-SCNC: 95 MEQ/L (ref 98–111)
CO2: 28 MEQ/L (ref 23–33)
CREAT SERPL-MCNC: 0.5 MG/DL (ref 0.4–1.2)
EKG ATRIAL RATE: 119 BPM
EKG P AXIS: 78 DEGREES
EKG P-R INTERVAL: 126 MS
EKG Q-T INTERVAL: 286 MS
EKG QRS DURATION: 80 MS
EKG QTC CALCULATION (BAZETT): 402 MS
EKG R AXIS: 49 DEGREES
EKG T AXIS: 75 DEGREES
EKG VENTRICULAR RATE: 119 BPM
EOSINOPHIL # BLD: 2.6 %
EOSINOPHILS ABSOLUTE: 0.3 THOU/MM3 (ref 0–0.4)
ERYTHROCYTE [DISTWIDTH] IN BLOOD BY AUTOMATED COUNT: 13.9 % (ref 11.5–14.5)
ERYTHROCYTE [DISTWIDTH] IN BLOOD BY AUTOMATED COUNT: 45.7 FL (ref 35–45)
FLU A ANTIGEN: NEGATIVE
FLU B ANTIGEN: NEGATIVE
GFR SERPL CREATININE-BSD FRML MDRD: > 90 ML/MIN/1.73M2
GLUCOSE BLD-MCNC: 85 MG/DL (ref 70–108)
HCT VFR BLD CALC: 39.3 % (ref 37–47)
HEMOGLOBIN: 12.6 GM/DL (ref 12–16)
IMMATURE GRANS (ABS): 0.07 THOU/MM3 (ref 0–0.07)
IMMATURE GRANULOCYTES: 0.7 %
LACTIC ACID: 0.9 MMOL/L (ref 0.5–2.2)
LYMPHOCYTES # BLD: 19 %
LYMPHOCYTES ABSOLUTE: 1.9 THOU/MM3 (ref 1–4.8)
MCH RBC QN AUTO: 28.8 PG (ref 26–33)
MCHC RBC AUTO-ENTMCNC: 32.1 GM/DL (ref 32.2–35.5)
MCV RBC AUTO: 89.9 FL (ref 81–99)
MONOCYTES # BLD: 11.7 %
MONOCYTES ABSOLUTE: 1.2 THOU/MM3 (ref 0.4–1.3)
NUCLEATED RED BLOOD CELLS: 0 /100 WBC
OSMOLALITY CALCULATION: 269.5 MOSMOL/KG (ref 275–300)
PLATELET # BLD: 271 THOU/MM3 (ref 130–400)
PMV BLD AUTO: 10.1 FL (ref 9.4–12.4)
POTASSIUM REFLEX MAGNESIUM: 3.6 MEQ/L (ref 3.5–5.2)
PRO-BNP: 249.3 PG/ML (ref 0–1800)
PROCALCITONIN: 0.16 NG/ML (ref 0.01–0.09)
RBC # BLD: 4.37 MILL/MM3 (ref 4.2–5.4)
REASON FOR REJECTION: NORMAL
REASON FOR REJECTION: NORMAL
REJECTED TEST: NORMAL
REJECTED TEST: NORMAL
SEG NEUTROPHILS: 65.4 %
SEGMENTED NEUTROPHILS ABSOLUTE COUNT: 6.6 THOU/MM3 (ref 1.8–7.7)
SODIUM BLD-SCNC: 136 MEQ/L (ref 135–145)
TOTAL PROTEIN: 7 G/DL (ref 6.1–8)
TROPONIN T: < 0.01 NG/ML
WBC # BLD: 10.1 THOU/MM3 (ref 4.8–10.8)

## 2019-12-17 PROCEDURE — 83605 ASSAY OF LACTIC ACID: CPT

## 2019-12-17 PROCEDURE — 80053 COMPREHEN METABOLIC PANEL: CPT

## 2019-12-17 PROCEDURE — 94761 N-INVAS EAR/PLS OXIMETRY MLT: CPT

## 2019-12-17 PROCEDURE — 87147 CULTURE TYPE IMMUNOLOGIC: CPT

## 2019-12-17 PROCEDURE — 71045 X-RAY EXAM CHEST 1 VIEW: CPT

## 2019-12-17 PROCEDURE — 94640 AIRWAY INHALATION TREATMENT: CPT

## 2019-12-17 PROCEDURE — 87804 INFLUENZA ASSAY W/OPTIC: CPT

## 2019-12-17 PROCEDURE — 84145 PROCALCITONIN (PCT): CPT

## 2019-12-17 PROCEDURE — 96374 THER/PROPH/DIAG INJ IV PUSH: CPT

## 2019-12-17 PROCEDURE — 87040 BLOOD CULTURE FOR BACTERIA: CPT

## 2019-12-17 PROCEDURE — 36415 COLL VENOUS BLD VENIPUNCTURE: CPT

## 2019-12-17 PROCEDURE — 87077 CULTURE AEROBIC IDENTIFY: CPT

## 2019-12-17 PROCEDURE — 99285 EMERGENCY DEPT VISIT HI MDM: CPT

## 2019-12-17 PROCEDURE — 2580000003 HC RX 258: Performed by: PHYSICIAN ASSISTANT

## 2019-12-17 PROCEDURE — 85025 COMPLETE CBC W/AUTO DIFF WBC: CPT

## 2019-12-17 PROCEDURE — 6370000000 HC RX 637 (ALT 250 FOR IP): Performed by: PHYSICIAN ASSISTANT

## 2019-12-17 PROCEDURE — 6360000002 HC RX W HCPCS: Performed by: PHYSICIAN ASSISTANT

## 2019-12-17 PROCEDURE — 84484 ASSAY OF TROPONIN QUANT: CPT

## 2019-12-17 PROCEDURE — 2700000000 HC OXYGEN THERAPY PER DAY

## 2019-12-17 PROCEDURE — 71275 CT ANGIOGRAPHY CHEST: CPT

## 2019-12-17 PROCEDURE — 87801 DETECT AGNT MULT DNA AMPLI: CPT

## 2019-12-17 PROCEDURE — 93010 ELECTROCARDIOGRAM REPORT: CPT | Performed by: NUCLEAR MEDICINE

## 2019-12-17 PROCEDURE — 93005 ELECTROCARDIOGRAM TRACING: CPT | Performed by: EMERGENCY MEDICINE

## 2019-12-17 PROCEDURE — 83880 ASSAY OF NATRIURETIC PEPTIDE: CPT

## 2019-12-17 PROCEDURE — 6360000004 HC RX CONTRAST MEDICATION: Performed by: PHYSICIAN ASSISTANT

## 2019-12-17 RX ORDER — PREDNISONE 20 MG/1
40 TABLET ORAL DAILY
Qty: 10 TABLET | Refills: 0 | Status: SHIPPED | OUTPATIENT
Start: 2019-12-17 | End: 2019-12-22

## 2019-12-17 RX ORDER — LEVOFLOXACIN 750 MG/1
750 TABLET ORAL DAILY
Qty: 10 TABLET | Refills: 0 | Status: SHIPPED | OUTPATIENT
Start: 2019-12-17 | End: 2019-12-27

## 2019-12-17 RX ORDER — 0.9 % SODIUM CHLORIDE 0.9 %
500 INTRAVENOUS SOLUTION INTRAVENOUS ONCE
Status: COMPLETED | OUTPATIENT
Start: 2019-12-17 | End: 2019-12-17

## 2019-12-17 RX ORDER — IPRATROPIUM BROMIDE AND ALBUTEROL SULFATE 2.5; .5 MG/3ML; MG/3ML
1 SOLUTION RESPIRATORY (INHALATION) ONCE
Status: COMPLETED | OUTPATIENT
Start: 2019-12-17 | End: 2019-12-17

## 2019-12-17 RX ORDER — AZITHROMYCIN 250 MG/1
TABLET, FILM COATED ORAL
Qty: 6 TABLET | Refills: 0 | Status: SHIPPED | OUTPATIENT
Start: 2019-12-17 | End: 2019-12-17 | Stop reason: ALTCHOICE

## 2019-12-17 RX ORDER — METHYLPREDNISOLONE SODIUM SUCCINATE 125 MG/2ML
125 INJECTION, POWDER, LYOPHILIZED, FOR SOLUTION INTRAMUSCULAR; INTRAVENOUS ONCE
Status: COMPLETED | OUTPATIENT
Start: 2019-12-17 | End: 2019-12-17

## 2019-12-17 RX ORDER — BENZONATATE 100 MG/1
100 CAPSULE ORAL 3 TIMES DAILY PRN
Qty: 20 CAPSULE | Refills: 0 | Status: ON HOLD | OUTPATIENT
Start: 2019-12-17 | End: 2021-12-08 | Stop reason: HOSPADM

## 2019-12-17 RX ORDER — GUAIFENESIN 100 MG/5ML
200 SOLUTION ORAL ONCE
Status: COMPLETED | OUTPATIENT
Start: 2019-12-17 | End: 2019-12-17

## 2019-12-17 RX ORDER — BENZONATATE 100 MG/1
200 CAPSULE ORAL ONCE
Status: COMPLETED | OUTPATIENT
Start: 2019-12-17 | End: 2019-12-17

## 2019-12-17 RX ADMIN — GUAIFENESIN 200 MG: 200 SOLUTION ORAL at 17:38

## 2019-12-17 RX ADMIN — ALBUTEROL SULFATE 2.5 MG: 2.5 SOLUTION RESPIRATORY (INHALATION) at 19:21

## 2019-12-17 RX ADMIN — SODIUM CHLORIDE 500 ML: 9 INJECTION, SOLUTION INTRAVENOUS at 17:40

## 2019-12-17 RX ADMIN — METHYLPREDNISOLONE SODIUM SUCCINATE 125 MG: 125 INJECTION, POWDER, FOR SOLUTION INTRAMUSCULAR; INTRAVENOUS at 21:14

## 2019-12-17 RX ADMIN — BENZONATATE 200 MG: 100 CAPSULE ORAL at 19:00

## 2019-12-17 RX ADMIN — IOPAMIDOL 80 ML: 755 INJECTION, SOLUTION INTRAVENOUS at 19:40

## 2019-12-17 RX ADMIN — IPRATROPIUM BROMIDE AND ALBUTEROL SULFATE 1 AMPULE: .5; 3 SOLUTION RESPIRATORY (INHALATION) at 17:18

## 2019-12-17 ASSESSMENT — ENCOUNTER SYMPTOMS
VOMITING: 0
ABDOMINAL PAIN: 0
NAUSEA: 0
WHEEZING: 1
SHORTNESS OF BREATH: 1
COUGH: 1

## 2019-12-18 ASSESSMENT — ENCOUNTER SYMPTOMS
SINUS PAIN: 0
RHINORRHEA: 0
SORE THROAT: 0

## 2019-12-19 LAB
ACINETOBACTER BAUMANNII FILM ARRAY: NOT DETECTED
BOTTLE TYPE: NORMAL
CANDIDA ALBICANS FILM ARRAY: NOT DETECTED
CANDIDA GLABRATA FILM ARRAY: NOT DETECTED
CANDIDA KRUSEI FILM ARRAY: NOT DETECTED
CANDIDA PARAPSILOSIS FILM ARRAY: NOT DETECTED
CANDIDA TROPICALIS FILM ARRAY: NOT DETECTED
CARBAPENEM RESITANT FILM ARRAY: NORMAL
ENTERBACTER CLOACAE FILM ARRAY: NOT DETECTED
ENTERBACTERIACEAE FILM ARRAY: NOT DETECTED
ENTEROCOCCUS FILM ARRAY: NOT DETECTED
ESCHERICHIA COLI FILM ARRAY: NOT DETECTED
HAEMOPHILUS INFLUENZA FILM ARRAY: NOT DETECTED
KLEBSIELLA OXYTOCA FILM ARRAY: NOT DETECTED
KLEBSIELLA PNEUMONIAE FILM ARRAY: NOT DETECTED
LISTERIA MONOCYTOGENES FILM ARRAY: NOT DETECTED
METHICILLIN RESISTANT FILM ARRAY: NORMAL
NEISSERIA MENIGITIDIS FILM ARRAY: NOT DETECTED
PROTEUS FILM ARRAY: NOT DETECTED
PSEUDOMONAS AERUGINOSA FILM ARRAY: NOT DETECTED
SERRATIA MARCESCENS FILM ARRAY: NOT DETECTED
SOURCE OF BLOOD CULTURE: NORMAL
STAPH AUREUS FILM ARRAY: NOT DETECTED
STAPHYLOCOCCUS FILM ARRAY: NOT DETECTED
STREP AGALACTIAE FILM ARRAY: NOT DETECTED
STREP PNEUMONIAE FILM ARRAY: NOT DETECTED
STREP PYOCGENES FILM ARRAY: NOT DETECTED
STREPTOCOCCUS FILM ARRAY: NOT DETECTED
VANCOMYCIN RESISTANT FILM ARRAY: NORMAL

## 2019-12-21 LAB
BLOOD CULTURE, ROUTINE: ABNORMAL
BLOOD CULTURE, ROUTINE: ABNORMAL
ORGANISM: ABNORMAL

## 2019-12-23 LAB — BLOOD CULTURE, ROUTINE: NORMAL

## 2020-03-25 PROBLEM — E43 SEVERE MALNUTRITION (HCC): Status: RESOLVED | Noted: 2018-12-05 | Resolved: 2020-03-24

## 2020-03-25 PROBLEM — J44.9 COPD (CHRONIC OBSTRUCTIVE PULMONARY DISEASE) (HCC): Status: RESOLVED | Noted: 2017-12-16 | Resolved: 2020-03-24

## 2020-10-07 ENCOUNTER — HOSPITAL ENCOUNTER (OUTPATIENT)
Dept: CT IMAGING | Age: 75
Discharge: HOME | End: 2020-10-07
Payer: MEDICARE

## 2020-10-07 ENCOUNTER — HOSPITAL ENCOUNTER (OUTPATIENT)
Dept: WOMENS IMAGING | Age: 75
Discharge: HOME | End: 2020-10-07
Payer: MEDICARE

## 2020-10-07 PROCEDURE — 77063 BREAST TOMOSYNTHESIS BI: CPT

## 2020-10-07 PROCEDURE — G0297 LDCT FOR LUNG CA SCREEN: HCPCS

## 2021-03-23 ENCOUNTER — HOSPITAL ENCOUNTER (OUTPATIENT)
Dept: WOMENS IMAGING | Age: 76
Discharge: HOME | End: 2021-03-23
Payer: MEDICARE

## 2021-03-23 DIAGNOSIS — M85.9 DISORDER OF BONE DENSITY AND STRUCTURE, UNSPECIFIED: ICD-10-CM

## 2021-03-23 PROCEDURE — 77080 DXA BONE DENSITY AXIAL: CPT

## 2021-03-24 ENCOUNTER — OFFICE VISIT (OUTPATIENT)
Dept: PULMONOLOGY | Age: 76
End: 2021-03-24
Payer: MEDICARE

## 2021-03-24 VITALS
WEIGHT: 98.4 LBS | BODY MASS INDEX: 19.32 KG/M2 | DIASTOLIC BLOOD PRESSURE: 64 MMHG | OXYGEN SATURATION: 94 % | SYSTOLIC BLOOD PRESSURE: 110 MMHG | HEIGHT: 60 IN | TEMPERATURE: 96.4 F | HEART RATE: 102 BPM

## 2021-03-24 DIAGNOSIS — Z87.891 PERSONAL HISTORY OF TOBACCO USE: ICD-10-CM

## 2021-03-24 DIAGNOSIS — J96.12 CHRONIC RESPIRATORY FAILURE WITH HYPERCAPNIA (HCC): ICD-10-CM

## 2021-03-24 DIAGNOSIS — J44.9 CHRONIC OBSTRUCTIVE PULMONARY DISEASE, UNSPECIFIED COPD TYPE (HCC): ICD-10-CM

## 2021-03-24 DIAGNOSIS — E43 SEVERE MALNUTRITION (HCC): ICD-10-CM

## 2021-03-24 DIAGNOSIS — J44.9 CHRONIC OBSTRUCTIVE PULMONARY DISEASE, UNSPECIFIED COPD TYPE (HCC): Primary | ICD-10-CM

## 2021-03-24 PROCEDURE — G8484 FLU IMMUNIZE NO ADMIN: HCPCS | Performed by: NURSE PRACTITIONER

## 2021-03-24 PROCEDURE — 3023F SPIROM DOC REV: CPT | Performed by: NURSE PRACTITIONER

## 2021-03-24 PROCEDURE — 1090F PRES/ABSN URINE INCON ASSESS: CPT | Performed by: NURSE PRACTITIONER

## 2021-03-24 PROCEDURE — 99215 OFFICE O/P EST HI 40 MIN: CPT | Performed by: NURSE PRACTITIONER

## 2021-03-24 PROCEDURE — 1036F TOBACCO NON-USER: CPT | Performed by: NURSE PRACTITIONER

## 2021-03-24 PROCEDURE — G8399 PT W/DXA RESULTS DOCUMENT: HCPCS | Performed by: NURSE PRACTITIONER

## 2021-03-24 PROCEDURE — G8420 CALC BMI NORM PARAMETERS: HCPCS | Performed by: NURSE PRACTITIONER

## 2021-03-24 PROCEDURE — 4040F PNEUMOC VAC/ADMIN/RCVD: CPT | Performed by: NURSE PRACTITIONER

## 2021-03-24 PROCEDURE — G8428 CUR MEDS NOT DOCUMENT: HCPCS | Performed by: NURSE PRACTITIONER

## 2021-03-24 PROCEDURE — 1123F ACP DISCUSS/DSCN MKR DOCD: CPT | Performed by: NURSE PRACTITIONER

## 2021-03-24 PROCEDURE — G8926 SPIRO NO PERF OR DOC: HCPCS | Performed by: NURSE PRACTITIONER

## 2021-03-24 PROCEDURE — 94618 PULMONARY STRESS TESTING: CPT | Performed by: NURSE PRACTITIONER

## 2021-03-24 ASSESSMENT — ENCOUNTER SYMPTOMS
VOMITING: 0
ABDOMINAL PAIN: 0
WHEEZING: 0
SHORTNESS OF BREATH: 1
COUGH: 0
EYES NEGATIVE: 1
NAUSEA: 0
DIARRHEA: 0
CHEST TIGHTNESS: 0

## 2021-03-24 NOTE — Clinical Note
O2 order needs sent to [facility] with my note from today, they were requiring F2F but I also repeated 6 min walk

## 2021-03-24 NOTE — PROGRESS NOTES
[facility]     Patient: Kong Lemons, 67 y.o.   : 1945  3/24/2021    Pt of Dr. Antonio Zabala   Patient presents with    Follow-up     6 month f/u ct chest         HPI  Zoe Duverney is here for 6 month follow up for COPD   Accompanied by her daughter  Pt resides at [facility]   Using 3LPM continuous at home with rest/ activity / sleep  Previous 6 min walk in 2019, did not require O2 at rest and only 2LPM with activity  Is willing to re-do walk in office today   No personal history of COVID 23 , has been vaccinated. Using Breo and Incruse daily with no issues, cost is ok  Seldomly using Duoneb or albuterol HFA   Feels her breathing has been at baseline  Denies any recent exacerbations  Underweight no longer on Megace , has lost about 2-3 lbs. Reports poor appetite    ROS   Review of Systems   Constitutional: Negative for activity change, chills, fatigue, fever and unexpected weight change. HENT: Negative. Eyes: Negative. Respiratory: Positive for shortness of breath (minimal ). Negative for cough, chest tightness and wheezing. Cardiovascular: Positive for leg swelling. Negative for chest pain and palpitations. Gastrointestinal: Negative for abdominal pain, diarrhea, nausea and vomiting. Genitourinary: Negative. Musculoskeletal: Negative. Skin: Negative. Neurological: Negative. Hematological: Does not bruise/bleed easily. Psychiatric/Behavioral: Negative for suicidal ideas.         Physical exam   /64 (Site: Left Upper Arm, Position: Sitting, Cuff Size: Medium Adult)   Pulse 102   Temp 96.4 °F (35.8 °C)   Ht 5' (1.524 m)   Wt 98 lb 6.4 oz (44.6 kg)   SpO2 94% Comment: 1L/02 at rest (we put her on our 02  BMI 19.22 kg/m²      Wt Readings from Last 3 Encounters:   21 98 lb 6.4 oz (44.6 kg)   19 96 lb (43.5 kg)   19 89 lb 4.8 oz (40.5 kg)     Physical Exam  Vitals signs and nursing note reviewed. Constitutional:       General: She is not in acute distress. Appearance: She is well-developed. HENT:      Mouth/Throat:      Lips: Pink. Mouth: Mucous membranes are moist.      Pharynx: Oropharynx is clear. No oropharyngeal exudate or posterior oropharyngeal erythema. Eyes:      Conjunctiva/sclera: Conjunctivae normal.   Neck:      Vascular: No JVD. Cardiovascular:      Rate and Rhythm: Normal rate and regular rhythm. Heart sounds: No murmur. No friction rub. Pulmonary:      Effort: Pulmonary effort is normal. No accessory muscle usage or respiratory distress. Breath sounds: Normal breath sounds. No wheezing, rhonchi or rales. Chest:      Chest wall: No tenderness. Musculoskeletal:      Right lower leg: Edema present. Left lower leg: Edema present. Comments: Non pitting edema bilateral lower legs/ feet    Skin:     General: Skin is warm and dry. Capillary Refill: Capillary refill takes less than 2 seconds. Nails: There is no clubbing. Comments: Dry and flaky skin on legs    Neurological:      Mental Status: She is alert. Psychiatric:         Mood and Affect: Mood normal.         Behavior: Behavior normal.         Thought Content: Thought content normal.         Judgment: Judgment normal.          Test results   Lung Nodule Screening     [] Qualifies    [x]Does not qualify   [] Declined    [] Completed         Six Minute Walk Test  Lu Matute 1945    Six minute walk test done in my office today by my medical assistant. Julias oxygen saturation at rest on room air was 88%. She was placed on 1LPM at rest, SPO2 97% before starting the walk     The six minute walk test was started with oxygen supplementation. Oxygen supplementation was started with 1 LPM via nasal cannula and titrated to 3 LPM via nasal cannula. At the end of the test Queenie Abreu's oxygen saturation remained at 93% on 3 LPM with exertion.  She is mobile in the home and requires oxygen as outlined above. Patient ambulated a total of 648 feet with oxygen. Resting Dyspnea/Chaka score was 0/ 10  and 0 / 10  upon completion of the walk. Resting heart rate was  87 bpm and 102 bpm upon completing the walk. Nasal Oxygen order:  1 lpm to be used with rest/ 3LPM with activity and sleep       DME Medical Necessity Documentation    Gregoria Horta was seen in the office on 3/24/2021 for the diagnosis COPD. I am prescribing oxygen because the diagnosis and testing requires the patient to have oxygen in the home. her condition will improve or be benefited by oxygen use. The patient is able to perform good mobility in a home setting and therefore does require the use of a portable oxygen system. Assessment      Diagnosis Orders   1. Chronic obstructive pulmonary disease, unspecified COPD type ([address]  6 Minute Walk Test    DME Order for Home Oxygen as OP   2. Chronic respiratory failure with hypercapnia (HCC)     3.  Severe malnutrition ([address]     4. Personal history of tobacco use       Plan    - 6 min walk completed, now requiring 3LPM with activity / sleep and 1LPM at rest- order placed  -continue INcruse/ Breo - rinse after use  -continue PRN Ventolin  -discussed need to gain weight, eat small frequent meals discuss going back on Megace with PCP     Total time spent on encounter was 50 minutes    Will see Arcelia Puri in: 1 year    Electronically signed by SHARONA Candelaria CNP on 3/24/2021 at 12:02 PM

## 2021-04-09 ENCOUNTER — HOSPITAL ENCOUNTER (OUTPATIENT)
Dept: CT IMAGING | Age: 76
Discharge: HOME | End: 2021-04-09
Payer: MEDICARE

## 2021-04-09 DIAGNOSIS — N28.89 URETERAL FISTULA: ICD-10-CM

## 2021-04-09 LAB — POC CREATININE WHOLE BLOOD: 0.9 MG/DL (ref 0.5–1.2)

## 2021-04-09 PROCEDURE — 82565 ASSAY OF CREATININE: CPT

## 2021-04-09 PROCEDURE — 6360000004 HC RX CONTRAST MEDICATION: Performed by: FAMILY MEDICINE

## 2021-04-09 PROCEDURE — 74178 CT ABD&PLV WO CNTR FLWD CNTR: CPT

## 2021-04-09 RX ADMIN — IOPAMIDOL 85 ML: 755 INJECTION, SOLUTION INTRAVENOUS at 13:52

## 2021-05-26 ENCOUNTER — HOSPITAL ENCOUNTER (EMERGENCY)
Age: 76
Discharge: HOME | End: 2021-05-27
Attending: EMERGENCY MEDICINE
Payer: MEDICARE

## 2021-05-26 ENCOUNTER — APPOINTMENT (OUTPATIENT)
Dept: GENERAL RADIOLOGY | Age: 76
End: 2021-05-26
Payer: MEDICARE

## 2021-05-26 ENCOUNTER — APPOINTMENT (OUTPATIENT)
Dept: CT IMAGING | Age: 76
End: 2021-05-26
Payer: MEDICARE

## 2021-05-26 DIAGNOSIS — R41.0 CONFUSION: Primary | ICD-10-CM

## 2021-05-26 LAB
BASOPHILS # BLD: 0.3 %
BASOPHILS ABSOLUTE: 0 THOU/MM3 (ref 0–0.1)
EOSINOPHIL # BLD: 2.2 %
EOSINOPHILS ABSOLUTE: 0.2 THOU/MM3 (ref 0–0.4)
ERYTHROCYTE [DISTWIDTH] IN BLOOD BY AUTOMATED COUNT: 14.4 % (ref 11.5–14.5)
ERYTHROCYTE [DISTWIDTH] IN BLOOD BY AUTOMATED COUNT: 46.7 FL (ref 35–45)
HCT VFR BLD CALC: 33 % (ref 37–47)
HEMOGLOBIN: 10.4 GM/DL (ref 12–16)
IMMATURE GRANS (ABS): 0.03 THOU/MM3 (ref 0–0.07)
IMMATURE GRANULOCYTES: 0.3 %
LYMPHOCYTES # BLD: 17.5 %
LYMPHOCYTES ABSOLUTE: 1.8 THOU/MM3 (ref 1–4.8)
MCH RBC QN AUTO: 28.1 PG (ref 26–33)
MCHC RBC AUTO-ENTMCNC: 31.5 GM/DL (ref 32.2–35.5)
MCV RBC AUTO: 89.2 FL (ref 81–99)
MONOCYTES # BLD: 9.5 %
MONOCYTES ABSOLUTE: 1 THOU/MM3 (ref 0.4–1.3)
NUCLEATED RED BLOOD CELLS: 0 /100 WBC
PLATELET # BLD: 246 THOU/MM3 (ref 130–400)
PMV BLD AUTO: 9.4 FL (ref 9.4–12.4)
RBC # BLD: 3.7 MILL/MM3 (ref 4.2–5.4)
SEG NEUTROPHILS: 70.2 %
SEGMENTED NEUTROPHILS ABSOLUTE COUNT: 7.3 THOU/MM3 (ref 1.8–7.7)
WBC # BLD: 10.4 THOU/MM3 (ref 4.8–10.8)

## 2021-05-26 PROCEDURE — 85025 COMPLETE CBC W/AUTO DIFF WBC: CPT

## 2021-05-26 PROCEDURE — 82248 BILIRUBIN DIRECT: CPT

## 2021-05-26 PROCEDURE — 85610 PROTHROMBIN TIME: CPT

## 2021-05-26 PROCEDURE — 70450 CT HEAD/BRAIN W/O DYE: CPT

## 2021-05-26 PROCEDURE — 83735 ASSAY OF MAGNESIUM: CPT

## 2021-05-26 PROCEDURE — 71045 X-RAY EXAM CHEST 1 VIEW: CPT

## 2021-05-26 PROCEDURE — 99285 EMERGENCY DEPT VISIT HI MDM: CPT

## 2021-05-26 PROCEDURE — 83690 ASSAY OF LIPASE: CPT

## 2021-05-26 PROCEDURE — 80307 DRUG TEST PRSMV CHEM ANLYZR: CPT

## 2021-05-26 PROCEDURE — 84443 ASSAY THYROID STIM HORMONE: CPT

## 2021-05-26 PROCEDURE — 83880 ASSAY OF NATRIURETIC PEPTIDE: CPT

## 2021-05-26 PROCEDURE — 85730 THROMBOPLASTIN TIME PARTIAL: CPT

## 2021-05-26 PROCEDURE — 80053 COMPREHEN METABOLIC PANEL: CPT

## 2021-05-26 PROCEDURE — 36415 COLL VENOUS BLD VENIPUNCTURE: CPT

## 2021-05-26 PROCEDURE — 81003 URINALYSIS AUTO W/O SCOPE: CPT

## 2021-05-26 PROCEDURE — 82077 ASSAY SPEC XCP UR&BREATH IA: CPT

## 2021-05-26 PROCEDURE — 84484 ASSAY OF TROPONIN QUANT: CPT

## 2021-05-26 ASSESSMENT — ENCOUNTER SYMPTOMS
BACK PAIN: 0
EYE REDNESS: 0
NAUSEA: 0
VOICE CHANGE: 0
DIARRHEA: 0
CHOKING: 0
RHINORRHEA: 0
CONSTIPATION: 0
COUGH: 0
WHEEZING: 0
SORE THROAT: 0
BLOOD IN STOOL: 0
ABDOMINAL PAIN: 0
VOMITING: 0
SINUS PRESSURE: 0
TROUBLE SWALLOWING: 0
EYE ITCHING: 0
PHOTOPHOBIA: 0
EYE PAIN: 0
CHEST TIGHTNESS: 0
EYE DISCHARGE: 0
SHORTNESS OF BREATH: 0
ABDOMINAL DISTENTION: 0

## 2021-05-27 VITALS
OXYGEN SATURATION: 98 % | TEMPERATURE: 98 F | SYSTOLIC BLOOD PRESSURE: 101 MMHG | RESPIRATION RATE: 22 BRPM | HEART RATE: 97 BPM | DIASTOLIC BLOOD PRESSURE: 70 MMHG

## 2021-05-27 LAB
ALBUMIN SERPL-MCNC: 3.7 G/DL (ref 3.5–5.1)
ALLEN TEST: POSITIVE
ALP BLD-CCNC: 56 U/L (ref 38–126)
ALT SERPL-CCNC: 12 U/L (ref 11–66)
AMMONIA: < 10 UMOL/L (ref 11–60)
AMPHETAMINE+METHAMPHETAMINE URINE SCREEN: NEGATIVE
ANION GAP SERPL CALCULATED.3IONS-SCNC: 11 MEQ/L (ref 8–16)
APTT: 27.3 SECONDS (ref 22–38)
AST SERPL-CCNC: 19 U/L (ref 5–40)
BARBITURATE QUANTITATIVE URINE: NEGATIVE
BASE EXCESS (CALCULATED): 3.2 MMOL/L (ref -2.5–2.5)
BENZODIAZEPINE QUANTITATIVE URINE: NEGATIVE
BILIRUB SERPL-MCNC: 0.3 MG/DL (ref 0.3–1.2)
BILIRUBIN DIRECT: < 0.2 MG/DL (ref 0–0.3)
BILIRUBIN URINE: NEGATIVE
BLOOD, URINE: NEGATIVE
BUN BLDV-MCNC: 18 MG/DL (ref 7–22)
CALCIUM SERPL-MCNC: 9.3 MG/DL (ref 8.5–10.5)
CANNABINOID QUANTITATIVE URINE: NEGATIVE
CHARACTER, URINE: CLEAR
CHLORIDE BLD-SCNC: 96 MEQ/L (ref 98–111)
CO2: 28 MEQ/L (ref 23–33)
COCAINE METABOLITE QUANTITATIVE URINE: NEGATIVE
COLLECTED BY:: ABNORMAL
COLOR: YELLOW
CREAT SERPL-MCNC: 0.8 MG/DL (ref 0.4–1.2)
DEVICE: ABNORMAL
EKG ATRIAL RATE: 92 BPM
EKG P AXIS: 69 DEGREES
EKG P-R INTERVAL: 138 MS
EKG Q-T INTERVAL: 356 MS
EKG QRS DURATION: 82 MS
EKG QTC CALCULATION (BAZETT): 440 MS
EKG R AXIS: 53 DEGREES
EKG T AXIS: 68 DEGREES
EKG VENTRICULAR RATE: 92 BPM
ETHYL ALCOHOL, SERUM: < 0.01 %
GFR SERPL CREATININE-BSD FRML MDRD: 70 ML/MIN/1.73M2
GLUCOSE BLD-MCNC: 95 MG/DL (ref 70–108)
GLUCOSE URINE: NEGATIVE MG/DL
HCO3: 28 MMOL/L (ref 23–28)
IFIO2: 2
INR BLD: 1.14 (ref 0.85–1.13)
KETONES, URINE: NEGATIVE
LEUKOCYTE ESTERASE, URINE: NEGATIVE
LIPASE: 11.6 U/L (ref 5.6–51.3)
MAGNESIUM: 1.9 MG/DL (ref 1.6–2.4)
NITRITE, URINE: NEGATIVE
O2 SATURATION: 98 %
OPIATES, URINE: POSITIVE
OSMOLALITY CALCULATION: 271.8 MOSMOL/KG (ref 275–300)
OXYCODONE: NEGATIVE
PCO2: 43 MMHG (ref 35–45)
PH BLOOD GAS: 7.42 (ref 7.35–7.45)
PH UA: 6 (ref 5–9)
PHENCYCLIDINE QUANTITATIVE URINE: NEGATIVE
PO2: 106 MMHG (ref 71–104)
POTASSIUM SERPL-SCNC: 3.7 MEQ/L (ref 3.5–5.2)
PRO-BNP: 221.8 PG/ML (ref 0–1800)
PROTEIN UA: NEGATIVE
REASON FOR REJECTION: NORMAL
REJECTED TEST: NORMAL
SODIUM BLD-SCNC: 135 MEQ/L (ref 135–145)
SOURCE, BLOOD GAS: ABNORMAL
SPECIFIC GRAVITY, URINE: 1.02 (ref 1–1.03)
TOTAL PROTEIN: 7 G/DL (ref 6.1–8)
TROPONIN T: < 0.01 NG/ML
TSH SERPL DL<=0.05 MIU/L-ACNC: 1.72 UIU/ML (ref 0.4–4.2)
UROBILINOGEN, URINE: 0.2 EU/DL (ref 0–1)

## 2021-05-27 PROCEDURE — 82803 BLOOD GASES ANY COMBINATION: CPT

## 2021-05-27 PROCEDURE — 82140 ASSAY OF AMMONIA: CPT

## 2021-05-27 PROCEDURE — 36415 COLL VENOUS BLD VENIPUNCTURE: CPT

## 2021-05-27 PROCEDURE — 93005 ELECTROCARDIOGRAM TRACING: CPT | Performed by: EMERGENCY MEDICINE

## 2021-05-27 PROCEDURE — 36600 WITHDRAWAL OF ARTERIAL BLOOD: CPT

## 2021-05-27 RX ORDER — ZINC GLUCONATE 50 MG
50 TABLET ORAL DAILY
COMMUNITY

## 2021-05-27 RX ORDER — ACETAMINOPHEN 325 MG/1
650 TABLET ORAL EVERY 6 HOURS PRN
COMMUNITY

## 2021-05-27 RX ORDER — HYDROCODONE BITARTRATE AND ACETAMINOPHEN 5; 325 MG/1; MG/1
1 TABLET ORAL 3 TIMES DAILY PRN
Status: ON HOLD | COMMUNITY
End: 2022-09-23 | Stop reason: SDUPTHER

## 2021-05-27 RX ORDER — CIPROFLOXACIN 500 MG/1
500 TABLET, FILM COATED ORAL 2 TIMES DAILY
Status: ON HOLD | COMMUNITY
End: 2021-12-08 | Stop reason: HOSPADM

## 2021-05-27 RX ORDER — LIDOCAINE 4 G/G
1 PATCH TOPICAL DAILY
Status: ON HOLD | COMMUNITY
End: 2022-04-20

## 2021-05-27 RX ORDER — MULTIVIT WITH MINERALS/LUTEIN
500 TABLET ORAL DAILY
COMMUNITY

## 2021-05-27 RX ORDER — BISACODYL 10 MG
10 SUPPOSITORY, RECTAL RECTAL PRN
COMMUNITY

## 2021-05-27 RX ORDER — SULFAMETHOXAZOLE AND TRIMETHOPRIM 800; 160 MG/1; MG/1
1 TABLET ORAL 2 TIMES DAILY
Status: ON HOLD | COMMUNITY
End: 2021-12-08 | Stop reason: HOSPADM

## 2021-05-27 RX ORDER — LANOLIN ALCOHOL/MO/W.PET/CERES
3 CREAM (GRAM) TOPICAL DAILY
COMMUNITY

## 2021-05-27 RX ORDER — DOXYCYCLINE HYCLATE 100 MG
100 TABLET ORAL 2 TIMES DAILY
Status: ON HOLD | COMMUNITY
End: 2021-12-08 | Stop reason: HOSPADM

## 2021-05-27 RX ORDER — OMEPRAZOLE 20 MG/1
20 CAPSULE, DELAYED RELEASE ORAL DAILY
COMMUNITY

## 2021-05-27 RX ORDER — HYDROXYZINE HYDROCHLORIDE 10 MG/1
10 TABLET, FILM COATED ORAL 2 TIMES DAILY
COMMUNITY

## 2021-05-27 RX ORDER — TIZANIDINE 2 MG/1
2 TABLET ORAL NIGHTLY
Status: ON HOLD | COMMUNITY
End: 2021-12-08 | Stop reason: HOSPADM

## 2021-05-27 RX ORDER — FUROSEMIDE 40 MG/1
40 TABLET ORAL DAILY
Status: ON HOLD | COMMUNITY
End: 2022-04-20

## 2021-05-27 NOTE — ED PROVIDER NOTES
[facility]  eMERGENCY dEPARTMENT eNCOUnter          CHIEF COMPLAINT       Chief Complaint   Patient presents with    Altered Mental Status    Other     reported covid + 3 days ago       Nurses Notes reviewed and I agree except as noted in the HPI. HISTORY OF PRESENT ILLNESS    Don Sneed is a 67 y.o. female who presents to the ER via squad from [facility]. When patient got here she was alert and she was oriented. Patient denies constitutional complaints at this time. Patient states that she always has frequent urination. States sometimes she has pain with urination but this is chronic. Upon reviewing of chart that came with patient she is on 2 different antibiotics Cipro and doxycycline. Patient states that she tested positive for Covid \"a while ago\". To fully understand the situation [facility] was called, and they state that patient was more lethargic today than she typically is. They also state for a brief period of time she thought she was in [facility], and she told her friend that she had been vomiting which staff have not witnessed. They told me that on the 19th patient had a temp of 100.4, that she tested positive for Covid, and then the \"throat swab \"came back positive for strep and Pseudomonas. REVIEW OF SYSTEMS     Review of Systems   Constitutional: Negative for activity change, appetite change, diaphoresis, fatigue and unexpected weight change. HENT: Negative for congestion, ear discharge, ear pain, hearing loss, rhinorrhea, sinus pressure, sore throat, trouble swallowing and voice change. Eyes: Negative for photophobia, pain, discharge, redness and itching. Respiratory: Negative for cough, choking, chest tightness, shortness of breath and wheezing. Cardiovascular: Negative for chest pain, palpitations and leg swelling.    Gastrointestinal: Negative for abdominal distention, abdominal pain, blood in stool, constipation, diarrhea, spasmHistorical Med      Ascorbic Acid (VITAMIN C) 250 MG tablet Take 500 mg by mouth daily For 10 days d/t covidHistorical Med      zinc 50 MG TABS tablet Take 50 mg by mouth dailyHistorical Med      sulfamethoxazole-trimethoprim (BACTRIM DS;SEPTRA DS) 800-160 MG per tablet Take 1 tablet by mouth 2 times daily X 14 days for strepHistorical Med      ciprofloxacin (CIPRO) 500 MG tablet Take 500 mg by mouth 2 times daily X 5daysHistorical Med      doxycycline hyclate (VIBRA-TABS) 100 MG tablet Take 100 mg by mouth 2 times daily X 5 daysHistorical Med      hydrOXYzine (ATARAX) 10 MG tablet Take 10 mg by mouth 4 times daily itchingHistorical Med      HYDROcodone-acetaminophen (NORCO) 5-325 MG per tablet Take 1 tablet by mouth every 6 hours as needed for Pain. Historical Med      lidocaine (ASPERCREME LIDOCAINE) 4 % external patch Place 1 patch onto the skin daily, Transdermal, DAILY, Historical Med      Nutritional Supplements (ENSURE HIGH PROTEIN) LIQD Take 1 Can by mouth 3 times daily (with meals), Disp-90 Can, R-0Print      !! megestrol (MEGACE) 40 MG tablet Take 1 tablet by mouth 2 times daily, Disp-60 tablet, R-1Normal      fluticasone-vilanterol (BREO ELLIPTA) 100-25 MCG/INH AEPB inhaler Inhale 1 puff into the lungs daily, Disp-30 each, R-0Normal      !! amitriptyline (ELAVIL) 25 MG tablet Take 2 tablets by mouth nightly, Disp-60 tablet, R-0Normal      aspirin 81 MG tablet Take 81 mg by mouth dailyHistorical Med      atorvastatin (LIPITOR) 10 MG tablet Take 1 tablet by mouth nightly, Disp-30 tablet, R-3Normal      vitamin D (CHOLECALCIFEROL) 1000 UNIT TABS tablet Take 2,000 Units by mouth dailyHistorical Med      Fluticasone Furoate-Vilanterol (BREO ELLIPTA) 200-25 MCG/INH AEPB Inhale 1 puff into the lungs dailyHistorical Med      !! amitriptyline (ELAVIL) 10 MG tablet Take 10 mg by mouth nightlyHistorical Med      OXYGEN Inhale 2-4 L into the lungs nightly Nightly and prnHistorical Med      !! ARIPiprazole (ABILIFY) 10 MG tablet Take 10 mg by mouth dailyHistorical Med      !! desvenlafaxine succinate (PRISTIQ) 50 MG TB24 extended release tablet Take 100 mg by mouth daily Historical Med      acetaminophen (TYLENOL) 325 MG tablet Take 650 mg by mouth every 6 hours as needed for PainHistorical Med      bisacodyl (DULCOLAX) 10 MG suppository Place 10 mg rectally as needed for ConstipationHistorical Med      benzonatate (TESSALON PERLES) 100 MG capsule Take 1 capsule by mouth 3 times daily as needed for Cough, Disp-20 capsule, R-0Print      !! desvenlafaxine succinate (PRISTIQ) 50 MG TB24 extended release tablet Take 2 tablets by mouth daily, Disp-60 tablet, R-1Normal      !! ARIPiprazole (ABILIFY) 5 MG tablet Take 2 tablets by mouth daily, Disp-60 tablet, R-0Normal      albuterol sulfate  (90 Base) MCG/ACT inhaler Inhale 2 puffs into the lungs every 6 hours, Disp-1 Inhaler, R-3Normal      Umeclidinium Bromide (INCRUSE ELLIPTA) 62.5 MCG/INH AEPB Inhale 1 puff into the lungs daily, Disp-30 each, R-0Normal      Cholecalciferol (VITAMIN D3) 50 MCG (2000 UT) CAPS Take 2,000 Units by mouth dailyHistorical Med      ipratropium-albuterol (DUONEB) 0.5-2.5 (3) MG/3ML SOLN nebulizer solution Inhale 3 mLs into the lungs every 6 hours as needed for Shortness of Breath, Disp-360 mL, R-2Print      !! MEGESTROL ACETATE PO Take 40 mg by mouth 2 times daily Historical Med      Multiple Vitamins-Minerals (THERAPEUTIC MULTIVITAMIN-MINERALS) tablet Take 1 tablet by mouth Daily with lunch, Disp-30 tablet, R-3Normal       !! - Potential duplicate medications found. Please discuss with provider. ALLERGIES     is allergic to spiriva handihaler [tiotropium bromide monohydrate] and spiriva handihaler [tiotropium bromide monohydrate]. FAMILY HISTORY     She indicated that her mother is . She indicated that her father is . family history includes Cancer in her mother; Other in her father.     SOCIAL HISTORY reports that she quit smoking about 2 years ago. Her smoking use included cigarettes. She started smoking about 59 years ago. She has a 112.00 pack-year smoking history. She has never used smokeless tobacco. She reports previous alcohol use. She reports previous drug use. PHYSICAL EXAM     INITIAL VITALS:  oral temperature is 98 °F (36.7 °C). Her blood pressure is 101/70 and her pulse is 97. Her respiration is 22 and oxygen saturation is 98%. Physical Exam  Vitals and nursing note reviewed. Constitutional:       General: She is not in acute distress. Appearance: She is well-developed. She is not diaphoretic. HENT:      Head: Normocephalic and atraumatic. Right Ear: External ear normal.      Left Ear: External ear normal.      Nose: Nose normal.      Mouth/Throat:      Pharynx: No oropharyngeal exudate. Eyes:      General: No scleral icterus. Right eye: No discharge. Left eye: No discharge. Conjunctiva/sclera: Conjunctivae normal.      Pupils: Pupils are equal, round, and reactive to light. Neck:      Thyroid: No thyromegaly. Vascular: No JVD. Trachea: No tracheal deviation. Cardiovascular:      Rate and Rhythm: Normal rate and regular rhythm. Heart sounds: Normal heart sounds, S1 normal and S2 normal. No murmur heard. No friction rub. No gallop. Pulmonary:      Effort: Pulmonary effort is normal.      Breath sounds: Normal breath sounds. No stridor. No wheezing, rhonchi or rales. Chest:      Chest wall: No tenderness. Abdominal:      General: Bowel sounds are normal. There is no distension. Palpations: Abdomen is soft. There is no mass. Tenderness: There is no abdominal tenderness. There is no guarding or rebound. Hernia: No hernia is present. Musculoskeletal:         General: No tenderness. Normal range of motion. Cervical back: Normal range of motion and neck supple. Lymphadenopathy:      Cervical: No cervical adenopathy. Skin:     General: Skin is warm and dry. Findings: No bruising, ecchymosis, lesion or rash. Neurological:      Mental Status: She is alert and oriented to person, place, and time. GCS: GCS eye subscore is 4. GCS verbal subscore is 5. GCS motor subscore is 6. Cranial Nerves: No cranial nerve deficit. Coordination: Coordination normal.      Deep Tendon Reflexes: Reflexes are normal and symmetric. Psychiatric:         Speech: Speech normal.         Behavior: Behavior normal.         Thought Content: Thought content normal.         Judgment: Judgment normal.     INITIAL NIH STROKE SCALE    Time Performed:  7204    1a. Level of consciousness:  0  1b. Level of consciousness questions:  0  1c. Level of consciousness questions:  0  2. Best Gaze:  0  3. Visual: 0  4. Facial Palsy: 0  5a. Motor left arm:  0  5b. Motor right arm:  0  6a. Motor left le  6b. Motor right le  7. Limb Ataxia:  0  8. Sensory: 0  9. Best Language:  0  10. Dysarthria:  0  11. Extinction and Inattention:  0    TOTAL:  0        DIFFERENTIAL DIAGNOSIS:   COVID-19  UTI  Exacerbation of chronic mental illness    DIAGNOSTIC RESULTS     EKG: All EKG's are interpreted by the Emergency Department Physician who either signs or Co-signs this chart in the absence of a cardiologist.  EKG reveals 1 appears to be a sinus rhythm with occasional PVCs, normal axis, ventricular rate of 92 TX interval 138 QRS duration of 82 QT interval 356 QTC of 440. RADIOLOGY: non-plain film images(s) such as CT, Ultrasound and MRI are read by the radiologist.  CT HEAD WO CONTRAST   Final Result   Impression:   1. No acute findings      This document has been electronically signed by: Haseeb Sanchez MD on    2021 12:48 AM      All CTs at this facility use dose modulation techniques and iterative    reconstructions, and/or weight-based dosing   when appropriate to reduce radiation to a low as reasonably achievable. XR CHEST PORTABLE   Final Result   Mild right basilar atelectasis and/or infiltrate. This document has been electronically signed by: Ava Fox MD on    05/27/2021 12:07 AM          LABS:   Labs Reviewed   PROTIME-INR - Abnormal; Notable for the following components:       Result Value    INR 1.14 (*)     All other components within normal limits   CBC WITH AUTO DIFFERENTIAL - Abnormal; Notable for the following components:    RBC 3.70 (*)     Hemoglobin 10.4 (*)     Hematocrit 33.0 (*)     MCHC 31.5 (*)     RDW-SD 46.7 (*)     All other components within normal limits   BASIC METABOLIC PANEL - Abnormal; Notable for the following components:    Chloride 96 (*)     All other components within normal limits   BLOOD GAS, ARTERIAL - Abnormal; Notable for the following components:    PO2 106 (*)     Base Excess (Calculated) 3.2 (*)     All other components within normal limits   OSMOLALITY - Abnormal; Notable for the following components:    Osmolality Calc 271.8 (*)     All other components within normal limits   GLOMERULAR FILTRATION RATE, ESTIMATED - Abnormal; Notable for the following components:    Est, Glom Filt Rate 70 (*)     All other components within normal limits   AMMONIA - Abnormal; Notable for the following components:    Ammonia < 10 (*)     All other components within normal limits   APTT   BRAIN NATRIURETIC PEPTIDE   HEPATIC FUNCTION PANEL   LIPASE   TROPONIN   MAGNESIUM   TSH WITHOUT REFLEX   ETHANOL   URINE DRUG SCREEN   URINE RT REFLEX TO CULTURE   ANION GAP   SPECIMEN REJECTION       EMERGENCY DEPARTMENT COURSE:   Vitals:    Vitals:    05/26/21 2328 05/27/21 0027 05/27/21 0137 05/27/21 0251   BP:    101/70   Pulse: 98 95 97 97   Resp:  18 18 22   Temp:       TempSrc:       SpO2:  98% 99% 98%     Patient was assessed at bedside appropriate labs and imaging were ordered. Patient appeared to be completely with it here. We checked her basic labs and her imaging. All within normal limits. Patient is easily arousable at bedside. It we went and discussed it with her. She understood all the explanations. At this point I feel the patient be safely discharged back to her nursing home. Caregivers are instructed to have the patient follow-up with the primary care physician and do so within the next 1 to 2 days. They were also instructed to watch how much pain medication they gave her including narcotics. I discussed this all with the patient at bedside who understood and agreed with the plan. Patient is subsequently discharged back to her nursing home in stable condition. Patient is here for a mild confusional state which appears to have resolved by the time the patient has got here. Caregivers are instructed to have the patient follow-up with the facility physician and do so within the next 1 to 2 days. Caregivers are instructed to inquire with the facility physician about the use of narcotics for the patient's pain as this may be aiding her confusion. Caregivers are instructed to return the patient to the emergency room immediately for any new or worsening complaints. CRITICAL CARE:   None    CONSULTS:  None    PROCEDURES:  None    FINAL IMPRESSION      1.  Confusion          DISPOSITION/PLAN   Discharge    PATIENT REFERRED TO:  Ines Anthony MD  [facility]  [phone]    Call in 1 day        DISCHARGE MEDICATIONS:  Discharge Medication List as of 5/27/2021  2:42 AM          (Please note that portions of this note were completed with a voice recognition program.  Efforts were made to edit the dictations but occasionally words are mis-transcribed.)    Quan Ding, [address] [place] [address],   05/27/21 1963

## 2021-05-27 NOTE — ED TRIAGE NOTES
Pt to rm 12 per EMS[facility] and slow to arouse. Pt dx covid+ and strep+ on 5/19, currently on ABX therapy. On arrival pt appears sleepy but arouses to name being called, answers questions appropriately. States she doesn't feel bad at this time. VSS, sats 100% on home dose o2. Assessment complete.

## 2021-05-27 NOTE — ED NOTES
Transport paperwork faxed to [id number]. Pt remains restful on cart, states she is ready to go back to [facility].       Nael Whitley RN  05/27/21 5133

## 2021-12-04 ENCOUNTER — HOSPITAL ENCOUNTER (INPATIENT)
Age: 76
LOS: 4 days | Discharge: NURSING, REHAB OR LONG-TERM CARE | DRG: 871 | End: 2021-12-08
Attending: HOSPITALIST | Admitting: HOSPITALIST
Payer: MEDICARE

## 2021-12-04 ENCOUNTER — APPOINTMENT (OUTPATIENT)
Dept: GENERAL RADIOLOGY | Age: 76
DRG: 871 | End: 2021-12-04
Payer: MEDICARE

## 2021-12-04 DIAGNOSIS — R09.02 HYPOXIA: ICD-10-CM

## 2021-12-04 DIAGNOSIS — E86.0 DEHYDRATION: ICD-10-CM

## 2021-12-04 DIAGNOSIS — R06.89 DYSPNEA AND RESPIRATORY ABNORMALITIES: ICD-10-CM

## 2021-12-04 DIAGNOSIS — R53.1 GENERAL WEAKNESS: ICD-10-CM

## 2021-12-04 DIAGNOSIS — J18.9 PNEUMONIA OF RIGHT UPPER LOBE DUE TO INFECTIOUS ORGANISM: Primary | ICD-10-CM

## 2021-12-04 DIAGNOSIS — J44.1 COPD EXACERBATION (HCC): ICD-10-CM

## 2021-12-04 DIAGNOSIS — R06.00 DYSPNEA AND RESPIRATORY ABNORMALITIES: ICD-10-CM

## 2021-12-04 PROBLEM — F25.9 SCHIZOAFFECTIVE DISORDER (HCC): Status: ACTIVE | Noted: 2020-02-04

## 2021-12-04 PROBLEM — J96.01 ACUTE RESPIRATORY FAILURE WITH HYPOXIA (HCC): Status: ACTIVE | Noted: 2021-12-04

## 2021-12-04 LAB
ALBUMIN SERPL-MCNC: 4.2 G/DL (ref 3.5–5.1)
ALP BLD-CCNC: 77 U/L (ref 38–126)
ALT SERPL-CCNC: 15 U/L (ref 11–66)
ANION GAP SERPL CALCULATED.3IONS-SCNC: 13 MEQ/L (ref 8–16)
AST SERPL-CCNC: 23 U/L (ref 5–40)
BASOPHILS # BLD: 0.3 %
BASOPHILS ABSOLUTE: 0.1 THOU/MM3 (ref 0–0.1)
BILIRUB SERPL-MCNC: 0.5 MG/DL (ref 0.3–1.2)
BILIRUBIN DIRECT: < 0.2 MG/DL (ref 0–0.3)
BUN BLDV-MCNC: 12 MG/DL (ref 7–22)
CALCIUM SERPL-MCNC: 9.3 MG/DL (ref 8.5–10.5)
CHLORIDE BLD-SCNC: 91 MEQ/L (ref 98–111)
CO2: 32 MEQ/L (ref 23–33)
CREAT SERPL-MCNC: 0.8 MG/DL (ref 0.4–1.2)
EOSINOPHIL # BLD: 0.5 %
EOSINOPHILS ABSOLUTE: 0.1 THOU/MM3 (ref 0–0.4)
ERYTHROCYTE [DISTWIDTH] IN BLOOD BY AUTOMATED COUNT: 15.9 % (ref 11.5–14.5)
ERYTHROCYTE [DISTWIDTH] IN BLOOD BY AUTOMATED COUNT: 49.1 FL (ref 35–45)
FLU A ANTIGEN: NEGATIVE
FLU B ANTIGEN: NEGATIVE
GFR SERPL CREATININE-BSD FRML MDRD: 69 ML/MIN/1.73M2
GLUCOSE BLD-MCNC: 108 MG/DL (ref 70–108)
HCT VFR BLD CALC: 36.2 % (ref 37–47)
HEMOGLOBIN: 11.5 GM/DL (ref 12–16)
IMMATURE GRANS (ABS): 0.2 THOU/MM3 (ref 0–0.07)
IMMATURE GRANULOCYTES: 0.8 %
LACTIC ACID, SEPSIS: 0.9 MMOL/L (ref 0.5–1.9)
LYMPHOCYTES # BLD: 5.8 %
LYMPHOCYTES ABSOLUTE: 1.4 THOU/MM3 (ref 1–4.8)
MAGNESIUM: 1.6 MG/DL (ref 1.6–2.4)
MCH RBC QN AUTO: 26.7 PG (ref 26–33)
MCHC RBC AUTO-ENTMCNC: 31.8 GM/DL (ref 32.2–35.5)
MCV RBC AUTO: 84.2 FL (ref 81–99)
MONOCYTES # BLD: 7.5 %
MONOCYTES ABSOLUTE: 1.8 THOU/MM3 (ref 0.4–1.3)
NUCLEATED RED BLOOD CELLS: 0 /100 WBC
OSMOLALITY CALCULATION: 272.2 MOSMOL/KG (ref 275–300)
PLATELET # BLD: 293 THOU/MM3 (ref 130–400)
PMV BLD AUTO: 9.7 FL (ref 9.4–12.4)
POTASSIUM REFLEX MAGNESIUM: 3.3 MEQ/L (ref 3.5–5.2)
PRO-BNP: 296.1 PG/ML (ref 0–1800)
PROCALCITONIN: 0.26 NG/ML (ref 0.01–0.09)
RBC # BLD: 4.3 MILL/MM3 (ref 4.2–5.4)
SARS-COV-2, NAAT: NOT  DETECTED
SCAN OF BLOOD SMEAR: NORMAL
SEG NEUTROPHILS: 85.1 %
SEGMENTED NEUTROPHILS ABSOLUTE COUNT: 20.4 THOU/MM3 (ref 1.8–7.7)
SODIUM BLD-SCNC: 136 MEQ/L (ref 135–145)
T4 FREE: 1.15 NG/DL (ref 0.93–1.76)
TOTAL PROTEIN: 7.8 G/DL (ref 6.1–8)
TROPONIN T: < 0.01 NG/ML
TSH SERPL DL<=0.05 MIU/L-ACNC: 0.9 UIU/ML (ref 0.4–4.2)
WBC # BLD: 24 THOU/MM3 (ref 4.8–10.8)

## 2021-12-04 PROCEDURE — 94640 AIRWAY INHALATION TREATMENT: CPT

## 2021-12-04 PROCEDURE — 93005 ELECTROCARDIOGRAM TRACING: CPT | Performed by: PHYSICIAN ASSISTANT

## 2021-12-04 PROCEDURE — 87040 BLOOD CULTURE FOR BACTERIA: CPT

## 2021-12-04 PROCEDURE — 2580000003 HC RX 258: Performed by: STUDENT IN AN ORGANIZED HEALTH CARE EDUCATION/TRAINING PROGRAM

## 2021-12-04 PROCEDURE — 71045 X-RAY EXAM CHEST 1 VIEW: CPT

## 2021-12-04 PROCEDURE — 83605 ASSAY OF LACTIC ACID: CPT

## 2021-12-04 PROCEDURE — 87804 INFLUENZA ASSAY W/OPTIC: CPT

## 2021-12-04 PROCEDURE — 80076 HEPATIC FUNCTION PANEL: CPT

## 2021-12-04 PROCEDURE — 36415 COLL VENOUS BLD VENIPUNCTURE: CPT

## 2021-12-04 PROCEDURE — 85025 COMPLETE CBC W/AUTO DIFF WBC: CPT

## 2021-12-04 PROCEDURE — 87635 SARS-COV-2 COVID-19 AMP PRB: CPT

## 2021-12-04 PROCEDURE — 83880 ASSAY OF NATRIURETIC PEPTIDE: CPT

## 2021-12-04 PROCEDURE — 6360000002 HC RX W HCPCS: Performed by: PHYSICIAN ASSISTANT

## 2021-12-04 PROCEDURE — 83735 ASSAY OF MAGNESIUM: CPT

## 2021-12-04 PROCEDURE — 84145 PROCALCITONIN (PCT): CPT

## 2021-12-04 PROCEDURE — 84484 ASSAY OF TROPONIN QUANT: CPT

## 2021-12-04 PROCEDURE — 6370000000 HC RX 637 (ALT 250 FOR IP): Performed by: STUDENT IN AN ORGANIZED HEALTH CARE EDUCATION/TRAINING PROGRAM

## 2021-12-04 PROCEDURE — 84439 ASSAY OF FREE THYROXINE: CPT

## 2021-12-04 PROCEDURE — 96374 THER/PROPH/DIAG INJ IV PUSH: CPT

## 2021-12-04 PROCEDURE — 84443 ASSAY THYROID STIM HORMONE: CPT

## 2021-12-04 PROCEDURE — 6360000002 HC RX W HCPCS: Performed by: STUDENT IN AN ORGANIZED HEALTH CARE EDUCATION/TRAINING PROGRAM

## 2021-12-04 PROCEDURE — 2580000003 HC RX 258: Performed by: PHYSICIAN ASSISTANT

## 2021-12-04 PROCEDURE — 99285 EMERGENCY DEPT VISIT HI MDM: CPT

## 2021-12-04 PROCEDURE — 6370000000 HC RX 637 (ALT 250 FOR IP): Performed by: PHYSICIAN ASSISTANT

## 2021-12-04 PROCEDURE — 1200000000 HC SEMI PRIVATE

## 2021-12-04 PROCEDURE — 80048 BASIC METABOLIC PNL TOTAL CA: CPT

## 2021-12-04 RX ORDER — ARIPIPRAZOLE 10 MG/1
10 TABLET ORAL DAILY
Status: DISCONTINUED | OUTPATIENT
Start: 2021-12-04 | End: 2021-12-08 | Stop reason: HOSPADM

## 2021-12-04 RX ORDER — POTASSIUM CHLORIDE 7.45 MG/ML
10 INJECTION INTRAVENOUS PRN
Status: DISCONTINUED | OUTPATIENT
Start: 2021-12-04 | End: 2021-12-08 | Stop reason: HOSPADM

## 2021-12-04 RX ORDER — ACETAMINOPHEN 325 MG/1
650 TABLET ORAL ONCE
Status: COMPLETED | OUTPATIENT
Start: 2021-12-04 | End: 2021-12-04

## 2021-12-04 RX ORDER — HYDROXYZINE HYDROCHLORIDE 10 MG/1
10 TABLET, FILM COATED ORAL 4 TIMES DAILY PRN
Status: DISCONTINUED | OUTPATIENT
Start: 2021-12-04 | End: 2021-12-08 | Stop reason: HOSPADM

## 2021-12-04 RX ORDER — ONDANSETRON 2 MG/ML
4 INJECTION INTRAMUSCULAR; INTRAVENOUS EVERY 6 HOURS PRN
Status: DISCONTINUED | OUTPATIENT
Start: 2021-12-04 | End: 2021-12-08 | Stop reason: HOSPADM

## 2021-12-04 RX ORDER — DESVENLAFAXINE 100 MG/1
100 TABLET, EXTENDED RELEASE ORAL DAILY
Status: DISCONTINUED | OUTPATIENT
Start: 2021-12-04 | End: 2021-12-08 | Stop reason: HOSPADM

## 2021-12-04 RX ORDER — ALBUTEROL SULFATE 90 UG/1
2 AEROSOL, METERED RESPIRATORY (INHALATION) EVERY 6 HOURS PRN
Status: DISCONTINUED | OUTPATIENT
Start: 2021-12-04 | End: 2021-12-08 | Stop reason: HOSPADM

## 2021-12-04 RX ORDER — ASPIRIN 81 MG/1
81 TABLET ORAL DAILY
Status: DISCONTINUED | OUTPATIENT
Start: 2021-12-04 | End: 2021-12-08 | Stop reason: HOSPADM

## 2021-12-04 RX ORDER — METHYLPREDNISOLONE SODIUM SUCCINATE 125 MG/2ML
125 INJECTION, POWDER, LYOPHILIZED, FOR SOLUTION INTRAMUSCULAR; INTRAVENOUS ONCE
Status: COMPLETED | OUTPATIENT
Start: 2021-12-04 | End: 2021-12-04

## 2021-12-04 RX ORDER — PANTOPRAZOLE SODIUM 40 MG/1
40 TABLET, DELAYED RELEASE ORAL
Status: DISCONTINUED | OUTPATIENT
Start: 2021-12-05 | End: 2021-12-08 | Stop reason: HOSPADM

## 2021-12-04 RX ORDER — ACETAMINOPHEN 325 MG/1
650 TABLET ORAL EVERY 6 HOURS PRN
Status: DISCONTINUED | OUTPATIENT
Start: 2021-12-04 | End: 2021-12-08 | Stop reason: HOSPADM

## 2021-12-04 RX ORDER — MEGESTROL ACETATE 40 MG/1
40 TABLET ORAL 2 TIMES DAILY
Status: DISCONTINUED | OUTPATIENT
Start: 2021-12-05 | End: 2021-12-08 | Stop reason: HOSPADM

## 2021-12-04 RX ORDER — BENZONATATE 100 MG/1
100 CAPSULE ORAL 3 TIMES DAILY PRN
Status: DISCONTINUED | OUTPATIENT
Start: 2021-12-04 | End: 2021-12-08 | Stop reason: HOSPADM

## 2021-12-04 RX ORDER — HYDROCODONE BITARTRATE AND ACETAMINOPHEN 5; 325 MG/1; MG/1
1 TABLET ORAL ONCE
Status: COMPLETED | OUTPATIENT
Start: 2021-12-04 | End: 2021-12-04

## 2021-12-04 RX ORDER — POLYETHYLENE GLYCOL 3350 17 G/17G
17 POWDER, FOR SOLUTION ORAL DAILY PRN
Status: DISCONTINUED | OUTPATIENT
Start: 2021-12-04 | End: 2021-12-08 | Stop reason: HOSPADM

## 2021-12-04 RX ORDER — LANOLIN ALCOHOL/MO/W.PET/CERES
3 CREAM (GRAM) TOPICAL NIGHTLY
Status: DISCONTINUED | OUTPATIENT
Start: 2021-12-05 | End: 2021-12-08 | Stop reason: HOSPADM

## 2021-12-04 RX ORDER — SODIUM CHLORIDE 9 MG/ML
25 INJECTION, SOLUTION INTRAVENOUS PRN
Status: DISCONTINUED | OUTPATIENT
Start: 2021-12-04 | End: 2021-12-08 | Stop reason: HOSPADM

## 2021-12-04 RX ORDER — BUDESONIDE AND FORMOTEROL FUMARATE DIHYDRATE 160; 4.5 UG/1; UG/1
2 AEROSOL RESPIRATORY (INHALATION) 2 TIMES DAILY
Status: DISCONTINUED | OUTPATIENT
Start: 2021-12-04 | End: 2021-12-08 | Stop reason: HOSPADM

## 2021-12-04 RX ORDER — SODIUM CHLORIDE 0.9 % (FLUSH) 0.9 %
5-40 SYRINGE (ML) INJECTION PRN
Status: DISCONTINUED | OUTPATIENT
Start: 2021-12-04 | End: 2021-12-08 | Stop reason: HOSPADM

## 2021-12-04 RX ORDER — SODIUM CHLORIDE 0.9 % (FLUSH) 0.9 %
5-40 SYRINGE (ML) INJECTION EVERY 12 HOURS SCHEDULED
Status: DISCONTINUED | OUTPATIENT
Start: 2021-12-04 | End: 2021-12-08 | Stop reason: HOSPADM

## 2021-12-04 RX ORDER — BISACODYL 10 MG
10 SUPPOSITORY, RECTAL RECTAL DAILY PRN
Status: DISCONTINUED | OUTPATIENT
Start: 2021-12-04 | End: 2021-12-08 | Stop reason: HOSPADM

## 2021-12-04 RX ORDER — ACETAMINOPHEN 650 MG/1
650 SUPPOSITORY RECTAL EVERY 6 HOURS PRN
Status: DISCONTINUED | OUTPATIENT
Start: 2021-12-04 | End: 2021-12-08 | Stop reason: HOSPADM

## 2021-12-04 RX ORDER — ATORVASTATIN CALCIUM 10 MG/1
10 TABLET, FILM COATED ORAL NIGHTLY
Status: DISCONTINUED | OUTPATIENT
Start: 2021-12-05 | End: 2021-12-08 | Stop reason: HOSPADM

## 2021-12-04 RX ORDER — 0.9 % SODIUM CHLORIDE 0.9 %
500 INTRAVENOUS SOLUTION INTRAVENOUS ONCE
Status: COMPLETED | OUTPATIENT
Start: 2021-12-04 | End: 2021-12-04

## 2021-12-04 RX ORDER — 0.9 % SODIUM CHLORIDE 0.9 %
1000 INTRAVENOUS SOLUTION INTRAVENOUS ONCE
Status: COMPLETED | OUTPATIENT
Start: 2021-12-04 | End: 2021-12-04

## 2021-12-04 RX ORDER — ONDANSETRON 4 MG/1
4 TABLET, ORALLY DISINTEGRATING ORAL EVERY 8 HOURS PRN
Status: DISCONTINUED | OUTPATIENT
Start: 2021-12-04 | End: 2021-12-08 | Stop reason: HOSPADM

## 2021-12-04 RX ORDER — POTASSIUM CHLORIDE 20 MEQ/1
40 TABLET, EXTENDED RELEASE ORAL PRN
Status: DISCONTINUED | OUTPATIENT
Start: 2021-12-04 | End: 2021-12-08 | Stop reason: HOSPADM

## 2021-12-04 RX ORDER — POTASSIUM CHLORIDE 20 MEQ/1
40 TABLET, EXTENDED RELEASE ORAL ONCE
Status: DISCONTINUED | OUTPATIENT
Start: 2021-12-04 | End: 2021-12-08 | Stop reason: HOSPADM

## 2021-12-04 RX ADMIN — ALBUTEROL SULFATE 2.5 MG: 2.5 SOLUTION RESPIRATORY (INHALATION) at 18:19

## 2021-12-04 RX ADMIN — HYDROCODONE BITARTRATE AND ACETAMINOPHEN 1 TABLET: 5; 325 TABLET ORAL at 18:28

## 2021-12-04 RX ADMIN — SODIUM CHLORIDE 500 ML: 9 INJECTION, SOLUTION INTRAVENOUS at 20:03

## 2021-12-04 RX ADMIN — CEFTRIAXONE SODIUM 1000 MG: 1 INJECTION, POWDER, FOR SOLUTION INTRAMUSCULAR; INTRAVENOUS at 19:20

## 2021-12-04 RX ADMIN — SODIUM CHLORIDE 1000 ML: 9 INJECTION, SOLUTION INTRAVENOUS at 17:47

## 2021-12-04 RX ADMIN — METHYLPREDNISOLONE SODIUM SUCCINATE 125 MG: 125 INJECTION, POWDER, FOR SOLUTION INTRAMUSCULAR; INTRAVENOUS at 17:47

## 2021-12-04 RX ADMIN — ACETAMINOPHEN 650 MG: 325 TABLET ORAL at 17:46

## 2021-12-04 RX ADMIN — POTASSIUM CHLORIDE 40 MEQ: 1500 TABLET, EXTENDED RELEASE ORAL at 20:01

## 2021-12-04 RX ADMIN — ENOXAPARIN SODIUM 40 MG: 100 INJECTION SUBCUTANEOUS at 20:01

## 2021-12-04 RX ADMIN — AZITHROMYCIN MONOHYDRATE 500 MG: 500 INJECTION, POWDER, LYOPHILIZED, FOR SOLUTION INTRAVENOUS at 20:07

## 2021-12-04 ASSESSMENT — PAIN SCALES - GENERAL
PAINLEVEL_OUTOF10: 8
PAINLEVEL_OUTOF10: 8
PAINLEVEL_OUTOF10: 3
PAINLEVEL_OUTOF10: 6

## 2021-12-04 ASSESSMENT — ENCOUNTER SYMPTOMS
BACK PAIN: 1
VOMITING: 0
SORE THROAT: 0
SHORTNESS OF BREATH: 1
PHOTOPHOBIA: 0
RHINORRHEA: 0
DIARRHEA: 0
WHEEZING: 1
ABDOMINAL PAIN: 0
NAUSEA: 0
COUGH: 1

## 2021-12-04 ASSESSMENT — PAIN DESCRIPTION - ORIENTATION: ORIENTATION: RIGHT

## 2021-12-04 ASSESSMENT — PAIN DESCRIPTION - PAIN TYPE: TYPE: ACUTE PAIN

## 2021-12-04 ASSESSMENT — PAIN DESCRIPTION - LOCATION: LOCATION: HIP;FLANK

## 2021-12-04 ASSESSMENT — PAIN DESCRIPTION - FREQUENCY: FREQUENCY: CONTINUOUS

## 2021-12-04 ASSESSMENT — PAIN DESCRIPTION - DESCRIPTORS: DESCRIPTORS: ACHING

## 2021-12-04 NOTE — ED PROVIDER NOTES
[facility]      CHIEF COMPLAINT       Chief Complaint   Patient presents with    Shortness of Breath    Fever       Nurses Notes reviewed and I agree except as noted in the HPI. HISTORY OF PRESENT ILLNESS    Corey Goldman is a 67 y.o. female who presents for shortness of breath. Patient has not felt well for at least 2 days. She has had right-sided chest pain, dyspnea worse with exertion, cough, loss of appetite, significant decreased oral intake, generalized weakness, lightheadedness, and unsteady gait. Patient also states that she has been having an \"uncomfortable\" feeling in her epigastric region. This occurs only at night and feels like feathers. It is not painful or itchy however makes the patient very uncomfortable. Daughter wanted patient evaluated not only for the illness but also because she found out the patient has been without her Norco for the past 4 days. Patient has a chronic history of hip and low back pain after a fall 1 year ago resulting in compression fracture. Assisted living apparently had trouble refilling her Circle.  Patient has had an increase in her hip pain making it more difficult to walk on top of her generalized weakness. Patient denies vomiting, diarrhea, urinary changes, or other complaints. Patient has a history of COPD and is dependent on 3 L of oxygen. Patient quit smoking in 2019. REVIEW OF SYSTEMS     Review of Systems   Constitutional: Positive for appetite change and fatigue. Negative for chills, diaphoresis and fever. HENT: Positive for congestion. Negative for rhinorrhea and sore throat. Eyes: Negative for photophobia. Respiratory: Positive for cough, shortness of breath and wheezing. Cardiovascular: Positive for chest pain. Negative for leg swelling. Gastrointestinal: Negative for abdominal pain, diarrhea, nausea and vomiting. Genitourinary: Negative for decreased urine volume, difficulty urinating and dysuria. Musculoskeletal: Positive for arthralgias, back pain and gait problem. Skin: Negative for rash. Neurological: Positive for weakness and light-headedness. Negative for dizziness and numbness. Psychiatric/Behavioral: Negative for confusion. The patient is not nervous/anxious. PAST MEDICAL HISTORY    has a past medical history of Chronic respiratory failure with hypoxia (HCC), Chronic respiratory failure with hypoxia and hypercapnia ([address] COPD (chronic obstructive pulmonary disease) ([facility] Depression, Dysarthria, Hyperlipidemia, Hyperlipidemia, Lung nodules, Pneumonia, Severe malnutrition ([facility] [address] Severe malnutrition ([facility] [address] Severe malnutrition ([facility] [address] and TIA (transient ischemic attack). SURGICAL HISTORY      has a past surgical history that includes Tonsillectomy and Colonoscopy.     CURRENT MEDICATIONS       Previous Medications    ACETAMINOPHEN (TYLENOL) 325 MG TABLET    Take 650 mg by mouth every 6 hours as needed for Pain    ALBUTEROL SULFATE  (90 BASE) MCG/ACT INHALER    Inhale 2 puffs into the lungs 4 times daily Use spacer    ALBUTEROL SULFATE  (90 BASE) MCG/ACT INHALER    Inhale 2 puffs into the lungs every 6 hours    AMITRIPTYLINE (ELAVIL) 10 MG TABLET    Take 10 mg by mouth nightly    AMITRIPTYLINE (ELAVIL) 25 MG TABLET    Take 2 tablets by mouth nightly    ARIPIPRAZOLE (ABILIFY) 10 MG TABLET    Take 10 mg by mouth daily    ARIPIPRAZOLE (ABILIFY) 5 MG TABLET    Take 2 tablets by mouth daily    ASCORBIC ACID (VITAMIN C) 250 MG TABLET    Take 500 mg by mouth daily For 10 days d/t covid    ASPIRIN 81 MG TABLET    Take 81 mg by mouth daily    ATORVASTATIN (LIPITOR) 10 MG TABLET    Take 1 tablet by mouth nightly    BENZONATATE (TESSALON PERLES) 100 MG CAPSULE    Take 1 capsule by mouth 3 times daily as needed for Cough    BISACODYL (DULCOLAX) 10 MG SUPPOSITORY    Place 10 mg rectally as needed for Constipation    CHOLECALCIFEROL (VITAMIN D3) 50 MCG (2000 UT) CAPS    Take 2,000 Units by mouth daily    CIPROFLOXACIN (CIPRO) 500 MG TABLET    Take 500 mg by mouth 2 times daily X 5days    DESVENLAFAXINE SUCCINATE (PRISTIQ) 50 MG TB24 EXTENDED RELEASE TABLET    Take 100 mg by mouth daily     DESVENLAFAXINE SUCCINATE (PRISTIQ) 50 MG TB24 EXTENDED RELEASE TABLET    Take 2 tablets by mouth daily    DOXYCYCLINE HYCLATE (VIBRA-TABS) 100 MG TABLET    Take 100 mg by mouth 2 times daily X 5 days    FLUTICASONE FUROATE-VILANTEROL (BREO ELLIPTA) 200-25 MCG/INH AEPB    Inhale 1 puff into the lungs daily    FLUTICASONE-VILANTEROL (BREO ELLIPTA) 100-25 MCG/INH AEPB INHALER    Inhale 1 puff into the lungs daily    FUROSEMIDE (LASIX) 40 MG TABLET    Take 40 mg by mouth daily    HYDROCODONE-ACETAMINOPHEN (NORCO) 5-325 MG PER TABLET    Take 1 tablet by mouth every 6 hours as needed for Pain.     HYDROXYZINE (ATARAX) 10 MG TABLET    Take 10 mg by mouth 4 times daily itching    IPRATROPIUM-ALBUTEROL (DUONEB) 0.5-2.5 (3) MG/3ML SOLN NEBULIZER SOLUTION    Inhale 3 mLs into the lungs every 6 hours as needed for Shortness of Breath    LIDOCAINE (ASPERCREME LIDOCAINE) 4 % EXTERNAL PATCH    Place 1 patch onto the skin daily    MEGESTROL (MEGACE) 40 MG TABLET    Take 1 tablet by mouth 2 times daily    MEGESTROL ACETATE PO    Take 40 mg by mouth 2 times daily     MELATONIN 3 MG TABS TABLET    Take 3 mg by mouth daily    MULTIPLE VITAMINS-MINERALS (THERAPEUTIC MULTIVITAMIN-MINERALS) TABLET    Take 1 tablet by mouth Daily with lunch    NUTRITIONAL SUPPLEMENTS (ENSURE HIGH PROTEIN) LIQD    Take 1 Can by mouth 3 times daily (with meals)    OMEPRAZOLE (PRILOSEC) 20 MG DELAYED RELEASE CAPSULE    Take 20 mg by mouth Daily    OXYGEN    Inhale 2-4 L into the lungs nightly Nightly and prn    SULFAMETHOXAZOLE-TRIMETHOPRIM (BACTRIM DS;SEPTRA DS) 800-160 MG PER TABLET    Take 1 tablet by mouth 2 times daily X 14 days for strep    TIZANIDINE (ZANAFLEX) 2 MG TABLET    Take 2 mg by mouth nightly Muscle spasm    UMECLIDINIUM BROMIDE (INCRUSE ELLIPTA) 62.5 MCG/INH AEPB    Inhale 1 puff into the lungs daily    VITAMIN D (CHOLECALCIFEROL) 1000 UNIT TABS TABLET    Take 2,000 Units by mouth daily    ZINC 50 MG TABS TABLET    Take 50 mg by mouth daily       ALLERGIES     is allergic to spiriva handihaler [tiotropium bromide monohydrate] and spiriva handihaler [tiotropium bromide monohydrate]. FAMILY HISTORY     She indicated that her mother is . She indicated that her father is . family history includes Cancer in her mother; Other in her father. SOCIAL HISTORY    reports that she quit smoking about 2 years ago. Her smoking use included cigarettes. She started smoking about 59 years ago. She has a 112.00 pack-year smoking history. She has never used smokeless tobacco. She reports previous alcohol use. She reports previous drug use. PHYSICAL EXAM     INITIAL VITALS:  weight is 98 lb 8 oz (44.7 kg). Her oral temperature is 101 °F (38.3 °C). Her blood pressure is 106/73 and her pulse is 131. Her respiration is 24 and oxygen saturation is 96%. Physical Exam  Constitutional:       Appearance: Normal appearance. She is well-developed and underweight. She is ill-appearing. She is not diaphoretic. HENT:      Head: Normocephalic and atraumatic. Right Ear: Hearing normal.      Left Ear: Hearing normal.      Nose: Nose normal. No rhinorrhea. Mouth/Throat:      Lips: Pink. Mouth: Mucous membranes are moist.      Pharynx: Oropharynx is clear. Eyes:      General: Lids are normal. No scleral icterus. Extraocular Movements: Extraocular movements intact. Conjunctiva/sclera: Conjunctivae normal.      Pupils: Pupils are equal, round, and reactive to light. Neck:      Trachea: Trachea normal.   Cardiovascular:      Rate and Rhythm: Regular rhythm. Tachycardia present. Heart sounds: Normal heart sounds. No murmur heard. Pulmonary:      Effort: Pulmonary effort is normal.      Breath sounds: Normal air entry. Examination of the right-upper field reveals wheezing. Examination of the left-lower field reveals wheezing. Decreased breath sounds and wheezing present. No rhonchi. Abdominal:      General: There is no distension. Palpations: Abdomen is soft. Tenderness: There is no abdominal tenderness. Musculoskeletal:      Cervical back: Normal range of motion and neck supple. No rigidity. Comments: Well perfused; movement normal as observed; no signs of DVT   Lymphadenopathy:      Cervical: No cervical adenopathy. Skin:     General: Skin is warm and dry. Findings: No rash. Neurological:      General: No focal deficit present. Mental Status: She is alert. Sensory: Sensation is intact. Motor: Motor function is intact. Comments: Gait not tested due to generalized weakness and hip pain. Patient felt she was unable to walk in the ED. Psychiatric:         Mood and Affect: Mood normal.         Speech: Speech normal.         Behavior: Behavior is cooperative. DIFFERENTIAL DIAGNOSIS:   Including but not limited to: Covid, influenza, pneumonia, COPD exacerbation, dehydration, JUSTIN, sepsis    DIAGNOSTIC RESULTS     EKG: All EKG's are interpreted by theEmergency Department Physician who either signs or Co-signs this chart in the absence of a cardiologist.  Ventricular rate 130 bpm  ME interval 136 ms  QRS duration 80 ms  QTc interval 597 ms   P-R-T axes 54, 67, 66  Sinus tachycardia with PACs. No STEMI. RADIOLOGY: non-plain film images(s) such as CT,Ultrasound and MRI are read by the radiologist.  Plain radiographic images are visualized and preliminarily interpreted by the emergency physician unless otherwise stated below. XR CHEST PORTABLE   Final Result      Right upper lung atelectasis/infiltrate. **This report has been created using voice recognition software. It may contain minor errors which are inherent in voice recognition technology. **      Final report electronically signed by Dr. Leobardo Oneal on 12/4/2021 4:09 PM          LABS:   Labs Reviewed   BASIC METABOLIC PANEL W/ REFLEX TO MG FOR LOW K - Abnormal; Notable for the following components:       Result Value    Potassium reflex Magnesium 3.3 (*)     Chloride 91 (*)     All other components within normal limits   CBC WITH AUTO DIFFERENTIAL - Abnormal; Notable for the following components:    WBC 24.0 (*)     Hemoglobin 11.5 (*)     Hematocrit 36.2 (*)     MCHC 31.8 (*)     RDW-CV 15.9 (*)     RDW-SD 49.1 (*)     Segs Absolute 20.4 (*)     Monocytes Absolute 1.8 (*)     Immature Grans (Abs) 0.20 (*)     All other components within normal limits   GLOMERULAR FILTRATION RATE, ESTIMATED - Abnormal; Notable for the following components:    Est, Glom Filt Rate 69 (*)     All other components within normal limits   OSMOLALITY - Abnormal; Notable for the following components:    Osmolality Calc 272.2 (*)     All other components within normal limits   PROCALCITONIN - Abnormal; Notable for the following components:    Procalcitonin 0.26 (*)     All other components within normal limits   RAPID INFLUENZA A/B ANTIGENS   COVID-19, RAPID   CULTURE, BLOOD 2   CULTURE, BLOOD 1   BRAIN NATRIURETIC PEPTIDE   TROPONIN   ANION GAP   MAGNESIUM   SCAN OF BLOOD SMEAR   HEPATIC FUNCTION PANEL   LACTATE, SEPSIS   LACTATE, SEPSIS       EMERGENCY DEPARTMENT COURSE:   Vitals:    Vitals:    12/04/21 1529 12/04/21 1532 12/04/21 1606 12/04/21 1751   BP:  136/69 136/69 106/73   Pulse:  132 132 131   Resp: (!) 34 25 25 24   Temp:  101 °F (38.3 °C)     TempSrc:  Oral     SpO2: 92% 97% 95% 96%   Weight:  98 lb 8 oz (44.7 kg)         Patient was seen in the emergency department during the global pandemic, when there was surge capacity and regional healthcare crisis. MDM:  The patient was seen by me in the emergency room for dyspnea, cough, anorexia, and weakness. Physical exam revealed a pleasant 67-year-old female who was ill-appearing. Breath sounds were diminished with a few sparse expiratory wheezes. Patient was tachypneic and tachycardic. Vital signs reviewed and noted stable although febrile and tachycardic. Old records were reviewed. Appropriate laboratory and imaging studies were ordered and reviewed upon completion. Pertinent findings: Chest x-ray revealed right upper lung infiltrate consistent with pneumonia; mild hypokalemia 3.3, leukocytosis 24, procalcitonin 0.26    Interventions: IV fluids, Solu-Medrol, albuterol nebulizer treatment, Rocephin, Tylenol, Norco    Reexamination: Patient remained stable with improving vital signs. Patient was too shaky and painful to walk in the ER. She was not deemed appropriate for discharge. Results were discussed with the patient and daughter as well as desire for admission and they were amenable. Dr. Lisa Santiago of our hospitalists' service was consulted and graciously accepted admission. The patient was admitted to the hospital in fair condition. CRITICAL CARE:   During my workup of this patient, it did become clear to me the critical nature of this patient's illness which did require my constant attention, and a critical care time 30 minutes was given    CONSULTS:  1837: Dr. Belen Alvarado (hospitalist)    PROCEDURES:  None    FINAL IMPRESSION      1. Pneumonia of right upper lobe due to infectious organism    2. Hypoxia    3. Dyspnea and respiratory abnormalities    4. COPD exacerbation ([address]    5. General weakness    6. Dehydration          DISPOSITION/PLAN WBC 24, pro calcitonin 0.26     1. Pneumonia of right upper lobe due to infectious organism    2. Hypoxia    3. Dyspnea and respiratory abnormalities    4. COPD exacerbation ([address]    5. General weakness    6.  Dehydration    Admission      (Please note that portions of this note were completed with a voice recognition program.  Efforts were made to edit the dictations but occasionally words are mis-transcribed.)    Yoselin Bey

## 2021-12-04 NOTE — ED TRIAGE NOTES
Patient presents to the ED with complaints of SOB and a fever. Patient has not taken any fever reducers. Patient is always on 2L NC for COPD. Patient is an assisted living at [facility] in [place]. EKG completed. Patient states she fell about 3 weeks ago and has some right flank/hip pain rated at a 6/10.

## 2021-12-05 LAB
ALLEN TEST: POSITIVE
ANION GAP SERPL CALCULATED.3IONS-SCNC: 11 MEQ/L (ref 8–16)
BASE EXCESS (CALCULATED): 6.5 MMOL/L (ref -2.5–2.5)
BUN BLDV-MCNC: 12 MG/DL (ref 7–22)
CALCIUM SERPL-MCNC: 9.1 MG/DL (ref 8.5–10.5)
CHLORIDE BLD-SCNC: 99 MEQ/L (ref 98–111)
CO2: 32 MEQ/L (ref 23–33)
COLLECTED BY:: ABNORMAL
CREAT SERPL-MCNC: 0.7 MG/DL (ref 0.4–1.2)
DEVICE: ABNORMAL
EKG ATRIAL RATE: 130 BPM
EKG P AXIS: 54 DEGREES
EKG P-R INTERVAL: 136 MS
EKG Q-T INTERVAL: 406 MS
EKG QRS DURATION: 80 MS
EKG QTC CALCULATION (BAZETT): 597 MS
EKG R AXIS: 67 DEGREES
EKG T AXIS: 66 DEGREES
EKG VENTRICULAR RATE: 130 BPM
ERYTHROCYTE [DISTWIDTH] IN BLOOD BY AUTOMATED COUNT: 16.4 % (ref 11.5–14.5)
ERYTHROCYTE [DISTWIDTH] IN BLOOD BY AUTOMATED COUNT: 51.4 FL (ref 35–45)
GFR SERPL CREATININE-BSD FRML MDRD: 81 ML/MIN/1.73M2
GLUCOSE BLD-MCNC: 139 MG/DL (ref 70–108)
HCO3: 31 MMOL/L (ref 23–28)
HCT VFR BLD CALC: 32.8 % (ref 37–47)
HEMOGLOBIN: 10.3 GM/DL (ref 12–16)
IFIO2: 32
MCH RBC QN AUTO: 27.1 PG (ref 26–33)
MCHC RBC AUTO-ENTMCNC: 31.4 GM/DL (ref 32.2–35.5)
MCV RBC AUTO: 86.3 FL (ref 81–99)
O2 SATURATION: 92 %
OSMOLALITY CALCULATION: 285.1 MOSMOL/KG (ref 275–300)
PCO2: 46 MMHG (ref 35–45)
PH BLOOD GAS: 7.44 (ref 7.35–7.45)
PLATELET # BLD: 279 THOU/MM3 (ref 130–400)
PMV BLD AUTO: 9.8 FL (ref 9.4–12.4)
PO2: 62 MMHG (ref 71–104)
POTASSIUM REFLEX MAGNESIUM: 4.1 MEQ/L (ref 3.5–5.2)
RBC # BLD: 3.8 MILL/MM3 (ref 4.2–5.4)
SODIUM BLD-SCNC: 142 MEQ/L (ref 135–145)
SOURCE, BLOOD GAS: ABNORMAL
WBC # BLD: 19.1 THOU/MM3 (ref 4.8–10.8)

## 2021-12-05 PROCEDURE — 6360000002 HC RX W HCPCS: Performed by: STUDENT IN AN ORGANIZED HEALTH CARE EDUCATION/TRAINING PROGRAM

## 2021-12-05 PROCEDURE — 2580000003 HC RX 258: Performed by: INTERNAL MEDICINE

## 2021-12-05 PROCEDURE — 99232 SBSQ HOSP IP/OBS MODERATE 35: CPT | Performed by: INTERNAL MEDICINE

## 2021-12-05 PROCEDURE — 6360000002 HC RX W HCPCS: Performed by: INTERNAL MEDICINE

## 2021-12-05 PROCEDURE — 1200000000 HC SEMI PRIVATE

## 2021-12-05 PROCEDURE — 80048 BASIC METABOLIC PNL TOTAL CA: CPT

## 2021-12-05 PROCEDURE — 85027 COMPLETE CBC AUTOMATED: CPT

## 2021-12-05 PROCEDURE — 94640 AIRWAY INHALATION TREATMENT: CPT

## 2021-12-05 PROCEDURE — 94760 N-INVAS EAR/PLS OXIMETRY 1: CPT

## 2021-12-05 PROCEDURE — 2580000003 HC RX 258: Performed by: STUDENT IN AN ORGANIZED HEALTH CARE EDUCATION/TRAINING PROGRAM

## 2021-12-05 PROCEDURE — 93010 ELECTROCARDIOGRAM REPORT: CPT | Performed by: INTERNAL MEDICINE

## 2021-12-05 PROCEDURE — 6370000000 HC RX 637 (ALT 250 FOR IP): Performed by: STUDENT IN AN ORGANIZED HEALTH CARE EDUCATION/TRAINING PROGRAM

## 2021-12-05 PROCEDURE — 82803 BLOOD GASES ANY COMBINATION: CPT

## 2021-12-05 PROCEDURE — 36415 COLL VENOUS BLD VENIPUNCTURE: CPT

## 2021-12-05 PROCEDURE — 36600 WITHDRAWAL OF ARTERIAL BLOOD: CPT

## 2021-12-05 PROCEDURE — 2700000000 HC OXYGEN THERAPY PER DAY

## 2021-12-05 RX ADMIN — PANTOPRAZOLE SODIUM 40 MG: 40 TABLET, DELAYED RELEASE ORAL at 06:23

## 2021-12-05 RX ADMIN — AZITHROMYCIN MONOHYDRATE 500 MG: 500 INJECTION, POWDER, LYOPHILIZED, FOR SOLUTION INTRAVENOUS at 20:23

## 2021-12-05 RX ADMIN — HYDROXYZINE HYDROCHLORIDE 10 MG: 10 TABLET ORAL at 19:53

## 2021-12-05 RX ADMIN — MEGESTROL ACETATE 40 MG: 40 TABLET ORAL at 08:52

## 2021-12-05 RX ADMIN — ATORVASTATIN CALCIUM 10 MG: 10 TABLET, FILM COATED ORAL at 19:52

## 2021-12-05 RX ADMIN — ASPIRIN 81 MG: 81 TABLET, COATED ORAL at 08:52

## 2021-12-05 RX ADMIN — MEGESTROL ACETATE 40 MG: 40 TABLET ORAL at 19:52

## 2021-12-05 RX ADMIN — CEFTRIAXONE SODIUM 1000 MG: 1 INJECTION, POWDER, FOR SOLUTION INTRAMUSCULAR; INTRAVENOUS at 19:49

## 2021-12-05 RX ADMIN — SODIUM CHLORIDE, PRESERVATIVE FREE 10 ML: 5 INJECTION INTRAVENOUS at 09:32

## 2021-12-05 RX ADMIN — BUDESONIDE AND FORMOTEROL FUMARATE DIHYDRATE 2 PUFF: 160; 4.5 AEROSOL RESPIRATORY (INHALATION) at 07:53

## 2021-12-05 RX ADMIN — Medication 3 MG: at 19:52

## 2021-12-05 RX ADMIN — SODIUM CHLORIDE, PRESERVATIVE FREE 10 ML: 5 INJECTION INTRAVENOUS at 19:50

## 2021-12-05 RX ADMIN — DESVENLAFAXINE SUCCINATE 100 MG: 100 TABLET, FILM COATED, EXTENDED RELEASE ORAL at 08:52

## 2021-12-05 RX ADMIN — ENOXAPARIN SODIUM 40 MG: 100 INJECTION SUBCUTANEOUS at 19:52

## 2021-12-05 RX ADMIN — ARIPIPRAZOLE 10 MG: 10 TABLET ORAL at 08:52

## 2021-12-05 RX ADMIN — BUDESONIDE AND FORMOTEROL FUMARATE DIHYDRATE 2 PUFF: 160; 4.5 AEROSOL RESPIRATORY (INHALATION) at 17:41

## 2021-12-05 ASSESSMENT — PAIN SCALES - GENERAL
PAINLEVEL_OUTOF10: 0

## 2021-12-05 NOTE — FLOWSHEET NOTE
12/05/21 0219   Provider Notification   Reason for Communication Evaluate; Review case   Provider Name [facility]   Provider Notification Physician   Method of Communication Secure Message   Response Waiting for response   Notification Time [phone]     Upon assessment, patient is labored breathing with accessory muscle use. 94-95% on 3L nasal cannula. Appears pale and diaphoretic. STAT ABG's ordered.

## 2021-12-05 NOTE — PROGRESS NOTES
Pt admitted to  5K21 via via cart/stretcher from ED. Complaints: Shortness of breath. IV site free of s/s of infection or infiltration. Vital signs obtained. Assessment and data collection initiated. Two nurse skin assessment performed by Jacquelyn Rodriguez and Margery Romberg RN. Oriented to room. Policies and procedures for 5K explained. All questions answered with no further questions at this time. Fall prevention and safety brochure discussed with patient. Bed alarm on. Call light in reach. Oriented to room. SCD's applied for DVT prevention. Tawana Sepulveda RN, 12/5/2021 3:58 AM     Explained patients right to have family, representative or physician notified of their admission. Patient has Declined for physician to be notified. Patient has Declined for family/representative to be notified.

## 2021-12-05 NOTE — H&P
HISTORY & PHYSICAL       Patient:  Breonna Fry  YOB: 1945  MRN: [id number]     Acct: [de-identified]    PCP: No primary care provider on file. Date of Admission: 12/4/2021    Date of Service: Pt seen/examined in ED with expected LOS greater than two midnights due to medical therapy. ASSESSMENT/PLAN:    1. Acute on chronic hypoxic respiratory failure   Secondary to right upper lung pneumonia   Baseline O2 3LNC, requiring 4LNC  2. Sepsis  · SIRS: Tachycardia, leukocytosis (WBC 24), tachypnea. CXR significant for right upper lobe infiltrate. · Pro-Antione 0.26, lactic acid 0.9  · 30 cc/kg fluid given  · Rocephin and azithromycin started  · Blood cultures pending, will follow results  · BMP and CBC in a.m.  3. Right upper lobe pneumonia   CXR 12/4: Right upper lung atelectasis/infiltrate   Rocephin and azithromycin started  4. Weakness  · Secondary to chronic deconditioning in the setting of acute infection  · PT/OT consulted  · Dietitian consulted  5. History of COPD   Controlled, not in acute exacerbation   Denies wheezing, increased cough, or increased sputum production   Symbicort scheduled and Albuterol as needed  6. History of tachycardia   Noted since 2019   Echo 4/2019: Normal LV size and systolic function, EF 24%. No LV wall motion abnormalities, normal wall thickness. Severe tricuspid regurgitation visualized. RV systolic pressure 40 mmHg.  12/4 EKG shows normal sinus tachycardia with some PACs   TSH/T4 ordered  7. Severe malnutrition   Inpatient consult to dietitian   Continue Megace  8. Deconditioning   PT/OT consulted, appreciate evaluation and treatment  9. Hypokalemia   K 3.3 on arrival, given 40 mEq on admission   Replacement protocol ordered   BMP in a.m.  10. History of Schizoaffective disorder/MDD   Stable   Continue Abilify, Pristiq  11. History of GERD   Control   Takes omeprazole at home, will start Protonix  12.  History of CAD   Stable   Continue aspirin and Lipitor    Dispo: Limited code (no compressions, no intubation), regular diet, Lovenox for DVT prophylaxis, [facility]/Kayleigh vincent on discharge, PT/OT consulted    Chief Complaint:  Weakness    History Of Present Illness:      67 y.o. female who presented to [address] [place] [address] with weakness. She states that she woke up this morning was having significant worsening of her chronic hip pain. She says that her weakness is her main concern, but she does realize that she has been more short of breath the last 2 days, especially with exertion. She is a resident at [facility] in [place]. She denies any worsening of her chronic cough. Denies sputum production. She denies any feelings of fever/chills. She says that her tachycardia is chronic. She says she does not see a cardiologist.  Denies sensations of palpitations or chest pain. She does have some musculoskeletal rib pain on the right side, which radiates around posteriorly. It is worse with deep breaths and worsens with palpation. She describes as a sharp pain, which lasts only seconds. She had mentioned recent fall to one of our ED nursing team, but denies it to me during admission. She says her right hip pain is chronic since she fell in January and was ultimately found to have a compression fracture of her spine. Her right hip pain is worse at this time because she has not been taking her Norco recently due to her assisted living having trouble refilling it. She denies any abdominal pain to me at this time, but had mentioned it to ED provider. I informed her to let us know if it returns. No other questions or complaints.       Past Medical History:          Diagnosis Date    Chronic respiratory failure with hypoxia (HCC)     Chronic respiratory failure with hypoxia and hypercapnia (HCC)     COPD (chronic obstructive pulmonary disease) (HCC)     Depression     Dysarthria     Hyperlipidemia     Hyperlipidemia     Lung nodules     Pneumonia     Severe malnutrition (HCC)     Severe malnutrition (HCC)     Severe malnutrition (HCC)     TIA (transient ischemic attack)        Past Surgical History:          Procedure Laterality Date    COLONOSCOPY      TONSILLECTOMY         Medications Prior to Admission:      Prior to Admission medications    Medication Sig Start Date End Date Taking? Authorizing Provider   furosemide (LASIX) 40 MG tablet Take 40 mg by mouth daily    Historical Provider, MD   melatonin 3 MG TABS tablet Take 3 mg by mouth daily    Historical Provider, MD   omeprazole (PRILOSEC) 20 MG delayed release capsule Take 20 mg by mouth Daily    Historical Provider, MD   tiZANidine (ZANAFLEX) 2 MG tablet Take 2 mg by mouth nightly Muscle spasm    Historical Provider, MD   Ascorbic Acid (VITAMIN C) 250 MG tablet Take 500 mg by mouth daily For 10 days d/t covid    Historical Provider, MD   zinc 50 MG TABS tablet Take 50 mg by mouth daily    Historical Provider, MD   sulfamethoxazole-trimethoprim (BACTRIM DS;SEPTRA DS) 800-160 MG per tablet Take 1 tablet by mouth 2 times daily X 14 days for strep    Historical Provider, MD   ciprofloxacin (CIPRO) 500 MG tablet Take 500 mg by mouth 2 times daily X 5days    Historical Provider, MD   doxycycline hyclate (VIBRA-TABS) 100 MG tablet Take 100 mg by mouth 2 times daily X 5 days    Historical Provider, MD   hydrOXYzine (ATARAX) 10 MG tablet Take 10 mg by mouth 4 times daily itching    Historical Provider, MD   HYDROcodone-acetaminophen (NORCO) 5-325 MG per tablet Take 1 tablet by mouth every 6 hours as needed for Pain.     Historical Provider, MD   acetaminophen (TYLENOL) 325 MG tablet Take 650 mg by mouth every 6 hours as needed for Pain    Historical Provider, MD   bisacodyl (DULCOLAX) 10 MG suppository Place 10 mg rectally as needed for Constipation    Historical Provider, MD   lidocaine (ASPERCREME LIDOCAINE) 4 % external patch Place 1 patch onto the skin daily    Historical Provider, MD   benzonatate (TESSALON PERLES) 100 MG capsule Take 1 capsule by mouth 3 times daily as needed for Cough  Patient not taking: Reported on 3/24/2021 12/17/19   Jean Paul Bai PA-C   Nutritional Supplements (ENSURE HIGH PROTEIN) LIQD Take 1 Can by mouth 3 times daily (with meals) 11/20/19   Leamington Shires, DO   desvenlafaxine succinate (PRISTIQ) 50 MG TB24 extended release tablet Take 2 tablets by mouth daily 11/21/19   Leamington Shires, DO   ARIPiprazole (ABILIFY) 5 MG tablet Take 2 tablets by mouth daily 11/21/19   Leamington Shires, DO   megestrol (MEGACE) 40 MG tablet Take 1 tablet by mouth 2 times daily 11/20/19   Leamington Shires, DO   albuterol sulfate  (90 Base) MCG/ACT inhaler Inhale 2 puffs into the lungs every 6 hours 11/20/19   Leamington Shires, DO   fluticasone-vilanterol (BREO ELLIPTA) 100-25 MCG/INH AEPB inhaler Inhale 1 puff into the lungs daily 11/20/19   Leamington Shires, DO   Umeclidinium Bromide (INCRUSE ELLIPTA) 62.5 MCG/INH AEPB Inhale 1 puff into the lungs daily 11/20/19   Leamington Shires, DO   amitriptyline (ELAVIL) 25 MG tablet Take 2 tablets by mouth nightly 11/20/19   Leamington Shires, DO   Cholecalciferol (VITAMIN D3) 50 MCG (2000 UT) CAPS Take 2,000 Units by mouth daily    Historical Provider, MD   aspirin 81 MG tablet Take 81 mg by mouth daily    Historical Provider, MD   albuterol sulfate  (90 Base) MCG/ACT inhaler Inhale 2 puffs into the lungs 4 times daily Use spacer 6/27/19 7/27/19  Tristian Crooks,    ipratropium-albuterol (DUONEB) 0.5-2.5 (3) MG/3ML SOLN nebulizer solution Inhale 3 mLs into the lungs every 6 hours as needed for Shortness of Breath 6/27/19   Tristian Crooks DO   atorvastatin (LIPITOR) 10 MG tablet Take 1 tablet by mouth nightly 4/8/19   Bandar Malik MD   vitamin D (CHOLECALCIFEROL) 1000 UNIT TABS tablet Take 2,000 Units by mouth daily    Historical Provider, MD   MEGESTROL ACETATE PO Take 40 mg by mouth 2 times daily     Historical Provider, MD   Fluticasone Furoate-Vilanterol (BREO ELLIPTA) 200-25 MCG/INH AEPB Inhale 1 puff into the lungs daily    Historical Provider, MD   Multiple Vitamins-Minerals (THERAPEUTIC MULTIVITAMIN-MINERALS) tablet Take 1 tablet by mouth Daily with lunch  Patient not taking: Reported on 3/24/2021 12/7/18 3/29/19  Nanci Yi MD   amitriptyline (ELAVIL) 10 MG tablet Take 10 mg by mouth nightly    Historical Provider, MD   OXYGEN Inhale 2-4 L into the lungs nightly Nightly and prn    Historical Provider, MD   ARIPiprazole (ABILIFY) 10 MG tablet Take 10 mg by mouth daily    Historical Provider, MD   desvenlafaxine succinate (PRISTIQ) 50 MG TB24 extended release tablet Take 100 mg by mouth daily     Historical Provider, MD       Allergies:  Spiriva handihaler [tiotropium bromide monohydrate] and Spiriva handihaler [tiotropium bromide monohydrate]    Social History:      [facility] [address] [place] [address] Drive:   reports that she quit smoking about 2 years ago. Her smoking use included cigarettes. She started smoking about 59 years ago. She has a 112.00 pack-year smoking history. She has never used smokeless tobacco.  ETOH:   reports previous alcohol use. Family History:      Reviewed in detail for DM, CAD, Cancer, CVA. Positive as follows:        Problem Relation Age of Onset    Cancer Mother         mouth cancer    Other Father         rheumatic fever/rheumatic fever--paraplegia       Diet:  ADULT DIET; Regular    Constitutional: Negative for chills and fever. HENT: Negative for congestion and sore throat. Eyes: Negative for visual disturbance. Respiratory: Positive for chronic cough and acute on chronic shortness of breath. Cardiovascular: Endorses chest pain, but it appears to be musculoskeletal in nature. Gastrointestinal: Negative for abdominal pain and nausea. Genitourinary: Negative for dysuria. MSK: Right sided musculoskeletal chest pain. Reproducible with palpation. Worse with deep breath. Radiates around the side to her back. Skin: Negative for rash. Neurological: Negative for dizziness, light-headedness and headaches. Psychiatric/Behavioral: The patient is not nervous/anxious. PHYSICAL EXAM:    BP (!) 108/59   Pulse 118   Temp 101 °F (38.3 °C) (Oral)   Resp 21   Wt 98 lb 8 oz (44.7 kg)   SpO2 98%   BMI 19.24 kg/m²     Constitutional:       General: she is not in acute distress. Appearance: Normal appearance. HENT:      Head: Normocephalic. Right Ear: External ear normal.      Left Ear: External ear normal.      Nose: Nose normal.      Mouth/Throat:      Mouth: Mucous membranes are moist.   Eyes:      General: No scleral icterus. Extraocular Movements: Extraocular movements intact. Neck:      Musculoskeletal: Normal range of motion. Cardiovascular:      Rate and Rhythm: Tachycardia present with regular rhythm. Pulses: Normal pulses. Heart sounds: Normal heart sounds. Pulmonary:      Effort: Pulmonary effort is normal.      Breath sounds: Decreased breath sounds. No rhonchi or wheezes. No Rales. Abdominal:      General: Abdomen is flat. There is no distension. Palpations: Abdomen is soft. Musculoskeletal: Normal range of motion. No pain with palpation of hips. Pain with palpation of intercostal space on right rib 6. Palpation does not cause radiation towards back. Skin:     General: Skin is warm and dry. Very dry skin on lower extremities, but no edema or signs of DVT. Neurological:      Mental Status: she is alert. Motor: No weakness.    Psychiatric:         Mood and Affect: Mood normal.     Labs:     Recent Labs     12/04/21  1548   WBC 24.0*   HGB 11.5*   HCT 36.2*        Recent Labs     12/04/21  1548      K 3.3*   CL 91*   CO2 32   BUN 12   CREATININE 0.8   CALCIUM 9.3     Recent Labs     12/04/21  1547   AST 23   ALT 15   BILIDIR <0.2   BILITOT 0.5   ALKPHOS 77     No results for input(s): INR in the last 72 hours. No results for input(s): Adonica Hoose in the last 72 hours. Urinalysis:      Lab Results   Component Value Date    NITRU NEGATIVE 05/26/2021    WBCUA 5-10 11/16/2019    BACTERIA MANY 11/16/2019    RBCUA 0-2 11/16/2019    BLOODU NEGATIVE 05/26/2021    SPECGRAV 1.009 11/16/2019    GLUCOSEU NEGATIVE 05/26/2021       Intake & Output:  No intake/output data recorded. No intake/output data recorded. Radiology:     XR CHEST PORTABLE   Final Result      Right upper lung atelectasis/infiltrate. **This report has been created using voice recognition software. It may contain minor errors which are inherent in voice recognition technology. **      Final report electronically signed by Dr. Nory Hannah on 12/4/2021 4:09 PM               DVT prophylaxis: [x] Lovenox                                 [] SCDs                                 [] SQ Heparin                                 [] Encourage ambulation           [] Already on Anticoagulation    Code Status: Limited    Disposition:    [] Home       [] TCU       [] Rehab       [] Psych       [] SNF        [] [facility]       [x] Other-patient comes from [facility] in [place] and will likely return there on discharge      Thank you No primary care provider on file. for the opportunity to be involved in this patient's care.     Electronically signed by Bud Asher DO on 12/4/2021 at 8:41 PM Complex Repair And Dorsal Nasal Flap Text: The defect edges were debeveled with a #15 scalpel blade.  The primary defect was closed partially with a complex linear closure.  Given the location of the remaining defect, shape of the defect and the proximity to free margins a dorsal nasal flap was deemed most appropriate for complete closure of the defect.  Using a sterile surgical marker, an appropriate flap was drawn incorporating the defect and placing the expected incisions within the relaxed skin tension lines where possible.    The area thus outlined was incised deep to adipose tissue with a #15 scalpel blade.  The skin margins were undermined to an appropriate distance in all directions utilizing iris scissors.

## 2021-12-05 NOTE — PROGRESS NOTES
Hospitalist Progress Note      Patient:  Jesus Zaragoza    Unit/Bed:5K-21/021-A  YOB: 1945  MRN: [id number]   Acct: [de-identified]   PCP: No primary care provider on file. Date of Admission: 12/4/2021    Assessment/Plan:    1. Acute on chronic hypoxic respiratory failure  · Secondary to right upper lung pneumonia  · Baseline O2 3LNC, requiring 4LNC  12/5/21: Weaning down to baseline  2. Sepsis  · SIRS: Tachycardia, leukocytosis (WBC 24), tachypnea. CXR significant for right upper lobe infiltrate. · Pro-Antione 0.26, lactic acid 0.9  · 30 cc/kg fluid given  · Rocephin and azithromycin started  · Blood cultures pending, will follow results  · BMP and CBC in a.m.  3. Right upper lobe pneumonia  · CXR 12/4: Right upper lung atelectasis/infiltrate  · Rocephin and azithromycin started  12/5/21: C/w rocephin, plan 3 days of azithro 500mg  4. Weakness  · Secondary to chronic deconditioning in the setting of acute infection  · PT/OT consulted  · Dietitian consulted  5. History of COPD  · Controlled, not in acute exacerbation  · Denies wheezing, increased cough, or increased sputum production  · Symbicort scheduled and Albuterol as needed  6. History of tachycardia  · Noted since 2019  · Echo 4/2019: Normal LV size and systolic function, EF 96%. No LV wall motion abnormalities, normal wall thickness. Severe tricuspid regurgitation visualized. RV systolic pressure 40 mmHg. · 12/4 EKG shows normal sinus tachycardia with some PACs  · TSH/T4 ordered  7. Severe malnutrition  · Inpatient consult to dietitian  · Continue Megace  8. Deconditioning  · PT/OT consulted, appreciate evaluation and treatment  9. Hypokalemia  · K 3.3 on arrival, given 40 mEq on admission  · Replacement protocol ordered  · BMP in a.m.  10. History of Schizoaffective disorder/MDD  · Stable  · Continue Abilify, Pristiq  11.  History of GERD  · Control  · Takes omeprazole at home, will start Protonix  12. History of CAD  · Stable  · Continue aspirin and Lipitor        Expected discharge date:  ? Disposition:    [x] Home       [] TCU       [] Rehab       [] Psych       [] SNF       [] [facility]       [] Other-    Chief Complaint: Weakness    Hospital Treatment Course: (Prior to today) \"70 y.o. female who presented to [address] [place] [address] with weakness. She states that she woke up this morning was having significant worsening of her chronic hip pain. She says that her weakness is her main concern, but she does realize that she has been more short of breath the last 2 days, especially with exertion. She is a resident at [facility] in [place].     She denies any worsening of her chronic cough. Denies sputum production. She denies any feelings of fever/chills.     She says that her tachycardia is chronic. She says she does not see a cardiologist.  Denies sensations of palpitations or chest pain. She does have some musculoskeletal rib pain on the right side, which radiates around posteriorly. It is worse with deep breaths and worsens with palpation. She describes as a sharp pain, which lasts only seconds.     She had mentioned recent fall to one of our ED nursing team, but denies it to me during admission. She says her right hip pain is chronic since she fell in January and was ultimately found to have a compression fracture of her spine. Her right hip pain is worse at this time because she has not been taking her Norco recently due to her assisted living having trouble refilling it.     She denies any abdominal pain to me at this time, but had mentioned it to ED provider. I informed her to let us know if it returns. \"    12/5/21: C/w abx, wean O2     Subjective (past 24 hours): Breathing is improved, weakness is improved but not yet back to baseline       Past medical history, family history, social history and allergies reviewed again and is unchanged since admission. ROS (12 point review of systems completed. Pertinent positives noted. Otherwise ROS is negative)     Medications:  Reviewed    Infusion Medications    sodium chloride       Scheduled Medications    sodium chloride flush  5-40 mL IntraVENous 2 times per day    enoxaparin  40 mg SubCUTAneous Q24H    cefTRIAXone (ROCEPHIN) IV  1,000 mg IntraVENous Q24H    azithromycin  500 mg IntraVENous Q24H    potassium chloride  40 mEq Oral Once    ARIPiprazole  10 mg Oral Daily    aspirin  81 mg Oral Daily    atorvastatin  10 mg Oral Nightly    desvenlafaxine succinate  100 mg Oral Daily    budesonide-formoterol  2 puff Inhalation BID    megestrol  40 mg Oral BID    melatonin  3 mg Oral Nightly    pantoprazole  40 mg Oral QAM AC     PRN Meds: sodium chloride flush, sodium chloride, ondansetron **OR** ondansetron, polyethylene glycol, acetaminophen **OR** acetaminophen, potassium chloride **OR** potassium alternative oral replacement **OR** potassium chloride, albuterol sulfate HFA, benzonatate, bisacodyl, hydrOXYzine      Intake/Output Summary (Last 24 hours) at 12/5/2021 1836  Last data filed at 12/5/2021 1336  Gross per 24 hour   Intake 530 ml   Output 0 ml   Net 530 ml       Diet:  ADULT DIET; Regular    Exam:  BP (!) 107/58   Pulse 107   Temp 97.9 °F (36.6 °C) (Oral)   Resp 18   Wt 98 lb 8 oz (44.7 kg)   SpO2 94%   BMI 19.24 kg/m²   General appearance:  No apparent distress, appears stated age and cooperative. HEENT: Pupils equal, round, and reactive to light. Conjunctivae/corneas clear. Neck: Supple, with full range of motion. No jugular venous distention. Trachea midline. Respiratory:  Normal respiratory effort. Clear to auscultation, bilaterally without Rales/Wheezes/Rhonchi. Cardiovascular: Regular rate and rhythm with normal S1/S2 without murmurs, rubs or gallops. Abdomen: Soft, non-tender, non-distended with normal bowel sounds.   Musculoskeletal: passive and active ROM x 4 extremities. Skin: Skin color, texture, turgor normal.  No rashes or lesions. Neurologic:  Neurovascularly intact without any focal sensory/motor deficits. Cranial nerves: II-XII intact, grossly non-focal.  Psychiatric: Alert and oriented, thought content appropriate, normal insight  Capillary Refill: Brisk,< 3 seconds   Peripheral Pulses: +2 palpable, equal bilaterally     Labs:   Recent Labs     12/04/21  1548 12/05/21  0624   WBC 24.0* 19.1*   HGB 11.5* 10.3*   HCT 36.2* 32.8*    279     Recent Labs     12/04/21  1548 12/05/21  0624    142   K 3.3* 4.1   CL 91* 99   CO2 32 32   BUN 12 12   CREATININE 0.8 0.7   CALCIUM 9.3 9.1     Recent Labs     12/04/21  1547   AST 23   ALT 15   BILIDIR <0.2   BILITOT 0.5   ALKPHOS 77     No results for input(s): INR in the last 72 hours. No results for input(s): Yahir Merchant in the last 72 hours. Microbiology:    Blood culture #1:   Lab Results   Component Value Date    BC No growth-preliminary  12/04/2021    BC No growth-preliminary  12/04/2021       Blood culture #2:No results found for: Mehdi Murillo    Organism:  Lab Results   Component Value Date    ORG Micrococcus luteus 12/17/2019       No results found for: LABGRAM    MRSA culture only:No results found for: [address] [place] [address]    Urine culture:   Lab Results   Component Value Date    LABURIN [place] count: >100,000 CFU/mL  11/16/2019       Respiratory culture: No results found for: CULTRESP    Aerobic and Anaerobic :  No results found for: LABAERO  No results found for: LABANAE    Urinalysis:      Lab Results   Component Value Date    NITRU NEGATIVE 05/26/2021    WBCUA 5-10 11/16/2019    BACTERIA MANY 11/16/2019    RBCUA 0-2 11/16/2019    BLOODU NEGATIVE 05/26/2021    SPECGRAV 1.009 11/16/2019    GLUCOSEU NEGATIVE 05/26/2021       Radiology:  XR CHEST PORTABLE   Final Result      Right upper lung atelectasis/infiltrate. **This report has been created using voice recognition software.  It may contain minor errors which are inherent in voice recognition technology. **      Final report electronically signed by Dr. Tyree Sparks on 12/4/2021 4:09 PM        XR CHEST PORTABLE    Result Date: 12/4/2021  PROCEDURE: XR CHEST PORTABLE CLINICAL INFORMATION: Shortness of breath TECHNIQUE: Mobile AP chest radiograph. COMPARISON: Mobile AP chest radiograph 5/26/2021 FINDINGS: The patient is rotated. Given technique, cardiomediastinal silhouette is likely within normal limits. There are indistinct opacities in the right upper lung. Degenerative changes in the thoracic spine are poorly visualized. Right upper lung atelectasis/infiltrate. **This report has been created using voice recognition software. It may contain minor errors which are inherent in voice recognition technology. ** Final report electronically signed by Dr. Tyree Sparks on 12/4/2021 4:09 PM          DVT prophylaxis: [x] Lovenox                                 [] SCDs                                 [] SQ Heparin                                 [] Encourage ambulation           [] Already on Anticoagulation     Code Status: Limited    Tele:   [] yes             [x] no    Electronically signed by Trey Barraza DO on 12/5/2021 at 6:36 PM

## 2021-12-05 NOTE — PROGRESS NOTES
Patient provided with education on incentive spirometer at this time. Return demonstration provided. Encouraged to use 10x an hour.

## 2021-12-05 NOTE — ED NOTES
Bed: 029A  Expected date:   Expected time:   Means of arrival:   Comments:  Room [address] [place] [address], [address] [place] [address]  12/04/21 [id number]

## 2021-12-05 NOTE — ED NOTES
Pt resting on cot with eyes closed. Pt respirations even and unlabored. Posey alarm in place.      Laura Dolan RN  12/04/21 [id number]

## 2021-12-06 LAB
ERYTHROCYTE [DISTWIDTH] IN BLOOD BY AUTOMATED COUNT: 16.4 % (ref 11.5–14.5)
ERYTHROCYTE [DISTWIDTH] IN BLOOD BY AUTOMATED COUNT: 50.7 FL (ref 35–45)
HCT VFR BLD CALC: 32.2 % (ref 37–47)
HEMOGLOBIN: 10.4 GM/DL (ref 12–16)
MCH RBC QN AUTO: 27.4 PG (ref 26–33)
MCHC RBC AUTO-ENTMCNC: 32.3 GM/DL (ref 32.2–35.5)
MCV RBC AUTO: 85 FL (ref 81–99)
PLATELET # BLD: 316 THOU/MM3 (ref 130–400)
PMV BLD AUTO: 10 FL (ref 9.4–12.4)
RBC # BLD: 3.79 MILL/MM3 (ref 4.2–5.4)
WBC # BLD: 21.3 THOU/MM3 (ref 4.8–10.8)

## 2021-12-06 PROCEDURE — 36415 COLL VENOUS BLD VENIPUNCTURE: CPT

## 2021-12-06 PROCEDURE — 85027 COMPLETE CBC AUTOMATED: CPT

## 2021-12-06 PROCEDURE — 6360000002 HC RX W HCPCS: Performed by: INTERNAL MEDICINE

## 2021-12-06 PROCEDURE — 2580000003 HC RX 258: Performed by: STUDENT IN AN ORGANIZED HEALTH CARE EDUCATION/TRAINING PROGRAM

## 2021-12-06 PROCEDURE — 2580000003 HC RX 258: Performed by: INTERNAL MEDICINE

## 2021-12-06 PROCEDURE — 6370000000 HC RX 637 (ALT 250 FOR IP): Performed by: STUDENT IN AN ORGANIZED HEALTH CARE EDUCATION/TRAINING PROGRAM

## 2021-12-06 PROCEDURE — 97530 THERAPEUTIC ACTIVITIES: CPT

## 2021-12-06 PROCEDURE — 94640 AIRWAY INHALATION TREATMENT: CPT

## 2021-12-06 PROCEDURE — 97110 THERAPEUTIC EXERCISES: CPT

## 2021-12-06 PROCEDURE — 99233 SBSQ HOSP IP/OBS HIGH 50: CPT | Performed by: INTERNAL MEDICINE

## 2021-12-06 PROCEDURE — 97166 OT EVAL MOD COMPLEX 45 MIN: CPT

## 2021-12-06 PROCEDURE — 1200000000 HC SEMI PRIVATE

## 2021-12-06 PROCEDURE — 6360000002 HC RX W HCPCS: Performed by: STUDENT IN AN ORGANIZED HEALTH CARE EDUCATION/TRAINING PROGRAM

## 2021-12-06 PROCEDURE — 94760 N-INVAS EAR/PLS OXIMETRY 1: CPT

## 2021-12-06 RX ADMIN — ENOXAPARIN SODIUM 40 MG: 100 INJECTION SUBCUTANEOUS at 19:53

## 2021-12-06 RX ADMIN — BUDESONIDE AND FORMOTEROL FUMARATE DIHYDRATE 2 PUFF: 160; 4.5 AEROSOL RESPIRATORY (INHALATION) at 18:38

## 2021-12-06 RX ADMIN — SODIUM CHLORIDE, PRESERVATIVE FREE 10 ML: 5 INJECTION INTRAVENOUS at 19:53

## 2021-12-06 RX ADMIN — ATORVASTATIN CALCIUM 10 MG: 10 TABLET, FILM COATED ORAL at 19:54

## 2021-12-06 RX ADMIN — DESVENLAFAXINE SUCCINATE 100 MG: 100 TABLET, FILM COATED, EXTENDED RELEASE ORAL at 08:58

## 2021-12-06 RX ADMIN — ARIPIPRAZOLE 10 MG: 10 TABLET ORAL at 08:58

## 2021-12-06 RX ADMIN — PANTOPRAZOLE SODIUM 40 MG: 40 TABLET, DELAYED RELEASE ORAL at 06:18

## 2021-12-06 RX ADMIN — AZITHROMYCIN MONOHYDRATE 500 MG: 500 INJECTION, POWDER, LYOPHILIZED, FOR SOLUTION INTRAVENOUS at 20:50

## 2021-12-06 RX ADMIN — Medication 3 MG: at 19:54

## 2021-12-06 RX ADMIN — ACETAMINOPHEN 650 MG: 325 TABLET ORAL at 19:53

## 2021-12-06 RX ADMIN — MEGESTROL ACETATE 40 MG: 40 TABLET ORAL at 19:54

## 2021-12-06 RX ADMIN — SODIUM CHLORIDE, PRESERVATIVE FREE 10 ML: 5 INJECTION INTRAVENOUS at 08:58

## 2021-12-06 RX ADMIN — MEGESTROL ACETATE 40 MG: 40 TABLET ORAL at 08:58

## 2021-12-06 RX ADMIN — CEFTRIAXONE SODIUM 1000 MG: 1 INJECTION, POWDER, FOR SOLUTION INTRAMUSCULAR; INTRAVENOUS at 19:53

## 2021-12-06 RX ADMIN — BUDESONIDE AND FORMOTEROL FUMARATE DIHYDRATE 2 PUFF: 160; 4.5 AEROSOL RESPIRATORY (INHALATION) at 07:46

## 2021-12-06 RX ADMIN — ASPIRIN 81 MG: 81 TABLET, COATED ORAL at 08:58

## 2021-12-06 ASSESSMENT — PAIN DESCRIPTION - FREQUENCY: FREQUENCY: CONTINUOUS

## 2021-12-06 ASSESSMENT — PAIN DESCRIPTION - ONSET: ONSET: ON-GOING

## 2021-12-06 ASSESSMENT — PAIN DESCRIPTION - DESCRIPTORS: DESCRIPTORS: ACHING

## 2021-12-06 ASSESSMENT — PAIN DESCRIPTION - PROGRESSION: CLINICAL_PROGRESSION: NOT CHANGED

## 2021-12-06 ASSESSMENT — PAIN SCALES - GENERAL
PAINLEVEL_OUTOF10: 0
PAINLEVEL_OUTOF10: 7

## 2021-12-06 ASSESSMENT — PAIN DESCRIPTION - PAIN TYPE: TYPE: ACUTE PAIN

## 2021-12-06 ASSESSMENT — PAIN DESCRIPTION - LOCATION: LOCATION: HIP;FLANK

## 2021-12-06 ASSESSMENT — PAIN DESCRIPTION - ORIENTATION: ORIENTATION: RIGHT

## 2021-12-06 NOTE — PROGRESS NOTES
[facility]  INPATIENT OCCUPATIONAL THERAPY  [facility] ONC MED 5K  EVALUATION    Time:   Time In: 6832  Time Out: 1030  Timed Code Treatment Minutes: 28 Minutes  Minutes: 38    Date: 2021  Patient Name: Renée Wilson,   Gender: female      MRN: [id number]  : 1945  (67 y.o.)  Referring Practitioner: Dante Galindo DO  Diagnosis: Dehydration  Additional Pertinent Hx: Per H&P: \"70 y.o. female who presented to [address] [place] [address] with weakness. She states that she woke up this morning was having significant worsening of her chronic hip pain. She says that her weakness is her main concern, but she does realize that she has been more short of breath the last 2 days, especially with exertion. She is a resident at [facility] in [place]. She denies any worsening of her chronic cough. Denies sputum production. She denies any feelings of fever/chills. She says that her tachycardia is chronic. She says she does not see a cardiologist.  Denies sensations of palpitations or chest pain. She does have some musculoskeletal rib pain on the right side, which radiates around posteriorly. It is worse with deep breaths and worsens with palpation. She describes as a sharp pain, which lasts only seconds. She had mentioned recent fall to one of our ED nursing team, but denies it to me during admission. She says her right hip pain is chronic since she fell in January and was ultimately found to have a compression fracture of her spine. Her right hip pain is worse at this time because she has not been taking her Norco recently due to her assisted living having trouble refilling it. She denies any abdominal pain to me at this time, but had mentioned it to ED provider. I informed her to let us know if it returns. \"    Restrictions/Precautions:  Restrictions/Precautions: General Precautions    Subjective  Chart Reviewed: Yes, Orders, History and Physical  Patient assessed for rehabilitation services?: Yes  Family / Caregiver Present: No    Subjective: RN approved OT session, reports pt's breathing has improved, on 3L. Pt resting in bed upon arrival, agreeable to participation. Pleasant and cooperative. Pain:  Pain Assessment  Patient Currently in Pain: Denies    Vitals: Oxygen: >93% on 3L throughout session    Social/Functional History:  Lives With: Alone  Type of Home: Assisted living  Home Layout: One level  Home Access: Level entry  Home Equipment: 4 wheeled walker, Oxygen   Bathroom Shower/Tub: Walk-in shower, Shower chair with back  Bathroom Toilet: Standard       ADL Assistance: Independent  Homemaking Assistance: Independent  Homemaking Responsibilities: Yes  Ambulation Assistance: Independent  Transfer Assistance: Independent    Active : No  Patient's  Info: Daughter assists     Additional Comments: Daughter assists as needed. Pt uses 4WW for mobility at home, 3L O2 at baseline. VISION:WFL    HEARING:  WFL    COGNITION: Slow Processing, Decreased Problem Solving and Decreased Safety Awareness    RANGE OF MOTION:  Bilateral Upper Extremity:  WFL    STRENGTH:  Bilateral Upper Extremity:  grossly deconditioned    SENSATION:   WFL    ADL:   Grooming: Stand By Assistance. for hand hygiene and to fix hair standing sinkside with 1-2UE release. Pt endorses feeling \"a little wobbly\"   Lower Extremity Dressing: Stand By Assistance. to adjust B socks prior to transfer  Toileting: Stand By Assistance. including pericares and clothing management of brief while standing  Toilet Transfer: Stand By Assistance. with verbal cues for safe hand placement. BALANCE:  Sitting Balance:  Stand By Assistance. Standing Balance: Stand By Assistance, [employer].  pt requiring CGA due to unsteadiness while standing sinkside with 2UE release    BED MOBILITY:  Supine to Sit: Stand By Assistance  with HOB raised    TRANSFERS:  Sit to Stand:  [employer], cues for hand placement. Stand to Sit: [employer], cues for hand placement, to/from chair with arms. FUNCTIONAL MOBILITY:  Assistive Device: Rolling Walker  Assist Level:  Contact Guard Assistance. Distance: To and from bathroom  Pt with slow pace, no LOB, good safety awareness of O2 tubing. Education provided on safe walker tech and placement throughout activities      Exercise:  Patient completed BUE strengthening exercises with skilled education on HEP: completed x10 reps x1 set with a light resistance theraband in all joints and all planes in order to improve UE strength and activity tolerance required for BADL routine and toilet / shower transfers. Patient tolerated well, requiring brief rest breaks. Patient also required min visual and verbal cues for technique. Activity Tolerance:  Patient tolerance of  treatment: good. Pt O2 dependent, states she is back to her baseline level of O2 demands and SOB. Assessment:  Assessment: Patient presents with decreased endurance, balance, and safety awareness resulting in overall decreased participation in all self care, transfer, and mobility tasks. Pt currently required CGA for all mobility and SBA-CGA for self care tasks and would benefit from skilled OT services throughout admission and after discharge to increase safety and independence needed to return to [facility]. Without skilled OT services pt is at increased risk of falls and caregiver burden. Performance deficits / Impairments: Decreased functional mobility , Decreased endurance, Decreased ADL status, Decreased safe awareness, Decreased strength, Decreased balance  Prognosis: Fair  REQUIRES OT FOLLOW UP: Yes    Treatment Initiated: Treatment and education initiated within context of evaluation.   Evaluation time included review of current medical information, gathering information related to past medical, social and functional history, completion of standardized testing, formal and informal observation of tasks, assessment of data and development of plan of care and goals. Treatment time included skilled education and facilitation of tasks to increase safety and independence with ADL's for improved functional independence and quality of life. Discharge Recommendations:  Continue to assess pending progress, Patient would benefit from continued therapy after discharge    Patient Education:  OT Education: OT Role, Plan of Care, Transfer Training, Energy Conservation    Equipment Recommendations:  Equipment Needed:  (continue to monitor for needs)    Plan:  Times per week: 5x  Current Treatment Recommendations: Self-Care / ADL, Functional Mobility Training, Endurance Training, Balance Training, Strengthening. See long-term goal time frame for expected duration of plan of care. If no long-term goals established, a short length of stay is anticipated. Goals:  Patient goals : Did not state  Short term goals  Time Frame for Short term goals: By discharge  Short term goal 1: Pt to complete functional mobility to/from BR and HH distances with mod (I) and good safety awareness to increase indep in accessing environment. Short term goal 2: Patient to complete BADL routine including LB dressing with mod (I) to increase indep in ADL completion. Short term goal 3: Patient to tolerate dynamic standing task x5 minutes with (S) and 1-2UE release to increase balance and endurance needed during toileting task. Short term goal 4: Patient to complete BUE light resistive HEP with min cues for technique to increase strength and endurance needed for all mobility and ADL tasks. Following session, patient left in safe position with all fall risk precautions in place.

## 2021-12-06 NOTE — PROGRESS NOTES
PROGRESS NOTE      Patient:  Hank Carmona      Unit/Bed:5K-21/021-A    YOB: 1945    MRN: [id number]       Acct: [de-identified]     PCP: No primary care provider on file. Date of Admission: 12/4/2021      Assessment/Plan:  Acute on chronic hypoxic respiratory failure secondary to RUL PNA:  - CXR with RUL infiltrate noted. COVID, Rapid influenza negative. WBC 24. Procal mildly elevated 0.26. Baseline O2 requirement 3L. On admission, patient requiring 4L. - Started on Azithromycin and Rocephin on admission. Will continue.   - BCx preliminary negative x 2  - WBC slowly improving, 21.3 today  - CBC in the AM    Sepsis POA, resolved:  - Patient noted to be tachycardic, tachypneic on admission. WBC elevated. Lactic acid wnl CXR significant for RUL infiltrate.   - Given 30 cc/kg fluid given. Started on antibiotics as noted above  - CBC in the AM    Generalized weakness/deconditoning:  - PT/OT consulted    COPD, compensated:  - Continue with Symbicort     Sinus tachycardia:  - Noted history since 2019. Echo 4/2019: Normal LV size and systolic function, EF 06%. No LV wall motion abnormalities, normal wall thickness. Severe tricuspid regurgitation visualized. RV systolic pressure 40 mmHg. - EKG 12/4 shows normal sinus tachycardia with some PACs  - TSH/T4 unremarkable  - Consider Holter monitor on discharge    Severe malnutrition:  - Dietician consulted. Continue Megace    Hypokalemia, resolved:  - K 3.3 on arrival, given 40 mEq on admission  - K+ replacement protocol PRN    Schizoaffective disorder/MDD:  - Continue Abilify, Pristiq    GERD:  - Continue Protonix    CAD:  - Continue ASA and Lipitor      DVT prophylaxis: [x] Lovenox                                 [] SCDs                                 [] SQ Heparin                                 [] Encourage ambulation           [] Already on Anticoagulation     Disposition:  Pending clinical improvement.  PT/OT consulted given patient's medical deconditioning and generalized weaknesses. Chief Complaint: SOB    Hospital Course: As per H&P:    \"70 y.o. female who presented to [facility] with weakness. She states that she woke up this morning was having significant worsening of her chronic hip pain. She says that her weakness is her main concern, but she does realize that she has been more short of breath the last 2 days, especially with exertion. She is a resident at [facility] in [place].     She denies any worsening of her chronic cough. Denies sputum production. She denies any feelings of fever/chills.     She says that her tachycardia is chronic. She says she does not see a cardiologist.  Denies sensations of palpitations or chest pain. She does have some musculoskeletal rib pain on the right side, which radiates around posteriorly. It is worse with deep breaths and worsens with palpation. She describes as a sharp pain, which lasts only seconds.     She had mentioned recent fall to one of our ED nursing team, but denies it to me during admission. She says her right hip pain is chronic since she fell in January and was ultimately found to have a compression fracture of her spine. Her right hip pain is worse at this time because she has not been taking her Norco recently due to her assisted living having trouble refilling it.     She denies any abdominal pain to me at this time, but had mentioned it to ED provider. I informed her to let us know if it returns.     No other questions or complaints. \"       Subjective:   Patient seen and examined at bedside. States that she is feeling better since she has been admitted. Reports improvement in her shortness of breathing. Denies any chest pain, abdominal pain, N/V/D otherwise.      Medications:  Reviewed    Infusion Medications    sodium chloride       Scheduled Medications    sodium chloride flush  5-40 mL IntraVENous 2 times per day    enoxaparin  40 mg

## 2021-12-07 LAB
ANION GAP SERPL CALCULATED.3IONS-SCNC: 12 MEQ/L (ref 8–16)
BUN BLDV-MCNC: 13 MG/DL (ref 7–22)
CALCIUM IONIZED: 1.09 MMOL/L (ref 1.12–1.32)
CALCIUM SERPL-MCNC: 9.1 MG/DL (ref 8.5–10.5)
CHLORIDE BLD-SCNC: 98 MEQ/L (ref 98–111)
CO2: 28 MEQ/L (ref 23–33)
CREAT SERPL-MCNC: 0.6 MG/DL (ref 0.4–1.2)
ERYTHROCYTE [DISTWIDTH] IN BLOOD BY AUTOMATED COUNT: 16.5 % (ref 11.5–14.5)
ERYTHROCYTE [DISTWIDTH] IN BLOOD BY AUTOMATED COUNT: 50.9 FL (ref 35–45)
GFR SERPL CREATININE-BSD FRML MDRD: > 90 ML/MIN/1.73M2
GLUCOSE BLD-MCNC: 94 MG/DL (ref 70–108)
HCT VFR BLD CALC: 35.9 % (ref 37–47)
HEMOGLOBIN: 11.1 GM/DL (ref 12–16)
MAGNESIUM: 1.9 MG/DL (ref 1.6–2.4)
MCH RBC QN AUTO: 26.3 PG (ref 26–33)
MCHC RBC AUTO-ENTMCNC: 30.9 GM/DL (ref 32.2–35.5)
MCV RBC AUTO: 85.1 FL (ref 81–99)
PHOSPHORUS: 2.6 MG/DL (ref 2.4–4.7)
PLATELET # BLD: 334 THOU/MM3 (ref 130–400)
PMV BLD AUTO: 9.4 FL (ref 9.4–12.4)
POTASSIUM SERPL-SCNC: 3.6 MEQ/L (ref 3.5–5.2)
RBC # BLD: 4.22 MILL/MM3 (ref 4.2–5.4)
SODIUM BLD-SCNC: 138 MEQ/L (ref 135–145)
WBC # BLD: 14.8 THOU/MM3 (ref 4.8–10.8)

## 2021-12-07 PROCEDURE — 82330 ASSAY OF CALCIUM: CPT

## 2021-12-07 PROCEDURE — 83735 ASSAY OF MAGNESIUM: CPT

## 2021-12-07 PROCEDURE — 97530 THERAPEUTIC ACTIVITIES: CPT

## 2021-12-07 PROCEDURE — 6370000000 HC RX 637 (ALT 250 FOR IP): Performed by: STUDENT IN AN ORGANIZED HEALTH CARE EDUCATION/TRAINING PROGRAM

## 2021-12-07 PROCEDURE — 80048 BASIC METABOLIC PNL TOTAL CA: CPT

## 2021-12-07 PROCEDURE — 2580000003 HC RX 258: Performed by: STUDENT IN AN ORGANIZED HEALTH CARE EDUCATION/TRAINING PROGRAM

## 2021-12-07 PROCEDURE — 84100 ASSAY OF PHOSPHORUS: CPT

## 2021-12-07 PROCEDURE — 94640 AIRWAY INHALATION TREATMENT: CPT

## 2021-12-07 PROCEDURE — 85027 COMPLETE CBC AUTOMATED: CPT

## 2021-12-07 PROCEDURE — 94760 N-INVAS EAR/PLS OXIMETRY 1: CPT

## 2021-12-07 PROCEDURE — 1200000000 HC SEMI PRIVATE

## 2021-12-07 PROCEDURE — 97535 SELF CARE MNGMENT TRAINING: CPT

## 2021-12-07 PROCEDURE — 97116 GAIT TRAINING THERAPY: CPT

## 2021-12-07 PROCEDURE — 36415 COLL VENOUS BLD VENIPUNCTURE: CPT

## 2021-12-07 PROCEDURE — 2700000000 HC OXYGEN THERAPY PER DAY

## 2021-12-07 PROCEDURE — 6360000002 HC RX W HCPCS: Performed by: STUDENT IN AN ORGANIZED HEALTH CARE EDUCATION/TRAINING PROGRAM

## 2021-12-07 PROCEDURE — 99233 SBSQ HOSP IP/OBS HIGH 50: CPT | Performed by: INTERNAL MEDICINE

## 2021-12-07 PROCEDURE — 97162 PT EVAL MOD COMPLEX 30 MIN: CPT

## 2021-12-07 RX ORDER — PREDNISONE 20 MG/1
40 TABLET ORAL DAILY
Status: DISCONTINUED | OUTPATIENT
Start: 2021-12-07 | End: 2021-12-08 | Stop reason: HOSPADM

## 2021-12-07 RX ORDER — SODIUM CHLORIDE 9 MG/ML
INJECTION, SOLUTION INTRAVENOUS CONTINUOUS
Status: DISCONTINUED | OUTPATIENT
Start: 2021-12-07 | End: 2021-12-08 | Stop reason: HOSPADM

## 2021-12-07 RX ADMIN — PREDNISONE 40 MG: 20 TABLET ORAL at 13:38

## 2021-12-07 RX ADMIN — SODIUM CHLORIDE: 9 INJECTION, SOLUTION INTRAVENOUS at 23:46

## 2021-12-07 RX ADMIN — ASPIRIN 81 MG: 81 TABLET, COATED ORAL at 10:02

## 2021-12-07 RX ADMIN — ATORVASTATIN CALCIUM 10 MG: 10 TABLET, FILM COATED ORAL at 20:38

## 2021-12-07 RX ADMIN — ARIPIPRAZOLE 10 MG: 10 TABLET ORAL at 10:02

## 2021-12-07 RX ADMIN — ALBUTEROL SULFATE 2 PUFF: 90 AEROSOL, METERED RESPIRATORY (INHALATION) at 04:21

## 2021-12-07 RX ADMIN — ENOXAPARIN SODIUM 40 MG: 100 INJECTION SUBCUTANEOUS at 20:41

## 2021-12-07 RX ADMIN — MEGESTROL ACETATE 40 MG: 40 TABLET ORAL at 10:02

## 2021-12-07 RX ADMIN — BUDESONIDE AND FORMOTEROL FUMARATE DIHYDRATE 2 PUFF: 160; 4.5 AEROSOL RESPIRATORY (INHALATION) at 06:30

## 2021-12-07 RX ADMIN — MEGESTROL ACETATE 40 MG: 40 TABLET ORAL at 20:38

## 2021-12-07 RX ADMIN — BUDESONIDE AND FORMOTEROL FUMARATE DIHYDRATE 2 PUFF: 160; 4.5 AEROSOL RESPIRATORY (INHALATION) at 18:28

## 2021-12-07 RX ADMIN — HYDROXYZINE HYDROCHLORIDE 10 MG: 10 TABLET ORAL at 22:48

## 2021-12-07 RX ADMIN — Medication 3 MG: at 22:48

## 2021-12-07 RX ADMIN — DESVENLAFAXINE SUCCINATE 100 MG: 100 TABLET, FILM COATED, EXTENDED RELEASE ORAL at 10:02

## 2021-12-07 RX ADMIN — CEFTRIAXONE SODIUM 1000 MG: 1 INJECTION, POWDER, FOR SOLUTION INTRAMUSCULAR; INTRAVENOUS at 20:43

## 2021-12-07 RX ADMIN — SODIUM CHLORIDE, PRESERVATIVE FREE 10 ML: 5 INJECTION INTRAVENOUS at 10:02

## 2021-12-07 RX ADMIN — SODIUM CHLORIDE: 9 INJECTION, SOLUTION INTRAVENOUS at 13:38

## 2021-12-07 ASSESSMENT — PAIN SCALES - GENERAL: PAINLEVEL_OUTOF10: 0

## 2021-12-07 NOTE — PROGRESS NOTES
[address] [place] [address]  INPATIENT PHYSICAL THERAPY  EVALUATION  [facility] ONC MED 5K - 4F-54/577-V    Time In: 0969  Time Out: 1450  Timed Code Treatment Minutes: 17 Minutes  Minutes: 25          Date: 2021  Patient Name: Stu Grey,  Gender:  female        MRN: [id number]  : 1945  (67 y.o.)      Referring Practitioner: Bud Asher DO  Diagnosis: Dehydration  Additional Pertinent Hx: Per H&P : 67 y.o. female who presented to [address] [place] [address] with weakness. She states that she woke up this morning was having significant worsening of her chronic hip pain. She says that her weakness is her main concern, but she does realize that she has been more short of breath the last 2 days, especially with exertion. She is a resident at [facility] in [place]. She denies any worsening of her chronic cough. Denies sputum production. She denies any feelings of fever/chills. She says that her tachycardia is chronic. She says she does not see a cardiologist.  Denies sensations of palpitations or chest pain. She does have some musculoskeletal rib pain on the right side, which radiates around posteriorly. It is worse with deep breaths and worsens with palpation. She describes as a sharp pain, which lasts only seconds. She had mentioned recent fall to one of our ED nursing team, but denies it to me during admission. She says her right hip pain is chronic since she fell in January and was ultimately found to have a compression fracture of her spine. Her right hip pain is worse at this time because she has not been taking her Norco recently due to her assisted living having trouble refilling it. Pt has a PMH of COPD, malnutrition, and TIA.      Restrictions/Precautions:  Restrictions/Precautions: Contact Precautions, Fall Risk  Position Activity Restriction  Other position/activity restrictions: monitor HR    Subjective:  Chart Reviewed: Yes  Patient assessed for rehabilitation services?: Yes  Family / Caregiver Present: No  Subjective: RN approved session. Pt in bed, pleasantly agreeable for PT eval. Pt states she does well at assisted living, and is able to manage her O2 tubing. Pt states she has a cat. Pt agreeable for continued PT, and states she does think she needs to work on her balance and strength. General:  Overall Orientation Status: Within Functional Limits  Follows Commands: Within Functional Limits    Vision: Impaired  Vision Exceptions: Wears glasses at all times    Hearing: Within functional limits       Pain: denies    Vitals: Blood Pressure: 139/60  Oxygen: 91% baseline, drop to 87% following ambulation, increase to 93% with seated rest break and cues for deep braething. Pt 93% chay conclusion of session   Heart Rate: 130 bpm at rest, increase to 140' with mobility, recoverd to 134 at end of session   *Pt on 4L NC  *RN, aware of pt's elevated HR, approved remaining session. States this is why pt is on fluids. When asked pt does not state that it feels like her heart is racing. Social/Functional History:    Lives With: Alone  Type of Home: Assisted living  Home Layout: One level  Home Access: Level entry  Home Equipment: 4 wheeled walker, Oxygen     Bathroom Shower/Tub: Walk-in shower, Shower chair with back  [address]: [address]: Independent  Homemaking Responsibilities: Yes  Ambulation Assistance: Independent  Transfer Assistance: Independent    Active : No     Additional Comments: Daughter assists as needed. Pt uses 4WW for mobility at home, 3L O2 at baseline. Pt states she has had one fall in the past 6 mo.     OBJECTIVE:  Range of Motion:  Bilateral Lower Extremity: WFL    Strength:  Bilateral Lower Extremity: Impaired - Hip flexion 4/5, knee extension 4+/5, knee flexion 4+/5, DF 5/5, PF 5/5  *minor proximal muscle weakness    Balance:  Dynamic Sitting Balance: Stand By Assistance  Static Standing Balance: Contact Guard Assistance  Dynamic Standing Balance: Minimal Assistance   *Min A at times with Tinetti    Bed Mobility:  Supine to Sit: Contact Guard Assistance, with head of bed raised, with rail, with increased time for completion  Sit to Supine: Stand By Assistance, with head of bed raised, with rail, with increased time for completion    *HOB ~40 degrees  *CGA with supine to sit to guide B LE off the edge of the bed     Transfers:  Sit to Stand: [employer], cues for hand placement  Stand to Sit:Contact Guard Assistance, cues for hand placement  *CGA for stability. Pt requires increased cues for safe hand placement and is observed to have min carryover with this. Ambulation:  Contact Guard Assistance, with verbal cues   Distance: 30'  Surface: Level Tile  Device:Rolling Walker  Gait Deviations: Forward Flexed Posture, Slow Liz, Decreased Step Length Bilaterally, Decreased Heel Strike Bilaterally, Narrow Base of Support, Mild Path Deviations and Unsteady Gait  *Verbal cues for upright posture, and to sequence change in direction to manage O2 tubing. CGA to stabilize unsteadiness. Functional Outcome Measures: Completed  Balance Score: 9  Gait Score: 6  Tinetti Total Score: 15/28 with high fall risk  -PAC Inpatient Mobility Raw Score : 17  AM-PAC Inpatient T-Scale Score : 42.13     Risk Indicators:  Less than/equal to 18 = high risk  19-23 Moderate risk  Greater than/equal to 24 = low risk    ASSESSMENT:  Activity Tolerance:  Patient tolerance of  treatment: fair. Increased HR at rest and with mobility, one minor drop in SpO2 with activity requiring seated rest break and verbal cues for deep breathing to recover. Pt requiring verbal cues and CGA for safety with mobility. Treatment Initiated: Treatment and education initiated within context of evaluation.   Evaluation time included review of current medical information, gathering information related to past medical, social and functional history, completion of standardized testing, formal and informal observation of tasks, assessment of data and development of plan of care and goals. Treatment time included skilled education and facilitation of tasks to increase safety and independence with functional mobility for improved independence and quality of life. Assessment: Body structures, Functions, Activity limitations: Decreased functional mobility , Decreased balance, Decreased strength, Decreased safe awareness, Decreased endurance  Assessment: This patient is a 67 y.o. who presents with dehydration. This is a decline from the patient's baseline status of mod I with 4ww and living in assisted living. The pt scored a 15/28 on the Tinetti indicating a high fall risk. The patient is observed to have deficits in strength, balance, activity tolerance and safety awareness and would benefit from skilled PT services to progress functional mobility, safety awareness, and to decrease overall risk of falls. PT recommending continued use of 4ww, 24/7 assistance, and continued therapy to improved overall safety and decrease risk of falls.      Prognosis: Fair    REQUIRES PT FOLLOW UP: Yes    Discharge Recommendations:  Discharge Recommendations: Continue to assess pending progress, Patient would benefit from continued therapy after discharge, 24 hour supervision or assist    Patient Education:  PT Education: Goals, Plan of Care, Transfer Training, Functional Mobility Training, General Safety, Gait Training    Equipment Recommendations:  Equipment Needed: No (next level of care should provide, continue to assess needs)    Plan:  Times per week: 5x GM  Current Treatment Recommendations: Strengthening, Neuromuscular Re-education, Home Exercise Program, Safety Education & Training, Balance Training, Endurance Training, Patient/Caregiver Education & Training, Functional Mobility Training, Transfer Training, Gait Training    Goals:  Patient goals : to be less wobbly and have better balance  Short term goals  Time Frame for Short term goals: By hospital d/c  Short term goal 1: Pt to complete supine <->sit mod I for ability to get out of bed with ease  Short term goal 2: Pt to complete sit <->stand with LRAD and S for ability to get out of a chair safely  Short term goal 3: Pt to ambulate >=30' with LRAD and S for ability to progress towards within home ambulation  Short term goal 4: Pt to improve Tinetti score to >=19/28 to progress towards the moderate fall risk category. Long term goals  Time Frame for Long term goals : NA due to short ELOS    Following session, patient left in safe position with all fall risk precautions in place.

## 2021-12-07 NOTE — PROGRESS NOTES
Pristiq    GERD:  - Continue Protonix    CAD:  - Continue ASA and Lipitor      DVT prophylaxis: [x] Lovenox                                 [] SCDs                                 [] SQ Heparin                                 [] Encourage ambulation           [] Already on Anticoagulation     Disposition:  Pending clinical improvement. PT/OT consulted given patient's medical deconditioning and generalized weaknesses. Continues to feel weak and dizzy with standing today    Chief Complaint: SOB    Hospital Course: As per H&P:    \"70 y.o. female who presented to [address] [place] [address] with weakness. She states that she woke up this morning was having significant worsening of her chronic hip pain. She says that her weakness is her main concern, but she does realize that she has been more short of breath the last 2 days, especially with exertion. She is a resident at [facility] in [place].     She denies any worsening of her chronic cough. Denies sputum production. She denies any feelings of fever/chills.     She says that her tachycardia is chronic. She says she does not see a cardiologist.  Denies sensations of palpitations or chest pain. She does have some musculoskeletal rib pain on the right side, which radiates around posteriorly. It is worse with deep breaths and worsens with palpation. She describes as a sharp pain, which lasts only seconds.     She had mentioned recent fall to one of our ED nursing team, but denies it to me during admission. She says her right hip pain is chronic since she fell in January and was ultimately found to have a compression fracture of her spine. Her right hip pain is worse at this time because she has not been taking her Norco recently due to her assisted living having trouble refilling it.     She denies any abdominal pain to me at this time, but had mentioned it to ED provider.   I informed her to let us know if it returns.     No other questions or complaints. \"       Subjective:   Patient seen and examined at bedside. States that she is feeling better since she has been admitted, but not to baseline. Reports improvement in her shortness of breathing. Denies any chest pain, abdominal pain, N/V/D otherwise. Continues to feel weak and dizzy with standing today and doesn't feel like she would be safe at home due to living alone. Medications:  Reviewed    Infusion Medications    sodium chloride       Scheduled Medications    sodium chloride flush  5-40 mL IntraVENous 2 times per day    enoxaparin  40 mg SubCUTAneous Q24H    cefTRIAXone (ROCEPHIN) IV  1,000 mg IntraVENous Q24H    potassium chloride  40 mEq Oral Once    ARIPiprazole  10 mg Oral Daily    aspirin  81 mg Oral Daily    atorvastatin  10 mg Oral Nightly    desvenlafaxine succinate  100 mg Oral Daily    budesonide-formoterol  2 puff Inhalation BID    megestrol  40 mg Oral BID    melatonin  3 mg Oral Nightly    pantoprazole  40 mg Oral QAM AC     PRN Meds: sodium chloride flush, sodium chloride, ondansetron **OR** ondansetron, polyethylene glycol, acetaminophen **OR** acetaminophen, potassium chloride **OR** potassium alternative oral replacement **OR** potassium chloride, albuterol sulfate HFA, benzonatate, bisacodyl, hydrOXYzine      Intake/Output Summary (Last 24 hours) at 12/7/2021 0755  Last data filed at 12/6/2021 1542  Gross per 24 hour   Intake 850 ml   Output 0 ml   Net 850 ml       Diet:  ADULT DIET; Regular  ADULT ORAL NUTRITION SUPPLEMENT; Breakfast, Lunch, Dinner; Standard High Calorie/High Protein Oral Supplement    Exam:  /63   Pulse 111   Temp 98 °F (36.7 °C) (Oral)   Resp 24   Wt 98 lb 8 oz (44.7 kg)   SpO2 91%   BMI 19.24 kg/m²     General appearance: Chronically ill-appearing female, frail, in NAD. HEENT: Conjunctivae/corneas clear. Neck: Supple, with full range of motion. No jugular venous distention. Trachea midline. Respiratory: On her baseline 3LNC. Wheezing. Decreased breath sounds at bases. Cardiovascular: Tachycardia noted, normal rhythm. Abdomen: Soft, non-tender, non-distended with normal bowel sounds. Musculoskeletal: passive and active ROM x 4 extremities. Skin: Skin color, texture, turgor normal.  No rashes or lesions. Neurologic:  Neurovascularly intact without any focal sensory/motor deficits. Psychiatric: Alert and oriented, thought content appropriate, normal insight  Peripheral Pulses: +2 palpable, equal bilaterally       Labs:   Recent Labs     12/05/21  0624 12/06/21  0535 12/07/21  0522   WBC 19.1* 21.3* 14.8*   HGB 10.3* 10.4* 11.1*   HCT 32.8* 32.2* 35.9*    316 334     Recent Labs     12/04/21  1548 12/05/21  0624 12/07/21  0522    142 138   K 3.3* 4.1 3.6   CL 91* 99 98   CO2 32 32 28   BUN 12 12 13   CREATININE 0.8 0.7 0.6   CALCIUM 9.3 9.1 9.1   PHOS  --   --  2.6     Recent Labs     12/04/21  1547   AST 23   ALT 15   BILIDIR <0.2   BILITOT 0.5   ALKPHOS 77     No results for input(s): INR in the last 72 hours. No results for input(s): Nagi Shorts in the last 72 hours. Urinalysis:      Lab Results   Component Value Date    NITRU NEGATIVE 05/26/2021    WBCUA 5-10 11/16/2019    BACTERIA MANY 11/16/2019    RBCUA 0-2 11/16/2019    BLOODU NEGATIVE 05/26/2021    SPECGRAV 1.009 11/16/2019    GLUCOSEU NEGATIVE 05/26/2021       Radiology:  XR CHEST PORTABLE   Final Result      Right upper lung atelectasis/infiltrate. **This report has been created using voice recognition software. It may contain minor errors which are inherent in voice recognition technology. **      Final report electronically signed by Dr. Tamra Cabrera on 12/4/2021 4:09 PM          Diet: ADULT DIET;  Regular  ADULT ORAL NUTRITION SUPPLEMENT; Breakfast, Lunch, Dinner; Standard High Calorie/High Protein Oral Supplement    Code Status: Limited    PT/OT Eval Status: Consulted      Electronically signed by Tristian Leary MD on 12/7/2021 at 7:55 AM

## 2021-12-07 NOTE — PROGRESS NOTES
Comprehensive Nutrition Assessment    Type and Reason for Visit:  Initial, Consult    Nutrition Recommendations/Plan:   1. Continue Regular Diet. 2. Pt agreeable to continue Ensure Enlive with each meal.   3. Pt is on 40 mg BID of megace since at least March. Pt states that she has had no appetite \"for months\". Talked with pharmacist. Increase to 400 mg daily? 4. Encouraged pt to continue all of her home vitamin/mineral supplementation after she is discharged. Nutrition Assessment:   Pt. Is severely malnourished AEB criteria as listed below. Pt is at risk for further nutrition compromise r/t recent respiratory failure, pneumonia/sepsis, weakness, hypokalemia, and underlying medical conditions (hx of COPD, tachycardia, Schizoaffective Disorder, GERD, CAD, Severe Malnutrition). Nutrition recommendations/interventions as per above. Malnutrition Assessment:  Malnutrition Status:  Severe malnutrition    Context:  Acute Illness     Findings of the 6 clinical characteristics of malnutrition:  Energy Intake:  7 - 50% or less of estimated energy requirements for 5 or more days  Weight Loss:  Unable to assess     Body Fat Loss:  7 - Severe subcutaneous body fat loss Orbital, Triceps  Muscle Mass Loss:  7 - Severe muscle mass loss Temples (temporalis), Calf (gastrocnemius)  Fluid Accumulation:  1 - Mild Extremities   Strength:   Not Measured    Estimated Daily Nutrient Needs:  Energy (kcal):  1350 - 1575 kcal/day (30-35 kcal/kgm/day); Weight Used for Energy Requirements:  Admission (45 kgm)     Protein (g):  at least 75 grams of protein daily (1.7 gram/kgm/day); Weight Used for Protein Requirements:  Admission (45 kgm)          Nutrition Related Findings:   Pt seen with no visitors present. Very pleasant. Alert & oriented. Stated that she has no problems tolerating the food but she just isn't hungry. Lunch tray at bedside with only bites taken. Pt drank about half of Ensure Enlive.  Pt states that she has been

## 2021-12-07 NOTE — PROGRESS NOTES
Notified Dr. Gasper Minaya that patient lost iv access. Informed her multiple nurses have tried and we are unable to get it at this time.

## 2021-12-07 NOTE — PROGRESS NOTES
[facility]  Occupational Therapy  Daily Note  Time:   Time In: 8803  Time Out: 1035  Timed Code Treatment Minutes: 27 Minutes  Minutes: 27          Date: 2021  Patient Name: Anna Canchola,   Gender: female      Room: [facility]021-A  MRN: [id number]  : 1945  (67 y.o.)  Referring Practitioner: Yanely Gaviria DO  Diagnosis: Dehydration  Additional Pertinent Hx: Per H&P: \"70 y.o. female who presented to [address] [place] [address] with weakness. She states that she woke up this morning was having significant worsening of her chronic hip pain. She says that her weakness is her main concern, but she does realize that she has been more short of breath the last 2 days, especially with exertion. She is a resident at [facility] in [place]. She denies any worsening of her chronic cough. Denies sputum production. She denies any feelings of fever/chills. She says that her tachycardia is chronic. She says she does not see a cardiologist.  Denies sensations of palpitations or chest pain. She does have some musculoskeletal rib pain on the right side, which radiates around posteriorly. It is worse with deep breaths and worsens with palpation. She describes as a sharp pain, which lasts only seconds. She had mentioned recent fall to one of our ED nursing team, but denies it to me during admission. She says her right hip pain is chronic since she fell in January and was ultimately found to have a compression fracture of her spine. Her right hip pain is worse at this time because she has not been taking her Norco recently due to her assisted living having trouble refilling it. She denies any abdominal pain to me at this time, but had mentioned it to ED provider. I informed her to let us know if it returns. \"    Restrictions/Precautions:  Restrictions/Precautions: General Precautions     SUBJECTIVE: Pt laying in bed upon arrival, pt agreeable to OT session, RN gave verbal approval for session    PAIN: 0/10:     Vitals: Oxygen: pt dropping to 84-86% with ADL routine, with RN notified  Heart Rate: 111-139 with RN notified     COGNITION: Slow Processing    ADL:   Bathing: Stand By Assistance. for completing TB bathing while sitting at the EOB with vc's for PLB and deep breathing  Upper Extremity Dressing: Stand By Assistance. for donning gown  Lower Extremity Dressing: Stand By Assistance. for donning/doffing socks. BED MOBILITY:  Supine to Sit: Stand By Assistance with HOB elevated    TRANSFERS:  Sit to Stand:  Stand By Assistance. from EOB  Stand to Sit: Stand By Assistance. to recliner    FUNCTIONAL MOBILITY:  Assistive Device: None  Assist Level:  Stand By Assistance. Distance: from EOB to recliner    ASSESSMENT:     Activity Tolerance:  Patient tolerance of  treatment: fair. Discharge Recommendations: Home with Home Health OT  Equipment Recommendations: Equipment Needed:  (continue to monitor for needs)  Plan: Times per week: 5x  Current Treatment Recommendations: Self-Care / ADL, Functional Mobility Training, Endurance Training, Balance Training, Strengthening    Patient Education  Patient Education: Energy Conservation    Goals  Short term goals  Time Frame for Short term goals: By discharge  Short term goal 1: Pt to complete functional mobility to/from BR and HH distances with mod (I) and good safety awareness to increase indep in accessing environment. Short term goal 2: Patient to complete BADL routine including LB dressing with mod (I) to increase indep in ADL completion. Short term goal 3: Patient to tolerate dynamic standing task x5 minutes with (S) and 1-2UE release to increase balance and endurance needed during toileting task. Short term goal 4: Patient to complete BUE light resistive HEP with min cues for technique to increase strength and endurance needed for all mobility and ADL tasks.     Following session, patient left in safe position with all fall risk precautions in place.

## 2021-12-08 VITALS
BODY MASS INDEX: 19.34 KG/M2 | HEIGHT: 60 IN | DIASTOLIC BLOOD PRESSURE: 80 MMHG | HEART RATE: 117 BPM | RESPIRATION RATE: 16 BRPM | SYSTOLIC BLOOD PRESSURE: 135 MMHG | WEIGHT: 98.5 LBS | TEMPERATURE: 97.3 F | OXYGEN SATURATION: 94 %

## 2021-12-08 PROBLEM — R06.89 DYSPNEA AND RESPIRATORY ABNORMALITIES: Status: ACTIVE | Noted: 2021-12-08

## 2021-12-08 PROBLEM — R06.00 DYSPNEA AND RESPIRATORY ABNORMALITIES: Status: ACTIVE | Noted: 2021-12-08

## 2021-12-08 PROBLEM — R53.1 GENERAL WEAKNESS: Status: ACTIVE | Noted: 2019-11-13

## 2021-12-08 PROBLEM — J44.1 COPD EXACERBATION (HCC): Status: ACTIVE | Noted: 2019-11-13

## 2021-12-08 LAB
ANION GAP SERPL CALCULATED.3IONS-SCNC: 12 MEQ/L (ref 8–16)
BUN BLDV-MCNC: 11 MG/DL (ref 7–22)
CALCIUM SERPL-MCNC: 9 MG/DL (ref 8.5–10.5)
CHLORIDE BLD-SCNC: 103 MEQ/L (ref 98–111)
CO2: 27 MEQ/L (ref 23–33)
CREAT SERPL-MCNC: 0.5 MG/DL (ref 0.4–1.2)
ERYTHROCYTE [DISTWIDTH] IN BLOOD BY AUTOMATED COUNT: 16.5 % (ref 11.5–14.5)
ERYTHROCYTE [DISTWIDTH] IN BLOOD BY AUTOMATED COUNT: 51.6 FL (ref 35–45)
GFR SERPL CREATININE-BSD FRML MDRD: > 90 ML/MIN/1.73M2
GLUCOSE BLD-MCNC: 101 MG/DL (ref 70–108)
HCT VFR BLD CALC: 31.3 % (ref 37–47)
HEMOGLOBIN: 9.7 GM/DL (ref 12–16)
MCH RBC QN AUTO: 26.7 PG (ref 26–33)
MCHC RBC AUTO-ENTMCNC: 31 GM/DL (ref 32.2–35.5)
MCV RBC AUTO: 86.2 FL (ref 81–99)
PLATELET # BLD: 324 THOU/MM3 (ref 130–400)
PMV BLD AUTO: 10.1 FL (ref 9.4–12.4)
POTASSIUM SERPL-SCNC: 3.7 MEQ/L (ref 3.5–5.2)
RBC # BLD: 3.63 MILL/MM3 (ref 4.2–5.4)
SODIUM BLD-SCNC: 142 MEQ/L (ref 135–145)
WBC # BLD: 13.4 THOU/MM3 (ref 4.8–10.8)

## 2021-12-08 PROCEDURE — 94760 N-INVAS EAR/PLS OXIMETRY 1: CPT

## 2021-12-08 PROCEDURE — 80048 BASIC METABOLIC PNL TOTAL CA: CPT

## 2021-12-08 PROCEDURE — 99239 HOSP IP/OBS DSCHRG MGMT >30: CPT | Performed by: INTERNAL MEDICINE

## 2021-12-08 PROCEDURE — 85027 COMPLETE CBC AUTOMATED: CPT

## 2021-12-08 PROCEDURE — 97116 GAIT TRAINING THERAPY: CPT

## 2021-12-08 PROCEDURE — 94640 AIRWAY INHALATION TREATMENT: CPT

## 2021-12-08 PROCEDURE — 36415 COLL VENOUS BLD VENIPUNCTURE: CPT

## 2021-12-08 PROCEDURE — 2580000003 HC RX 258: Performed by: STUDENT IN AN ORGANIZED HEALTH CARE EDUCATION/TRAINING PROGRAM

## 2021-12-08 PROCEDURE — 6370000000 HC RX 637 (ALT 250 FOR IP): Performed by: STUDENT IN AN ORGANIZED HEALTH CARE EDUCATION/TRAINING PROGRAM

## 2021-12-08 PROCEDURE — 2700000000 HC OXYGEN THERAPY PER DAY

## 2021-12-08 PROCEDURE — 97110 THERAPEUTIC EXERCISES: CPT

## 2021-12-08 RX ORDER — PREDNISONE 20 MG/1
40 TABLET ORAL DAILY
Qty: 6 TABLET | Refills: 0 | DISCHARGE
Start: 2021-12-09 | End: 2021-12-08

## 2021-12-08 RX ORDER — CEFDINIR 300 MG/1
300 CAPSULE ORAL 2 TIMES DAILY
Qty: 2 CAPSULE | Refills: 0 | Status: SHIPPED | OUTPATIENT
Start: 2021-12-08 | End: 2021-12-09

## 2021-12-08 RX ORDER — CEFDINIR 300 MG/1
300 CAPSULE ORAL 2 TIMES DAILY
Qty: 2 CAPSULE | Refills: 0 | DISCHARGE
Start: 2021-12-08 | End: 2021-12-08 | Stop reason: SDUPTHER

## 2021-12-08 RX ORDER — PREDNISONE 20 MG/1
40 TABLET ORAL DAILY
Qty: 6 TABLET | Refills: 0 | Status: SHIPPED | OUTPATIENT
Start: 2021-12-09 | End: 2021-12-12

## 2021-12-08 RX ADMIN — ASPIRIN 81 MG: 81 TABLET, COATED ORAL at 10:07

## 2021-12-08 RX ADMIN — PANTOPRAZOLE SODIUM 40 MG: 40 TABLET, DELAYED RELEASE ORAL at 05:39

## 2021-12-08 RX ADMIN — ARIPIPRAZOLE 10 MG: 10 TABLET ORAL at 10:08

## 2021-12-08 RX ADMIN — MEGESTROL ACETATE 40 MG: 40 TABLET ORAL at 10:08

## 2021-12-08 RX ADMIN — SODIUM CHLORIDE: 9 INJECTION, SOLUTION INTRAVENOUS at 10:07

## 2021-12-08 RX ADMIN — BUDESONIDE AND FORMOTEROL FUMARATE DIHYDRATE 2 PUFF: 160; 4.5 AEROSOL RESPIRATORY (INHALATION) at 07:57

## 2021-12-08 RX ADMIN — PREDNISONE 40 MG: 20 TABLET ORAL at 10:08

## 2021-12-08 RX ADMIN — DESVENLAFAXINE SUCCINATE 100 MG: 100 TABLET, FILM COATED, EXTENDED RELEASE ORAL at 10:15

## 2021-12-08 ASSESSMENT — PAIN SCALES - GENERAL: PAINLEVEL_OUTOF10: 0

## 2021-12-08 NOTE — CARE COORDINATION
12/8/21, 12:15 PM EST    DISCHARGE PLANNING EVALUATION    Patient is to be discharged today to the skilled side at [facility] today. Call to DOC Bullock) and ambulette transport arranged for 5:30 pm. Call to [place] at [facility] and advised him of discharge and planned transport time. 3:28 PM Number provided to RN to fax DANIEL and for report. No other needs at this time. Patient will be skilled under her medicare. No other needs at this time. 12/8/21, 3:35 PM EST    Patient goals/plan/ treatment preferences discussed by  and . Patient goals/plan/ treatment preferences reviewed with patient/ family. Patient/ family verbalize understanding of discharge plan and are in agreement with goal/plan/treatment preferences. Understanding was demonstrated using the teach back method. AVS provided by RN at time of discharge, which includes all necessary medical information pertaining to the patients current course of illness, treatment, post-discharge goals of care, and treatment preferences. Services After Discharge  Services At/After Discharge: Transport, [employer], [place], [employer], PT, OT ([place] [facility])   IMM Letter  IMM Letter given to Patient/Family/Significant other/Guardian/POA/by[de-identified] copy delivered to patient by mgr. Hung  IMM Letter date given[de-identified] 12/08/21  IMM Letter time given[de-identified] 4771

## 2021-12-08 NOTE — PROGRESS NOTES
Toilet: Standard    ADL Assistance: Independent  Homemaking Assistance: Independent  Homemaking Responsibilities: Yes  Ambulation Assistance: Independent  Transfer Assistance: Independent  Active : No  Additional Comments: Daughter assists as needed. Pt uses 4WW for mobility at home, 3L O2 at baseline. Pt states she has had one fall in the past 6 mo. Restrictions/Precautions:  Restrictions/Precautions: Contact Precautions, Fall Risk  Position Activity Restriction  Other position/activity restrictions: monitor HR     SUBJECTIVE: RN approved session, states pt's baseline HR has been in 110's, but she is ok for PT. Pt in bed, pleasantly agrees for PT treatment. Pt's daughter in the room.  and resident doctor in during session to discuss d/c planning. PAIN: denies    Vitals: Blood Pressure: 130/57  Oxygen: 92% baseline, drop to 90% following ambulation, 93% conclusion   Heart Rate: 114 baseline, elevate to 126 with mobility, recover to 123 bpm at conclusion     OBJECTIVE:  Bed Mobility:  Supine to Sit: Stand By Assistance  Sit to Supine: Stand By Assistance    *HOB ~45 degrees  *Pt slow to complete     Transfers:  Sit to Stand: [employer], cues for hand placement  Stand to Sit:Contact Guard Assistance, cues for hand placement   *Consistent cues required for safety with hand placement     Ambulation:  Contact Guard Assistance, with verbal cues , with increased time for completion  Distance: 18'  Surface: Level Tile  Device:Rolling Walker  Gait Deviations: Forward Flexed Posture, Slow Liz, Decreased Step Length Bilaterally, Mild Path Deviations and Unsteady Gait  *CGA for unsteadiness, verbal cues and assistance for sequencing with O2 tubing     Balance:  Dynamic Sitting Balance: Stand By Assistance  Static Standing Balance: Contact Guard Assistance    Exercise:  Patient was guided in 1 set(s) 10 reps of exercise to both lower extremities.   Straight leg raises, Shoulder horizontal abduction/adduction, Seated marches, Seated hamstring curls resisted with theraband, Seated heel/toe raises, Long arc quads and Seated isometric hip adduction/abduction. Exercises were completed for increased independence with functional mobility. *Demo and verbal cues provided for appropriate completion. *Multiple rest breaks provided to allow pt to recover increased shortness of breath following exercise completion     Functional Outcome Measures: Completed  AM-PAC Inpatient Mobility Raw Score : 17  AM-PAC Inpatient T-Scale Score : 42.13    ASSESSMENT:  Assessment: Patient progressing toward established goals. Activity Tolerance:  Patient tolerance of  treatment: fair. Pt observed to have increased work of breathing, even at rest. Pt requiring multiple rest breaks in attempt to decreased shortness of breath and allow pt's HR to recover. Pt requires CGA and verbal cues consistently for safety with mobility. Equipment Recommendations:Equipment Needed: No (next level of care should provide, continue to assess needs)  Discharge Recommendations: Subacte/Skilled Nursing Facility  Plan: Times per week: 5x GM  Current Treatment Recommendations: Strengthening, Neuromuscular Re-education, Home Exercise Program, Safety Education & Training, Balance Training, Endurance Training, Patient/Caregiver Education & Training, Functional Mobility Training, Transfer Training, Gait Training    Patient Education  Patient Education: Plan of Care, [employer] Mobility, Transfers, Reviewed Prior Education, Gait, Verbal Exercise Instruction, - Patient Requires Continued Education    Goals:  Patient goals : to be less wobbly and have better balance  Short term goals  Time Frame for Short term goals:  By hospital d/c  Short term goal 1: Pt to complete supine <->sit mod I for ability to get out of bed with ease  Short term goal 2: Pt to complete sit <->stand with LRAD and S for ability to get out of a chair safely  Short term goal 3: Pt to ambulate >=30' with LRAD and S for ability to progress towards within home ambulation  Short term goal 4: Pt to improve Tinetti score to >=19/28 to progress towards the moderate fall risk category. Long term goals  Time Frame for Long term goals : NA due to short ELOS    Following session, patient left in safe position with all fall risk precautions in place.

## 2021-12-08 NOTE — FLOWSHEET NOTE
12/08/21 1115   Provider Notification   Reason for Communication Patient request  (Patient's daughter)   Provider Name Brooke Kern   Provider Notification Resident   Method of Communication Secure Message   Response En route   Notification Time [phone]     Notified patient's daughter is here and would like to speak to a doctor if he's available.

## 2021-12-08 NOTE — DISCHARGE INSTR - COC
Continuity of Care Form    Patient Name: Omar Cunningham   :    MRN:  [id number]    Admit date:  2021  Discharge date:  21      Code Status Order: Limited   Advance Directives:      Admitting Physician:  Bob Parks DO  PCP: No primary care provider on file. Discharging Nurse: Oli Plasencia Unit/Room#: 5K-21/021-A  Discharging Unit Phone Number: [phone]    Emergency Contact:   Extended Emergency Contact Information  Primary Emergency Contact: Stevie Jackson [address] Phone: [phone]  Mobile Phone: [phone]  Relation: Child  Hearing or visual needs: None  Other needs: None  Preferred language: English   needed? No  Secondary Emergency ContactJeramy Whitmore  Mobile Phone: [phone]  Relation: Child   needed? No    Past Surgical History:  Past Surgical History:   Procedure Laterality Date    COLONOSCOPY      TONSILLECTOMY         Immunization History:   Immunization History   Administered Date(s) Administered    COVID-19, ARIAS Pope, 30mcg/0.3mL 2021, 2021, 2021    Influenza Virus Vaccine 10/04/2018       Active Problems:  Patient Active Problem List   Diagnosis Code    Pneumonia of right upper lobe due to infectious organism J18.9    Bandemia D72.825    Acute on chronic respiratory failure with hypoxia and hypercapnia ([facility]) J96.21, J96.22    Weight loss, non-intentional R63.4    Severe protein-calorie malnutrition Carnella Hun: less than 60% of standard weight) ([facility]) E43    History of head lice S26.3    Hypoxia R09.02    Tobacco use disorder F17.200    Leukocytosis D72.829    Lactic acidosis E87.2    SIRS (systemic inflammatory response syndrome) ([facility] R65.10    Patient underweight R63.6    Wasting syndrome ([address] R64    Dysarthria R47.1    TIA (transient ischemic attack) G45.9    Slurred speech R47.81    Bedbug bite W57. XXXA    COPD (chronic obstructive pulmonary disease) ([facility]) J44.9    Hyperlipidemia E78.5    Severe malnutrition ([facility] E43    Physical debility R53.81    Acute and chronic respiratory failure with hypoxia ([facility]) J96.21    Chronic respiratory failure with hypercapnia (HCC) R11.35    Acute metabolic encephalopathy D96.27    Sepsis secondary to UTI (HCC) A41.9, N39.0    Acute respiratory failure with hypoxia (HCC) J96.01    Schizoaffective disorder (HCC) F25.9       Isolation/Infection:   Isolation            Contact          Patient Infection Status       Infection Onset Added Last Indicated Last Indicated By Review Planned Expiration Resolved Resolved By    VRE  12/05/18 12/05/18 Christian Lr RN        Feces PCR 12/2018    Resolved    COVID-19 (Rule Out) 12/04/21 12/04/21 12/04/21 COVID-19, Rapid (Ordered)   12/04/21 Rule-Out Test Resulted            Nurse Assessment:  Last Vital Signs: BP (!) 162/68   Pulse 120   Temp 98.1 °F (36.7 °C) (Oral)   Resp 22   Ht 5' (1.524 m)   Wt 98 lb 8 oz (44.7 kg)   SpO2 91%   BMI 19.24 kg/m²     Last documented pain score (0-10 scale): Pain Level: 0  Last Weight:   Wt Readings from Last 1 Encounters:   12/04/21 98 lb 8 oz (44.7 kg)     Mental Status:  oriented, alert, coherent, logical, thought processes intact, and able to concentrate and follow conversation    IV Access:  - None    Nursing Mobility/ADLs:  Walking   Independent  Transfer  Independent  Bathing  Independent  Dressing  Independent  [facility]  Assisted  Med Delivery   whole    Wound Care Documentation and Therapy:        Elimination:  Continence: Bowel: Yes  Bladder: Yes  Urinary Catheter: None   Colostomy/Ileostomy/Ileal Conduit: No       Date of Last BM: 12/8/21    Intake/Output Summary (Last 24 hours) at 12/8/2021 1137  Last data filed at 12/8/2021 0800  Gross per 24 hour   Intake 340 ml   Output    Net 340 ml     I/O last 3 completed shifts:   In: 100 [P.O.:100]  Out: -     Safety Concerns:     None    Impairments/Disabilities: None    Nutrition Therapy:  Current Nutrition Therapy:   Regular diet with ensures at every meal    Routes of Feeding: Oral  Liquids: Thin Liquids  Daily Fluid Restriction: no  Last Modified Barium Swallow with Video (Video Swallowing Test): not done    Treatments at the Time of Hospital Discharge:   Respiratory Treatments: Inhalers  Oxygen Therapy:  is on oxygen at 3 L/min per nasal cannula. Ventilator:    - No ventilator support    Rehab Therapies: Physical Therapy and Occupational Therapy  Weight Bearing Status/Restrictions: No weight bearing restirctions  Other Medical Equipment (for information only, NOT a DME order):  walker  Other Treatments: N/A    Patient's personal belongings (please select all that are sent with patient):  None    RN SIGNATURE:  Electronically signed by Tarun Chowdary RN on 12/8/21 at 4:11 PM EST    CASE MANAGEMENT/SOCIAL WORK SECTION    Inpatient Status Date: 12/4/21    Readmission Risk Assessment Score:  Readmission Risk              Risk of Unplanned Readmission:  24           Discharging to Facility/ Agency   Name: [facility]  Address: [place] [address]  Phone: [phone]  Fax: [phone]    Dialysis Facility (if applicable)   Name:  Address:  Dialysis Schedule:  Phone:  Fax:    / signature: Electronically signed by WEI Medrano on 12/8/21 at 11:38 AM EST    PHYSICIAN SECTION    Prognosis: Good    Condition at Discharge: Stable    Rehab Potential (if transferring to Rehab): Fair    Recommended Labs or Other Treatments After Discharge: N/A    Physician Certification: I certify the above information and transfer of Charito Stern  is necessary for the continuing treatment of the diagnosis listed and that she requires [facility] for greater 30 days.      Update Admission H&P: No change in H&P    PHYSICIAN SIGNATURE:  Electronically signed by Shantal Bullock MD on 12/8/21 at 3:27 PM EST

## 2021-12-08 NOTE — PROGRESS NOTES
PROGRESS NOTE      Patient:  Hank Carmona      Unit/Bed:5K-21/021-A    YOB: 1945    MRN: [id number]       Acct: [de-identified]     PCP: No primary care provider on file. Date of Admission: 12/4/2021      Assessment/Plan:  Acute on chronic hypoxic respiratory failure secondary to RUL PNA:  - CXR with RUL infiltrate noted. COVID, Rapid influenza negative. WBC 24. Procal mildly elevated 0.26. Baseline O2 requirement 3L. On admission, patient requiring 4L. Now back on baseline O2 requirement  - Started on Azithromycin and Rocephin on admission. Will continue. (Day 5/5)  - BCx preliminary negative x 2  - WBC improving, 14.8 last  - CBC in the AM    Sepsis POA, resolved:  - Patient noted to be tachycardic, tachypneic on admission. WBC elevated. Lactic acid wnl CXR significant for RUL infiltrate.   - Given 30 cc/kg fluid given. Started on antibiotics as noted above  - CBC in the AM     Sinus tachycardia:  - Noted history since 2019. Echo 4/2019: Normal LV size and systolic function, EF 88%. No LV wall motion abnormalities, normal wall thickness. Severe tricuspid regurgitation visualized. RV systolic pressure 40 mmHg. - EKG 12/4 shows normal sinus tachycardia with some PACs  - TSH/T4 unremarkable  - Will continue fluids now due to dehydration as cause for this and has been improving since adding these  - Consider Holter monitor on discharge    Generalized weakness/deconditoning:  - Worst in hip, ran out of her norco  - PT/OT consulted  - She is a resident at [facility] in [facility]  - Consider restart home norco (takes TID, can make TID PRN)    COPD, compensated:  - Continues to have some mild wheezing, did get one dose of solu medrol in th ED  - Will continue steroid course for 5 days  - PRN ipratropium     Severe malnutrition:  - Dietician consulted.  Continue Megace    Hypokalemia, resolved:  - K 3.3 on arrival, given 40 mEq on admission  - K+ replacement protocol PRN    Schizoaffective disorder/MDD:  - Continue Abilify, Pristiq    GERD:  - Continue Protonix    CAD:  - Continue ASA and Lipitor      DVT prophylaxis: [x] Lovenox                                 [] SCDs                                 [] SQ Heparin                                 [] Encourage ambulation           [] Already on Anticoagulation     Disposition:  Pending clinical improvement. PT/OT consulted given patient's medical deconditioning and generalized weaknesses. Continues to feel weak and dizzy with standing today    Chief Complaint: SOB    Hospital Course: As per H&P:    \"70 y.o. female who presented to [facility] with weakness. She states that she woke up this morning was having significant worsening of her chronic hip pain. She says that her weakness is her main concern, but she does realize that she has been more short of breath the last 2 days, especially with exertion. She is a resident at [facility] in [place].     She denies any worsening of her chronic cough. Denies sputum production. She denies any feelings of fever/chills.     She says that her tachycardia is chronic. She says she does not see a cardiologist.  Denies sensations of palpitations or chest pain. She does have some musculoskeletal rib pain on the right side, which radiates around posteriorly. It is worse with deep breaths and worsens with palpation. She describes as a sharp pain, which lasts only seconds.     She had mentioned recent fall to one of our ED nursing team, but denies it to me during admission. She says her right hip pain is chronic since she fell in January and was ultimately found to have a compression fracture of her spine. Her right hip pain is worse at this time because she has not been taking her Norco recently due to her assisted living having trouble refilling it.     She denies any abdominal pain to me at this time, but had mentioned it to ED provider.   I informed her to let us know if it returns.     No other questions or complaints. \"       Subjective:   Patient seen and examined at bedside. States that she is feeling better since she has been admitted, but not to baseline. Reports improvement in her shortness of breathing and wheezing since yesterday. Denies any chest pain, abdominal pain, N/V/D otherwise. Continues to feel weak and dizzy with standing today and doesn't feel like she would be safe at home due to living alone. Medications:  Reviewed    Infusion Medications    sodium chloride 100 mL/hr at 12/07/21 2346    sodium chloride       Scheduled Medications    predniSONE  40 mg Oral Daily    sodium chloride flush  5-40 mL IntraVENous 2 times per day    enoxaparin  40 mg SubCUTAneous Q24H    cefTRIAXone (ROCEPHIN) IV  1,000 mg IntraVENous Q24H    potassium chloride  40 mEq Oral Once    ARIPiprazole  10 mg Oral Daily    aspirin  81 mg Oral Daily    atorvastatin  10 mg Oral Nightly    desvenlafaxine succinate  100 mg Oral Daily    budesonide-formoterol  2 puff Inhalation BID    megestrol  40 mg Oral BID    melatonin  3 mg Oral Nightly    pantoprazole  40 mg Oral QAM AC     PRN Meds: ipratropium, sodium chloride flush, sodium chloride, ondansetron **OR** ondansetron, polyethylene glycol, acetaminophen **OR** acetaminophen, potassium chloride **OR** potassium alternative oral replacement **OR** potassium chloride, albuterol sulfate HFA, benzonatate, bisacodyl, hydrOXYzine      Intake/Output Summary (Last 24 hours) at 12/8/2021 0719  Last data filed at 12/7/2021 2200  Gross per 24 hour   Intake 100 ml   Output    Net 100 ml       Diet:  ADULT DIET;  Regular  ADULT ORAL NUTRITION SUPPLEMENT; Breakfast, Lunch, Dinner; Standard High Calorie/High Protein Oral Supplement    Exam:  /60   Pulse 101   Temp 98.7 °F (37.1 °C) (Oral)   Resp 18   Ht 5' (1.524 m)   Wt 98 lb 8 oz (44.7 kg)   SpO2 91%   BMI 19.24 kg/m²     General appearance: Chronically ill-appearing female, frail, in NAD. HEENT: Conjunctivae/corneas clear. Neck: Supple, with full range of motion. No jugular venous distention. Trachea midline. Respiratory: On her baseline 3LNC. Wheezing. Decreased breath sounds at bases. Cardiovascular: Tachycardia noted, normal rhythm. Abdomen: Soft, non-tender, non-distended with normal bowel sounds. Musculoskeletal: passive and active ROM x 4 extremities. Skin: Skin color, texture, turgor normal.  No rashes or lesions. Neurologic:  Neurovascularly intact without any focal sensory/motor deficits. Psychiatric: Alert and oriented, thought content appropriate, normal insight  Peripheral Pulses: +2 palpable, equal bilaterally       Labs:   Recent Labs     12/06/21  0535 12/07/21  0522   WBC 21.3* 14.8*   HGB 10.4* 11.1*   HCT 32.2* 35.9*    334     Recent Labs     12/07/21 0522      K 3.6   CL 98   CO2 28   BUN 13   CREATININE 0.6   CALCIUM 9.1   PHOS 2.6     No results for input(s): AST, ALT, BILIDIR, BILITOT, ALKPHOS in the last 72 hours. No results for input(s): INR in the last 72 hours. No results for input(s): Debary Pears in the last 72 hours. Urinalysis:      Lab Results   Component Value Date    NITRU NEGATIVE 05/26/2021    WBCUA 5-10 11/16/2019    BACTERIA MANY 11/16/2019    RBCUA 0-2 11/16/2019    BLOODU NEGATIVE 05/26/2021    SPECGRAV 1.009 11/16/2019    GLUCOSEU NEGATIVE 05/26/2021       Radiology:  XR CHEST PORTABLE   Final Result      Right upper lung atelectasis/infiltrate. **This report has been created using voice recognition software. It may contain minor errors which are inherent in voice recognition technology. **      Final report electronically signed by Dr. Colleen Rubio on 12/4/2021 4:09 PM          Diet: ADULT DIET;  Regular  ADULT ORAL NUTRITION SUPPLEMENT; Breakfast, Lunch, Dinner; Standard High Calorie/High Protein Oral Supplement    Code Status: Limited    PT/OT Eval Status: Consulted      Electronically signed by Eyal Younger MD on 12/8/2021 at 7:19 AM

## 2021-12-08 NOTE — DISCHARGE SUMMARY
DISCHARGE SUMMARY      Patient Identification:   Hank Carmona   :   MRN: [id number]   Account: [de-identified]      Patient's PCP: Dr. Deepthi Rivera in [phone] Date: 2021     Discharge Date:       Admitting Physician: Rodrigo Sebastian DO     Discharge Physician: Elia Mckenna MD     Discharge Diagnoses: Active Hospital Problems    Diagnosis Date Noted    Dyspnea and respiratory abnormalities [R06.00, R06.89] 2021    Acute respiratory failure with hypoxia ([facility] [J96.01] 2021    Schizoaffective disorder ([facility] [F25.9] 2020    Dehydration [E86.0] 2019    General weakness [R53.1] 2019    COPD exacerbation (HCC) [J44.1] 2019    Pneumonia of right upper lobe due to infectious organism [J18.9] 2017       The patient was seen and examined on day of discharge and this discharge summary is in conjunction with any daily progress note from day of discharge. Hospital Course:   Hank Carmona is a 67 y.o. female admitted to [address] [place] [address] on 2021 for pneumonia and COPD exacerbation. \"70 y. o. female who presented to [address] [place] [address] with weakness.  She states that she woke up this morning was having significant worsening of her chronic hip pain.  She says that her weakness is her main concern, but she does realize that she has been more short of breath the last 2 days, especially with exertion. Carla Velazquez is a resident at [facility] in [place].     She denies any worsening of her chronic cough.  Denies sputum production.  She denies any feelings of fever/chills.     She says that her tachycardia is chronic.  She says she does not see a cardiologist.  Denies sensations of palpitations or chest pain.  She does have some musculoskeletal rib pain on the right side, which radiates around posteriorly.  It is worse with deep breaths and worsens with palpation.  She describes as a sharp pain, which lasts only seconds.     She had mentioned recent fall to one of our ED nursing team, but denies it to me during admission.  She says her right hip pain is chronic since she fell in January and was ultimately found to have a compression fracture of her spine.  Her right hip pain is worse at this time because she has not been taking her Earnest Cymraes recently due to her assisted living having trouble refilling it.     She denies any abdominal pain to me at this time, but had mentioned it to ED provider. Monica Arizmendi informed her to let us know if it returns.     No other questions or complaints. \"    After admission, was treated with rocephin and azithromycin for pneumonia. Cultures remained negative. WBC improved and shortness of air improved, weaned back to baseline 3L O2. Did develop tightness and wheezing on day 3, and so also treated for COPD exacerbation. Was discharged with 1 day of cefdinir and 3 of steroids. Patient did continue to complain of weakness and so PT consulted. They recommended closer supervision and stay with skilled nursing facility. Spoke with SW who assisted with placement and patient okay for discharge on 12/8. Also spoke with dietician, who advised continuing megace for now. Did have continued sinus tachycardia during stay, but has been chronic and thoroughly investigated at this point, dehydration and malnutrition certainly contributing but can defer further workup to PCP.         Exam:     Vitals:  Vitals:    12/07/21 2033 12/08/21 0349 12/08/21 0757 12/08/21 0930   BP: (!) 144/74 128/60  (!) 162/68   Pulse: 116 101  120   Resp: 24 18  22   Temp: 98.2 °F (36.8 °C) 98.7 °F (37.1 °C)  98.1 °F (36.7 °C)   TempSrc: Oral Oral  Oral   SpO2: 92% 91% 93% 91%   Weight:       Height:         Weight: Weight: 98 lb 8 oz (44.7 kg)     24 hour intake/output:    Intake/Output Summary (Last 24 hours) at 12/8/2021 1208  Last data filed at 12/8/2021 0800  Gross per 24 hour   Intake 340 ml   Output    Net 340 ml Physical Exam    General appearance: Chronically ill-appearing female, frail, in NAD. HEENT: Conjunctivae/corneas clear. Neck: Supple, with full range of motion. No jugular venous distention. Trachea midline. Respiratory: On her baseline 3LNC. Wheezing. Decreased breath sounds at bases. Cardiovascular: Tachycardia noted, normal rhythm. Abdomen: Soft, non-tender, non-distended with normal bowel sounds. Musculoskeletal: passive and active ROM x 4 extremities. Skin: Skin color, texture, turgor normal.  No rashes or lesions. Neurologic:  Neurovascularly intact without any focal sensory/motor deficits. Psychiatric: Alert and oriented, thought content appropriate, normal insight  Peripheral Pulses: +2 palpable, equal bilaterally     Labs: For convenience and continuity at follow-up the following most recent labs are provided:      CBC:    Lab Results   Component Value Date    WBC 13.4 12/08/2021    HGB 9.7 12/08/2021    HCT 31.3 12/08/2021     12/08/2021       Renal:    Lab Results   Component Value Date     12/08/2021    K 3.7 12/08/2021    K 4.1 12/05/2021     12/08/2021    CO2 27 12/08/2021    BUN 11 12/08/2021    CREATININE 0.5 12/08/2021    CALCIUM 9.0 12/08/2021    PHOS 2.6 12/07/2021         Significant Diagnostic Studies    Radiology:   XR CHEST PORTABLE   Final Result      Right upper lung atelectasis/infiltrate. **This report has been created using voice recognition software. It may contain minor errors which are inherent in voice recognition technology. **      Final report electronically signed by Dr. Divya Dumont on 12/4/2021 4:09 PM             Consults:     IP CONSULT TO DIETITIAN  IP CONSULT TO SOCIAL WORK    Disposition:    [] Home       [] TCU       [] Rehab       [] Psych       [x] SNF       [] [place]       [] Other-    Condition at Discharge: Stable    Code Status:  Limited     Patient Instructions:    Discharge lab work: None  Activity: activity as tolerated  Diet: ADULT DIET; Regular  ADULT ORAL NUTRITION SUPPLEMENT; Breakfast, Lunch, Dinner; Standard High Calorie/High Protein Oral Supplement      Follow-up visits:   Ruth Peralta MD  [address] [place] [address]  [phone]    Call           Discharge Medications: Added prednisone and cefidinir for 3 and 1 day, respectively. No other medication changes. Time Spent on discharge is more than 30 minutes in the examination, evaluation, counseling and review of medications and discharge plan. Signed: Thank you No primary care provider on file. for the opportunity to be involved in this patient's care.     Electronically signed by Elia Mckenna MD on 12/8/2021 at 12:08 PM

## 2021-12-08 NOTE — PROGRESS NOTES
Patient discharged at this time. All IV's removed. Discharge instructions, medication changes and follow up appointments explained at this time. All questions answered at this time. AVS given to [id number] [place] [facility] employee. All patient belongings sent with patient. Chart broken down and placed in yellow bin. [facility]

## 2021-12-08 NOTE — PLAN OF CARE
12/8/21, 10:45 AM EST    DISCHARGE PLANNING EVALUATION    DANIELA student spoke with Tiffanie Vazquez and her daughter, Alberta Corona while she was in the room. Tiffanie Vazquez expressed interest in returning to [address], potentially transferring to their skilled facility for short-term rehab. Patient states she is comfortable returning to the [facility] and getting therapy there, but she still \"feels wobbly\" at times. SW student left a message for [facility] at [facility] asking their recommendation for patient. Patient reports having a supportive daughter that lives nearby, as well as having a four-wheeled walker with her at home.    -Seen by social work student Lay Zheng  Electronically signed by WEI Joe on 12/8/21 at 11:31 AM EST      11:32 AM Physician has advised that patient is requesting placement on skilled side of facility when discharged. DANIELA spoke with patient and daughter Alberta Corona and confirmed this plan. Call to [facility] and spoke with Sneha Toscano will have a bed for patient when she is discharged. She will not need a COVID test. Patient and daughter updated.

## 2021-12-10 LAB
BLOOD CULTURE, ROUTINE: NORMAL
BLOOD CULTURE, ROUTINE: NORMAL

## 2022-01-07 PROBLEM — E86.0 DEHYDRATION: Status: RESOLVED | Noted: 2019-11-13 | Resolved: 2022-01-07

## 2022-04-20 ENCOUNTER — ANESTHESIA (OUTPATIENT)
Dept: ENDOSCOPY | Age: 77
End: 2022-04-20
Payer: MEDICARE

## 2022-04-20 ENCOUNTER — ANESTHESIA EVENT (OUTPATIENT)
Dept: ENDOSCOPY | Age: 77
End: 2022-04-20
Payer: MEDICARE

## 2022-04-20 ENCOUNTER — HOSPITAL ENCOUNTER (OUTPATIENT)
Age: 77
Setting detail: OUTPATIENT SURGERY
Discharge: HOME | End: 2022-04-20
Attending: INTERNAL MEDICINE | Admitting: INTERNAL MEDICINE
Payer: MEDICARE

## 2022-04-20 VITALS
SYSTOLIC BLOOD PRESSURE: 127 MMHG | RESPIRATION RATE: 16 BRPM | OXYGEN SATURATION: 100 % | DIASTOLIC BLOOD PRESSURE: 65 MMHG

## 2022-04-20 VITALS
TEMPERATURE: 97.7 F | RESPIRATION RATE: 18 BRPM | BODY MASS INDEX: 18.02 KG/M2 | HEIGHT: 62 IN | HEART RATE: 92 BPM | SYSTOLIC BLOOD PRESSURE: 134 MMHG | OXYGEN SATURATION: 99 % | DIASTOLIC BLOOD PRESSURE: 90 MMHG

## 2022-04-20 PROCEDURE — 2580000003 HC RX 258

## 2022-04-20 PROCEDURE — 87081 CULTURE SCREEN ONLY: CPT

## 2022-04-20 PROCEDURE — 88305 TISSUE EXAM BY PATHOLOGIST: CPT

## 2022-04-20 PROCEDURE — 3700000000 HC ANESTHESIA ATTENDED CARE: Performed by: INTERNAL MEDICINE

## 2022-04-20 PROCEDURE — 6360000002 HC RX W HCPCS

## 2022-04-20 PROCEDURE — 2500000003 HC RX 250 WO HCPCS

## 2022-04-20 PROCEDURE — 2580000003 HC RX 258: Performed by: INTERNAL MEDICINE

## 2022-04-20 PROCEDURE — 7100000010 HC PHASE II RECOVERY - FIRST 15 MIN: Performed by: INTERNAL MEDICINE

## 2022-04-20 PROCEDURE — 3609012400 HC EGD TRANSORAL BIOPSY SINGLE/MULTIPLE: Performed by: INTERNAL MEDICINE

## 2022-04-20 PROCEDURE — 7100000011 HC PHASE II RECOVERY - ADDTL 15 MIN: Performed by: INTERNAL MEDICINE

## 2022-04-20 PROCEDURE — 2720000010 HC SURG SUPPLY STERILE: Performed by: INTERNAL MEDICINE

## 2022-04-20 RX ORDER — SODIUM CHLORIDE 9 MG/ML
INJECTION, SOLUTION INTRAVENOUS CONTINUOUS PRN
Status: DISCONTINUED | OUTPATIENT
Start: 2022-04-20 | End: 2022-04-20 | Stop reason: SDUPTHER

## 2022-04-20 RX ORDER — LIDOCAINE HYDROCHLORIDE 20 MG/ML
INJECTION, SOLUTION INFILTRATION; PERINEURAL PRN
Status: DISCONTINUED | OUTPATIENT
Start: 2022-04-20 | End: 2022-04-20 | Stop reason: SDUPTHER

## 2022-04-20 RX ORDER — GLYCOPYRROLATE 1 MG/5 ML
SYRINGE (ML) INTRAVENOUS PRN
Status: DISCONTINUED | OUTPATIENT
Start: 2022-04-20 | End: 2022-04-20 | Stop reason: SDUPTHER

## 2022-04-20 RX ORDER — SODIUM CHLORIDE 9 MG/ML
INJECTION, SOLUTION INTRAVENOUS CONTINUOUS
Status: DISCONTINUED | OUTPATIENT
Start: 2022-04-20 | End: 2022-04-20 | Stop reason: HOSPADM

## 2022-04-20 RX ORDER — PROPOFOL 10 MG/ML
INJECTION, EMULSION INTRAVENOUS PRN
Status: DISCONTINUED | OUTPATIENT
Start: 2022-04-20 | End: 2022-04-20 | Stop reason: SDUPTHER

## 2022-04-20 RX ADMIN — SODIUM CHLORIDE: 9 INJECTION, SOLUTION INTRAVENOUS at 11:08

## 2022-04-20 RX ADMIN — SODIUM CHLORIDE: 9 INJECTION, SOLUTION INTRAVENOUS at 09:40

## 2022-04-20 RX ADMIN — PROPOFOL 20 MG: 10 INJECTION, EMULSION INTRAVENOUS at 11:28

## 2022-04-20 RX ADMIN — Medication 0.1 MG: at 11:26

## 2022-04-20 RX ADMIN — PHENYLEPHRINE HYDROCHLORIDE 50 MCG: 10 INJECTION INTRAVENOUS at 11:35

## 2022-04-20 RX ADMIN — LIDOCAINE HYDROCHLORIDE 40 MG: 20 INJECTION, SOLUTION INFILTRATION; PERINEURAL at 11:24

## 2022-04-20 RX ADMIN — PROPOFOL 30 MG: 10 INJECTION, EMULSION INTRAVENOUS at 11:25

## 2022-04-20 ASSESSMENT — ENCOUNTER SYMPTOMS: SHORTNESS OF BREATH: 1

## 2022-04-20 NOTE — PROGRESS NOTES
EGD completed, tolerated well. Biopsies taken, removed with cold forceps. 1 jars labeled and sent to lab. Photos taken. Scope  used.

## 2022-04-20 NOTE — PROGRESS NOTES
Unable to obtain pulse ox for approximately 15 minutes. Several finger probes used, pulse ox to earlobes and toes unsuccessful. Nonrebreather applied for short time. Pulse ox obtained at this time, 98% with oxygen at 3LPM per nasal cannula.

## 2022-04-20 NOTE — ANESTHESIA PRE PROCEDURE
Department of Anesthesiology  Preprocedure Note       Name:  Rose Marie Tam   Age:  67 y.o.  :  1945                                          MRN:  [id number]         Date:  2022      Surgeon: Stewart Mendoza):  Ayah Silver MD    Procedure: Procedure(s):  EGD ESOPHAGOGASTRODUODENOSCOPY    Medications prior to admission:   Prior to Admission medications    Medication Sig Start Date End Date Taking? Authorizing Provider   melatonin 3 MG TABS tablet Take 3 mg by mouth daily    Historical Provider, MD   omeprazole (PRILOSEC) 20 MG delayed release capsule Take 20 mg by mouth Daily    Historical Provider, MD   Ascorbic Acid (VITAMIN C) 250 MG tablet Take 500 mg by mouth daily For 10 days d/t covid    Historical Provider, MD   zinc 50 MG TABS tablet Take 50 mg by mouth daily    Historical Provider, MD   hydrOXYzine (ATARAX) 10 MG tablet Take 10 mg by mouth 2 times daily itching     Historical Provider, MD   HYDROcodone-acetaminophen (NORCO) 5-325 MG per tablet Take 1 tablet by mouth every 6 hours as needed for Pain.     Historical Provider, MD   acetaminophen (TYLENOL) 325 MG tablet Take 650 mg by mouth every 6 hours as needed for Pain    Historical Provider, MD   bisacodyl (DULCOLAX) 10 MG suppository Place 10 mg rectally as needed for Constipation    Historical Provider, MD   Nutritional Supplements (ENSURE HIGH PROTEIN) LIQD Take 1 Can by mouth 3 times daily (with meals) 19   Mauricio Borjas,    megestrol (MEGACE) 40 MG tablet Take 1 tablet by mouth 2 times daily 19   Mauricio Borjas, DO   albuterol sulfate  (90 Base) MCG/ACT inhaler Inhale 2 puffs into the lungs every 6 hours 19   Mauricio Borjas, DO   Umeclidinium Bromide (INCRUSE ELLIPTA) 62.5 MCG/INH AEPB Inhale 1 puff into the lungs daily 19   Mauricio Borjas, DO   Cholecalciferol (VITAMIN D3) 50 MCG (2000 UT) CAPS Take 2,000 Units by mouth daily    Historical Provider, MD   aspirin 81 MG tablet Take 81 mg by mouth daily    Historical Provider, MD   albuterol sulfate  (90 Base) MCG/ACT inhaler Inhale 2 puffs into the lungs 4 times daily Use spacer 6/27/19 7/27/19  Kia Nguyen DO   ipratropium-albuterol (DUONEB) 0.5-2.5 (3) MG/3ML SOLN nebulizer solution Inhale 3 mLs into the lungs every 6 hours as needed for Shortness of Breath 6/27/19   Kia Nguyen DO   atorvastatin (LIPITOR) 10 MG tablet Take 1 tablet by mouth nightly 4/8/19   Cindy Porter MD   vitamin D (CHOLECALCIFEROL) 1000 UNIT TABS tablet Take 2,000 Units by mouth daily    Historical Provider, MD   Fluticasone Furoate-Vilanterol (BREO ELLIPTA) 200-25 MCG/INH AEPB Inhale 1 puff into the lungs daily    Historical Provider, MD   OXYGEN Inhale 2-4 L into the lungs nightly Nightly and prn    Historical Provider, MD   ARIPiprazole (ABILIFY) 10 MG tablet Take 10 mg by mouth daily    Historical Provider, MD   desvenlafaxine succinate (PRISTIQ) 50 MG TB24 extended release tablet Take 100 mg by mouth daily     Historical Provider, MD       Current medications:    Current Facility-Administered Medications   Medication Dose Route Frequency Provider Last Rate Last Admin    0.9 % sodium chloride infusion   IntraVENous Continuous Dany Monday, MD 75 mL/hr at 04/20/22 0940 New Bag at 04/20/22 0940       Allergies:     Allergies   Allergen Reactions    Spiriva Handihaler [Tiotropium Bromide Monohydrate] Itching       Problem List:    Patient Active Problem List   Diagnosis Code    Pneumonia of right upper lobe due to infectious organism J18.9    Bandemia D72.825    Acute on chronic respiratory failure with hypoxia and hypercapnia ([facility]) J96.21, J96.22    Weight loss, non-intentional R63.4    Severe protein-calorie malnutrition Theotis Genta: less than 60% of standard weight) ([facility]) E43    History of head lice M43.5    Hypoxia R09.02    Tobacco use disorder F17.200    Leukocytosis D72.829    Lactic acidosis E87.2    SIRS (systemic inflammatory response syndrome) ([facility] R65.10    Patient underweight R63.6    Wasting syndrome ([facility] R64    Dysarthria R47.1    TIA (transient ischemic attack) G45.9    Slurred speech R47.81    Bedbug bite W57. [place]    COPD exacerbation ([facility] J44.1    Hyperlipidemia E78.5    Severe malnutrition (HCC) E43    General weakness R53.1    Acute and chronic respiratory failure with hypoxia (HCC) J96.21    Chronic respiratory failure with hypercapnia (HCC) J96.12    Acute metabolic encephalopathy Q76.92    Sepsis secondary to UTI ([facility] A41.9, N39.0    Acute respiratory failure with hypoxia (HCC) J96.01    Schizoaffective disorder (HCC) F25.9    Dyspnea and respiratory abnormalities R06.00, R06.89       Past Medical History:        Diagnosis Date    Chronic respiratory failure with hypoxia (HCC)     Chronic respiratory failure with hypoxia and hypercapnia (HCC)     COPD (chronic obstructive pulmonary disease) (HCC)     Depression     Dysarthria     Hyperlipidemia     Hyperlipidemia     Lung nodules     Pneumonia     [place] 2 months ago    Severe malnutrition (HCC)     Severe malnutrition (HCC)     Severe malnutrition (HCC)     TIA (transient ischemic attack)        Past Surgical History:        Procedure Laterality Date    COLONOSCOPY      TONSILLECTOMY         Social History:    Social History     Tobacco Use    Smoking status: Former Smoker     Packs/day: 2.00     Years: 56.00     Pack years: 112.00     Types: Cigarettes     Start date: 1/1/1962     Quit date: 3/3/2019     Years since quitting: 3.1    Smokeless tobacco: Never Used   Substance Use Topics    Alcohol use: Not Currently     Comment: stopped drinking 1989-daily vodka                                Counseling given: Not Answered      Vital Signs (Current):   Vitals:    04/20/22 0917   BP: 136/69   Pulse: 97   Resp: 16   Temp: 99 °F (37.2 °C)   TempSrc: Temporal   SpO2: 90%   Height: 5' 2\" (1.575 m)                                              BP Readings from Last 3 Encounters: 04/20/22 136/69   12/08/21 135/80   05/27/21 101/70       NPO Status: Time of last liquid consumption: 2330                        Time of last solid consumption: 2000                        Date of last liquid consumption: 04/19/22                        Date of last solid food consumption: 04/19/22    BMI:   Wt Readings from Last 3 Encounters:   12/04/21 98 lb 8 oz (44.7 kg)   03/24/21 98 lb 6.4 oz (44.6 kg)   12/17/19 96 lb (43.5 kg)     Body mass index is 18.02 kg/m². CBC:   Lab Results   Component Value Date    WBC 13.4 12/08/2021    RBC 3.63 12/08/2021    HGB 9.7 12/08/2021    HCT 31.3 12/08/2021    MCV 86.2 12/08/2021    RDW 14.2 10/01/2019     12/08/2021       CMP:   Lab Results   Component Value Date     12/08/2021    K 3.7 12/08/2021    K 4.1 12/05/2021     12/08/2021    CO2 27 12/08/2021    BUN 11 12/08/2021    CREATININE 0.5 12/08/2021    GFRAA >60 10/01/2019    LABGLOM >90 12/08/2021    GLUCOSE 101 12/08/2021    PROT 7.8 12/04/2021    CALCIUM 9.0 12/08/2021    BILITOT 0.5 12/04/2021    ALKPHOS 77 12/04/2021    AST 23 12/04/2021    ALT 15 12/04/2021       POC Tests: No results for input(s): POCGLU, POCNA, POCK, POCCL, POCBUN, POCHEMO, POCHCT in the last 72 hours.     Coags:   Lab Results   Component Value Date    INR 1.14 05/26/2021    APTT 27.3 05/26/2021       HCG (If Applicable): No results found for: PREGTESTUR, PREGSERUM, HCG, HCGQUANT     ABGs: No results found for: PHART, PO2ART, GND4UBX, XBM4ZWB, BEART, D5QPOXUE     Type & Screen (If Applicable):  No results found for: LABABO, LABRH    Drug/Infectious Status (If Applicable):  No results found for: HIV, HEPCAB    COVID-19 Screening (If Applicable):   Lab Results   Component Value Date    COVID19 NOT  DETECTED 12/04/2021           Anesthesia Evaluation  Patient summary reviewed and Nursing notes reviewed no history of anesthetic complications:   Airway: Mallampati: II        Dental:          Pulmonary:   (+) COPD:  shortness of breath:  decreased breath sounds,                            ROS comment: Chronic 3 L oxygen    Cardiovascular:  Exercise tolerance: poor (<4 METS),         ECG reviewed  Rhythm: regular  Rate: normal                    Neuro/Psych:   (+) neuromuscular disease:, TIA, psychiatric history:            GI/Hepatic/Renal:             Endo/Other:                     Abdominal:             Vascular: Other Findings:             Anesthesia Plan      MAC     ASA 4       Induction: intravenous. Anesthetic plan and risks discussed with patient and child/children. Plan discussed with CRNA.                   [facility], DO   4/20/2022

## 2022-04-20 NOTE — BRIEF OP NOTE
Brief Postoperative Note      Patient: Chelo Jason  YOB: 1945  MRN: [id number]    Date of Procedure: 4/20/2022    Pre-Op Diagnosis: OXYGEN DEPEND. APPETITE    Post-Op Diagnosis: EGD TO 2ND DUODENUM, BIOPSIES OBTAINED. GASTRITIS       Procedure(s):  EGD BIOPSY    Surgeon(s):  Jacquelin Gillespie MD    Assistant:  * No surgical staff found *    Anesthesia: Monitor Anesthesia Care    Estimated Blood Loss (mL): Minimal    Complications: None    Specimens:   ID Type Source Tests Collected by Time Destination   A : Small bowel bx, R/o malabsorption Tissue Duodenum SURGICAL PATHOLOGY Jacquelin Gillespie MD 4/20/2022 1129        Implants:  * No implants in log *      Drains: * No LDAs found *    Findings: EGD TO 2ND DUODENUM, BIOPSIES OBTAINED.  GASTRITIS    Electronically signed by Jacquelin Gillespie MD on 4/20/2022 at 11:36 AM

## 2022-04-20 NOTE — PROGRESS NOTES
Recovery mode, denies discomfort. Passing gas, taking fluids. Dr. Richard Nicholson discussed findings with pt and . Discharge instructions provided and understanding verbalized.

## 2022-04-20 NOTE — H&P
[address] [id number]                       PREOPERATIVE HISTORY AND PHYSICAL    PATIENT NAME: Jeremy Martinez                    :        1945  MED REC NO:   [id number]                           ROOM:  ACCOUNT NO:   [de-identified]                           ADMIT DATE: 2022  PROVIDER:     Derrek Ham M.D. PROCEDURE:  Esophagogastroduodenoscopy. INDICATION:  The patient is a 14-year-old pleasant female with  complaints of periumbilical abdominal pain, left upper quadrant  abdominal pain, decreased appetite. She is undergoing further  evaluation. PAST MEDICAL HISTORY:  Chronic respiratory failure with hypoxia oxygen  dependent, chronic obstructive pulmonary disease, depression,  hyperlipidemia, lung nodules, pneumonia, malnutrition, TIA. PAST SURGICAL HISTORY:  Colonoscopy, tonsillectomy. MEDICATIONS:  Reviewed. PHYSICAL EXAMINATION:  VITAL SIGNS:  Temperature 99, pulse 97, respiratory rate 16, blood  pressure 136/69. HEART:  Regular. Normal S1, S2.  LUNGS:  Fair air entry bilaterally. ABDOMEN:  Soft. Normoactive bowel sounds, nontender. IMPRESSION:  This 14-year-old pleasant female has left upper quadrant  abdominal pain, periumbilical abdominal pain, decreased appetite. She  is undergoing further evaluation. RECOMMENDATIONS:  Keep the patient nothing by mouth. Proceed with  esophagogastroduodenoscopy as planned. The indications and  complications including but not limited to perforation, bleeding,  infection, reaction to medicine, very slight chance of missing  significant lesions. The patient expressed her understanding. Further  plans pending the above test results.         Karri Zhang M.D.    D: 2022 10:49:36       T: 2022 11:28:53     [email or web address]  Job#: [id number]     Doc#: [id number]    CC:

## 2022-04-20 NOTE — ANESTHESIA POSTPROCEDURE EVALUATION
Department of Anesthesiology  Postprocedure Note    Patient: Scott Branham  MRN: [id number]  YOB: 1945  Date of evaluation: 4/20/2022  Time:  11:43 AM     Procedure Summary     Date: 04/20/22 [facility] [place]    Anesthesia Start: 5089 Anesthesia Stop: 3843    Procedure: EGD BIOPSY (N/A ) Diagnosis: (OXYGEN DEPEND. APPETITE)    Surgeons: Catie Lofton MD Responsible Provider: Marietta Mohan DO    Anesthesia Type: MAC ASA Status: 4          Anesthesia Type: MAC    Tono Phase I:      Tono Phase II:      Last vitals: Reviewed and per EMR flowsheets.        Anesthesia Post Evaluation    Patient location during evaluation: bedside  Patient participation: waiting for patient participation  Level of consciousness: awake and responsive to verbal stimuli  Pain score: 0  Airway patency: patent  Nausea & Vomiting: no nausea and no vomiting  Complications: no  Cardiovascular status: hemodynamically stable  Respiratory status: acceptable and nasal cannula  Hydration status: euvolemic

## 2022-04-21 LAB — UREASE-CLO-C. PYLORI: NEGATIVE

## 2022-04-21 NOTE — PROCEDURES
[address] [place] [address]                                 PROCEDURE NOTE    PATIENT NAME: Amol Pierce                    :        1945  MED REC NO:   [id number]                           ROOM:  ACCOUNT NO:   [de-identified]                           ADMIT DATE: 2022  PROVIDER:     ALEXANDRA Benjamin Roque OF PROCEDURE:  2022    SURGEON:  Cindy Laboy MD    PROCEDURE:  Esophagogastroduodenoscopy. INDICATION:  The patient is a 58-year-old pleasant female who has a  periumbilical abdominal pain, decreased appetite, nausea, weight loss. She is undergoing further evaluation. Please see my brief history for  details and for physical examination. ASA CLASSIFICATION:  As per Anesthesia. Please see Anesthesia note for  details. INSTRUMENT:  GIF- gastroscope. PHOTOGRAPHS:  Yes. BIOPSIES:  Yes. ESTIMATED BLOOD LOSS:  Less than 10 mL. CONSENT:  Procedure, indications and complications including but not  limited to perforation, bleeding, infection, very slight chance of  missing significant lesions discussed with the patient. The patient  expressed her understanding and a written consent was obtained. The patient was placed in the left lateral decubitus position in the  Endo Room #13. The patient was placed on appropriate monitoring of  vitals including blood pressure, heart rate and pulse ox. Oropharynx  was sprayed with Cetacaine spray and bite block was placed between  maxilla and mandible. After the adequate total intravenous anesthesia  was given by Anesthesia, ICQ- gastroscope was inserted into the  oropharynx and the esophagus was intubated under direct vision. The  scope was advanced down through the stomach into second portion of  duodenum. On careful inspection, the duodenum looks normal.  In the  duodenal bulb, the patient had multiple erosions noted.   In the antrum,  the patient had antral gastritis noted. Biopsies obtained for CLOtest.   Biopsies obtained from the second and third portion of the small bowel  to rule out malabsorption syndrome. Lesser and greater curvature and  body of the stomach look normal.  On retroflex, fundus looks normal.   Distal and proximal esophagus look normal.  Intact squamocolumnar  junction at gastroesophageal junction noted. Air was withdrawn as the  scope was removed. The patient tolerated the procedure well without any  immediate complications. Vitals including blood pressure, heart rate  and pulse ox were stable during the procedure and after the procedure. The patient was transferred to recovery room in stable condition. IMPRESSION:  Esophagogastroduodenoscopy to second portion of duodenum. Multiple biopsies obtained from the duodenum to rule out malabsorption  syndrome. Duodenal bulbar erosions noted. Antral gastritis. Biopsies  obtained for H. pylori, CLOtest.    MY RECOMMENDATIONS:  Antireflux instructions. Increase omeprazole to 40  mg p.o. daily. Continue Ensure high-protein 1 can three times a day. Multivitamin daily. Follow up with our office in three to four weeks  for the biopsy results. If biopsies are negative, continued to have  problems, then consider CT scan of the abdomen and pelvis with oral and  IV contrast to rule out any pancreatic pathology. Thank you Antonio Melissa for letting me to do procedure on this patient. Do not hesitate to call me if you have any questions. My  recommendations are above. Noble Quiñones M.D.    D: 04/20/2022 11:39:44       T: 04/20/2022 11:42:02     [email or web address]  Job#: [id number]     Doc#: [id number]    CC:   Jonathan Keene M.D.

## 2022-04-26 ENCOUNTER — OFFICE VISIT (OUTPATIENT)
Dept: PULMONOLOGY | Age: 77
End: 2022-04-26
Payer: MEDICARE

## 2022-04-26 VITALS
WEIGHT: 83.8 LBS | DIASTOLIC BLOOD PRESSURE: 78 MMHG | BODY MASS INDEX: 15.42 KG/M2 | OXYGEN SATURATION: 92 % | HEART RATE: 88 BPM | SYSTOLIC BLOOD PRESSURE: 136 MMHG | HEIGHT: 62 IN | TEMPERATURE: 96.8 F

## 2022-04-26 DIAGNOSIS — E43 SEVERE MALNUTRITION (HCC): ICD-10-CM

## 2022-04-26 DIAGNOSIS — J44.9 CHRONIC OBSTRUCTIVE PULMONARY DISEASE, UNSPECIFIED COPD TYPE (HCC): ICD-10-CM

## 2022-04-26 DIAGNOSIS — J96.12 CHRONIC RESPIRATORY FAILURE WITH HYPERCAPNIA (HCC): Primary | ICD-10-CM

## 2022-04-26 DIAGNOSIS — R63.4 WEIGHT LOSS, NON-INTENTIONAL: ICD-10-CM

## 2022-04-26 PROBLEM — E11.65 TYPE 2 DIABETES MELLITUS WITH HYPERGLYCEMIA (HCC): Status: ACTIVE | Noted: 2021-05-28

## 2022-04-26 PROBLEM — G95.19 NEUROGENIC CLAUDICATION (HCC): Status: ACTIVE | Noted: 2022-04-26

## 2022-04-26 PROBLEM — G89.4 CHRONIC PAIN DISORDER: Status: ACTIVE | Noted: 2021-05-28

## 2022-04-26 PROBLEM — E03.9 HYPOTHYROIDISM, UNSPECIFIED: Status: ACTIVE | Noted: 2021-05-28

## 2022-04-26 PROBLEM — D51.9 VITAMIN B12 DEFICIENCY ANEMIA, UNSPECIFIED: Status: ACTIVE | Noted: 2021-05-28

## 2022-04-26 PROBLEM — M85.88 OSTEOPENIA OF LUMBAR SPINE: Status: ACTIVE | Noted: 2022-04-26

## 2022-04-26 PROBLEM — E55.9 VITAMIN D DEFICIENCY, UNSPECIFIED: Status: ACTIVE | Noted: 2021-05-28

## 2022-04-26 PROBLEM — F33.1 MAJOR DEPRESSIVE DISORDER, RECURRENT, MODERATE (HCC): Status: ACTIVE | Noted: 2021-05-28

## 2022-04-26 PROBLEM — R29.818 NEUROGENIC CLAUDICATION: Status: ACTIVE | Noted: 2022-04-26

## 2022-04-26 PROBLEM — K21.9 GASTRO-ESOPHAGEAL REFLUX DISEASE WITHOUT ESOPHAGITIS: Status: ACTIVE | Noted: 2021-05-28

## 2022-04-26 PROCEDURE — 3023F SPIROM DOC REV: CPT | Performed by: NURSE PRACTITIONER

## 2022-04-26 PROCEDURE — G8419 CALC BMI OUT NRM PARAM NOF/U: HCPCS | Performed by: NURSE PRACTITIONER

## 2022-04-26 PROCEDURE — G8427 DOCREV CUR MEDS BY ELIG CLIN: HCPCS | Performed by: NURSE PRACTITIONER

## 2022-04-26 PROCEDURE — 1036F TOBACCO NON-USER: CPT | Performed by: NURSE PRACTITIONER

## 2022-04-26 PROCEDURE — 4040F PNEUMOC VAC/ADMIN/RCVD: CPT | Performed by: NURSE PRACTITIONER

## 2022-04-26 PROCEDURE — G8399 PT W/DXA RESULTS DOCUMENT: HCPCS | Performed by: NURSE PRACTITIONER

## 2022-04-26 PROCEDURE — 1090F PRES/ABSN URINE INCON ASSESS: CPT | Performed by: NURSE PRACTITIONER

## 2022-04-26 PROCEDURE — 1123F ACP DISCUSS/DSCN MKR DOCD: CPT | Performed by: NURSE PRACTITIONER

## 2022-04-26 PROCEDURE — 99214 OFFICE O/P EST MOD 30 MIN: CPT | Performed by: NURSE PRACTITIONER

## 2022-04-26 ASSESSMENT — ENCOUNTER SYMPTOMS
VOMITING: 0
ABDOMINAL DISTENTION: 0
NAUSEA: 0
SHORTNESS OF BREATH: 0
EYES NEGATIVE: 1
WHEEZING: 0
ABDOMINAL PAIN: 0
CHEST TIGHTNESS: 0
COUGH: 0
DIARRHEA: 0

## 2022-04-26 NOTE — PROGRESS NOTES
[facility]     Patient: Ava Severin, 67 y.o.   : 1945    Pt of Dr. Joie Spatz   Patient presents with    Follow-up     COPD 13 month pulmonary follow up with no testing         HPI  Nancy Blas is here for 1 year follow up for COPD  Current Inhalers:  Incruse, Breo 200 mcg , Ventolin   On 3LPM w/ activity and sleep , 1 LPM at rest . Does use her O2 at home, does not usually take places due to heaviness   Admitted [facility] to [facility] for pneumonia. COVID swab negative   Accompanied by her daughter. [facility] at [facility]  Noted to have 14% weight loss today . Poor appetite. Denies changes in taste. Pt reports no longer receiving ensure supplements at [facility], daughter concerned about this and will speak to D. O.N  Is taking Megace PO   Recent endoscopy by GI. Dx with gastritis.      Denies any SOB / wheezing/ cough      Internal Administration   First Dose COVID-19, Pfizer Purple top, DILUTE for use, 12+ yrs, 30mcg/0.3mL dose  2021   Second Dose COVID-19, Pfizer Purple top, DILUTE for use, 12+ yrs, 30mcg/0.3mL dose   2021       Last COVID Lab SARS-CoV-2, NAAT (no units)   Date Value   2021 NOT  DETECTED           SOCIAL HISTORY:  Social History     Tobacco Use    Smoking status: Former Smoker     Packs/day: 2.00     Years: 56.00     Pack years: 112.00     Types: Cigarettes     Start date: 1962     Quit date: 3/3/2019     Years since quitting: 3.1    Smokeless tobacco: Never Used   Vaping Use    Vaping Use: Never used   Substance Use Topics    Alcohol use: Not Currently     Comment: stopped drinking 1989-daily vodka    Drug use: Not Currently         CURRENT MEDICATIONS:  Current Outpatient Medications   Medication Sig Dispense Refill    melatonin 3 MG TABS tablet Take 3 mg by mouth daily      omeprazole (PRILOSEC) 20 MG delayed release capsule Take 20 mg by mouth Daily      Ascorbic Acid (VITAMIN C) 250 MG tablet Take 500 mg by mouth daily For 10 days d/t covid      zinc 50 MG TABS tablet Take 50 mg by mouth daily      hydrOXYzine (ATARAX) 10 MG tablet Take 10 mg by mouth 2 times daily itching       HYDROcodone-acetaminophen (NORCO) 5-325 MG per tablet Take 1 tablet by mouth every 6 hours as needed for Pain.  acetaminophen (TYLENOL) 325 MG tablet Take 650 mg by mouth every 6 hours as needed for Pain      bisacodyl (DULCOLAX) 10 MG suppository Place 10 mg rectally as needed for Constipation      Nutritional Supplements (ENSURE HIGH PROTEIN) LIQD Take 1 Can by mouth 3 times daily (with meals) 90 Can 0    megestrol (MEGACE) 40 MG tablet Take 1 tablet by mouth 2 times daily 60 tablet 1    albuterol sulfate  (90 Base) MCG/ACT inhaler Inhale 2 puffs into the lungs every 6 hours 1 Inhaler 3    Umeclidinium Bromide (INCRUSE ELLIPTA) 62.5 MCG/INH AEPB Inhale 1 puff into the lungs daily 30 each 0    Cholecalciferol (VITAMIN D3) 50 MCG (2000 UT) CAPS Take 2,000 Units by mouth daily      aspirin 81 MG tablet Take 81 mg by mouth daily      albuterol sulfate  (90 Base) MCG/ACT inhaler Inhale 2 puffs into the lungs 4 times daily Use spacer 32 g 0    ipratropium-albuterol (DUONEB) 0.5-2.5 (3) MG/3ML SOLN nebulizer solution Inhale 3 mLs into the lungs every 6 hours as needed for Shortness of Breath 360 mL 2    atorvastatin (LIPITOR) 10 MG tablet Take 1 tablet by mouth nightly 30 tablet 3    vitamin D (CHOLECALCIFEROL) 1000 UNIT TABS tablet Take 2,000 Units by mouth daily      Fluticasone Furoate-Vilanterol (BREO ELLIPTA) 200-25 MCG/INH AEPB Inhale 1 puff into the lungs daily      OXYGEN Inhale 2-4 L into the lungs nightly Nightly and prn      ARIPiprazole (ABILIFY) 10 MG tablet Take 10 mg by mouth daily      desvenlafaxine succinate (PRISTIQ) 50 MG TB24 extended release tablet Take 100 mg by mouth daily        No current facility-administered medications for this visit. Brisa Mendoza     ROS   Review of Systems   Constitutional: Positive for appetite change and fatigue. Negative for activity change, chills, fever and unexpected weight change. HENT: Negative. Eyes: Negative. Respiratory: Negative for cough, chest tightness, shortness of breath and wheezing. Cardiovascular: Negative for chest pain, palpitations and leg swelling. Gastrointestinal: Negative for abdominal distention, abdominal pain, diarrhea, nausea and vomiting. Genitourinary: Negative. Musculoskeletal: Positive for gait problem. Skin: Negative. Neurological: Positive for weakness. Hematological: Bruises/bleeds easily. Psychiatric/Behavioral: Negative. Negative for sleep disturbance. Physical exam   /78 (Site: Left Upper Arm, Position: Sitting)   Pulse 88   Temp 96.8 °F (36 °C)   Ht 5' 2\" (1.575 m)   Wt 83 lb 12.8 oz (38 kg)   SpO2 92% Comment: on RA  BMI 15.33 kg/m²      Wt Readings from Last 3 Encounters:   04/26/22 83 lb 12.8 oz (38 kg)   12/04/21 98 lb 8 oz (44.7 kg)   03/24/21 98 lb 6.4 oz (44.6 kg)     Physical Exam  Vitals and nursing note reviewed. Constitutional:       General: She is not in acute distress. Appearance: She is underweight. HENT:      Mouth/Throat:      Lips: Pink. Mouth: Mucous membranes are moist.      Pharynx: Oropharynx is clear. No oropharyngeal exudate or posterior oropharyngeal erythema. Eyes:      Conjunctiva/sclera: Conjunctivae normal.   Neck:      Vascular: No JVD. Cardiovascular:      Rate and Rhythm: Normal rate and regular rhythm. Heart sounds: No murmur heard. No friction rub. Pulmonary:      Effort: Pulmonary effort is normal. No accessory muscle usage or respiratory distress. Breath sounds: Normal breath sounds. No wheezing, rhonchi or rales. Chest:      Chest wall: No tenderness. Musculoskeletal:      Right lower leg: No edema. Left lower leg: No edema. Skin:     General: Skin is warm and dry.       Capillary Refill: Capillary refill takes less than 2 seconds. Coloration: Skin is pale. Nails: There is no clubbing. Neurological:      Mental Status: She is alert and oriented to person, place, and time. Psychiatric:         Mood and Affect: Mood normal.         Behavior: Behavior normal.         Thought Content: Thought content normal.         Judgment: Judgment normal.          Test results   Lung Nodule Screening     [] Qualifies    [x]Does not qualify   [] Declined    [] Completed     Assessment      Diagnosis Orders   1. Chronic respiratory failure with hypercapnia (HCC)     2. Severe malnutrition ([facility]     3. Weight loss, non-intentional     4. Chronic obstructive pulmonary disease, unspecified COPD type ([facility]       Plan      COPD appears to be optimally controlled: continue Breo and Incruse inhalers once a day as prescribed  Needs to be more compliant with oxygen use. Current order is 3LPM with activity / walking and sleep . 1LPM PRN at rest. Nursing staff to titrate oxygen flow to keep SPO2 90-94% . Please make sure pt has portable oxygen tanks that are full and available for use   Patients family going to discuss with DME possible attachment to walker to assist with oxygen   Continue Albuterol sulfate nebs PRN wheezing/ increased SOB   Weight loss is concerning.  Is following with GI.    -avoid ill contacts  -keep UTD on recommended vaccinations    Will see Patrick President in: 1 year  billing based on time: total time on encounter 30 min   Electronically signed by SHARONA Ward CNP on 4/26/2022 at 1:03 PM

## 2022-05-28 ENCOUNTER — APPOINTMENT (OUTPATIENT)
Dept: CT IMAGING | Age: 77
End: 2022-05-28
Payer: MEDICARE

## 2022-05-28 ENCOUNTER — HOSPITAL ENCOUNTER (EMERGENCY)
Age: 77
Discharge: HOME | End: 2022-05-28
Attending: EMERGENCY MEDICINE
Payer: MEDICARE

## 2022-05-28 VITALS
BODY MASS INDEX: 15.83 KG/M2 | DIASTOLIC BLOOD PRESSURE: 67 MMHG | HEART RATE: 88 BPM | RESPIRATION RATE: 18 BRPM | WEIGHT: 86 LBS | TEMPERATURE: 98.1 F | HEIGHT: 62 IN | SYSTOLIC BLOOD PRESSURE: 148 MMHG | OXYGEN SATURATION: 100 %

## 2022-05-28 DIAGNOSIS — R10.9 ABDOMINAL PAIN, UNSPECIFIED ABDOMINAL LOCATION: Primary | ICD-10-CM

## 2022-05-28 DIAGNOSIS — K59.00 CONSTIPATION, UNSPECIFIED CONSTIPATION TYPE: ICD-10-CM

## 2022-05-28 LAB
ALBUMIN SERPL-MCNC: 4.3 G/DL (ref 3.5–5.1)
ALP BLD-CCNC: 53 U/L (ref 38–126)
ALT SERPL-CCNC: 12 U/L (ref 11–66)
ANION GAP SERPL CALCULATED.3IONS-SCNC: 12 MEQ/L (ref 8–16)
AST SERPL-CCNC: 19 U/L (ref 5–40)
BACTERIA: ABNORMAL /HPF
BASOPHILS # BLD: 0.4 %
BASOPHILS ABSOLUTE: 0 THOU/MM3 (ref 0–0.1)
BILIRUB SERPL-MCNC: 0.3 MG/DL (ref 0.3–1.2)
BILIRUBIN DIRECT: < 0.2 MG/DL (ref 0–0.3)
BILIRUBIN URINE: NEGATIVE
BLOOD, URINE: NEGATIVE
BUN BLDV-MCNC: 8 MG/DL (ref 7–22)
CALCIUM SERPL-MCNC: 9.1 MG/DL (ref 8.5–10.5)
CASTS 2: ABNORMAL /LPF
CASTS UA: ABNORMAL /LPF
CHARACTER, URINE: ABNORMAL
CHLORIDE BLD-SCNC: 99 MEQ/L (ref 98–111)
CO2: 27 MEQ/L (ref 23–33)
COLOR: YELLOW
CREAT SERPL-MCNC: 0.5 MG/DL (ref 0.4–1.2)
CRYSTALS, UA: ABNORMAL
EKG ATRIAL RATE: 84 BPM
EKG P AXIS: 75 DEGREES
EKG P-R INTERVAL: 112 MS
EKG Q-T INTERVAL: 350 MS
EKG QRS DURATION: 74 MS
EKG QTC CALCULATION (BAZETT): 413 MS
EKG R AXIS: 72 DEGREES
EKG T AXIS: 67 DEGREES
EKG VENTRICULAR RATE: 84 BPM
EOSINOPHIL # BLD: 0.7 %
EOSINOPHILS ABSOLUTE: 0.1 THOU/MM3 (ref 0–0.4)
EPITHELIAL CELLS, UA: ABNORMAL /HPF
ERYTHROCYTE [DISTWIDTH] IN BLOOD BY AUTOMATED COUNT: 18 % (ref 11.5–14.5)
ERYTHROCYTE [DISTWIDTH] IN BLOOD BY AUTOMATED COUNT: 53.9 FL (ref 35–45)
GFR SERPL CREATININE-BSD FRML MDRD: > 90 ML/MIN/1.73M2
GLUCOSE BLD-MCNC: 108 MG/DL (ref 70–108)
GLUCOSE URINE: NEGATIVE MG/DL
HCT VFR BLD CALC: 35.3 % (ref 37–47)
HEMOGLOBIN: 11.2 GM/DL (ref 12–16)
IMMATURE GRANS (ABS): 0.01 THOU/MM3 (ref 0–0.07)
IMMATURE GRANULOCYTES: 0.1 %
KETONES, URINE: 15
LEUKOCYTE ESTERASE, URINE: NEGATIVE
LIPASE: 13.3 U/L (ref 5.6–51.3)
LYMPHOCYTES # BLD: 14.7 %
LYMPHOCYTES ABSOLUTE: 1.1 THOU/MM3 (ref 1–4.8)
MCH RBC QN AUTO: 26.4 PG (ref 26–33)
MCHC RBC AUTO-ENTMCNC: 31.7 GM/DL (ref 32.2–35.5)
MCV RBC AUTO: 83.1 FL (ref 81–99)
MISCELLANEOUS 2: ABNORMAL
MONOCYTES # BLD: 5.3 %
MONOCYTES ABSOLUTE: 0.4 THOU/MM3 (ref 0.4–1.3)
NITRITE, URINE: NEGATIVE
NUCLEATED RED BLOOD CELLS: 0 /100 WBC
OSMOLALITY CALCULATION: 274.5 MOSMOL/KG (ref 275–300)
PH UA: >= 9 (ref 5–9)
PLATELET # BLD: 264 THOU/MM3 (ref 130–400)
PMV BLD AUTO: 10 FL (ref 9.4–12.4)
POTASSIUM REFLEX MAGNESIUM: 3.9 MEQ/L (ref 3.5–5.2)
PROTEIN UA: NEGATIVE
RBC # BLD: 4.25 MILL/MM3 (ref 4.2–5.4)
RBC URINE: ABNORMAL /HPF
RENAL EPITHELIAL, UA: ABNORMAL
SEG NEUTROPHILS: 78.8 %
SEGMENTED NEUTROPHILS ABSOLUTE COUNT: 6 THOU/MM3 (ref 1.8–7.7)
SODIUM BLD-SCNC: 138 MEQ/L (ref 135–145)
SPECIFIC GRAVITY, URINE: 1.02 (ref 1–1.03)
TOTAL PROTEIN: 6.6 G/DL (ref 6.1–8)
UROBILINOGEN, URINE: 0.2 EU/DL (ref 0–1)
WBC # BLD: 7.6 THOU/MM3 (ref 4.8–10.8)
WBC UA: ABNORMAL /HPF
YEAST: ABNORMAL

## 2022-05-28 PROCEDURE — 85025 COMPLETE CBC W/AUTO DIFF WBC: CPT

## 2022-05-28 PROCEDURE — 83690 ASSAY OF LIPASE: CPT

## 2022-05-28 PROCEDURE — 81001 URINALYSIS AUTO W/SCOPE: CPT

## 2022-05-28 PROCEDURE — 93005 ELECTROCARDIOGRAM TRACING: CPT | Performed by: EMERGENCY MEDICINE

## 2022-05-28 PROCEDURE — 6360000002 HC RX W HCPCS

## 2022-05-28 PROCEDURE — 6360000002 HC RX W HCPCS: Performed by: STUDENT IN AN ORGANIZED HEALTH CARE EDUCATION/TRAINING PROGRAM

## 2022-05-28 PROCEDURE — 99285 EMERGENCY DEPT VISIT HI MDM: CPT

## 2022-05-28 PROCEDURE — 93010 ELECTROCARDIOGRAM REPORT: CPT | Performed by: INTERNAL MEDICINE

## 2022-05-28 PROCEDURE — 96375 TX/PRO/DX INJ NEW DRUG ADDON: CPT

## 2022-05-28 PROCEDURE — 80076 HEPATIC FUNCTION PANEL: CPT

## 2022-05-28 PROCEDURE — 2580000003 HC RX 258: Performed by: STUDENT IN AN ORGANIZED HEALTH CARE EDUCATION/TRAINING PROGRAM

## 2022-05-28 PROCEDURE — 96374 THER/PROPH/DIAG INJ IV PUSH: CPT

## 2022-05-28 PROCEDURE — 96376 TX/PRO/DX INJ SAME DRUG ADON: CPT

## 2022-05-28 PROCEDURE — 80048 BASIC METABOLIC PNL TOTAL CA: CPT

## 2022-05-28 PROCEDURE — 6360000004 HC RX CONTRAST MEDICATION: Performed by: STUDENT IN AN ORGANIZED HEALTH CARE EDUCATION/TRAINING PROGRAM

## 2022-05-28 PROCEDURE — 74177 CT ABD & PELVIS W/CONTRAST: CPT

## 2022-05-28 RX ORDER — ONDANSETRON 2 MG/ML
4 INJECTION INTRAMUSCULAR; INTRAVENOUS ONCE
Status: COMPLETED | OUTPATIENT
Start: 2022-05-28 | End: 2022-05-28

## 2022-05-28 RX ORDER — DOCUSATE SODIUM 100 MG/1
100 CAPSULE, LIQUID FILLED ORAL 2 TIMES DAILY
Qty: 60 CAPSULE | Refills: 0 | Status: SHIPPED | OUTPATIENT
Start: 2022-05-28

## 2022-05-28 RX ORDER — POLYETHYLENE GLYCOL 3350 17 G/17G
17 POWDER, FOR SOLUTION ORAL DAILY
Qty: 1530 G | Refills: 1 | Status: SHIPPED | OUTPATIENT
Start: 2022-05-28 | End: 2022-11-24

## 2022-05-28 RX ORDER — FENTANYL CITRATE 50 UG/ML
25 INJECTION, SOLUTION INTRAMUSCULAR; INTRAVENOUS ONCE
Status: COMPLETED | OUTPATIENT
Start: 2022-05-28 | End: 2022-05-28

## 2022-05-28 RX ORDER — ONDANSETRON 2 MG/ML
INJECTION INTRAMUSCULAR; INTRAVENOUS
Status: COMPLETED
Start: 2022-05-28 | End: 2022-05-28

## 2022-05-28 RX ORDER — 0.9 % SODIUM CHLORIDE 0.9 %
1000 INTRAVENOUS SOLUTION INTRAVENOUS ONCE
Status: COMPLETED | OUTPATIENT
Start: 2022-05-28 | End: 2022-05-28

## 2022-05-28 RX ADMIN — ONDANSETRON 4 MG: 2 INJECTION INTRAMUSCULAR; INTRAVENOUS at 11:45

## 2022-05-28 RX ADMIN — FENTANYL CITRATE 25 MCG: 50 INJECTION, SOLUTION INTRAMUSCULAR; INTRAVENOUS at 08:47

## 2022-05-28 RX ADMIN — SODIUM CHLORIDE 1000 ML: 9 INJECTION, SOLUTION INTRAVENOUS at 08:45

## 2022-05-28 RX ADMIN — IOPAMIDOL 80 ML: 755 INJECTION, SOLUTION INTRAVENOUS at 10:33

## 2022-05-28 RX ADMIN — ONDANSETRON 4 MG: 2 INJECTION INTRAMUSCULAR; INTRAVENOUS at 08:46

## 2022-05-28 ASSESSMENT — PAIN SCALES - GENERAL
PAINLEVEL_OUTOF10: 8
PAINLEVEL_OUTOF10: 8

## 2022-05-28 ASSESSMENT — PAIN DESCRIPTION - LOCATION: LOCATION: ABDOMEN

## 2022-05-28 ASSESSMENT — ENCOUNTER SYMPTOMS
VOMITING: 1
SHORTNESS OF BREATH: 0
COUGH: 0
ABDOMINAL PAIN: 1
NAUSEA: 1
SINUS PAIN: 0
DIARRHEA: 0
RHINORRHEA: 0
EYE REDNESS: 0
BACK PAIN: 0
BLOOD IN STOOL: 0
SORE THROAT: 0

## 2022-05-28 ASSESSMENT — PAIN DESCRIPTION - DESCRIPTORS: DESCRIPTORS: ACHING;SHARP

## 2022-05-28 ASSESSMENT — PAIN DESCRIPTION - ORIENTATION: ORIENTATION: RIGHT;LEFT

## 2022-05-28 NOTE — ED NOTES
Pt continues restful on cart w/eyes closed, RR easy and none labored.       Lashanda Melendez RN  05/28/22 2596

## 2022-05-28 NOTE — ED TRIAGE NOTES
Pt to rm 20 per EMS- sent in by Daphney villeda for evaluation of ABD pain and nausea that started this morning. On arrival pt appears in no acute distress, VSS- pt noted incontinent of urine, states she doesn't always know when she has to go- brief saturated w/dark foul smelling urine. Pt cleaned up, external cath and new brief applied. Pt denies urinary complaints- no CP/SOB- hx of COPD, on 3L NC home dose O2.

## 2022-05-28 NOTE — ED NOTES
Pt placed in clean scrubs and footies for transport back to apartment.  Updated on DC plan, verbalized understanding     Anne Travis RN  05/28/22 0245

## 2022-05-28 NOTE — ED NOTES
Pt appears restful on cart w/eyes closed, RR easy and non labored, medicated per orders. Waiting CT results.       Angelina Tuttle RN  05/28/22 0488

## 2022-05-28 NOTE — ED PROVIDER NOTES
[facility] ENCOUN[facility]          Pt Name: Gerhard Vazquez  MRN: [id number]  [place] 1945  Date of evaluation: 5/28/2022  Treating Resident Physician: Caroline Tatum MD  Supervising Physician: Dr Janessa Anderson       Chief Complaint   Patient presents with    Abdominal Pain     History obtained from the patient. HISTORY OF PRESENT ILLNESS    HPI  Gerhard Vazquez is a 67 y.o. female with past medical history of severe malnutrition, COPD, hyperlipidemia, lung nodules, TIA who presents to the emergency department for evaluation of lower abdominal pain that began this morning and woke her up from sleep a few hours ago. It is diffusely across her lower abdomen described as a dull ache. She has associated nausea and nonbilious nonbloody emesis x3. She denies any diarrhea, blood in her stool, dark stools, dysuria, hematuria, vaginal bleeding. She denies having any chest pain or shortness of breath. She is chronically on supplemental oxygen currently at her baseline here in the emergency department. Denies any past surgical history on her abdomen. The patient has no other acute complaints at this time. REVIEW OF SYSTEMS   Review of Systems   Constitutional: Negative for chills and fever. HENT: Negative for rhinorrhea, sinus pain and sore throat. Eyes: Negative for redness. Respiratory: Negative for cough and shortness of breath. Cardiovascular: Negative for chest pain. Gastrointestinal: Positive for abdominal pain, nausea and vomiting. Negative for blood in stool and diarrhea. Genitourinary: Negative for dysuria, hematuria and vaginal bleeding. Musculoskeletal: Negative for back pain. Skin: Negative for rash. Neurological: Negative for light-headedness and headaches. Psychiatric/Behavioral: Negative for agitation.          PAST MEDICAL AND SURGICAL HISTORY     Past Medical History:   Diagnosis Date    Chronic respiratory failure with hypoxia (HCC)     Chronic respiratory failure with hypoxia and hypercapnia (HCC)     COPD (chronic obstructive pulmonary disease) (HCC)     Depression     Dysarthria     Gastro-esophageal reflux disease without esophagitis 5/28/2021    Hyperlipidemia     Hyperlipidemia     Hypothyroidism, unspecified 5/28/2021    Lung nodules     Pneumonia     [place] 2 months ago    Severe malnutrition (HCC)     Severe malnutrition (HCC)     Severe malnutrition ([facility] [address]     TIA (transient ischemic attack)     Type 2 diabetes mellitus with hyperglycemia ([facility] [address] 5/28/2021     Past Surgical History:   Procedure Laterality Date    COLONOSCOPY      TONSILLECTOMY      UPPER GASTROINTESTINAL ENDOSCOPY N/A 4/20/2022    EGD BIOPSY performed by Carmen Evans MD at [address] [place] [address]   No current facility-administered medications for this encounter.     Current Outpatient Medications:     polyethylene glycol (GLYCOLAX) 17 GM/SCOOP powder, Take 17 g by mouth daily, Disp: 1530 g, Rfl: 1    docusate sodium (COLACE) 100 mg capsule, Take 1 capsule by mouth 2 times daily, Disp: 60 capsule, Rfl: 0    magnesium hydroxide (MILK OF MAGNESIA) 400 MG/5ML suspension, Take 15 mLs by mouth daily, Disp: 1 each, Rfl: 0    melatonin 3 MG TABS tablet, Take 3 mg by mouth daily, Disp: , Rfl:     omeprazole (PRILOSEC) 20 MG delayed release capsule, Take 20 mg by mouth Daily, Disp: , Rfl:     Ascorbic Acid (VITAMIN C) 250 MG tablet, Take 500 mg by mouth daily For 10 days d/t covid, Disp: , Rfl:     zinc 50 MG TABS tablet, Take 50 mg by mouth daily, Disp: , Rfl:     hydrOXYzine (ATARAX) 10 MG tablet, Take 10 mg by mouth 2 times daily itching , Disp: , Rfl:     HYDROcodone-acetaminophen (NORCO) 5-325 MG per tablet, Take 1 tablet by mouth every 6 hours as needed for Pain., Disp: , Rfl:     acetaminophen (TYLENOL) 325 MG tablet, Take 650 mg by mouth every 6 hours as needed for Pain, Disp: , Rfl:   bisacodyl (DULCOLAX) 10 MG suppository, Place 10 mg rectally as needed for Constipation, Disp: , Rfl:     Nutritional Supplements (ENSURE HIGH PROTEIN) LIQD, Take 1 Can by mouth 3 times daily (with meals), Disp: 90 Can, Rfl: 0    megestrol (MEGACE) 40 MG tablet, Take 1 tablet by mouth 2 times daily, Disp: 60 tablet, Rfl: 1    albuterol sulfate  (90 Base) MCG/ACT inhaler, Inhale 2 puffs into the lungs every 6 hours, Disp: 1 Inhaler, Rfl: 3    Umeclidinium Bromide (INCRUSE ELLIPTA) 62.5 MCG/INH AEPB, Inhale 1 puff into the lungs daily, Disp: 30 each, Rfl: 0    Cholecalciferol (VITAMIN D3) 50 MCG (2000 UT) CAPS, Take 2,000 Units by mouth daily, Disp: , Rfl:     aspirin 81 MG tablet, Take 81 mg by mouth daily, Disp: , Rfl:     albuterol sulfate  (90 Base) MCG/ACT inhaler, Inhale 2 puffs into the lungs 4 times daily Use spacer, Disp: 32 g, Rfl: 0    ipratropium-albuterol (DUONEB) 0.5-2.5 (3) MG/3ML SOLN nebulizer solution, Inhale 3 mLs into the lungs every 6 hours as needed for Shortness of Breath, Disp: 360 mL, Rfl: 2    atorvastatin (LIPITOR) 10 MG tablet, Take 1 tablet by mouth nightly, Disp: 30 tablet, Rfl: 3    vitamin D (CHOLECALCIFEROL) 1000 UNIT TABS tablet, Take 2,000 Units by mouth daily, Disp: , Rfl:     Fluticasone Furoate-Vilanterol (BREO ELLIPTA) 200-25 MCG/INH AEPB, Inhale 1 puff into the lungs daily, Disp: , Rfl:     OXYGEN, Inhale 2-4 L into the lungs nightly Nightly and prn, Disp: , Rfl:     ARIPiprazole (ABILIFY) 10 MG tablet, Take 10 mg by mouth daily, Disp: , Rfl:     desvenlafaxine succinate (PRISTIQ) 50 MG TB24 extended release tablet, Take 100 mg by mouth daily , Disp: , Rfl:       SOCIAL HISTORY     Social History     Social History Narrative    ** Merged History Encounter **          Social History     Tobacco Use    Smoking status: Former Smoker     Packs/day: 2.00     Years: 56.00     Pack years: 112.00     Types: Cigarettes     Start date: 1/1/1962     Quit date: 3/3/2019     Years since quitting: 3.2    Smokeless tobacco: Never Used   Vaping Use    Vaping Use: Never used   Substance Use Topics    Alcohol use: Not Currently     Comment: stopped drinking 1989-daily vodka    Drug use: Not Currently         ALLERGIES     Allergies   Allergen Reactions    Spiriva Handihaler [Tiotropium Bromide Monohydrate] Itching         FAMILY HISTORY     Family History   Problem Relation Age of Onset    Cancer Mother         mouth cancer    Other Father         rheumatic fever/rheumatic fever--paraplegia         PREVIOUS RECORDS   Previous records reviewed: Patient was seen on 12/4/2021 for pneumonia of the right upper lobe. PHYSICAL EXAM     ED Triage Vitals [05/28/22 0746]   BP Temp Temp src Heart Rate Resp SpO2 Height Weight   (!) 158/93 98.1 °F (36.7 °C) -- 90 20 100 % 5' 2\" (1.575 m) 86 lb (39 kg)     Initial vital signs and nursing assessment reviewed and normal. Pulsoximetry is normal per my interpretation. Additional Vital Signs:  Vitals:    05/28/22 1146   BP: (!) 148/67   Pulse: 88   Resp: 18   Temp:    SpO2: 100%       Physical Exam  Vitals and nursing note reviewed. Constitutional:       General: She is in acute distress. Appearance: Normal appearance. She is not toxic-appearing. HENT:      Head: Normocephalic and atraumatic. Right Ear: External ear normal.      Left Ear: External ear normal.      Nose: Nose normal.      Mouth/Throat:      Mouth: Mucous membranes are dry. Pharynx: Oropharynx is clear. Eyes:      General: No scleral icterus. Conjunctiva/sclera: Conjunctivae normal.   Cardiovascular:      Rate and Rhythm: Normal rate and regular rhythm. Pulses: Normal pulses. Heart sounds: Normal heart sounds. Pulmonary:      Effort: Pulmonary effort is normal. No respiratory distress. Comments: Diminished bilaterally. Abdominal:      General: Abdomen is flat. There is no distension. Palpations: Abdomen is soft. Tenderness: There is abdominal tenderness. There is no guarding or rebound. Comments: Left lower quadrant right lower quadrant tenderness to palpation. Musculoskeletal:         General: Normal range of motion. Cervical back: Normal range of motion and neck supple. No rigidity. No muscular tenderness. Lymphadenopathy:      Cervical: No cervical adenopathy. Skin:     General: Skin is warm and dry. Capillary Refill: Capillary refill takes less than 2 seconds. Coloration: Skin is not jaundiced. Neurological:      General: No focal deficit present. Mental Status: She is alert and oriented to person, place, and time. Psychiatric:         Mood and Affect: Mood normal.         Behavior: Behavior normal.         MEDICAL DECISION MAKING   Initial Assessment: This is 16-year-old female with past medical history of severe malnutrition, COPD, hyperlipidemia, no past surgical history in her abdomen who presents with lower abdominal pain that awoke her from sleep this a.m. Its constant in nature dull achy in description. With associated nausea nonbilious nonbloody emesis. Denies any diarrhea. Differential Diagnosis Included but not limited to: Diverticulitis, appendicitis, malignancy, urinary tract infection, cystitis, enteritis, gastritis    MDM:   Patient has significant constipation, she will be discharged with a bowel regimen advised to follow-up with her primary care physician next few days. No other acute findings on her current CT abdomen pelvis to require any intervention at this time.         ED RESULTS   Laboratory results:  Labs Reviewed   CBC WITH AUTO DIFFERENTIAL - Abnormal; Notable for the following components:       Result Value    Hemoglobin 11.2 (*)     Hematocrit 35.3 (*)     MCHC 31.7 (*)     RDW-CV 18.0 (*)     RDW-SD 53.9 (*)     All other components within normal limits   OSMOLALITY - Abnormal; Notable for the following components:    Osmolality Calc 274.5 (*) All other components within normal limits   URINE WITH REFLEXED MICRO - Abnormal; Notable for the following components:    Ketones, Urine 15 (*)     Character, Urine CLOUDY (*)     All other components within normal limits   BASIC METABOLIC PANEL W/ REFLEX TO MG FOR LOW K   HEPATIC FUNCTION PANEL   LIPASE   ANION GAP   GLOMERULAR FILTRATION RATE, ESTIMATED       Radiologic studies results:  CT ABDOMEN PELVIS W IV CONTRAST Additional Contrast? None   Final Result      1. Extensive stool in the rectum and sigmoid colon. 2. Hiatal hernia. Fluid-filled distended stomach. 3. Diffuse fatty replacement in the liver. Punctate calcifications in the liver and spleen. 4. Atherosclerotic calcification in the abdominal aorta and iliac arteries. 5. Lumbar spondylosis. Old compression deformities along the superior endplate of L2 and in the T12 vertebral body. .               **This report has been created using voice recognition software. It may contain minor errors which are inherent in voice recognition technology. **      Final report electronically signed by DR Clarissa Avitia on 5/28/2022 11:57 AM          ED Medications administered this visit:   Medications   fentaNYL (SUBLIMAZE) injection 25 mcg (25 mcg IntraVENous Given 5/28/22 0847)   ondansetron (ZOFRAN) injection 4 mg (4 mg IntraVENous Given 5/28/22 0846)   0.9 % sodium chloride bolus (0 mLs IntraVENous Stopped 5/28/22 0943)   iopamidol (ISOVUE-370) 76 % injection 80 mL (80 mLs IntraVENous Given 5/28/22 1033)   ondansetron (ZOFRAN) injection 4 mg (4 mg IntraVENous Given 5/28/22 1145)         ED COURSE     ED Course as of 05/28/22 1214   Sat May 28, 2022   0934 WBC: 7.6 [AL]   0934 Hemoglobin Quant(!): 11.2 [AL]   0934 Hematocrit(!): 35.3 [AL]   0934 Platelet Count: 746 [AL]   3177 Basic Metabolic Panel w/ Reflex to MG:    Sodium 138   Potassium 3.9   Chloride 99   CO2 27   GLUCOSE, FASTING,   BUN,BUNPL 8   Creatinine 0.5   CALCIUM, SERUM, 923496 9.1  Within normal limits [AL]   0934 Hepatic Function Panel:    Albumin 4.3   Bilirubin 0.3   Bilirubin, Direct <0.2   Alk Phos 53   AST 19   ALT 12   Total Protein 6.6  Within normal limits [AL]   0934 Lipase: 13.3 [AL]   0935 Osmolality Calc(!): 274.5 [AL]   0935 Anion Gap: 12.0 [AL]   1113 Bacteria, UA: NONE SEEN [AL]   1113 Epithelial Cells, UA: 0-2 [AL]   1113 WBC, UA: 0-2 [AL]   1205 CT ABDOMEN PELVIS W IV CONTRAST Additional Contrast? None  \"   IMPRESSION:     1. Extensive stool in the rectum and sigmoid colon. 2. Hiatal hernia. Fluid-filled distended stomach. 3. Diffuse fatty replacement in the liver. Punctate calcifications in the liver and spleen. 4. Atherosclerotic calcification in the abdominal aorta and iliac arteries. 5. Lumbar spondylosis. Old compression deformities along the superior endplate of L2 and in the T12 vertebral body. .\" [AL]   4635 Patient will be updated on her laboratory results and imaging results. She will be discharged on extensive bowel regimen and follow-up with her primary care physician next 2 days. [AL]      ED Course User Index  [AL] Cat Nelson MD     Strict return precautions and follow up instructions were discussed with the patient prior to discharge, with which the patient agrees. MEDICATION CHANGES     New Prescriptions    DOCUSATE SODIUM (COLACE) 100 MG CAPSULE    Take 1 capsule by mouth 2 times daily    MAGNESIUM HYDROXIDE (MILK OF MAGNESIA) 400 MG/5ML SUSPENSION    Take 15 mLs by mouth daily    POLYETHYLENE GLYCOL (GLYCOLAX) 17 GM/SCOOP POWDER    Take 17 g by mouth daily         FINAL DISPOSITION     Final diagnoses:   Abdominal pain, unspecified abdominal location   Constipation, unspecified constipation type     Condition: condition: good  Dispo: Discharge       This transcription was electronically signed.  Parts of this transcriptions may have been dictated by use of voice recognition software and electronically transcribed, and parts may have been

## 2022-07-11 ENCOUNTER — APPOINTMENT (OUTPATIENT)
Dept: GENERAL RADIOLOGY | Age: 77
End: 2022-07-11
Payer: MEDICARE

## 2022-07-11 ENCOUNTER — HOSPITAL ENCOUNTER (EMERGENCY)
Age: 77
Discharge: HOME | End: 2022-07-11
Payer: MEDICARE

## 2022-07-11 VITALS
HEART RATE: 94 BPM | TEMPERATURE: 98.2 F | OXYGEN SATURATION: 92 % | SYSTOLIC BLOOD PRESSURE: 107 MMHG | DIASTOLIC BLOOD PRESSURE: 57 MMHG | HEIGHT: 62 IN | WEIGHT: 83.4 LBS | RESPIRATION RATE: 18 BRPM | BODY MASS INDEX: 15.35 KG/M2

## 2022-07-11 DIAGNOSIS — R09.89 FOREIGN BODY SENSATION IN THROAT: ICD-10-CM

## 2022-07-11 DIAGNOSIS — R09.89 CHOKING EPISODE: Primary | ICD-10-CM

## 2022-07-11 LAB
ALBUMIN SERPL-MCNC: 4.1 G/DL (ref 3.5–5.1)
ALP BLD-CCNC: 53 U/L (ref 38–126)
ALT SERPL-CCNC: 15 U/L (ref 11–66)
ANION GAP SERPL CALCULATED.3IONS-SCNC: 11 MEQ/L (ref 8–16)
AST SERPL-CCNC: 19 U/L (ref 5–40)
BASOPHILS # BLD: 0.3 %
BASOPHILS ABSOLUTE: 0 THOU/MM3 (ref 0–0.1)
BILIRUB SERPL-MCNC: < 0.2 MG/DL (ref 0.3–1.2)
BUN BLDV-MCNC: 14 MG/DL (ref 7–22)
CALCIUM SERPL-MCNC: 9.2 MG/DL (ref 8.5–10.5)
CHLORIDE BLD-SCNC: 100 MEQ/L (ref 98–111)
CO2: 27 MEQ/L (ref 23–33)
CREAT SERPL-MCNC: 0.7 MG/DL (ref 0.4–1.2)
EOSINOPHIL # BLD: 1.2 %
EOSINOPHILS ABSOLUTE: 0.1 THOU/MM3 (ref 0–0.4)
ERYTHROCYTE [DISTWIDTH] IN BLOOD BY AUTOMATED COUNT: 18.6 % (ref 11.5–14.5)
ERYTHROCYTE [DISTWIDTH] IN BLOOD BY AUTOMATED COUNT: 57.3 FL (ref 35–45)
GFR SERPL CREATININE-BSD FRML MDRD: 81 ML/MIN/1.73M2
GLUCOSE BLD-MCNC: 102 MG/DL (ref 70–108)
HCT VFR BLD CALC: 36.2 % (ref 37–47)
HEMOGLOBIN: 11.3 GM/DL (ref 12–16)
IMMATURE GRANS (ABS): 0.06 THOU/MM3 (ref 0–0.07)
IMMATURE GRANULOCYTES: 0.5 %
LYMPHOCYTES # BLD: 26 %
LYMPHOCYTES ABSOLUTE: 3 THOU/MM3 (ref 1–4.8)
MCH RBC QN AUTO: 26.5 PG (ref 26–33)
MCHC RBC AUTO-ENTMCNC: 31.2 GM/DL (ref 32.2–35.5)
MCV RBC AUTO: 84.8 FL (ref 81–99)
MONOCYTES # BLD: 7.7 %
MONOCYTES ABSOLUTE: 0.9 THOU/MM3 (ref 0.4–1.3)
NUCLEATED RED BLOOD CELLS: 0 /100 WBC
OSMOLALITY CALCULATION: 276.3 MOSMOL/KG (ref 275–300)
PLATELET # BLD: 308 THOU/MM3 (ref 130–400)
PMV BLD AUTO: 9.7 FL (ref 9.4–12.4)
POTASSIUM SERPL-SCNC: 4.1 MEQ/L (ref 3.5–5.2)
RBC # BLD: 4.27 MILL/MM3 (ref 4.2–5.4)
SEG NEUTROPHILS: 64.3 %
SEGMENTED NEUTROPHILS ABSOLUTE COUNT: 7.5 THOU/MM3 (ref 1.8–7.7)
SODIUM BLD-SCNC: 138 MEQ/L (ref 135–145)
TOTAL PROTEIN: 6.4 G/DL (ref 6.1–8)
WBC # BLD: 11.6 THOU/MM3 (ref 4.8–10.8)

## 2022-07-11 PROCEDURE — 71045 X-RAY EXAM CHEST 1 VIEW: CPT

## 2022-07-11 PROCEDURE — 96374 THER/PROPH/DIAG INJ IV PUSH: CPT

## 2022-07-11 PROCEDURE — 85025 COMPLETE CBC W/AUTO DIFF WBC: CPT

## 2022-07-11 PROCEDURE — 80053 COMPREHEN METABOLIC PANEL: CPT

## 2022-07-11 PROCEDURE — 6370000000 HC RX 637 (ALT 250 FOR IP): Performed by: PHYSICIAN ASSISTANT

## 2022-07-11 PROCEDURE — 2500000003 HC RX 250 WO HCPCS: Performed by: PHYSICIAN ASSISTANT

## 2022-07-11 PROCEDURE — 36415 COLL VENOUS BLD VENIPUNCTURE: CPT

## 2022-07-11 PROCEDURE — 99284 EMERGENCY DEPT VISIT MOD MDM: CPT

## 2022-07-11 RX ADMIN — GLUCAGON HYDROCHLORIDE 1 MG: KIT at 18:27

## 2022-07-11 RX ADMIN — LIDOCAINE HYDROCHLORIDE: 20 SOLUTION ORAL; TOPICAL at 19:50

## 2022-07-11 RX ADMIN — Medication 5 ML: at 20:59

## 2022-07-11 ASSESSMENT — PAIN - FUNCTIONAL ASSESSMENT
PAIN_FUNCTIONAL_ASSESSMENT: NONE - DENIES PAIN
PAIN_FUNCTIONAL_ASSESSMENT: NONE - DENIES PAIN

## 2022-07-11 NOTE — ED NOTES
[address] [facility] sending patient in to be evaluated after choking on a piece of meat. The nurse states that another resident did the heimlich maneuver on her and and she expelled the meat. The nurse states that she is gurglely but is talking and moving air.       Enrique Higginbotham LPN  20/35/67 1818

## 2022-07-11 NOTE — ED NOTES
ED nurse-to-nurse bedside report    Chief Complaint   Patient presents with    Choking     on a piece of meet      LOC: alert and orientated to name, place, date  Vital signs   Vitals:    07/11/22 1732 07/11/22 1828 07/11/22 1912   BP: 121/82 105/72 (!) 107/57   Pulse: 98 95 94   Resp: 16 18 18   Temp: 98.2 °F (36.8 °C)     TempSrc: Oral     SpO2: 92% 92% 92%   Weight: 83 lb 6.4 oz (37.8 kg)     Height: 5' 2\" (1.575 m)        Pain:    Pain Interventions: n/a  Pain Goal: 0  Oxygen: No    Current needs required room air   Telemetry: Yes  LDAs:   Peripheral IV 07/11/22 Left Forearm (Active)   Site Assessment Clean, dry & intact 07/11/22 1739   Line Status Flushed 07/11/22 1739     Continuous Infusions:   Mobility: Independent  Hurd Fall Risk Score: No flowsheet data found.   Fall Interventions: ED orders complete  Report given to: Cornelius Torres RN  07/11/22 5367

## 2022-07-11 NOTE — ED NOTES
Report received from [facility]. Upon first contact, pt is resting in bed. Pt attempted to drink ginger ale, pt was able to swallow but with difficulty.       [facility]  07/11/22 1918

## 2022-07-11 NOTE — ED TRIAGE NOTES
Pt presents to the ED after chocking on a piece of meat at the nursing home. Pt reports that resident performed heimlich remover and dislodged the piece of meat. Pt states since she is unable to swallow water. Pt respirations even and unlabored.

## 2022-07-11 NOTE — ED NOTES
RN medicated per [facility] and updated on POC.      Carrillo Skinner RN  07/11/22 2556

## 2022-07-12 ASSESSMENT — ENCOUNTER SYMPTOMS
RHINORRHEA: 0
NAUSEA: 0
TROUBLE SWALLOWING: 0
COUGH: 0
VOICE CHANGE: 0
ABDOMINAL PAIN: 0
SORE THROAT: 1
SHORTNESS OF BREATH: 0
VOMITING: 0
CHOKING: 1
PHOTOPHOBIA: 0

## 2022-07-12 NOTE — ED PROVIDER NOTES
[address] [facility] [address] [address] EMERGENCY DEPT      CHIEF COMPLAINT       Chief Complaint   Patient presents with    Choking     on a piece of meet       Nurses Notes reviewed and I agree except as noted in the HPI. HISTORY OF PRESENT ILLNESS    Chanell Hart is a 67 y.o. female who presents for evaluation after possibly choking on a piece of meat. Patient reports prior to arrival she was at her assisted living when she choked on a piece of roast.  She explains this as swallowing the meat and then having it get stuck. She was coughing but denies that she was short of breath or unable to bring. Another resident name Virgilio Condon administered the Heimlich maneuver. Per nursing note the meat was dislodged however patient denies coughing anything up other than phlegm. Patient continues to complain of a foreign body sensation in her throat. Patient drank water prior to arrival however threw it up. Patient reports she is able to swallow and denies any emesis of saliva. Patient denies chest pain, dyspnea, dizziness, headache, palpitations, abdominal pain, or other complaints. Patient denies having a gastroenterologist or GI problems but recently had endoscopy by Dr. Penny Romero on 4-. This was positive for gastritis. REVIEW OF SYSTEMS     Review of Systems   Constitutional: Negative for appetite change, diaphoresis and fever. HENT: Positive for sore throat. Negative for drooling, rhinorrhea, trouble swallowing and voice change. Eyes: Negative for photophobia. Respiratory: Positive for choking. Negative for cough and shortness of breath. Cardiovascular: Negative for chest pain and palpitations. Gastrointestinal: Negative for abdominal pain, nausea and vomiting. Musculoskeletal: Negative for gait problem and neck pain. Skin: Negative for rash and wound. Neurological: Negative for weakness, light-headedness, numbness and headaches. Psychiatric/Behavioral: Negative for confusion.         PAST MEDICAL HISTORY has a past medical history of Chronic respiratory failure with hypoxia (HCC), Chronic respiratory failure with hypoxia and hypercapnia (HCC), COPD (chronic obstructive pulmonary disease) ([facility] [address] Depression, Dysarthria, Gastro-esophageal reflux disease without esophagitis, Hyperlipidemia, Hyperlipidemia, Hypothyroidism, unspecified, Lung nodules, Pneumonia, Severe malnutrition (HCC), Severe malnutrition ([address] Severe malnutrition ([facility] [address] TIA (transient ischemic attack), and Type 2 diabetes mellitus with hyperglycemia ([facility] [address] SURGICAL HISTORY      has a past surgical history that includes Tonsillectomy; Colonoscopy; and Upper gastrointestinal endoscopy (N/A, 4/20/2022). CURRENT MEDICATIONS       Discharge Medication List as of 7/11/2022  8:51 PM      CONTINUE these medications which have NOT CHANGED    Details   polyethylene glycol (GLYCOLAX) 17 GM/SCOOP powder Take 17 g by mouth daily, Disp-1530 g, R-1Normal      docusate sodium (COLACE) 100 mg capsule Take 1 capsule by mouth 2 times daily, Disp-60 capsule, R-0Normal      magnesium hydroxide (MILK OF MAGNESIA) 400 MG/5ML suspension Take 15 mLs by mouth daily, Disp-1 each, R-0Normal      melatonin 3 MG TABS tablet Take 3 mg by mouth dailyHistorical Med      omeprazole (PRILOSEC) 20 MG delayed release capsule Take 20 mg by mouth DailyHistorical Med      Ascorbic Acid (VITAMIN C) 250 MG tablet Take 500 mg by mouth daily For 10 days d/t covidHistorical Med      zinc 50 MG TABS tablet Take 50 mg by mouth dailyHistorical Med      hydrOXYzine (ATARAX) 10 MG tablet Take 10 mg by mouth 2 times daily itching Historical Med      HYDROcodone-acetaminophen (NORCO) 5-325 MG per tablet Take 1 tablet by mouth every 6 hours as needed for Pain. Historical Med      acetaminophen (TYLENOL) 325 MG tablet Take 650 mg by mouth every 6 hours as needed for PainHistorical Med      bisacodyl (DULCOLAX) 10 MG suppository Place 10 mg rectally as needed for ConstipationHistorical Med Nutritional Supplements (ENSURE HIGH PROTEIN) LIQD Take 1 Can by mouth 3 times daily (with meals), Disp-90 Can, R-0Print      megestrol (MEGACE) 40 MG tablet Take 1 tablet by mouth 2 times daily, Disp-60 tablet, R-1Normal      albuterol sulfate  (90 Base) MCG/ACT inhaler Inhale 2 puffs into the lungs every 6 hours, Disp-1 Inhaler, R-3Normal      Umeclidinium Bromide (INCRUSE ELLIPTA) 62.5 MCG/INH AEPB Inhale 1 puff into the lungs daily, Disp-30 each, R-0Normal      Cholecalciferol (VITAMIN D3) 50 MCG (2000 UT) CAPS Take 2,000 Units by mouth dailyHistorical Med      aspirin 81 MG tablet Take 81 mg by mouth dailyHistorical Med      ipratropium-albuterol (DUONEB) 0.5-2.5 (3) MG/3ML SOLN nebulizer solution Inhale 3 mLs into the lungs every 6 hours as needed for Shortness of Breath, Disp-360 mL, R-2Print      atorvastatin (LIPITOR) 10 MG tablet Take 1 tablet by mouth nightly, Disp-30 tablet, R-3Normal      vitamin D (CHOLECALCIFEROL) 1000 UNIT TABS tablet Take 2,000 Units by mouth dailyHistorical Med      Fluticasone Furoate-Vilanterol (BREO ELLIPTA) 200-25 MCG/INH AEPB Inhale 1 puff into the lungs dailyHistorical Med      OXYGEN Inhale 2-4 L into the lungs nightly Nightly and prnHistorical Med      ARIPiprazole (ABILIFY) 10 MG tablet Take 10 mg by mouth dailyHistorical Med      desvenlafaxine succinate (PRISTIQ) 50 MG TB24 extended release tablet Take 100 mg by mouth daily Historical Med             ALLERGIES     is allergic to spiriva handihaler [tiotropium bromide monohydrate]. FAMILY HISTORY     She indicated that her mother is . She indicated that her father is . family history includes Cancer in her mother; Other in her father. SOCIAL HISTORY    reports that she quit smoking about 3 years ago. Her smoking use included cigarettes. She started smoking about 60 years ago. She has a 112.00 pack-year smoking history. She has never used smokeless tobacco. She reports previous alcohol use. She reports previous drug use. PHYSICAL EXAM     INITIAL VITALS:  height is 5' 2\" (1.575 m) and weight is 83 lb 6.4 oz (37.8 kg). Her oral temperature is 98.2 °F (36.8 °C). Her blood pressure is 107/57 (abnormal) and her pulse is 94. Her respiration is 18 and oxygen saturation is 92%. Physical Exam  Vitals and nursing note reviewed. Constitutional:       General: She is not in acute distress. Appearance: Normal appearance. She is well-developed and underweight. She is not toxic-appearing or diaphoretic. HENT:      Head: Normocephalic and atraumatic. Right Ear: Hearing normal.      Left Ear: Hearing normal.      Nose: Nose normal. No rhinorrhea. Mouth/Throat:      Lips: Pink. Mouth: Mucous membranes are moist.      Pharynx: Oropharynx is clear. Uvula midline. Comments: Speech is clear. Patient is managing own secretions. Eyes:      General: Lids are normal. No scleral icterus. Extraocular Movements: Extraocular movements intact. Conjunctiva/sclera: Conjunctivae normal.      Pupils: Pupils are equal, round, and reactive to light. Neck:      Vascular: No JVD. Trachea: Trachea normal. No tracheal deviation. Cardiovascular:      Rate and Rhythm: Normal rate and regular rhythm. Heart sounds: Normal heart sounds. Pulmonary:      Effort: Pulmonary effort is normal. No respiratory distress. Breath sounds: Normal breath sounds. No decreased breath sounds or wheezing. Abdominal:      General: Bowel sounds are normal. There is no distension. Palpations: Abdomen is soft. Abdomen is not rigid. There is no pulsatile mass. Tenderness: There is no abdominal tenderness. There is no right CVA tenderness, left CVA tenderness, guarding or rebound. Negative signs include Rodrgiuez's sign and McBurney's sign. Hernia: No hernia is present. Musculoskeletal:         General: Normal range of motion. Cervical back: No rigidity.       Comments: Movement normal as observed   Lymphadenopathy:      Cervical: No cervical adenopathy. Skin:     General: Skin is warm and dry. Coloration: Skin is not pale. Findings: No rash. Neurological:      General: No focal deficit present. Mental Status: She is alert and oriented to person, place, and time. Gait: Gait normal.   Psychiatric:         Mood and Affect: Mood normal.         Speech: Speech normal.         Behavior: Behavior normal.         Thought Content: Thought content normal.         Cognition and Memory: Cognition normal.         DIFFERENTIAL DIAGNOSIS:   Including but not limited to: Choking episode, esophageal foreign body, esophageal stricture, esophageal abrasion    DIAGNOSTIC RESULTS     EKG: All [facility] who either signs or Co-signs this chart in the absence of a cardiologist.  None    RADIOLOGY: non-plain film images(s) such as CT,Ultrasound and MRI are read by the radiologist.  Plain radiographic images are visualized and preliminarily interpreted by the emergency physician unless otherwise stated below. XR CHEST PORTABLE   Final Result   1. No acute cardiopulmonary disease. 2. No radiopaque foreign body. 3. Possible gaseous distention of the esophagus. This document has been electronically signed by: Kenyon Montesinos MD on    07/11/2022 06:11 PM          LABS:   Labs Reviewed   CBC WITH AUTO DIFFERENTIAL - Abnormal; Notable for the following components:       Result Value    WBC 11.6 (*)     Hemoglobin 11.3 (*)     Hematocrit 36.2 (*)     MCHC 31.2 (*)     RDW-CV 18.6 (*)     RDW-SD 57.3 (*)     All other components within normal limits   COMPREHENSIVE METABOLIC PANEL - Abnormal; Notable for the following components:     Total Bilirubin <0.2 (*)     All other components within normal limits   GLOMERULAR FILTRATION RATE, ESTIMATED - Abnormal; Notable for the following components:    Est, Glom Filt Rate 81 (*)     All other components within normal limits   ANION GAP   OSMOLALITY       EMERGENCY DEPARTMENT COURSE:   Vitals:    Vitals:    07/11/22 1732 07/11/22 1828 07/11/22 1912   BP: 121/82 105/72 (!) 107/57   Pulse: 98 95 94   Resp: 16 18 18   Temp: 98.2 °F (36.8 °C)     TempSrc: Oral     SpO2: 92% 92% 92%   Weight: 83 lb 6.4 oz (37.8 kg)     Height: 5' 2\" (1.575 m)       MDM:  The patient was seen and evaluated within the ED today for choking episode versus esophageal foreign body. On exam, I appreciated no acute findings. The patient was smiling and pleasant managing own secretions with clear speech. Old records were reviewed. Within the department, I observed the patient's vital signs to be within acceptable range. Radiological studies within the department revealed no acute intra cardiopulmonary process. Laboratory work was reassuring. Within the department the patient was treated with GI cocktail and glucagon. I observed the patient's condition to modestly improve during the duration of their stay. On re-exam patient was able to tolerate sips of ginger ale and there was no emesis in the ER. Vital signs remained stable. The patient remained non-toxic appearing with no distress evident in the ER. The patient was comfortable with the plan of discharge home and to follow up with Dr. Jj Lima. Anticipatory guidance was given. The patient was instructed to return to the emergency department for any worsening of their symptoms. Patient was discharged from the emergency department in good condition with all questions answered. See disposition below. I have given the patient strict written and verbal instructions about care at home, follow-up, and signs and symptoms of worsening of condition and they did verbalize understanding. CRITICAL CARE:   None    CONSULTS:  2050: Dr. Anne Crespo (gastroenterology)- left voice message    PROCEDURES:  None    FINAL IMPRESSION      1. Choking episode    2.  Foreign body sensation in throat DISPOSITION/PLAN     1. Choking episode    2.  Foreign body sensation in throat        PATIENT REFERRED TO:  Patrick Ann MD  [address] [address] [phone]    Schedule an appointment as soon as possible for a visit         DISCHARGE MEDICATIONS:  Discharge Medication List as of 7/11/2022  8:51 PM          (Please note that portions of this note were completed with a voice recognition program.  Efforts were made to edit the dictations but occasionally words are mis-transcribed.)    Ysabel Blankenship PA-C 07/11/22 9:50 PM    ADIEL Johnson PA-C  07/12/22 [id number]

## 2022-08-21 ENCOUNTER — APPOINTMENT (OUTPATIENT)
Dept: CT IMAGING | Age: 77
End: 2022-08-21
Payer: MEDICARE

## 2022-08-21 ENCOUNTER — HOSPITAL ENCOUNTER (EMERGENCY)
Age: 77
Discharge: HOME | End: 2022-08-21
Payer: MEDICARE

## 2022-08-21 ENCOUNTER — APPOINTMENT (OUTPATIENT)
Dept: GENERAL RADIOLOGY | Age: 77
End: 2022-08-21
Payer: MEDICARE

## 2022-08-21 VITALS
HEART RATE: 97 BPM | SYSTOLIC BLOOD PRESSURE: 176 MMHG | TEMPERATURE: 97.8 F | BODY MASS INDEX: 14.91 KG/M2 | HEIGHT: 62 IN | WEIGHT: 81 LBS | OXYGEN SATURATION: 99 % | DIASTOLIC BLOOD PRESSURE: 76 MMHG | RESPIRATION RATE: 20 BRPM

## 2022-08-21 DIAGNOSIS — N30.00 ACUTE CYSTITIS WITHOUT HEMATURIA: Primary | ICD-10-CM

## 2022-08-21 DIAGNOSIS — K59.00 CONSTIPATION, UNSPECIFIED CONSTIPATION TYPE: ICD-10-CM

## 2022-08-21 LAB
ALBUMIN SERPL-MCNC: 3.8 G/DL (ref 3.5–5.1)
ALP BLD-CCNC: 61 U/L (ref 38–126)
ALT SERPL-CCNC: 10 U/L (ref 11–66)
ANION GAP SERPL CALCULATED.3IONS-SCNC: 13 MEQ/L (ref 8–16)
AST SERPL-CCNC: 17 U/L (ref 5–40)
BACTERIA: ABNORMAL /HPF
BASOPHILS # BLD: 0.6 %
BASOPHILS ABSOLUTE: 0.1 THOU/MM3 (ref 0–0.1)
BILIRUB SERPL-MCNC: 0.2 MG/DL (ref 0.3–1.2)
BILIRUBIN DIRECT: < 0.2 MG/DL (ref 0–0.3)
BILIRUBIN URINE: NEGATIVE
BLOOD, URINE: ABNORMAL
BUN BLDV-MCNC: 10 MG/DL (ref 7–22)
CALCIUM SERPL-MCNC: 9.6 MG/DL (ref 8.5–10.5)
CASTS 2: ABNORMAL /LPF
CASTS UA: ABNORMAL /LPF
CHARACTER, URINE: CLEAR
CHLORIDE BLD-SCNC: 98 MEQ/L (ref 98–111)
CO2: 29 MEQ/L (ref 23–33)
COLOR: YELLOW
CREAT SERPL-MCNC: 0.6 MG/DL (ref 0.4–1.2)
CRYSTALS, UA: ABNORMAL
EOSINOPHIL # BLD: 2.7 %
EOSINOPHILS ABSOLUTE: 0.3 THOU/MM3 (ref 0–0.4)
EPITHELIAL CELLS, UA: ABNORMAL /HPF
ERYTHROCYTE [DISTWIDTH] IN BLOOD BY AUTOMATED COUNT: 16.7 % (ref 11.5–14.5)
ERYTHROCYTE [DISTWIDTH] IN BLOOD BY AUTOMATED COUNT: 50.2 FL (ref 35–45)
GFR SERPL CREATININE-BSD FRML MDRD: > 90 ML/MIN/1.73M2
GLUCOSE BLD-MCNC: 98 MG/DL (ref 70–108)
GLUCOSE URINE: NEGATIVE MG/DL
HCT VFR BLD CALC: 40.2 % (ref 37–47)
HEMOGLOBIN: 12.3 GM/DL (ref 12–16)
IMMATURE GRANS (ABS): 0.04 THOU/MM3 (ref 0–0.07)
IMMATURE GRANULOCYTES: 0.4 %
KETONES, URINE: 15
LACTIC ACID: 1.3 MMOL/L (ref 0.5–2)
LEUKOCYTE ESTERASE, URINE: ABNORMAL
LIPASE: 11.9 U/L (ref 5.6–51.3)
LYMPHOCYTES # BLD: 12.1 %
LYMPHOCYTES ABSOLUTE: 1.2 THOU/MM3 (ref 1–4.8)
MCH RBC QN AUTO: 25.4 PG (ref 26–33)
MCHC RBC AUTO-ENTMCNC: 30.6 GM/DL (ref 32.2–35.5)
MCV RBC AUTO: 83.1 FL (ref 81–99)
MISCELLANEOUS 2: ABNORMAL
MONOCYTES # BLD: 6.3 %
MONOCYTES ABSOLUTE: 0.6 THOU/MM3 (ref 0.4–1.3)
NITRITE, URINE: NEGATIVE
NUCLEATED RED BLOOD CELLS: 0 /100 WBC
OSMOLALITY CALCULATION: 278.4 MOSMOL/KG (ref 275–300)
PH UA: 6.5 (ref 5–9)
PLATELET # BLD: 421 THOU/MM3 (ref 130–400)
PMV BLD AUTO: 10.3 FL (ref 9.4–12.4)
POTASSIUM REFLEX MAGNESIUM: 3.9 MEQ/L (ref 3.5–5.2)
PRO-BNP: 91.1 PG/ML (ref 0–1800)
PROCALCITONIN: 0.06 NG/ML (ref 0.01–0.09)
PROTEIN UA: NEGATIVE
RBC # BLD: 4.84 MILL/MM3 (ref 4.2–5.4)
RBC URINE: ABNORMAL /HPF
RENAL EPITHELIAL, UA: ABNORMAL
SARS-COV-2, NAAT: NOT DETECTED
SEG NEUTROPHILS: 77.9 %
SEGMENTED NEUTROPHILS ABSOLUTE COUNT: 7.7 THOU/MM3 (ref 1.8–7.7)
SODIUM BLD-SCNC: 140 MEQ/L (ref 135–145)
SPECIFIC GRAVITY, URINE: > 1.03 (ref 1–1.03)
TOTAL PROTEIN: 7.2 G/DL (ref 6.1–8)
TROPONIN T: < 0.01 NG/ML
UROBILINOGEN, URINE: 0.2 EU/DL (ref 0–1)
WBC # BLD: 9.9 THOU/MM3 (ref 4.8–10.8)
WBC UA: ABNORMAL /HPF
YEAST: ABNORMAL

## 2022-08-21 PROCEDURE — 84145 PROCALCITONIN (PCT): CPT

## 2022-08-21 PROCEDURE — 81001 URINALYSIS AUTO W/SCOPE: CPT

## 2022-08-21 PROCEDURE — 87635 SARS-COV-2 COVID-19 AMP PRB: CPT

## 2022-08-21 PROCEDURE — 83880 ASSAY OF NATRIURETIC PEPTIDE: CPT

## 2022-08-21 PROCEDURE — 85025 COMPLETE CBC W/AUTO DIFF WBC: CPT

## 2022-08-21 PROCEDURE — 6370000000 HC RX 637 (ALT 250 FOR IP): Performed by: PHYSICIAN ASSISTANT

## 2022-08-21 PROCEDURE — 6360000004 HC RX CONTRAST MEDICATION: Performed by: PHYSICIAN ASSISTANT

## 2022-08-21 PROCEDURE — 6360000002 HC RX W HCPCS: Performed by: PHYSICIAN ASSISTANT

## 2022-08-21 PROCEDURE — 96376 TX/PRO/DX INJ SAME DRUG ADON: CPT

## 2022-08-21 PROCEDURE — 87086 URINE CULTURE/COLONY COUNT: CPT

## 2022-08-21 PROCEDURE — 74177 CT ABD & PELVIS W/CONTRAST: CPT

## 2022-08-21 PROCEDURE — 2580000003 HC RX 258: Performed by: PHYSICIAN ASSISTANT

## 2022-08-21 PROCEDURE — 96375 TX/PRO/DX INJ NEW DRUG ADDON: CPT

## 2022-08-21 PROCEDURE — 80048 BASIC METABOLIC PNL TOTAL CA: CPT

## 2022-08-21 PROCEDURE — 84484 ASSAY OF TROPONIN QUANT: CPT

## 2022-08-21 PROCEDURE — 80076 HEPATIC FUNCTION PANEL: CPT

## 2022-08-21 PROCEDURE — 99285 EMERGENCY DEPT VISIT HI MDM: CPT

## 2022-08-21 PROCEDURE — 36415 COLL VENOUS BLD VENIPUNCTURE: CPT

## 2022-08-21 PROCEDURE — 83605 ASSAY OF LACTIC ACID: CPT

## 2022-08-21 PROCEDURE — 71045 X-RAY EXAM CHEST 1 VIEW: CPT

## 2022-08-21 PROCEDURE — 93005 ELECTROCARDIOGRAM TRACING: CPT | Performed by: EMERGENCY MEDICINE

## 2022-08-21 PROCEDURE — 96365 THER/PROPH/DIAG IV INF INIT: CPT

## 2022-08-21 PROCEDURE — 83690 ASSAY OF LIPASE: CPT

## 2022-08-21 PROCEDURE — 96361 HYDRATE IV INFUSION ADD-ON: CPT

## 2022-08-21 RX ORDER — MORPHINE SULFATE 2 MG/ML
2 INJECTION, SOLUTION INTRAMUSCULAR; INTRAVENOUS ONCE
Status: COMPLETED | OUTPATIENT
Start: 2022-08-21 | End: 2022-08-21

## 2022-08-21 RX ORDER — 0.9 % SODIUM CHLORIDE 0.9 %
500 INTRAVENOUS SOLUTION INTRAVENOUS ONCE
Status: COMPLETED | OUTPATIENT
Start: 2022-08-21 | End: 2022-08-21

## 2022-08-21 RX ORDER — MORPHINE SULFATE 4 MG/ML
4 INJECTION, SOLUTION INTRAMUSCULAR; INTRAVENOUS ONCE
Status: COMPLETED | OUTPATIENT
Start: 2022-08-21 | End: 2022-08-21

## 2022-08-21 RX ORDER — ONDANSETRON 2 MG/ML
4 INJECTION INTRAMUSCULAR; INTRAVENOUS ONCE
Status: COMPLETED | OUTPATIENT
Start: 2022-08-21 | End: 2022-08-21

## 2022-08-21 RX ORDER — CEPHALEXIN 250 MG/1
250 CAPSULE ORAL 4 TIMES DAILY
Qty: 28 CAPSULE | Refills: 0 | Status: SHIPPED | OUTPATIENT
Start: 2022-08-21 | End: 2022-08-28

## 2022-08-21 RX ADMIN — ONDANSETRON 4 MG: 2 INJECTION INTRAMUSCULAR; INTRAVENOUS at 11:07

## 2022-08-21 RX ADMIN — MORPHINE SULFATE 2 MG: 2 INJECTION, SOLUTION INTRAMUSCULAR; INTRAVENOUS at 09:41

## 2022-08-21 RX ADMIN — CEFTRIAXONE SODIUM 1000 MG: 1 INJECTION, POWDER, FOR SOLUTION INTRAMUSCULAR; INTRAVENOUS at 13:57

## 2022-08-21 RX ADMIN — IOPAMIDOL 80 ML: 755 INJECTION, SOLUTION INTRAVENOUS at 10:36

## 2022-08-21 RX ADMIN — SODIUM CHLORIDE 500 ML: 9 INJECTION, SOLUTION INTRAVENOUS at 09:41

## 2022-08-21 RX ADMIN — MORPHINE SULFATE 4 MG: 4 INJECTION, SOLUTION INTRAMUSCULAR; INTRAVENOUS at 11:07

## 2022-08-21 RX ADMIN — ONDANSETRON 4 MG: 2 INJECTION INTRAMUSCULAR; INTRAVENOUS at 09:41

## 2022-08-21 RX ADMIN — MAGNESIUM HYDROXIDE 30 ML: 400 SUSPENSION ORAL at 16:42

## 2022-08-21 RX ADMIN — SODIUM CHLORIDE 500 ML: 9 INJECTION, SOLUTION INTRAVENOUS at 12:31

## 2022-08-21 ASSESSMENT — PAIN DESCRIPTION - LOCATION
LOCATION: ABDOMEN
LOCATION: ABDOMEN

## 2022-08-21 ASSESSMENT — PAIN DESCRIPTION - DESCRIPTORS: DESCRIPTORS: ACHING

## 2022-08-21 ASSESSMENT — PAIN SCALES - GENERAL
PAINLEVEL_OUTOF10: 5

## 2022-08-21 NOTE — ED NOTES
Pt restful cart w/ eyes closed. Wakes to name being called. Updated on POC.       Suh De Souza RN  08/21/22 [id number]

## 2022-08-21 NOTE — ED NOTES
Pt lives in assisted living apt at [facility] express [facility] is available to take her home- pt states she ambulates on her own in her apartment and feels that she is able to sit in a [facility] [place] for transport back home. Will send an o2 tank with her for transport. Charge nurse aware and agreeable as well.         Henry Hale RN  08/21/22 1856

## 2022-08-21 NOTE — ED NOTES
Spoke w/pt daughter on the phone- given update. Pt medicated per orders.  Will monitor     Henry Hale RN  08/21/22 [id number]

## 2022-08-21 NOTE — ED TRIAGE NOTES
Pt to rm 22 per EMS- sent in per [facility] for fatigue, refusing medications and not eating for a couple days. Pt states she has breakfast and her morning medications. Per [facility] MAR pt has been taking all her medications appropriately. Pt reports nausea, no emesis and low ABD pain that just started since arrival to ER. Pt appears in no acute distress, VSS on home dose O2.

## 2022-08-21 NOTE — ED NOTES
called in for transports.  Pt to be transported back to [facility] in stable condition      Hank Guardado RN  08/21/22 4977

## 2022-08-22 LAB
EKG ATRIAL RATE: 109 BPM
EKG P AXIS: 75 DEGREES
EKG P-R INTERVAL: 112 MS
EKG Q-T INTERVAL: 306 MS
EKG QRS DURATION: 72 MS
EKG QTC CALCULATION (BAZETT): 412 MS
EKG R AXIS: 61 DEGREES
EKG T AXIS: 47 DEGREES
EKG VENTRICULAR RATE: 109 BPM
ORGANISM: ABNORMAL
URINE CULTURE REFLEX: ABNORMAL

## 2022-08-22 PROCEDURE — 93010 ELECTROCARDIOGRAM REPORT: CPT | Performed by: INTERNAL MEDICINE

## 2022-08-23 ASSESSMENT — ENCOUNTER SYMPTOMS
NAUSEA: 1
ABDOMINAL PAIN: 1

## 2022-08-24 NOTE — ED PROVIDER NOTES
[facility]  eMERGENCY dEPARTMENT eNCOUnter          [address] [facility]       Chief Complaint   Patient presents with    Fatigue    Nausea       Nurses Notes reviewed and I agree except as noted inthe HPI. HISTORY OF PRESENT ILLNESS    Aldo Gaxiola is a 65 y.o. female who presents to the Emergency Department for the evaluation of fatigue ongoing for the past 2 days associated generalized weakness today. Upon arrival to the department, she states she began having nausea and some abdominal pain as well. Denies any recent sick contacts, fevers, chills, medication changes. Denies any vertigo, lightheadedness, chest pain, shortness of breath, fall, headache, vision changes, cough, edema, constipation, diarrhea, melena, bloody stools, urinary changes. States her abdomen started hurting after elevating her legs in the ambulance. The HPI was provided by the patient. REVIEW OF SYSTEMS     Review of Systems   Constitutional:  Positive for fatigue. Gastrointestinal:  Positive for abdominal pain and nausea. Neurological:  Positive for weakness. All other systems reviewed and are negative. PAST MEDICAL HISTORY    has a past medical history of Chronic respiratory failure with hypoxia (HCC), Chronic respiratory failure with hypoxia and hypercapnia (HCC), COPD (chronic obstructive pulmonary disease) ([address] Depression, Dysarthria, Gastro-esophageal reflux disease without esophagitis, Hyperlipidemia, Hyperlipidemia, Hypothyroidism, unspecified, Lung nodules, Pneumonia, Severe malnutrition ([address] Severe malnutrition ([address] Severe malnutrition ([address] TIA (transient ischemic attack), and Type 2 diabetes mellitus with hyperglycemia ([address] SURGICAL HISTORY      has a past surgical history that includes Tonsillectomy; Colonoscopy; and Upper gastrointestinal endoscopy (N/A, 4/20/2022).     CURRENT MEDICATIONS       Discharge Medication List as of 8/21/2022  2:15 PM        CONTINUE these medications which have NOT CHANGED    Details   polyethylene glycol (GLYCOLAX) 17 GM/SCOOP powder Take 17 g by mouth daily, Disp-1530 g, R-1Normal      docusate sodium (COLACE) 100 mg capsule Take 1 capsule by mouth 2 times daily, Disp-60 capsule, R-0Normal      melatonin 3 MG TABS tablet Take 3 mg by mouth dailyHistorical Med      omeprazole (PRILOSEC) 20 MG delayed release capsule Take 20 mg by mouth DailyHistorical Med      Ascorbic Acid (VITAMIN C) 250 MG tablet Take 500 mg by mouth daily For 10 days d/t covidHistorical Med      zinc 50 MG TABS tablet Take 50 mg by mouth dailyHistorical Med      hydrOXYzine (ATARAX) 10 MG tablet Take 10 mg by mouth 2 times daily itching Historical Med      HYDROcodone-acetaminophen (NORCO) 5-325 MG per tablet Take 1 tablet by mouth every 6 hours as needed for Pain. Historical Med      acetaminophen (TYLENOL) 325 MG tablet Take 650 mg by mouth every 6 hours as needed for PainHistorical Med      bisacodyl (DULCOLAX) 10 MG suppository Place 10 mg rectally as needed for ConstipationHistorical Med      Nutritional Supplements (ENSURE HIGH PROTEIN) LIQD Take 1 Can by mouth 3 times daily (with meals), Disp-90 Can, R-0Print      megestrol (MEGACE) 40 MG tablet Take 1 tablet by mouth 2 times daily, Disp-60 tablet, R-1Normal      albuterol sulfate  (90 Base) MCG/ACT inhaler Inhale 2 puffs into the lungs every 6 hours, Disp-1 Inhaler, R-3Normal      Umeclidinium Bromide (INCRUSE ELLIPTA) 62.5 MCG/INH AEPB Inhale 1 puff into the lungs daily, Disp-30 each, R-0Normal      Cholecalciferol (VITAMIN D3) 50 MCG (2000 UT) CAPS Take 2,000 Units by mouth dailyHistorical Med      aspirin 81 MG tablet Take 81 mg by mouth dailyHistorical Med      ipratropium-albuterol (DUONEB) 0.5-2.5 (3) MG/3ML SOLN nebulizer solution Inhale 3 mLs into the lungs every 6 hours as needed for Shortness of Breath, Disp-360 mL, R-2Print      atorvastatin (LIPITOR) 10 MG tablet Take 1 tablet by mouth nightly, Disp-30 tablet, R-3Normal      vitamin D (CHOLECALCIFEROL) 1000 UNIT TABS tablet Take 2,000 Units by mouth dailyHistorical Med      Fluticasone Furoate-Vilanterol (BREO ELLIPTA) 200-25 MCG/INH AEPB Inhale 1 puff into the lungs dailyHistorical Med      OXYGEN Inhale 2-4 L into the lungs nightly Nightly and prnHistorical Med      ARIPiprazole (ABILIFY) 10 MG tablet Take 10 mg by mouth dailyHistorical Med      desvenlafaxine succinate (PRISTIQ) 50 MG TB24 extended release tablet Take 100 mg by mouth daily Historical Med             ALLERGIES     is allergic to spiriva handihaler [tiotropium bromide monohydrate]. FAMILY HISTORY     She indicated that her mother is . She indicated that her father is . family history includes Cancer in her mother; Other in her father. SOCIAL HISTORY      reports that she quit smoking about 3 years ago. Her smoking use included cigarettes. She started smoking about 60 years ago. She has a 112.00 pack-year smoking history. She has never used smokeless tobacco. She reports that she does not currently use alcohol. She reports that she does not currently use drugs. PHYSICAL EXAM     INITIAL VITALS:  height is 5' 2\" (1.575 m) and weight is 81 lb (36.7 kg). Her oral temperature is 97.8 °F (36.6 °C). Her blood pressure is 176/76 (abnormal) and her pulse is 97. Her respiration is 20 and oxygen saturation is 99%. Physical Exam  Vitals and nursing note reviewed. Constitutional:       Appearance: She is underweight. HENT:      Head: Normocephalic and atraumatic. Mouth/Throat:      Mouth: Mucous membranes are moist.   Eyes:      Conjunctiva/sclera: Conjunctivae normal.      Pupils: Pupils are equal, round, and reactive to light. Cardiovascular:      Rate and Rhythm: Normal rate and regular rhythm. Heart sounds: Normal heart sounds. No murmur heard. Pulmonary:      Effort: Pulmonary effort is normal. No respiratory distress.       Breath sounds: Normal breath sounds. No wheezing or rhonchi. Abdominal:      Palpations: Abdomen is soft. Tenderness: There is generalized abdominal tenderness. There is no right CVA tenderness, left CVA tenderness, guarding or rebound. Negative signs include Rodriguez's sign and McBurney's sign. Comments: Patient holding empty emesis bag, appears uncomfortable   Musculoskeletal:      Cervical back: Normal range of motion. Right lower leg: No edema. Left lower leg: No edema. Skin:     General: Skin is warm and dry. Neurological:      General: No focal deficit present. Mental Status: She is alert and oriented to person, place, and time. Psychiatric:         Mood and Affect: Mood normal.       DIFFERENTIAL DIAGNOSIS:   Differential diagnoses are discussed    DIAGNOSTIC RESULTS     EKG: All EKG's are interpreted by the Emergency Department Physician who either signs or Co-signsthis chart in the absence of a cardiologist.    Vent. Rate: 109 bpm  PRinterval: 112 ms  QRS duration: 72 ms  QTc: 412 ms  P-R-T axes: 75, 61, 47  Sinus tachycardia. No STEMI. Compared to old EKG on 5-28-22      RADIOLOGY: non-plain film images(s) such as CT, Ultrasound and MRI are read by the radiologist.    CT ABDOMEN PELVIS W IV CONTRAST Additional Contrast? None   Final Result       1. Stable CT scan of the abdomen and pelvis, no interval change since previous study dated 28th of May 2022.   2. Extensive stool throughout the colon. 3. Old granulomatous disease in the spleen. 4. Fluid-filled stomach with mucosal enhancement. 5. Extensive atherosclerotic calcification in the abdominal aorta and iliac arteries. 6. Small enhancing lesion in the right lobe of liver inferiorly and laterally, unchanged. 7. Mild compression deformities along the superior endplates of P32 and L2, unchanged. .. **This report has been created using voice recognition software.  It may contain minor errors which are inherent in voice recognition technology. **      Final report electronically signed by DR Yvon Moreno on 8/21/2022 11:31 AM      XR CHEST PORTABLE   Final Result   1. No interval change since previous study dated 7/11/2022, no acute cardiopulmonary disease         **This report has been created using voice recognition software. It may contain minor errors which are inherent in voice recognition technology. **      Final report electronically signed by DR Yvon Moreno on 8/21/2022 9:56 AM          LABS:      Labs Reviewed   CULTURE, REFLEXED, URINE - Abnormal; Notable for the following components:       Result Value    Urine Culture Reflex   (*)     Value: Mixed growth. The mixture of organisms present represents both organisms that may cause urinary tract infections and organisms that are not a common cause of urinary tract infections and are possibly skin ganga or distal urethral ganga. Organism Mixed Growth (*)     All other components within normal limits    Narrative:     Source: urine, clean catch       Site:           Current Antibiotics: not stated   CBC WITH AUTO DIFFERENTIAL - Abnormal; Notable for the following components:    MCH 25.4 (*)     MCHC 30.6 (*)     RDW-CV 16.7 (*)     RDW-SD 50.2 (*)     Platelets 688 (*)     All other components within normal limits   HEPATIC FUNCTION PANEL - Abnormal; Notable for the following components:     Total Bilirubin 0.2 (*)     ALT 10 (*)     All other components within normal limits   URINE WITH REFLEXED MICRO - Abnormal; Notable for the following components:    Ketones, Urine 15 (*)     Specific Gravity, Urine > 1.030 (*)     Blood, Urine TRACE (*)     Leukocyte Esterase, Urine MODERATE (*)     All other components within normal limits   COVID-19, RAPID   BASIC METABOLIC PANEL W/ REFLEX TO MG FOR LOW K   TROPONIN   BRAIN NATRIURETIC PEPTIDE   PROCALCITONIN   LIPASE   ANION GAP   OSMOLALITY   GLOMERULAR FILTRATION RATE, ESTIMATED   LACTIC ACID       EMERGENCY DEPARTMENT COURSE:   Vitals: Vitals:    08/21/22 1002 08/21/22 1111 08/21/22 1232 08/21/22 1332   BP: (!) 173/94 (!) 179/84 (!) 172/92 (!) 176/76   Pulse: (!) 105 98 98 97   Resp: 22 20 20 20   Temp:       TempSrc:       SpO2: 97% 98% 100% 99%   Weight:       Height:          11:07 PM EDT: The patient was seen and evaluated. Patient presents with tachycardia for complaint of fatigue ongoing for 2 days with development of abdominal pain and nausea upon arrival in the department. She is treated with morphine and Zofran during her ED stay and symptoms did resolve after second dose with patient appearing comfortable, sitting upright in the bed on follow-up evaluation. Laboratory studies do reveal moderate amount of leukocytes in the urine with trace blood. Laboratory findings otherwise unremarkable including lactic acid. CT of the abdomen reveals no acute changes but does note extensive stool throughout the colon. Rocephin given in the ED as well as milk of magnesia. On review of records, it appears as though she was on daily milk of magnesia which was stopped on 8/3/2022. There is a protocol in place if she were to have no stool output for 3 days but according to the patient, she had last bowel movement this morning. Will reinitiate milk of magnesia for symptom control and she has as needed Zofran order at her ECF. At this time, she appears stable to return to the [facility] ([place], [place]) and return precautions were discussed. CRITICAL CARE:   None    CONSULTS:  None    PROCEDURES:  None    FINAL IMPRESSION      1. Acute cystitis without hematuria    2.  Constipation, unspecified constipation type          DISPOSITION/PLAN   Discharge    PATIENT REFERRED TO:  Bonifacio Bell MD  [address] [phone]    Call in 1 day      [address] [facility]  [address]  [address] [place] [address]  [phone]    If symptoms worsen      DISCHARGEMEDICATIONS:  Discharge Medication List as of 8/21/2022  2:15 PM        START taking these

## 2022-09-21 ENCOUNTER — ANESTHESIA (OUTPATIENT)
Dept: ENDOSCOPY | Age: 77
End: 2022-09-21
Payer: MEDICARE

## 2022-09-21 ENCOUNTER — ANESTHESIA EVENT (OUTPATIENT)
Dept: ENDOSCOPY | Age: 77
End: 2022-09-21
Payer: MEDICARE

## 2022-09-21 ENCOUNTER — APPOINTMENT (OUTPATIENT)
Dept: CT IMAGING | Age: 77
End: 2022-09-21
Attending: INTERNAL MEDICINE
Payer: MEDICARE

## 2022-09-21 ENCOUNTER — HOSPITAL ENCOUNTER (OUTPATIENT)
Age: 77
Setting detail: OBSERVATION
Discharge: NURSING, REHAB OR LONG-TERM CARE | End: 2022-09-23
Attending: INTERNAL MEDICINE | Admitting: STUDENT IN AN ORGANIZED HEALTH CARE EDUCATION/TRAINING PROGRAM
Payer: MEDICARE

## 2022-09-21 ENCOUNTER — TELEPHONE (OUTPATIENT)
Dept: INTERNAL MEDICINE CLINIC | Age: 77
End: 2022-09-21

## 2022-09-21 DIAGNOSIS — G89.4 CHRONIC PAIN DISORDER: ICD-10-CM

## 2022-09-21 DIAGNOSIS — R10.12 LEFT UPPER QUADRANT ABDOMINAL PAIN: Primary | ICD-10-CM

## 2022-09-21 PROBLEM — R63.0 ANOREXIA: Status: ACTIVE | Noted: 2022-09-21

## 2022-09-21 PROBLEM — R10.9 ABDOMINAL PAIN: Status: ACTIVE | Noted: 2022-09-21

## 2022-09-21 PROCEDURE — 6360000002 HC RX W HCPCS: Performed by: INTERNAL MEDICINE

## 2022-09-21 PROCEDURE — 94640 AIRWAY INHALATION TREATMENT: CPT

## 2022-09-21 PROCEDURE — 6360000004 HC RX CONTRAST MEDICATION: Performed by: INTERNAL MEDICINE

## 2022-09-21 PROCEDURE — 3609013300 HC EGD TUBE PLACEMENT: Performed by: INTERNAL MEDICINE

## 2022-09-21 PROCEDURE — 6360000002 HC RX W HCPCS: Performed by: ANESTHESIOLOGY

## 2022-09-21 PROCEDURE — 2580000003 HC RX 258: Performed by: INTERNAL MEDICINE

## 2022-09-21 PROCEDURE — 7100000010 HC PHASE II RECOVERY - FIRST 15 MIN: Performed by: INTERNAL MEDICINE

## 2022-09-21 PROCEDURE — 74177 CT ABD & PELVIS W/CONTRAST: CPT

## 2022-09-21 PROCEDURE — G0378 HOSPITAL OBSERVATION PER HR: HCPCS

## 2022-09-21 PROCEDURE — 6360000002 HC RX W HCPCS: Performed by: NURSE ANESTHETIST, CERTIFIED REGISTERED

## 2022-09-21 PROCEDURE — 2500000003 HC RX 250 WO HCPCS: Performed by: NURSE ANESTHETIST, CERTIFIED REGISTERED

## 2022-09-21 PROCEDURE — 3700000001 HC ADD 15 MINUTES (ANESTHESIA): Performed by: INTERNAL MEDICINE

## 2022-09-21 PROCEDURE — 3700000000 HC ANESTHESIA ATTENDED CARE: Performed by: INTERNAL MEDICINE

## 2022-09-21 PROCEDURE — 7100000011 HC PHASE II RECOVERY - ADDTL 15 MIN: Performed by: INTERNAL MEDICINE

## 2022-09-21 PROCEDURE — 2580000003 HC RX 258: Performed by: STUDENT IN AN ORGANIZED HEALTH CARE EDUCATION/TRAINING PROGRAM

## 2022-09-21 PROCEDURE — 2709999900 HC NON-CHARGEABLE SUPPLY: Performed by: INTERNAL MEDICINE

## 2022-09-21 PROCEDURE — 99220 PR INITIAL OBSERVATION CARE/DAY 70 MINUTES: CPT | Performed by: STUDENT IN AN ORGANIZED HEALTH CARE EDUCATION/TRAINING PROGRAM

## 2022-09-21 PROCEDURE — 6370000000 HC RX 637 (ALT 250 FOR IP): Performed by: STUDENT IN AN ORGANIZED HEALTH CARE EDUCATION/TRAINING PROGRAM

## 2022-09-21 RX ORDER — FAMOTIDINE 40 MG/1
40 TABLET, FILM COATED ORAL EVERY EVENING
COMMUNITY

## 2022-09-21 RX ORDER — LACTULOSE 10 G/15ML
30 SOLUTION ORAL ONCE
Status: COMPLETED | OUTPATIENT
Start: 2022-09-21 | End: 2022-09-21

## 2022-09-21 RX ORDER — SODIUM CHLORIDE 0.9 % (FLUSH) 0.9 %
5-40 SYRINGE (ML) INJECTION PRN
Status: DISCONTINUED | OUTPATIENT
Start: 2022-09-21 | End: 2022-09-23 | Stop reason: HOSPADM

## 2022-09-21 RX ORDER — ACETAMINOPHEN 500 MG
1000 TABLET ORAL EVERY 8 HOURS SCHEDULED
Status: DISCONTINUED | OUTPATIENT
Start: 2022-09-21 | End: 2022-09-23

## 2022-09-21 RX ORDER — ONDANSETRON 2 MG/ML
4 INJECTION INTRAMUSCULAR; INTRAVENOUS EVERY 6 HOURS PRN
Status: DISCONTINUED | OUTPATIENT
Start: 2022-09-21 | End: 2022-09-23 | Stop reason: HOSPADM

## 2022-09-21 RX ORDER — MAGNESIUM SULFATE IN WATER 40 MG/ML
2000 INJECTION, SOLUTION INTRAVENOUS PRN
Status: DISCONTINUED | OUTPATIENT
Start: 2022-09-21 | End: 2022-09-23 | Stop reason: HOSPADM

## 2022-09-21 RX ORDER — ONDANSETRON 4 MG/1
4 TABLET, FILM COATED ORAL EVERY 6 HOURS PRN
COMMUNITY

## 2022-09-21 RX ORDER — SODIUM CHLORIDE 9 MG/ML
25 INJECTION, SOLUTION INTRAVENOUS PRN
Status: DISCONTINUED | OUTPATIENT
Start: 2022-09-21 | End: 2022-09-23 | Stop reason: HOSPADM

## 2022-09-21 RX ORDER — ENOXAPARIN SODIUM 100 MG/ML
30 INJECTION SUBCUTANEOUS DAILY
Status: DISCONTINUED | OUTPATIENT
Start: 2022-09-22 | End: 2022-09-23 | Stop reason: HOSPADM

## 2022-09-21 RX ORDER — FUROSEMIDE 40 MG/1
40 TABLET ORAL DAILY PRN
COMMUNITY

## 2022-09-21 RX ORDER — FENTANYL CITRATE 50 UG/ML
25 INJECTION, SOLUTION INTRAMUSCULAR; INTRAVENOUS EVERY 5 MIN PRN
Status: DISCONTINUED | OUTPATIENT
Start: 2022-09-21 | End: 2022-09-23 | Stop reason: HOSPADM

## 2022-09-21 RX ORDER — POLYETHYLENE GLYCOL 3350 17 G/17G
17 POWDER, FOR SOLUTION ORAL DAILY PRN
Status: DISCONTINUED | OUTPATIENT
Start: 2022-09-21 | End: 2022-09-23 | Stop reason: HOSPADM

## 2022-09-21 RX ORDER — POTASSIUM CHLORIDE 20 MEQ/1
40 TABLET, EXTENDED RELEASE ORAL PRN
Status: DISCONTINUED | OUTPATIENT
Start: 2022-09-21 | End: 2022-09-23 | Stop reason: HOSPADM

## 2022-09-21 RX ORDER — M-VIT,TX,IRON,MINS/CALC/FOLIC 27MG-0.4MG
1 TABLET ORAL DAILY
COMMUNITY

## 2022-09-21 RX ORDER — PROPOFOL 10 MG/ML
INJECTION, EMULSION INTRAVENOUS PRN
Status: DISCONTINUED | OUTPATIENT
Start: 2022-09-21 | End: 2022-09-21 | Stop reason: SDUPTHER

## 2022-09-21 RX ORDER — ALBUTEROL SULFATE 90 UG/1
2 AEROSOL, METERED RESPIRATORY (INHALATION) EVERY 6 HOURS PRN
Status: DISCONTINUED | OUTPATIENT
Start: 2022-09-21 | End: 2022-09-23 | Stop reason: HOSPADM

## 2022-09-21 RX ORDER — TRAMADOL HYDROCHLORIDE 50 MG/1
50 TABLET ORAL EVERY 6 HOURS PRN
Status: DISCONTINUED | OUTPATIENT
Start: 2022-09-21 | End: 2022-09-23

## 2022-09-21 RX ORDER — POTASSIUM CHLORIDE 7.45 MG/ML
10 INJECTION INTRAVENOUS PRN
Status: DISCONTINUED | OUTPATIENT
Start: 2022-09-21 | End: 2022-09-23 | Stop reason: HOSPADM

## 2022-09-21 RX ORDER — TIZANIDINE 2 MG/1
2 TABLET ORAL NIGHTLY PRN
COMMUNITY

## 2022-09-21 RX ORDER — SODIUM CHLORIDE 0.9 % (FLUSH) 0.9 %
5-40 SYRINGE (ML) INJECTION EVERY 12 HOURS SCHEDULED
Status: DISCONTINUED | OUTPATIENT
Start: 2022-09-21 | End: 2022-09-23 | Stop reason: HOSPADM

## 2022-09-21 RX ORDER — ACETAMINOPHEN 650 MG/1
650 SUPPOSITORY RECTAL EVERY 6 HOURS PRN
Status: DISCONTINUED | OUTPATIENT
Start: 2022-09-21 | End: 2022-09-21

## 2022-09-21 RX ORDER — MIRTAZAPINE 30 MG/1
30 TABLET, FILM COATED ORAL NIGHTLY
COMMUNITY

## 2022-09-21 RX ORDER — ACETAMINOPHEN 325 MG/1
650 TABLET ORAL EVERY 6 HOURS PRN
Status: DISCONTINUED | OUTPATIENT
Start: 2022-09-21 | End: 2022-09-21

## 2022-09-21 RX ORDER — FENTANYL CITRATE 50 UG/ML
25 INJECTION, SOLUTION INTRAMUSCULAR; INTRAVENOUS ONCE
Status: COMPLETED | OUTPATIENT
Start: 2022-09-21 | End: 2022-09-21

## 2022-09-21 RX ORDER — SIMETHICONE 80 MG
80 TABLET,CHEWABLE ORAL EVERY 8 HOURS PRN
COMMUNITY

## 2022-09-21 RX ORDER — ONDANSETRON 4 MG/1
4 TABLET, ORALLY DISINTEGRATING ORAL EVERY 8 HOURS PRN
Status: DISCONTINUED | OUTPATIENT
Start: 2022-09-21 | End: 2022-09-23 | Stop reason: HOSPADM

## 2022-09-21 RX ORDER — TRAMADOL HYDROCHLORIDE 50 MG/1
100 TABLET ORAL EVERY 6 HOURS PRN
Status: DISCONTINUED | OUTPATIENT
Start: 2022-09-21 | End: 2022-09-23

## 2022-09-21 RX ORDER — SODIUM CHLORIDE 9 MG/ML
INJECTION, SOLUTION INTRAVENOUS PRN
Status: DISCONTINUED | OUTPATIENT
Start: 2022-09-21 | End: 2022-09-23 | Stop reason: HOSPADM

## 2022-09-21 RX ORDER — SENNA AND DOCUSATE SODIUM 50; 8.6 MG/1; MG/1
1 TABLET, FILM COATED ORAL DAILY
COMMUNITY

## 2022-09-21 RX ORDER — GLYCOPYRROLATE 1 MG/5 ML
SYRINGE (ML) INTRAVENOUS PRN
Status: DISCONTINUED | OUTPATIENT
Start: 2022-09-21 | End: 2022-09-21 | Stop reason: SDUPTHER

## 2022-09-21 RX ADMIN — FENTANYL CITRATE 25 MCG: 50 INJECTION, SOLUTION INTRAMUSCULAR; INTRAVENOUS at 12:10

## 2022-09-21 RX ADMIN — FENTANYL CITRATE 25 MCG: 50 INJECTION, SOLUTION INTRAMUSCULAR; INTRAVENOUS at 11:27

## 2022-09-21 RX ADMIN — TRAMADOL HYDROCHLORIDE 100 MG: 50 TABLET, COATED ORAL at 18:31

## 2022-09-21 RX ADMIN — FENTANYL CITRATE 25 MCG: 50 INJECTION, SOLUTION INTRAMUSCULAR; INTRAVENOUS at 12:00

## 2022-09-21 RX ADMIN — LACTULOSE 30 G: 20 SOLUTION ORAL at 18:35

## 2022-09-21 RX ADMIN — CEFAZOLIN 2000 MG: 10 INJECTION, POWDER, FOR SOLUTION INTRAVENOUS at 07:12

## 2022-09-21 RX ADMIN — PROPOFOL 50 MG: 10 INJECTION, EMULSION INTRAVENOUS at 10:19

## 2022-09-21 RX ADMIN — ALBUTEROL SULFATE 2.5 MG: 2.5 SOLUTION RESPIRATORY (INHALATION) at 11:26

## 2022-09-21 RX ADMIN — Medication 0.2 MG: at 10:20

## 2022-09-21 RX ADMIN — IOPAMIDOL 80 ML: 755 INJECTION, SOLUTION INTRAVENOUS at 15:17

## 2022-09-21 RX ADMIN — ACETAMINOPHEN 1000 MG: 500 TABLET ORAL at 20:38

## 2022-09-21 RX ADMIN — SODIUM CHLORIDE 25 ML: 9 INJECTION, SOLUTION INTRAVENOUS at 07:12

## 2022-09-21 RX ADMIN — PROPOFOL 50 MG: 10 INJECTION, EMULSION INTRAVENOUS at 10:12

## 2022-09-21 RX ADMIN — WATER: 100 IRRIGANT IRRIGATION at 20:37

## 2022-09-21 ASSESSMENT — PAIN DESCRIPTION - LOCATION
LOCATION: ABDOMEN

## 2022-09-21 ASSESSMENT — PAIN - FUNCTIONAL ASSESSMENT
PAIN_FUNCTIONAL_ASSESSMENT: PREVENTS OR INTERFERES SOME ACTIVE ACTIVITIES AND ADLS
PAIN_FUNCTIONAL_ASSESSMENT: ACTIVITIES ARE NOT PREVENTED
PAIN_FUNCTIONAL_ASSESSMENT: ACTIVITIES ARE NOT PREVENTED

## 2022-09-21 ASSESSMENT — PAIN DESCRIPTION - FREQUENCY
FREQUENCY: CONTINUOUS
FREQUENCY: CONTINUOUS

## 2022-09-21 ASSESSMENT — PAIN SCALES - GENERAL
PAINLEVEL_OUTOF10: 6
PAINLEVEL_OUTOF10: 7
PAINLEVEL_OUTOF10: 6
PAINLEVEL_OUTOF10: 7

## 2022-09-21 ASSESSMENT — ENCOUNTER SYMPTOMS: SHORTNESS OF BREATH: 1

## 2022-09-21 ASSESSMENT — PAIN DESCRIPTION - ORIENTATION
ORIENTATION: LEFT;UPPER
ORIENTATION: RIGHT;UPPER
ORIENTATION: RIGHT;UPPER
ORIENTATION: MID

## 2022-09-21 ASSESSMENT — PAIN DESCRIPTION - ONSET: ONSET: ON-GOING

## 2022-09-21 ASSESSMENT — PAIN DESCRIPTION - DESCRIPTORS
DESCRIPTORS: SORE
DESCRIPTORS: ACHING
DESCRIPTORS: SORE
DESCRIPTORS: SHARP
DESCRIPTORS: ACHING

## 2022-09-21 ASSESSMENT — PAIN DESCRIPTION - PAIN TYPE
TYPE: SURGICAL PAIN
TYPE: SURGICAL PAIN

## 2022-09-21 NOTE — BRIEF OP NOTE
Brief Postoperative Note      Patient: Nova Noel  YOB: 1945  MRN: [id number]    Date of Procedure: 9/21/2022    Pre-Op Diagnosis: Anorexia [R63.0]    Post-Op Diagnosis:  small H/H, 21 F G TUBE PLACED. Procedure(s):  EGD PEG TUBE PLACEMENT    Surgeon(s):  Kwame Torrez MD    Assistant:  * No surgical staff found *    Anesthesia: Monitor Anesthesia Care    Estimated Blood Loss (mL): Minimal    Complications: None    Specimens:   * No specimens in log *    Implants:  * No implants in log *      Drains: * No LDAs found *    Findings: small H/H, 20 F G TUBE PLACED.     Electronically signed by Kwame Torrez MD on 9/21/2022 at 10:39 AM

## 2022-09-21 NOTE — H&P
benefits explained.         Dipesh Asencio M.D.    D: 09/21/2022 10:11:20       T: 09/21/2022 11:26:25     LUI/LAN  Job#: [id number]     Doc#: [id number]    CC:

## 2022-09-21 NOTE — ANESTHESIA POSTPROCEDURE EVALUATION
Department of Anesthesiology  Postprocedure Note    Patient: Marilynn Conklin  MRN: [id number]  YOB: 1945  Date of evaluation: 9/21/2022      Procedure Summary     Date: 09/21/22 Room / Location: [address] [place] [address] / [address] [place] [address]    Anesthesia Start: 1009 Anesthesia Stop: 2542    Procedure: EGD PEG TUBE PLACEMENT (Abdomen) Diagnosis:       Anorexia      (Anorexia [R63.0])    Surgeons: Chicho Cobb MD Responsible Provider: Edgar Batista DO    Anesthesia Type: MAC ASA Status: 4          Anesthesia Type: No value filed.     Tono Phase I: Tono Score: 10    Tono Phase II: Tono Score: 9      Anesthesia Post Evaluation    Patient location during evaluation: PACU  Patient participation: complete - patient participated  Level of consciousness: awake  Airway patency: patent  Nausea & Vomiting: no nausea  Complications: no  Cardiovascular status: hemodynamically stable  Respiratory status: acceptable  Hydration status: stable

## 2022-09-21 NOTE — DISCHARGE INSTRUCTIONS
RESTART ASA ON 9/24  DIETARY CONSULT FOR  G TUBE RECOMMENDATIONS  ASPIRATION PRECAUTIONS  PLEASE CALL OUR OFFICE IF ANY ISSUES WITH G TUBE REMOVED OR REPLACED.   FOLLOW UP WITH US IN 3-4 WKS,-October 13th at 1:30 pm with Meenu Hernández

## 2022-09-21 NOTE — PROGRESS NOTES
Contacted ECF to get updated Home medication list. Staff at [facility] reports they will fax it to unit.

## 2022-09-21 NOTE — H&P
Hospitalist - History & Physical      Patient: Jesus Zaragoza    Unit/Bed:6E-54/054-A  YOB: 1945  MRN: [id number]   Acct: [de-identified]   PCP: Aimee Mercedes MD    Date of Service: Pt seen/examined on 09/21/22  and Admitted to [Observation] with expected LOS [less than] two midnights due to medical therapy. Chief Complaint:  post PEG placement abdominal pain    Assessment and Plan:-  Abdominal pain  Likely related to the PEG placement as her pain is right at this site. May have increased pain sensitivity due to chronic norco use  Will give tylenol 1g q8h with tramdol. Minimize narcotics to avoid worsening constipation. IS to assist with splinting and possible atelectasis. Gi following. Chronic constipation  Admits to regular bowel movements 102 daily  Will give lactulose enema plus lactulose GIT with miralax. Severe protein calorie malnutrition  Dietary consult for TF recommendations  Hold on initiating TF today until constipation is improved and bowel are moving  PEG placed for chronic poor PO intake   Chronic hypoxic respiratory failure  COPD, not in acute exacerbation  Prn albuterol. Baseline O2 requirements of 3L  IS encouraged to prevent atelectasis due to splinting from abdominal pain. HTN  Awaiting med rec to resume home medications in the AM  HLD  Awaiting med rec to resume home medications in the AM    Disposition: Likely DC back to [facility] in 24-48 hours. History Of Present Illness:    Patient is a pleasant 77/F with medical history of malnutrition who was admitted to the hospital following a planned outpatient PEG placement with Dr. Raquel Ku due to prolonged poor PO intake. Following the procedure, the patient was noted to have continued abdominal pain at the site of her PEG. She states the area is tender to palpation and hurts with deep inspiration.   An abdominal/pelvic CT was completed showing appropriate placement of the PEG tube with evidence of severe constipation with possible impaction. The patients reports chronic constipation, but states she has regular bowel movements 1-2 daily. She does use severe stool softeners/laxatives at the [facility] including MoM. She does use Norco 3x daily for chronic pain with a history of vertebral compression fracture. Past Medical History:        Diagnosis Date    Chronic respiratory failure with hypoxia (HCC)     Chronic respiratory failure with hypoxia and hypercapnia (HCC)     COPD (chronic obstructive pulmonary disease) (HCC)     Depression     Dysarthria     Gastro-esophageal reflux disease without esophagitis 5/28/2021    Hyperlipidemia     Hyperlipidemia     Hypothyroidism, unspecified 5/28/2021    Lung nodules     Pneumonia     [place] 2 months ago    Severe malnutrition (HCC)     Severe malnutrition (HCC)     Severe malnutrition (HCC)     TIA (transient ischemic attack)     Type 2 diabetes mellitus with hyperglycemia ([facility] [address] 5/28/2021       Past Surgical History:        Procedure Laterality Date    COLONOSCOPY      TONSILLECTOMY      UPPER GASTROINTESTINAL ENDOSCOPY N/A 4/20/2022    EGD BIOPSY performed by Renata Neff MD at [address] [facility] Endoscopy       Home Medications:   No current facility-administered medications on file prior to encounter.      Current Outpatient Medications on File Prior to Encounter   Medication Sig Dispense Refill    furosemide (LASIX) 40 MG tablet Take 40 mg by mouth daily as needed (take for edema)      magnesium hydroxide (MILK OF MAGNESIA) 400 MG/5ML suspension Take by mouth daily as needed for Constipation      Multiple Vitamins-Minerals (THERAPEUTIC MULTIVITAMIN-MINERALS) tablet Take 1 tablet by mouth daily      famotidine (PEPCID) 40 MG tablet Take 40 mg by mouth every evening      Pantoprazole Sodium (PROTONIX PO) Take 40 mg by mouth Delayed release      mirtazapine (REMERON) 30 MG tablet Take 30 mg by mouth nightly      sennosides-docusate sodium (SENOKOT-S) 8.6-50 MG tablet Take 1 tablet by mouth daily      simethicone (MYLICON) 80 MG chewable tablet Take 80 mg by mouth every 8 hours as needed for Flatulence Give2 tabs PRN      tiZANidine (ZANAFLEX) 2 MG tablet Take 2 mg by mouth nightly as needed      ondansetron (ZOFRAN) 4 MG tablet Take 4 mg by mouth every 6 hours as needed for Nausea or Vomiting      polyethylene glycol (GLYCOLAX) 17 GM/SCOOP powder Take 17 g by mouth daily 1530 g 1    docusate sodium (COLACE) 100 mg capsule Take 1 capsule by mouth 2 times daily 60 capsule 0    melatonin 3 MG TABS tablet Take 3 mg by mouth daily      omeprazole (PRILOSEC) 20 MG delayed release capsule Take 20 mg by mouth Daily      Ascorbic Acid (VITAMIN C) 250 MG tablet Take 500 mg by mouth daily For 10 days d/t covid      zinc 50 MG TABS tablet Take 50 mg by mouth daily      hydrOXYzine (ATARAX) 10 MG tablet Take 10 mg by mouth 2 times daily itching       HYDROcodone-acetaminophen (NORCO) 5-325 MG per tablet Take 1 tablet by mouth 3 times daily as needed for Pain.       acetaminophen (TYLENOL) 325 MG tablet Take 650 mg by mouth every 6 hours as needed for Pain      bisacodyl (DULCOLAX) 10 MG suppository Place 10 mg rectally as needed for Constipation      Nutritional Supplements (ENSURE HIGH PROTEIN) LIQD Take 1 Can by mouth 3 times daily (with meals) 90 Can 0    megestrol (MEGACE) 40 MG tablet Take 1 tablet by mouth 2 times daily 60 tablet 1    albuterol sulfate  (90 Base) MCG/ACT inhaler Inhale 2 puffs into the lungs every 6 hours 1 Inhaler 3    Umeclidinium Bromide (INCRUSE ELLIPTA) 62.5 MCG/INH AEPB Inhale 1 puff into the lungs daily 30 each 0    Cholecalciferol (VITAMIN D3) 50 MCG (2000 UT) CAPS Take 2,000 Units by mouth daily      aspirin 81 MG tablet Take 81 mg by mouth daily      ipratropium-albuterol (DUONEB) 0.5-2.5 (3) MG/3ML SOLN nebulizer solution Inhale 3 mLs into the lungs every 6 hours as needed for Shortness of Breath 360 mL 2    atorvastatin (LIPITOR) 10 MG tablet Take 1 tablet by mouth nightly 30 tablet 3    vitamin D (CHOLECALCIFEROL) 1000 UNIT TABS tablet Take 2,000 Units by mouth daily      Fluticasone furoate-vilanterol (BREO ELLIPTA) 200-25 MCG/INH AEPB inhaler Inhale 1 puff into the lungs daily      OXYGEN Inhale 2-4 L into the lungs nightly Nightly and prn      ARIPiprazole (ABILIFY) 10 MG tablet Take 15 mg by mouth daily Give 1.5 tablet (10mg tabs) daily      desvenlafaxine succinate (PRISTIQ) 50 MG TB24 extended release tablet Take 100 mg by mouth daily          Allergies:    Spiriva handihaler [tiotropium bromide monohydrate]    Social History:    reports that she quit smoking about 3 years ago. Her smoking use included cigarettes. She started smoking about 60 years ago. She has a 112.00 pack-year smoking history. She has never used smokeless tobacco. She reports that she does not currently use alcohol. She reports that she does not currently use drugs. Family History:       Problem Relation Age of Onset    Cancer Mother         mouth cancer    Other Father         rheumatic fever/rheumatic fever--paraplegia       Diet:  Diet NPO Exceptions are: Sips of Water with Meds    Review of systems:   Pertinent positives as noted in the HPI. All other systems reviewed and negative. PHYSICAL EXAM:  /72   Pulse 85   Temp 97.8 °F (36.6 °C) (Oral)   Resp 16   SpO2 100%   General appearance: No apparent distress, appears stated age and cooperative. HEENT: Normal cephalic, atraumatic without obvious deformity. Pupils equal, round, and reactive to light. Extra ocular muscles intact. Conjunctivae/corneas clear. Neck: Supple, with full range of motion. No jugular venous distention. Trachea midline. Respiratory:  Diminished breath sounds with faint end expiratory wheezes. Cardiovascular: Regular rate and rhythm with normal S1/S2 without murmurs, rubs or gallops. Abdomen: This and scapohoid abdomen. Tenderness to palpation at the site of the Peg tube. No rebound tenderness. pain COMPARISON: 8/21/2022 TECHNIQUE: Axial 5 mm CT images were obtained through the abdomen and pelvis after the administration of 80  cc Isovue 370 intravenous contrast. Coronal and sagittal reconstructions were obtained. Oral contrast was administered apparently at endoscopy. Volume is not known. All CT scans at this facility use dose modulation, iterative reconstruction, and/or weight-based dosing when appropriate to reduce radiation dose to as low as reasonably achievable. FINDINGS: Lung bases: Minimal parenchymal scarring posterior lateral right lung base. Liver/gallbladder/bilary tree: Normal. Pancreas: The pancreas is atrophic. Spleen : Normal. Adrenal glands: Normal. Kidneys/ ureters/ bladder: Kidneys enhance symmetrically. Urinary bladder is moderately distended. Gastrointestinal:  There is PEG tube in place. There is subcutaneous gas in the left anterior chest wall on either side of the PEG tube and gas extending within the abdominis rectus muscle secondary to recent placement. Contrast remains within the gastrointestinal tract is no evidence of extravasation. There is severe constipation with extremely large fecal bolus indicating a fecal impaction. Edema around the rectum suggests possibility of cyst or stercoral colitis Retroperitoneum / lymph nodes: No pathologically enlarged lymph nodes. Severe calcific atherosclerosis of the aorta and iliac vessels Pelvis: Distention of the urinary bladder and fecal impaction are noted. Uterus is present. No free intraperitoneal air identified. Musculoskeletal: Normal.     1. 1. PEG tube is in place there is no extravasation of contrast identified. There is subcutaneous gas from placement of the tube. 2. Severe constipation and very large fecal impaction measuring up to 8 cm with appearance of possible stercoral colitis **This report has been created using voice recognition software. It may contain minor errors which are inherent in voice recognition technology. ** Final report electronically signed by Dr. Antonio Garza on 9/21/2022 3:40 PM        Electronically signed by Maverick Pacheco DO on 9/21/2022 at 5:50 PM

## 2022-09-21 NOTE — PROGRESS NOTES
1150  report given to Dai RN. 1200  pt. Complains of pain 7 out of 10. Dr. Robby Sterling notified. Pt. Medicated for pain. Ice applied to abdomen. 1210  pt. States pain is the same. Continue to medicate. 1230  pt. Dozing for short periods of time. 1250  discharge instructions given to pt. And daughter. Pt. Natalia velez.

## 2022-09-21 NOTE — PROGRESS NOTES
20 FR gastrostomy tube placed at 2 cm skin level without difficulty. No biopsies taken Photos taken. Scope #[id number] used.

## 2022-09-21 NOTE — TELEPHONE ENCOUNTER
[facility] from this department left ms with dietitian office notifying of dietitian consult per Dr Vikas Art after PEG tube placement today. Outpatient dietitian callback to [facility]. Explained since patient being discharged back to [facility] center that a dietitian can be consulted from there - otherwise we will need an electronic order or written faxed order to see patient.

## 2022-09-21 NOTE — PROGRESS NOTES
Dr. MAX BELTRE [facility] notified of continued complaints of pain. CT scan ordered. Admission orders placed per Dr. MAX BELTRE [facility], patient being admitted to 6E-[id number], report called to [facility].

## 2022-09-21 NOTE — PROGRESS NOTES
Patient admitted to room [phone] with daughter present. Room orientation gone over with patient and family.   Awaiting admission orders from the hospitalist

## 2022-09-21 NOTE — ANESTHESIA PRE PROCEDURE
Department of Anesthesiology  Preprocedure Note       Name:  Mica Brian   Age:  65 y.o.  :  1945                                          MRN:  [id number]         Date:  2022      Surgeon: Krish Sutherland):  Anne Crespo MD    Procedure: Procedure(s):  EGD PEG TUBE PLACEMENT    Medications prior to admission:   Prior to Admission medications    Medication Sig Start Date End Date Taking?  Authorizing Provider   furosemide (LASIX) 40 MG tablet Take 40 mg by mouth daily as needed (take for edema)   Yes Historical Provider, MD   magnesium hydroxide (MILK OF MAGNESIA) 400 MG/5ML suspension Take by mouth daily as needed for Constipation   Yes Historical Provider, MD   Multiple Vitamins-Minerals (THERAPEUTIC MULTIVITAMIN-MINERALS) tablet Take 1 tablet by mouth daily   Yes Historical Provider, MD   famotidine (PEPCID) 40 MG tablet Take 40 mg by mouth every evening   Yes Historical Provider, MD   Pantoprazole Sodium (PROTONIX PO) Take 40 mg by mouth Delayed release   Yes Historical Provider, MD   mirtazapine (REMERON) 30 MG tablet Take 30 mg by mouth nightly   Yes Historical Provider, MD   sennosides-docusate sodium (SENOKOT-S) 8.6-50 MG tablet Take 1 tablet by mouth daily   Yes Historical Provider, MD   simethicone (MYLICON) 80 MG chewable tablet Take 80 mg by mouth every 8 hours as needed for Flatulence Give2 tabs PRN   Yes Historical Provider, MD   tiZANidine (ZANAFLEX) 2 MG tablet Take 2 mg by mouth nightly as needed   Yes Historical Provider, MD   ondansetron (ZOFRAN) 4 MG tablet Take 4 mg by mouth every 6 hours as needed for Nausea or Vomiting   Yes Historical Provider, MD   polyethylene glycol (GLYCOLAX) 17 GM/SCOOP powder Take 17 g by mouth daily 22  Alberto Chong MD   docusate sodium (COLACE) 100 mg capsule Take 1 capsule by mouth 2 times daily 22   Alberto Chong MD   melatonin 3 MG TABS tablet Take 3 mg by mouth daily    Historical Provider, MD   omeprazole failure with hypoxia (HCC) J96.01    Schizoaffective disorder (HCC) F25.9    Dyspnea and respiratory abnormalities R06.00, R06.89    Chronic pain disorder G89.4    Major depressive disorder, recurrent, moderate (HCC) F33.1    Neurogenic claudication (HCC) G95.19    Osteopenia of lumbar spine M85.88    Hypothyroidism, unspecified E03.9    Gastro-esophageal reflux disease without esophagitis K21.9    Vitamin B12 deficiency anemia, unspecified D51.9    Type 2 diabetes mellitus with hyperglycemia ([facility]) E11.65    Vitamin D deficiency, unspecified E55.9       Past Medical History:        Diagnosis Date    Chronic respiratory failure with hypoxia (HCC)     Chronic respiratory failure with hypoxia and hypercapnia (HCC)     COPD (chronic obstructive pulmonary disease) ([facility])     Depression     Dysarthria     Gastro-esophageal reflux disease without esophagitis 5/28/2021    Hyperlipidemia     Hyperlipidemia     Hypothyroidism, unspecified 5/28/2021    Lung nodules     Pneumonia     [place] 2 months ago    Severe malnutrition (HCC)     Severe malnutrition (HCC)     Severe malnutrition (HCC)     TIA (transient ischemic attack)     Type 2 diabetes mellitus with hyperglycemia ([facility] [address] 5/28/2021       Past Surgical History:        Procedure Laterality Date    COLONOSCOPY      TONSILLECTOMY      UPPER GASTROINTESTINAL ENDOSCOPY N/A 4/20/2022    EGD BIOPSY performed by Sharan Davis MD at [facility]       Social History:    Social History     Tobacco Use    Smoking status: Former     Packs/day: 2.00     Years: 56.00     Pack years: 112.00     Types: Cigarettes     Start date: 1/1/1962     Quit date: 3/3/2019     Years since quitting: 3.5    Smokeless tobacco: Never   Substance Use Topics    Alcohol use: Not Currently     Comment: stopped drinking 1989-daily vodka                                Counseling given: Not Answered      Vital Signs (Current): There were no vitals filed for this visit. BP Readings from Last 3 Encounters:   08/21/22 (!) 176/76   07/11/22 (!) 107/57   05/28/22 (!) 148/67       NPO Status: Time of last liquid consumption: 2200                        Time of last solid consumption: 1630                        Date of last liquid consumption: 09/20/22                        Date of last solid food consumption: 09/20/22    BMI:   Wt Readings from Last 3 Encounters:   08/21/22 81 lb (36.7 kg)   07/11/22 83 lb 6.4 oz (37.8 kg)   05/28/22 86 lb (39 kg)     There is no height or weight on file to calculate BMI.    CBC:   Lab Results   Component Value Date/Time    WBC 9.9 08/21/2022 09:19 AM    RBC 4.84 08/21/2022 09:19 AM    HGB 12.3 08/21/2022 09:19 AM    HCT 40.2 08/21/2022 09:19 AM    MCV 83.1 08/21/2022 09:19 AM    RDW 14.2 10/01/2019 11:40 AM     08/21/2022 09:19 AM       CMP:   Lab Results   Component Value Date/Time     08/21/2022 09:19 AM    K 3.9 08/21/2022 09:19 AM    CL 98 08/21/2022 09:19 AM    CO2 29 08/21/2022 09:19 AM    BUN 10 08/21/2022 09:19 AM    CREATININE 0.6 08/21/2022 09:19 AM    GFRAA >60 10/01/2019 11:40 AM    LABGLOM >90 08/21/2022 09:19 AM    GLUCOSE 98 08/21/2022 09:19 AM    PROT 7.2 08/21/2022 09:19 AM    CALCIUM 9.6 08/21/2022 09:19 AM    BILITOT 0.2 08/21/2022 09:19 AM    ALKPHOS 61 08/21/2022 09:19 AM    AST 17 08/21/2022 09:19 AM    ALT 10 08/21/2022 09:19 AM       POC Tests: No results for input(s): POCGLU, POCNA, POCK, POCCL, POCBUN, POCHEMO, POCHCT in the last 72 hours.     Coags:   Lab Results   Component Value Date/Time    INR 1.14 05/26/2021 11:30 PM    APTT 27.3 05/26/2021 11:30 PM       HCG (If Applicable): No results found for: PREGTESTUR, PREGSERUM, HCG, HCGQUANT     ABGs: No results found for: PHART, PO2ART, QTA8JUS, FLH1VCE, BEART, Y9SCZDQW     Type & Screen (If Applicable):  No results found for: LABABO, LABRH    Drug/Infectious Status (If Applicable):  No results found for: HIV, HEPCAB    COVID-19 Screening (If Applicable):   Lab Results   Component Value Date/Time    COVID19 NOT DETECTED 08/21/2022 01:07 PM           Anesthesia Evaluation  Patient summary reviewed and Nursing notes reviewed no history of anesthetic complications:   Airway: Mallampati: II  TM distance: >3 FB   Neck ROM: full  Mouth opening: > = 3 FB   Dental:    (+) edentulous      Pulmonary:   (+) pneumonia:  COPD:  shortness of breath: chronic,                             Cardiovascular:  Exercise tolerance: poor (<4 METS),   (+) hyperlipidemia      ECG reviewed      Echocardiogram reviewed                  Neuro/Psych:   (+) neuromuscular disease:, TIA, psychiatric history:            GI/Hepatic/Renal:   (+) GERD:,           Endo/Other:    (+) DiabetesType II DM, , hypothyroidism::., electrolyte abnormalities, . Abdominal:             Vascular: negative vascular ROS. Other Findings:           Anesthesia Plan      MAC     ASA 4       Induction: intravenous. Anesthetic plan and risks discussed with patient and child/children. Plan discussed with attending.                     SHARONA Eisenberg - CRNA   9/21/2022

## 2022-09-22 PROBLEM — E43 SEVERE MALNUTRITION (HCC): Chronic | Status: ACTIVE | Noted: 2019-11-13

## 2022-09-22 PROCEDURE — 94761 N-INVAS EAR/PLS OXIMETRY MLT: CPT

## 2022-09-22 PROCEDURE — 6370000000 HC RX 637 (ALT 250 FOR IP): Performed by: STUDENT IN AN ORGANIZED HEALTH CARE EDUCATION/TRAINING PROGRAM

## 2022-09-22 PROCEDURE — 96372 THER/PROPH/DIAG INJ SC/IM: CPT

## 2022-09-22 PROCEDURE — G0378 HOSPITAL OBSERVATION PER HR: HCPCS

## 2022-09-22 PROCEDURE — 2580000003 HC RX 258: Performed by: STUDENT IN AN ORGANIZED HEALTH CARE EDUCATION/TRAINING PROGRAM

## 2022-09-22 PROCEDURE — 6360000002 HC RX W HCPCS: Performed by: ANESTHESIOLOGY

## 2022-09-22 PROCEDURE — 2700000000 HC OXYGEN THERAPY PER DAY

## 2022-09-22 PROCEDURE — 6360000002 HC RX W HCPCS: Performed by: STUDENT IN AN ORGANIZED HEALTH CARE EDUCATION/TRAINING PROGRAM

## 2022-09-22 PROCEDURE — 99225 PR SBSQ OBSERVATION CARE/DAY 25 MINUTES: CPT | Performed by: STUDENT IN AN ORGANIZED HEALTH CARE EDUCATION/TRAINING PROGRAM

## 2022-09-22 PROCEDURE — 96374 THER/PROPH/DIAG INJ IV PUSH: CPT

## 2022-09-22 PROCEDURE — 94640 AIRWAY INHALATION TREATMENT: CPT

## 2022-09-22 RX ORDER — ATORVASTATIN CALCIUM 10 MG/1
10 TABLET, FILM COATED ORAL NIGHTLY
Status: DISCONTINUED | OUTPATIENT
Start: 2022-09-22 | End: 2022-09-23 | Stop reason: HOSPADM

## 2022-09-22 RX ORDER — VITAMIN B COMPLEX
2000 TABLET ORAL DAILY
Status: DISCONTINUED | OUTPATIENT
Start: 2022-09-22 | End: 2022-09-23 | Stop reason: HOSPADM

## 2022-09-22 RX ORDER — MIRTAZAPINE 30 MG/1
30 TABLET, FILM COATED ORAL NIGHTLY
Status: DISCONTINUED | OUTPATIENT
Start: 2022-09-22 | End: 2022-09-23 | Stop reason: HOSPADM

## 2022-09-22 RX ORDER — HYDROCODONE BITARTRATE AND ACETAMINOPHEN 5; 325 MG/1; MG/1
1 TABLET ORAL 3 TIMES DAILY PRN
Status: DISCONTINUED | OUTPATIENT
Start: 2022-09-22 | End: 2022-09-23 | Stop reason: HOSPADM

## 2022-09-22 RX ORDER — ASPIRIN 81 MG/1
81 TABLET ORAL DAILY
Status: DISCONTINUED | OUTPATIENT
Start: 2022-09-22 | End: 2022-09-23 | Stop reason: HOSPADM

## 2022-09-22 RX ORDER — ASCORBIC ACID 500 MG
500 TABLET ORAL DAILY
Status: DISCONTINUED | OUTPATIENT
Start: 2022-09-22 | End: 2022-09-23 | Stop reason: HOSPADM

## 2022-09-22 RX ORDER — HYDROXYZINE HYDROCHLORIDE 10 MG/1
10 TABLET, FILM COATED ORAL 2 TIMES DAILY
Status: DISCONTINUED | OUTPATIENT
Start: 2022-09-22 | End: 2022-09-23 | Stop reason: HOSPADM

## 2022-09-22 RX ORDER — BISACODYL 10 MG
10 SUPPOSITORY, RECTAL RECTAL PRN
Status: DISCONTINUED | OUTPATIENT
Start: 2022-09-22 | End: 2022-09-23 | Stop reason: HOSPADM

## 2022-09-22 RX ORDER — ALBUTEROL SULFATE 90 UG/1
2 AEROSOL, METERED RESPIRATORY (INHALATION) 2 TIMES DAILY
Status: DISCONTINUED | OUTPATIENT
Start: 2022-09-23 | End: 2022-09-23 | Stop reason: HOSPADM

## 2022-09-22 RX ORDER — DOCUSATE SODIUM 100 MG/1
100 CAPSULE, LIQUID FILLED ORAL 2 TIMES DAILY
Status: DISCONTINUED | OUTPATIENT
Start: 2022-09-22 | End: 2022-09-23 | Stop reason: HOSPADM

## 2022-09-22 RX ORDER — ARIPIPRAZOLE 15 MG/1
15 TABLET ORAL DAILY
Status: DISCONTINUED | OUTPATIENT
Start: 2022-09-22 | End: 2022-09-23 | Stop reason: HOSPADM

## 2022-09-22 RX ORDER — ALBUTEROL SULFATE 90 UG/1
2 AEROSOL, METERED RESPIRATORY (INHALATION) EVERY 6 HOURS
Status: DISCONTINUED | OUTPATIENT
Start: 2022-09-22 | End: 2022-09-22

## 2022-09-22 RX ORDER — PANTOPRAZOLE SODIUM 40 MG/1
40 TABLET, DELAYED RELEASE ORAL
Status: DISCONTINUED | OUTPATIENT
Start: 2022-09-23 | End: 2022-09-23 | Stop reason: HOSPADM

## 2022-09-22 RX ORDER — FAMOTIDINE 20 MG/1
40 TABLET, FILM COATED ORAL EVERY EVENING
Status: DISCONTINUED | OUTPATIENT
Start: 2022-09-22 | End: 2022-09-23 | Stop reason: HOSPADM

## 2022-09-22 RX ORDER — DESVENLAFAXINE 100 MG/1
100 TABLET, EXTENDED RELEASE ORAL DAILY
Status: DISCONTINUED | OUTPATIENT
Start: 2022-09-22 | End: 2022-09-23 | Stop reason: HOSPADM

## 2022-09-22 RX ADMIN — SODIUM CHLORIDE, PRESERVATIVE FREE 10 ML: 5 INJECTION INTRAVENOUS at 20:07

## 2022-09-22 RX ADMIN — ARIPIPRAZOLE 15 MG: 15 TABLET ORAL at 16:29

## 2022-09-22 RX ADMIN — DESVENLAFAXINE SUCCINATE 100 MG: 100 TABLET, FILM COATED, EXTENDED RELEASE ORAL at 16:29

## 2022-09-22 RX ADMIN — TRAMADOL HYDROCHLORIDE 100 MG: 50 TABLET, COATED ORAL at 18:46

## 2022-09-22 RX ADMIN — FENTANYL CITRATE 25 MCG: 50 INJECTION, SOLUTION INTRAMUSCULAR; INTRAVENOUS at 14:14

## 2022-09-22 RX ADMIN — ATORVASTATIN CALCIUM 10 MG: 10 TABLET, FILM COATED ORAL at 20:08

## 2022-09-22 RX ADMIN — Medication 2 PUFF: at 14:57

## 2022-09-22 RX ADMIN — OXYCODONE HYDROCHLORIDE AND ACETAMINOPHEN 500 MG: 500 TABLET ORAL at 16:29

## 2022-09-22 RX ADMIN — MIRTAZAPINE 30 MG: 30 TABLET, FILM COATED ORAL at 20:08

## 2022-09-22 RX ADMIN — Medication 2000 UNITS: at 16:29

## 2022-09-22 RX ADMIN — HYDROXYZINE HYDROCHLORIDE 10 MG: 10 TABLET ORAL at 20:07

## 2022-09-22 RX ADMIN — TRAMADOL HYDROCHLORIDE 100 MG: 50 TABLET, COATED ORAL at 11:10

## 2022-09-22 RX ADMIN — HYDROCODONE BITARTRATE AND ACETAMINOPHEN 1 TABLET: 5; 325 TABLET ORAL at 16:29

## 2022-09-22 RX ADMIN — ACETAMINOPHEN 1000 MG: 500 TABLET ORAL at 22:20

## 2022-09-22 RX ADMIN — ASPIRIN 81 MG: 81 TABLET, COATED ORAL at 16:31

## 2022-09-22 RX ADMIN — ENOXAPARIN SODIUM 30 MG: 100 INJECTION SUBCUTANEOUS at 09:08

## 2022-09-22 RX ADMIN — FAMOTIDINE 40 MG: 20 TABLET ORAL at 20:07

## 2022-09-22 ASSESSMENT — PAIN DESCRIPTION - LOCATION
LOCATION: ABDOMEN

## 2022-09-22 ASSESSMENT — PAIN DESCRIPTION - PAIN TYPE
TYPE: SURGICAL PAIN
TYPE: SURGICAL PAIN

## 2022-09-22 ASSESSMENT — PAIN DESCRIPTION - ONSET
ONSET: ON-GOING
ONSET: ON-GOING

## 2022-09-22 ASSESSMENT — PAIN DESCRIPTION - ORIENTATION
ORIENTATION: LEFT
ORIENTATION: LEFT
ORIENTATION: LEFT;UPPER
ORIENTATION: LEFT;UPPER

## 2022-09-22 ASSESSMENT — PAIN SCALES - GENERAL
PAINLEVEL_OUTOF10: 8
PAINLEVEL_OUTOF10: 8
PAINLEVEL_OUTOF10: 7
PAINLEVEL_OUTOF10: 4
PAINLEVEL_OUTOF10: 8

## 2022-09-22 ASSESSMENT — PAIN DESCRIPTION - DESCRIPTORS
DESCRIPTORS: ACHING
DESCRIPTORS: SHARP
DESCRIPTORS: ACHING
DESCRIPTORS: ACHING

## 2022-09-22 ASSESSMENT — PAIN - FUNCTIONAL ASSESSMENT
PAIN_FUNCTIONAL_ASSESSMENT: PREVENTS OR INTERFERES SOME ACTIVE ACTIVITIES AND ADLS

## 2022-09-22 ASSESSMENT — PAIN DESCRIPTION - FREQUENCY
FREQUENCY: CONTINUOUS
FREQUENCY: CONTINUOUS

## 2022-09-22 NOTE — RT PROTOCOL NOTE
RT Inhaler-Nebulizer Bronchodilator Protocol Note    There is a bronchodilator order in the chart from a provider indicating to follow the RT Bronchodilator Protocol and there is an Initiate RT Inhaler-Nebulizer Bronchodilator Protocol order as well (see protocol at bottom of note). CXR Findings:  No results found. The findings from the last RT Protocol Assessment were as follows:   History Pulmonary Disease: Chronic pulmonary disease  Respiratory Pattern: Regular pattern and RR 12-20 bpm  Breath Sounds: Slightly diminished and/or crackles  Cough: Strong, spontaneous, non-productive  Indication for Bronchodilator Therapy: Decreased or absent breath sounds  Bronchodilator Assessment Score: 4    Aerosolized bronchodilator medication orders have been revised according to the RT Inhaler-Nebulizer Bronchodilator Protocol below. Respiratory Therapist to perform RT Therapy Protocol Assessment initially then follow the protocol. Repeat RT Therapy Protocol Assessment PRN for score 0-3 or on second treatment, BID, and PRN for scores above 3. No Indications - adjust the frequency to every 6 hours PRN wheezing or bronchospasm, if no treatments needed after 48 hours then discontinue using Per Protocol order mode. If indication present, adjust the RT bronchodilator orders based on the Bronchodilator Assessment Score as indicated below. Use Inhaler orders unless patient has one or more of the following: on home nebulizer, not able to hold breath for 10 seconds, is not alert and oriented, cannot activate and use MDI correctly, or respiratory rate 25 breaths per minute or more, then use the equivalent nebulizer order(s) with same Frequency and PRN reasons based on the score. If a patient is on this medication at home then do not decrease Frequency below that used at home.     0-3 - enter or revise RT bronchodilator order(s) to equivalent RT Bronchodilator order with Frequency of every 4 hours PRN for wheezing or increased work of breathing using Per Protocol order mode. 4-6 - enter or revise RT Bronchodilator order(s) to two equivalent RT bronchodilator orders with one order with BID Frequency and one order with Frequency of every 4 hours PRN wheezing or increased work of breathing using Per Protocol order mode. 7-10 - enter or revise RT Bronchodilator order(s) to two equivalent RT bronchodilator orders with one order with TID Frequency and one order with Frequency of every 4 hours PRN wheezing or increased work of breathing using Per Protocol order mode. 11-13 - enter or revise RT Bronchodilator order(s) to one equivalent RT bronchodilator order with QID Frequency and an Albuterol order with Frequency of every 4 hours PRN wheezing or increased work of breathing using Per Protocol order mode. Greater than 13 - enter or revise RT Bronchodilator order(s) to one equivalent RT bronchodilator order with every 4 hours Frequency and an Albuterol order with Frequency of every 2 hours PRN wheezing or increased work of breathing using Per Protocol order mode. RT to enter RT Home Evaluation for COPD & MDI Assessment order using Per Protocol order mode.     Electronically signed by Morales Israel RCP on 9/22/2022 at 2:57 PM

## 2022-09-22 NOTE — CONSULTS
Comprehensive Nutrition Assessment    Type and Reason for Visit:  Initial, Consult (Tube Feed Order and Management)    Nutrition Recommendations/Plan:   Started Glucerna TID. Continue current diet. Recommend if patient consumes less than 50% of meals, bolus 260 ml Glucerna TID, flushing 50 ml free water before and after each bolus. Malnutrition Assessment:  Malnutrition Status:  Severe malnutrition (09/22/22 0911)    Context:  Chronic Illness     Findings of the 6 clinical characteristics of malnutrition:  Energy Intake:  75% or less estimated energy requirements for 1 month or longer  Weight Loss:   (-16.5% weight loss in 9 months per EMR)     Body Fat Loss:  Severe body fat loss Orbital   Muscle Mass Loss:  Severe muscle mass loss Temples (temporalis)  Fluid Accumulation:  No significant fluid accumulation Extremities   Strength:  Not Performed    Nutrition Assessment: Pt. severely malnourished AEB criteria listed above. At risk for further nutritional compromise r/t s/p PEG tube placement on 9/21 with abdominal pain at PEG tube site and underlying medical condition (Hx: DM, COPD, GERD, HLD, HTN, constipation). Nutrition Related Findings:    Pt. Report/Treatments/Miscellaneous: s/p PEG tube placement on 9/21; pt seen- she reports poor appetite over the past several months consuming 1-2 meals at baseline; pt reports unplanned weight loss over the past year d/t poor appetite; pt denies any difficulty chewing/swallowing food; discussed plan for bolus tube feed with patient; plan d/c back to ECF. GI Status: Pt denies N/V. Pt reports hx of chronic constipation. Last BM x1 on 9/21. Pertinent Labs: No new labs since admit. Pertinent Meds: Glycolax PRN. Wound Type: None       Current Nutrition Intake & Therapies:    Average Meal Intake: 51-75% (of supper last night per RN and pt report)  Average Supplements Intake:  (initiated today)  ADULT DIET;  Regular  ADULT ORAL NUTRITION SUPPLEMENT; Breakfast, Lunch, Dinner; Diabetic Oral Supplement  Current Tube Feeding (TF) Orders:  Feeding Route: PEG (s/p PEG tube on 9/21/22)  Formula: Diabetic  Schedule: Bolus  Feeding Regimen: Bolus 260 ml Glucerna TID, flushing 85 ml free water before and after each bolus feed  Additives/Modulars: None  Water Flushes: Recommend flushing 50 ml before and after each bolus feed  Current TF & Flush Orders Provides: TF to start  Goal TF & Flush Orders Provides: Recommend bolusing 260 ml Glucerna 1.5 TID, flushing 30 ml free water before and after each bolus feed to provide: 1170 kcals, 104 grams CHO, 64 grams protein, 12.5 grams fiber and total free water of 892 ml (592 TF, 300 Flush) in total volume of 1080 ml (780 TF, 300 Flush) /24 hours. Note: pt orally consumes ~400 ml daily which provides a total of 1292 ml free water daily from TF and flushes. Anthropometric Measures:  Height: 5' 2\" (157.5 cm)  Ideal Body Weight (IBW): 110 lbs (50 kg)    Admission Body Weight: 91 lb 12.8 oz (41.6 kg) (9/22; no edema noted)  Current Body Weight: 81 lb 12.8 oz (37.1 kg) (9/22; no edema noted), 74.4 %   Current BMI (kg/m2): 15  Usual Body Weight:  (77 lbs per pt report recently.  Per EMR: 98 lb 8 oz on 12/4/21; 83 lb 12.8 oz on 4/26/22; 81 lb pn 8/21/22)  BMI Categories: Underweight (BMI less than 18.5)    Estimated Daily Nutrient Needs:  Energy Requirements Based On: Kcal/kg  Weight Used for Energy Requirements: Current  Energy (kcal/day): 5518-9775 kcal/day (35-40 kcal/kg)  Weight Used for Protein Requirements: Current  Protein (g/day): 55+ g/day (1.5+ g/kg)  Fluid (ml/day): 0411-3928 ml/day (35-40 ml/kg) or per MD    Nutrition Diagnosis:   Severe malnutrition, In context of chronic illness related to inadequate protein-energy intake as evidenced by Criteria as identified in malnutrition assessment    Nutrition Interventions:   Food and/or Nutrient Delivery: Continue Current Diet, Start Oral Nutrition Supplement, Start Tube Feeding  Nutrition Education/Counseling: Education initiated (Encouraged po intake of meals and ONS at best effort and use of PEG for nutrition if she doesn't eat at least 50% of meals)  Coordination of Nutrition Care: Continue to monitor while inpatient    Goals:  Previous Goal Met: Progressing toward Goal(s)  Goals: Meet at least 75% of estimated needs, by next RD assessment    Nutrition Monitoring and Evaluation:   Food/Nutrient Intake Outcomes: Diet Advancement/Tolerance, Food and Nutrient Intake, Supplement Intake, Enteral Nutrition Intake/Tolerance  Physical Signs/Symptoms Outcomes: Biochemical Data, GI Status, Constipation, Weight, Skin, Nutrition Focused Physical Findings    Discharge Planning:    Continue current diet, Enteral Nutrition     Jovita Payne MS, RD, LD  Contact: [phone]

## 2022-09-22 NOTE — CARE COORDINATION
Case Management Assessment  Initial Evaluation    Date/Time of Evaluation: 9/22/2022 1:17 PM  Assessment Completed by: Paco Heard RN    If patient is discharged prior to next notation, then this note serves as note for discharge by case management. Patient Name: Raquel Coleman                   YOB: 1945  Diagnosis: Anorexia [R63.0]  Abdominal pain [R10.9]                   Date / Time: 9/21/2022  6:38 AM    Patient Admission Status: Observation     Current PCP: Cathryn Blackburn MD  PCP verified by CM? Yes    Chart Reviewed: Yes      History Provided by: Patient  Patient Orientation: Alert and Oriented    Patient Cognition: Alert    Hospitalization in the last 30 days (Readmission):  No    If yes, Readmission Assessment in CM Navigator will be completed. Advance Directives:     Code Status: Full Code       Discharge Planning  Patient lives with:   Type of Home:    Primary Care Giver: Other (Comment) (From [facility]ER[facility])  Patient Support Systems include: Children   Current Financial resources: Medicare, Medicaid  Current community resources: ECF/Home Care ([facility])  Current services prior to admission: None   Type of Home Care services:  [facility]    ADLS  Prior functional level: Assistance with the following:, Other (see comment) (From [facility])  Current functional level: Assistance with the following:, Feeding (New PEG tube)    PT AM-PAC:   /24  OT AM-PAC:   /24    Family can provide assistance at DC: No  Would you like Case Management to discuss the discharge plan with any other family members/significant others, and if so, who? No  Plans to Return to Present Housing: Other (see comment) (Planning discharge to [facility])  Other Identified Issues/Barriers to RETURNING to current housing: Planning to go to [facility].    Potential Assistance needed at discharge: [facility]  Patient expects to discharge to: [facility])  Plan for transportation at discharge: Family    Financial  Payor: Hakeem Chance / Plan: Curtis Neighbor / Product Type: *No Product type* /     Does insurance require precert for SNF: Yes    Potential assistance Purchasing Medications: No  Meds-to-Beds request: Yes      [facility], [address]  [address] [address]  Phone: [phone] Fax: [phone]      Factors facilitating achievement of predicted outcomes: Family support and Pleasant    Barriers to discharge: New PEG tube, planning discharge to SNF    Additional Case Management Notes: Admitted from [facility] after PEG tube placed. Pt will need tube feeding started and precert for SNF. Lovenox. Pain control. O2 at 3L/nc this am, sats 95%. Dietitian consulted for tube feedings. The Plan for Transition of Care is related to the following treatment goals of Anorexia [R63.0]  Abdominal pain [U86.5]    IF APPLICABLE: The Patient and/or patient representative Krystal Cooley and her family were provided with a choice of provider and agrees with the discharge plan. Freedom of choice list with basic dialogue that supports the patient's individualized plan of care/goals and shares the quality data associated with the providers was provided to: Patient       The Patient and/or Patient Representative Agree with the Discharge Plan? Yes, planning discharge to [facility]. SW consulted.      Yi Reveles RN  Case Management Department  Ph

## 2022-09-22 NOTE — PLAN OF CARE
Problem: Chronic Conditions and Co-morbidities  Goal: Patient's chronic conditions and co-morbidity symptoms are monitored and maintained or improved  Outcome: Progressing     Problem: Safety - Adult  Goal: Free from fall injury  Outcome: Progressing     Problem: Pain  Goal: Verbalizes/displays adequate comfort level or baseline comfort level  Outcome: Progressing     Problem: Discharge Planning  Goal: Discharge to home or other facility with appropriate resources  Outcome: Progressing     Problem: Skin/Tissue Integrity  Goal: Absence of new skin breakdown  Description: 1. Monitor for areas of redness and/or skin breakdown  2. Assess vascular access sites hourly  3. Every 4-6 hours minimum:  Change oxygen saturation probe site  4. Every 4-6 hours:  If on nasal continuous positive airway pressure, respiratory therapy assess nares and determine need for appliance change or resting period. Outcome: Progressing     Problem: ABCDS Injury Assessment  Goal: Absence of physical injury  Outcome: Progressing  Flowsheets (Taken 9/22/2022 0042)  Absence of Physical Injury: Implement safety measures based on patient assessment     Problem: Skin/Tissue Integrity - Adult  Goal: Skin integrity remains intact  Outcome: Progressing  Flowsheets (Taken 9/22/2022 0042)  Skin Integrity Remains Intact: Monitor for areas of redness and/or skin breakdown     Problem: Gastrointestinal - Adult  Goal: Maintains adequate nutritional intake  Outcome: Progressing  Flowsheets (Taken 9/22/2022 0042)  Maintains adequate nutritional intake:   Monitor percentage of each meal consumed   Monitor intake and output, weight and lab values   Obtain nutritional consult as needed   Assist with meals as needed   Care plan reviewed with patient. Patient verbalized understanding of the plan of care and contribute to goal setting.

## 2022-09-22 NOTE — CARE COORDINATION
DISCHARGE/PLANNING EVALUATION  9/22/22, 10:39 AM EDT    Reason for Referral: ecf  Mental Status: alert, oriented   Decision Making:  makes own decisions   Family/Social/Home Environment:  Tammie Black is from assisted living at [facility]. She is planning on going to skilled ecf at the facility at discharge  Current Services including food security, transportation and housekeeping:  from assisted living, has assistance with meals, housekeeping and transportation, supportive family  Current Equipment:   Payment Source: [employer], medicaid   Concerns or Barriers to Discharge: planning ecf, will need precert   Post-acute provider list with abbreviated version of the quality measures, geographic area, and applicable managed care information provided. Offered option of searching Medicare. gov website by using the computer in patient's room to review complete quality measures information. Questions regarding selection process answered: list declined, prefers [address]      Teach Back Method used with patient regarding care plan and discharge plan  Patient  verbalizes understanding of the plan of care and contributes to goal setting. Patient goals, treatment preferences and discharge plan:  spoke with Tammie Black, who confirms plan for ecf at [facility], from assisted living at the facility. Confirmed plan with [facility] admissions, will need precert for ecf, which facility will start today. Message left for daughter to update.       Electronically signed by WEI Barrientos on 9/22/2022 at 10:39 AM

## 2022-09-22 NOTE — DISCHARGE INSTR - COC
Continuity of Care Form    Patient Name: Marleny Cunningham   :  [birth year]  MRN:  [id number]    Admit date:  2022  Discharge date:  2022    Code Status Order: Full Code   Advance Directives:     Admitting Physician:  Magy Landeros DO  PCP: Ana Murphy MD    Discharging Nurse: [facility] Unit/Room#: 7W-38/694-M  Discharging Unit Phone Number: [phone]    Emergency Contact:   Extended Emergency Contact Information  Primary Emergency Contact: Gladys [address] [place] [address] Phone: [phone]  Mobile Phone: [phone]  Relation: Child  Hearing or visual needs: None  Other needs: None  Preferred language: English   needed? No  Secondary Emergency Contact: Lashell Tejeda, [address] [facility] Phone: [phone]  Mobile Phone: [phone]  Relation: Child   needed?  No    Past Surgical History:  Past Surgical History:   Procedure Laterality Date    COLONOSCOPY      GASTROSTOMY TUBE PLACEMENT N/A 2022    EGD PEG TUBE PLACEMENT performed by Earnest Hodgkin, MD at [address] [place] [address] N/A 2022    EGD BIOPSY performed by Earnest Hodgkin, MD at [facility]       Immunization History:   Immunization History   Administered Date(s) Administered    COVID-19, PFIZER PURPLE top, DILUTE for use, (age 15 y+), 30mcg/0.3mL 2021, 2021, 2021    Influenza Virus Vaccine 10/04/2018       Active Problems:  Patient Active Problem List   Diagnosis Code    Pneumonia of right upper lobe due to infectious organism J18.9    Bandemia D72.825    Acute on chronic respiratory failure with hypoxia and hypercapnia (HCC) J96.21, J96.22    Weight loss, non-intentional R63.4    Severe protein-calorie malnutrition Schuyler Chew: less than 60% of standard weight) ([facility] [address] E43    History of head lice U68.6    Hypoxia R09.02    Tobacco use disorder F17.200    Leukocytosis D72.829    Lactic acidosis E87.2    SIRS (systemic 08/21/22 81 lb (36.7 kg)     Mental Status:  oriented and alert    IV Access:  - None    Nursing Mobility/ADLs:  Walking   Assisted  Transfer  Assisted  Bathing  Assisted  Dressing  Assisted  Toileting  Assisted  Feeding  Independent  Med Admin  Assisted  Med Delivery   whole    Wound Care Documentation and Therapy:          Elimination:  Continence: Bowel: No  Bladder: No  Urinary Catheter: None   Colostomy/Ileostomy/Ileal Conduit: No       Date of Last BM: 09/23/2022    Intake/Output Summary (Last 24 hours) at 9/22/2022 1046  Last data filed at 9/22/2022 0933  Gross per 24 hour   Intake 500 ml   Output 350 ml   Net 150 ml     I/O last 3 completed shifts: In: 12 [P.O.:400; I.V.:400]  Out: -     Safety Concerns: At Risk for Falls    Impairments/Disabilities:      None    Nutrition Therapy:  Current Nutrition Therapy:   - Oral Diet:  Dental Soft    Routes of Feeding: Oral and Gastrostomy Tube  Liquids: No Restrictions  Daily Fluid Restriction: no  Last Modified Barium Swallow with Video (Video Swallowing Test): not done    Treatments at the Time of Hospital Discharge:   Respiratory Treatments: albuterol inhaler  Oxygen Therapy:  is on oxygen at 3 L/min per nasal cannula.   Ventilator:    - No ventilator support    Rehab Therapies: Physical Therapy and Occupational Therapy  Weight Bearing Status/Restrictions: No weight bearing restrictions  Other Medical Equipment (for information only, NOT a DME order):  walker  Other Treatments: none    Patient's personal belongings (please select all that are sent with patient):  None    patient's daughter took belongings home with her     RN SIGNATURE:  Electronically signed by Filomena Jasso RN on 9/23/22 at 3:21 PM EDT    CASE MANAGEMENT/SOCIAL WORK SECTION    Inpatient Status Date: 09/21/22    Readmission Risk Assessment Score:  Readmission Risk              Risk of Unplanned Readmission:  0           Discharging to Facility/ Agency   Name:  [facility]  Address: [address] [place] [address],[address]  [address]  Phone: [phone]  Fax: [phone]    Dialysis Facility (if applicable)   Name:  Address:  Dialysis Schedule:  Phone:  Fax:    / signature: Electronically signed by WEI De La Cruz on 9/22/22 at 10:46 AM EDT    PHYSICIAN SECTION    Prognosis: Fair    Condition at Discharge: Stable    Rehab Potential (if transferring to Rehab): Fair    Recommended Labs or Other Treatments After Discharge: None    Physician Certification: I certify the above information and transfer of Hank Carmona  is necessary for the continuing treatment of the diagnosis listed and that she requires [facility] for greater 30 days.      Update Admission H&P: No change in H&P    PHYSICIAN SIGNATURE:  Electronically signed by Patrizia Olivares DO on 9/23/22 at 5:29 PM EDT

## 2022-09-22 NOTE — PROGRESS NOTES
Subjective (past 24 hours): Only required 1x dose of tramadol overnight, however she is complaining about severe abdominal pain at the site of the PEG tube again this morning. Denies radiation. Still having pain with deep inspiration. Past medical history, family history, social history and allergies reviewed again and is unchanged since admission. ROS (12 point review of systems completed. Pertinent positives noted. Otherwise ROS is negative)     Medications:  Reviewed    Infusion Medications    sodium chloride 100 mL/hr at 09/21/22 1009    sodium chloride       Scheduled Medications    albuterol sulfate HFA  2 puff Inhalation Q6H    ARIPiprazole  15 mg Oral Daily    vitamin C  500 mg Oral Daily    aspirin  81 mg Oral Daily    atorvastatin  10 mg Oral Nightly    Vitamin D3  2,000 Units Oral Daily    desvenlafaxine succinate  100 mg Oral Daily    docusate sodium  100 mg Oral BID    famotidine  40 mg Oral QPM    mometasone-formoterol  2 puff Inhalation BID    hydrOXYzine HCl  10 mg Oral BID    mirtazapine  30 mg Oral Nightly    [START ON 9/23/2022] pantoprazole  40 mg Oral QAM AC    tiotropium  2 puff Inhalation Daily    sodium chloride flush  5-40 mL IntraVENous 2 times per day    enoxaparin  30 mg SubCUTAneous Daily    acetaminophen  1,000 mg Oral 3 times per day     PRN Meds: bisacodyl, HYDROcodone-acetaminophen, magnesium hydroxide, sodium chloride, fentanNYL, sodium chloride flush, sodium chloride, ondansetron **OR** ondansetron, polyethylene glycol, potassium chloride **OR** potassium alternative oral replacement **OR** potassium chloride, magnesium sulfate, traMADol **OR** traMADol, albuterol sulfate HFA      Intake/Output Summary (Last 24 hours) at 9/22/2022 1405  Last data filed at 9/22/2022 0933  Gross per 24 hour   Intake 500 ml   Output 350 ml   Net 150 ml       Diet:  ADULT DIET;  Regular  ADULT ORAL NUTRITION SUPPLEMENT; Breakfast, Lunch, Dinner; Diabetic Oral Supplement    Exam:  BP 130/71   Pulse (!) 102   Temp 97.5 °F (36.4 °C) (Oral)   Resp 24   Ht 5' 2\" (1.575 m)   SpO2 95%   BMI 14.82 kg/m²   General appearance: No apparent distress, appears stated age and cooperative. HEENT: Normal cephalic, atraumatic without obvious deformity. Pupils equal, round, and reactive to light. Extra ocular muscles intact. Conjunctivae/corneas clear. Neck: Supple, with full range of motion. No jugular venous distention. Trachea midline. Respiratory:  Diminished breath sounds with faint end expiratory wheezes. Cardiovascular: Regular rate and rhythm with normal S1/S2 without murmurs, rubs or gallops. Abdomen: This and scapohoid abdomen. Tenderness to palpation at the site of the Peg tube. No rebound tenderness. Musculoskeletal:  Diffuse moderate muscle wasting noted throughout with bitemporal wasting. Skin: Skin color, texture, turgor normal.  No rashes or lesions. Neurologic:  Neurovascularly intact without any focal sensory/motor deficits. Cranial nerves: II-XII intact, grossly non-focal.  Psychiatric: Alert and oriented, thought content appropriate, normal insight  Capillary Refill: Brisk,< 3 seconds   Peripheral Pulses: +2 palpable, equal bilaterally     Labs:   No results for input(s): WBC, HGB, HCT, PLT in the last 72 hours. No results for input(s): NA, K, CL, CO2, BUN, CREATININE, CALCIUM, PHOS in the last 72 hours. Invalid input(s): MAGNES  No results for input(s): AST, ALT, BILIDIR, BILITOT, ALKPHOS in the last 72 hours. No results for input(s): INR in the last 72 hours. No results for input(s): Zena Risk in the last 72 hours.     Microbiology:    Blood culture #1:   Lab Results   Component Value Date/Time    BC No growth-preliminary No growth  12/04/2021 05:15 PM    BC No growth-preliminary No growth  12/04/2021 05:15 PM       Blood culture #2:No results found for: [address]    Organism:  Lab Results   Component Value Date/Time    ORG Mixed Growth 08/21/2022 12:30 PM No results found for: LABGRAM    MRSA culture only:No results found for: [facility]    Urine culture:   Lab Results   Component Value Date/Time    LABURIN [place] count: >100,000 CFU/mL  11/16/2019 05:27 PM       Respiratory culture: No results found for: CULTRESP    Aerobic and Anaerobic :  No results found for: LABAERO  No results found for: LABANAE    Urinalysis:      Lab Results   Component Value Date/Time    NITRU NEGATIVE 08/21/2022 12:30 PM    WBCUA 25-50 08/21/2022 12:30 PM    BACTERIA NONE SEEN 08/21/2022 12:30 PM    RBCUA 3-5 08/21/2022 12:30 PM    BLOODU TRACE 08/21/2022 12:30 PM    SPECGRAV 1.009 11/16/2019 05:27 PM    GLUCOSEU NEGATIVE 08/21/2022 12:30 PM       Radiology:  CT ABDOMEN PELVIS W IV CONTRAST Additional Contrast? Oral   Final Result   1.    1. PEG tube is in place there is no extravasation of contrast identified. There is subcutaneous gas from placement of the tube. 2. Severe constipation and very large fecal impaction measuring up to 8 cm with appearance of possible stercoral colitis            **This report has been created using voice recognition software. It may contain minor errors which are inherent in voice recognition technology. **      Final report electronically signed by Dr. Vesta Munoz on 9/21/2022 3:40 PM        CT ABDOMEN PELVIS W IV CONTRAST Additional Contrast? Oral    Result Date: 9/21/2022  PROCEDURE: CT ABDOMEN PELVIS W IV CONTRAST CLINICAL INFORMATION: PEG tube placement pain COMPARISON: 8/21/2022 TECHNIQUE: Axial 5 mm CT images were obtained through the abdomen and pelvis after the administration of 80  cc Isovue 370 intravenous contrast. Coronal and sagittal reconstructions were obtained. Oral contrast was administered apparently at endoscopy. Volume is not known. All CT scans at this facility use dose modulation, iterative reconstruction, and/or weight-based dosing when appropriate to reduce radiation dose to as low as reasonably achievable.  FINDINGS: Lung bases: Minimal parenchymal scarring posterior lateral right lung base. Liver/gallbladder/bilary tree: Normal. Pancreas: The pancreas is atrophic. Spleen : Normal. Adrenal glands: Normal. Kidneys/ ureters/ bladder: Kidneys enhance symmetrically. Urinary bladder is moderately distended. Gastrointestinal:  There is PEG tube in place. There is subcutaneous gas in the left anterior chest wall on either side of the PEG tube and gas extending within the abdominis rectus muscle secondary to recent placement. Contrast remains within the gastrointestinal tract is no evidence of extravasation. There is severe constipation with extremely large fecal bolus indicating a fecal impaction. Edema around the rectum suggests possibility of cyst or stercoral colitis Retroperitoneum / lymph nodes: No pathologically enlarged lymph nodes. Severe calcific atherosclerosis of the aorta and iliac vessels Pelvis: Distention of the urinary bladder and fecal impaction are noted. Uterus is present. No free intraperitoneal air identified. Musculoskeletal: Normal.     1. 1. PEG tube is in place there is no extravasation of contrast identified. There is subcutaneous gas from placement of the tube. 2. Severe constipation and very large fecal impaction measuring up to 8 cm with appearance of possible stercoral colitis **This report has been created using voice recognition software. It may contain minor errors which are inherent in voice recognition technology. ** Final report electronically signed by Dr. Frank Hay on 9/21/2022 3:40 PM      Electronically signed by Delene Spatz, DO on 9/22/2022 at 2:05 PM

## 2022-09-22 NOTE — OP NOTE
[address] [id number]                                OPERATIVE REPORT    PATIENT NAME: Keny Bruner                    :        1945  MED REC NO:   [id number]                           ROOM:       5614  ACCOUNT NO:   [de-identified]                           ADMIT DATE: 2022  PROVIDER:     Janet Oneal M.D.    Dilia Lovecameron OF PROCEDURE:  2022    SURGEON:  Janet Oneal MD    PHOTOGRAPHS:  Yes. BIOPSIES:  No.    INSTRUMENT:  GIF- gastroscope, 20-Uzbek G-tube. ESTIMATED BLOOD LOSS:  Less than 10 mL. CONSENT:  Procedure, indications and complications including but not  limited to perforation, bleeding, infection, adverse reaction to  medicine, very slight chance of missing significant lesions discussed  with the patient. The patient expressed her understanding and a written  consent was obtained. DESCRIPTION OF PROCEDURE:  The patient was placed in the supine position  in the Endo room #13. The patient was placed on appropriate monitoring  of vitals including blood pressure, heart rate and pulse ox. Oropharynx  was sprayed with Cetacaine spray and bite block was placed between  maxilla and mandible. After the adequate total intravenous anesthesia  was given by Anesthesia, the GIF- gastroscope was inserted into the  oropharynx and the esophagus was intubated under direct vision. The  scope was advanced down through the stomach into second portion of  duodenum. On careful inspection and on withdrawal of the scope, the  duodenum looks normal.  Antrum of the stomach looks normal.  Body of the  stomach looks normal.  On retroflexing the fundus, small hiatal hernia  noted. Distal and proximal esophagus looks normal.  (Pictures were not  obtained due to the inability to print because of malfunctioning of the  packs).   After the evaluation of the gastric lumen, the scope was  advanced into the body of the stomach. Left upper quadrant was  transilluminated. The transillumination was focused 3 cm below the left  costal margin, 2 cm left to the midline and the area was confirmed with  the transillumination. After the area was confirmed, the area was  sterilized with Betadine and surgical drape was placed. 1% Xylocaine  was infiltrated into the maximum area of transillumination. Stylet with  trocar advanced through the skin by thumb. The tip of the stylet with  trocar noted in the gastric lumen. Stylet was removed with trocar in  place. Guidewire was passed through the trocar. Snare was advanced  through the scope, the guidewire was grabbed with snare. Guidewire,  snare and gastroscope removed out through the oropharynx as a unit, and  a 20-Welsh Bard tube was placed over the guidewire with gentle pull  method. Internal bumper was secured in good position. External bumper  was placed at 2 cm at the skin. Triple antibiotic ointment was applied. During the procedure, the patient had slight hypoxia with oxygen  saturation down to 68%, intervened by the Anesthesia services. Please  see their note for details. No immediate complications noted. IMPRESSION:  20-Welsh Bard G-tube was placed without any immediate  complications. Mild hypoxia was treated by Anesthesia services. Small  hiatal hernia. RECOMMENDATIONS:  Antireflux instructions. Get the dietary consult for  the G-tube recommendations. Start the G-tube feedings. Please call my  office if the G-tube needs to be removed, replaced or any problems with  G-tube. Thank you Dr. Binta Romero for letting me to do procedure on the patient. Do  not hesitate to call me if you have any questions. My recommendations  are above.         Yareli Harkins M.D.    D: 09/21/2022 10:42:59       T: 09/21/2022 10:46:36     [email or web address]  Job#: [id number]     Doc#: [id number]    CC:  Md Leticia Rodriguez M.D.

## 2022-09-23 VITALS
HEART RATE: 97 BPM | DIASTOLIC BLOOD PRESSURE: 69 MMHG | RESPIRATION RATE: 16 BRPM | TEMPERATURE: 98.5 F | BODY MASS INDEX: 14.82 KG/M2 | OXYGEN SATURATION: 95 % | HEIGHT: 62 IN | SYSTOLIC BLOOD PRESSURE: 121 MMHG

## 2022-09-23 LAB
INFLUENZA A: NOT DETECTED
INFLUENZA B: NOT DETECTED
SARS-COV-2 RNA, RT PCR: NOT DETECTED

## 2022-09-23 PROCEDURE — 87636 SARSCOV2 & INF A&B AMP PRB: CPT

## 2022-09-23 PROCEDURE — 6370000000 HC RX 637 (ALT 250 FOR IP): Performed by: STUDENT IN AN ORGANIZED HEALTH CARE EDUCATION/TRAINING PROGRAM

## 2022-09-23 PROCEDURE — 96372 THER/PROPH/DIAG INJ SC/IM: CPT

## 2022-09-23 PROCEDURE — 6360000002 HC RX W HCPCS: Performed by: STUDENT IN AN ORGANIZED HEALTH CARE EDUCATION/TRAINING PROGRAM

## 2022-09-23 PROCEDURE — G0378 HOSPITAL OBSERVATION PER HR: HCPCS

## 2022-09-23 PROCEDURE — 94640 AIRWAY INHALATION TREATMENT: CPT

## 2022-09-23 PROCEDURE — 99217 PR OBSERVATION CARE DISCHARGE MANAGEMENT: CPT | Performed by: STUDENT IN AN ORGANIZED HEALTH CARE EDUCATION/TRAINING PROGRAM

## 2022-09-23 PROCEDURE — 2700000000 HC OXYGEN THERAPY PER DAY

## 2022-09-23 PROCEDURE — 94760 N-INVAS EAR/PLS OXIMETRY 1: CPT

## 2022-09-23 RX ORDER — HYDROCODONE BITARTRATE AND ACETAMINOPHEN 5; 325 MG/1; MG/1
1 TABLET ORAL 3 TIMES DAILY PRN
Qty: 3 TABLET | Refills: 0 | Status: SHIPPED | OUTPATIENT
Start: 2022-09-23 | End: 2022-09-26

## 2022-09-23 RX ADMIN — ACETAMINOPHEN 1000 MG: 500 TABLET ORAL at 06:10

## 2022-09-23 RX ADMIN — ALBUTEROL SULFATE 2 PUFF: 90 AEROSOL, METERED RESPIRATORY (INHALATION) at 10:08

## 2022-09-23 RX ADMIN — ASPIRIN 81 MG: 81 TABLET, COATED ORAL at 07:55

## 2022-09-23 RX ADMIN — OXYCODONE HYDROCHLORIDE AND ACETAMINOPHEN 500 MG: 500 TABLET ORAL at 07:55

## 2022-09-23 RX ADMIN — ENOXAPARIN SODIUM 30 MG: 100 INJECTION SUBCUTANEOUS at 07:55

## 2022-09-23 RX ADMIN — HYDROCODONE BITARTRATE AND ACETAMINOPHEN 1 TABLET: 5; 325 TABLET ORAL at 11:36

## 2022-09-23 RX ADMIN — HYDROXYZINE HYDROCHLORIDE 10 MG: 10 TABLET ORAL at 07:55

## 2022-09-23 RX ADMIN — Medication 2000 UNITS: at 07:55

## 2022-09-23 RX ADMIN — ARIPIPRAZOLE 15 MG: 15 TABLET ORAL at 07:55

## 2022-09-23 RX ADMIN — DESVENLAFAXINE SUCCINATE 100 MG: 100 TABLET, FILM COATED, EXTENDED RELEASE ORAL at 07:55

## 2022-09-23 RX ADMIN — DOCUSATE SODIUM 100 MG: 100 CAPSULE, LIQUID FILLED ORAL at 07:55

## 2022-09-23 RX ADMIN — PANTOPRAZOLE SODIUM 40 MG: 40 TABLET, DELAYED RELEASE ORAL at 06:10

## 2022-09-23 RX ADMIN — TIOTROPIUM BROMIDE INHALATION SPRAY 2 PUFF: 3.12 SPRAY, METERED RESPIRATORY (INHALATION) at 10:08

## 2022-09-23 RX ADMIN — MOMETASONE FUROATE AND FORMOTEROL FUMARATE DIHYDRATE 2 PUFF: 200; 5 AEROSOL RESPIRATORY (INHALATION) at 10:07

## 2022-09-23 ASSESSMENT — PAIN SCALES - GENERAL
PAINLEVEL_OUTOF10: 7
PAINLEVEL_OUTOF10: 5
PAINLEVEL_OUTOF10: 7
PAINLEVEL_OUTOF10: 7

## 2022-09-23 ASSESSMENT — PAIN DESCRIPTION - DESCRIPTORS
DESCRIPTORS: ACHING
DESCRIPTORS: ACHING;TENDER;SHARP
DESCRIPTORS: ACHING;SHARP;STABBING
DESCRIPTORS: ACHING

## 2022-09-23 ASSESSMENT — PAIN DESCRIPTION - LOCATION
LOCATION: ABDOMEN

## 2022-09-23 ASSESSMENT — PAIN DESCRIPTION - FREQUENCY
FREQUENCY: INTERMITTENT
FREQUENCY: CONTINUOUS

## 2022-09-23 ASSESSMENT — PAIN DESCRIPTION - ONSET
ONSET: ON-GOING
ONSET: ON-GOING

## 2022-09-23 ASSESSMENT — PAIN DESCRIPTION - PAIN TYPE
TYPE: SURGICAL PAIN
TYPE: SURGICAL PAIN

## 2022-09-23 ASSESSMENT — PAIN DESCRIPTION - ORIENTATION
ORIENTATION: LEFT;UPPER
ORIENTATION: LEFT
ORIENTATION: LEFT
ORIENTATION: LEFT;UPPER

## 2022-09-23 ASSESSMENT — PAIN - FUNCTIONAL ASSESSMENT
PAIN_FUNCTIONAL_ASSESSMENT: PREVENTS OR INTERFERES SOME ACTIVE ACTIVITIES AND ADLS

## 2022-09-23 NOTE — CARE COORDINATION
9/23/22, 8:21 AM EDT    DISCHARGE PLANNING EVALUATION    Waiting precert for [facility], patient is going to skilled rather than assisted living. Will need neg covid test prior to discharge.

## 2022-09-23 NOTE — CARE COORDINATION
9/23/22, 8:02 AM EDT    DISCHARGE ON GOING EVALUATION    Via [address] 32 day: 0  Location: 6E-54/054-A Reason for admit: Anorexia [R63.0]  Abdominal pain [R10.9]   Procedure:   9/21 PEG placement. Barriers to Discharge: Hospitalist and GI following. Lovenox. O2 3L/nc. Dietitian following. Regular diet with supplements. Awaiting precert. PCP: Josefina Sweeney MD   %  Patient Goals/Plan/Treatment Preferences: Planning discharge to [facility] once precert obtained. SW following.

## 2022-09-23 NOTE — DISCHARGE INSTR - DIET

## 2022-09-23 NOTE — PLAN OF CARE
Problem: Chronic Conditions and Co-morbidities  Goal: Patient's chronic conditions and co-morbidity symptoms are monitored and maintained or improved  Flowsheets (Taken 9/22/2022 1933)  Care Plan - Patient's Chronic Conditions and Co-Morbidity Symptoms are Monitored and Maintained or Improved:   Monitor and assess patient's chronic conditions and comorbid symptoms for stability, deterioration, or improvement   Collaborate with multidisciplinary team to address chronic and comorbid conditions and prevent exacerbation or deterioration   Update acute care plan with appropriate goals if chronic or comorbid symptoms are exacerbated and prevent overall improvement and discharge     Problem: Safety - Adult  Goal: Free from fall injury  Flowsheets (Taken 9/23/2022 0018)  Free From Fall Injury: Instruct family/caregiver on patient safety     Problem: Pain  Goal: Verbalizes/displays adequate comfort level or baseline comfort level  Flowsheets (Taken 9/23/2022 0018)  Verbalizes/displays adequate comfort level or baseline comfort level:   Encourage patient to monitor pain and request assistance   Assess pain using appropriate pain scale   Administer analgesics based on type and severity of pain and evaluate response   Implement non-pharmacological measures as appropriate and evaluate response     Problem: Discharge Planning  Goal: Discharge to home or other facility with appropriate resources  Flowsheets (Taken 9/22/2022 1933)  Discharge to home or other facility with appropriate resources:   Identify barriers to discharge with patient and caregiver   Arrange for needed discharge resources and transportation as appropriate   Identify discharge learning needs (meds, wound care, etc)   Refer to discharge planning if patient needs post-hospital services based on physician order or complex needs related to functional status, cognitive ability or social support system     Problem: ABCDS Injury Assessment  Goal: Absence of physical injury  Flowsheets (Taken 9/23/2022 0018)  Absence of Physical Injury: Implement safety measures based on patient assessment     Problem: Skin/Tissue Integrity - Adult  Goal: Skin integrity remains intact  Flowsheets  Taken 9/22/2022 2040  Skin Integrity Remains Intact:   Monitor for areas of redness and/or skin breakdown   Assess vascular access sites hourly  Taken 9/22/2022 1933  Skin Integrity Remains Intact:   Monitor for areas of redness and/or skin breakdown   Assess vascular access sites hourly     Problem: Gastrointestinal - Adult  Goal: Maintains adequate nutritional intake  Flowsheets (Taken 9/23/2022 0018)  Maintains adequate nutritional intake:   Monitor percentage of each meal consumed   Monitor intake and output, weight and lab values   Obtain nutritional consult as needed     Problem: Nutrition Deficit:  Goal: Optimize nutritional status  Flowsheets (Taken 9/23/2022 0018)  Nutrient intake appropriate for improving, restoring, or maintaining nutritional needs:   Assess nutritional status and recommend course of action   Monitor oral intake, labs, and treatment plans   Recommend appropriate diets, oral nutritional supplements, and vitamin/mineral supplements   Provide specific nutrition education to patient or family as appropriate   Care plan reviewed with patient. Patient verbalized understanding of the plan of care and contribute to goal setting. Sudha Chance

## 2022-09-23 NOTE — DISCHARGE SUMMARY
Hospitalist Discharge Summary        Patient: Sydni Flood  YOB: 1945  MRN: [id number]   Acct: [de-identified]    Primary Care Physician: Naif Thomson MD    Admit date  9/21/2022    Discharge date:  9/23/2022 5:25 PM    Chief Complaint on presentation :-    Abdominal pain    Discharge Assessment and Plan:-   Post PEG insertion abdominal pain  Pain at the site of the PEG tube  CT A/P shows appropriate placement of PEG tube without complications  Treated with tramadol and acetaminophen with transition back to home norco prior to discharge  Chronic conditions:  Constipation  Severe protein calorie malnutrition  Chronic hypoxic respiratory failure  COPD, not in acute exaerbation  HTN  HLD    Initial H and P and Hospital course:-  Patient is a 77/F with history of severe malnutrition and poor PO intake who was admitted to the hospital following planned outpatient placement of PEG tube with GI. Post-operatively, the patient had persistent pain at the site of the PEG tube insertion. CT A/P completed by GI showed appropriate placement of the PEG with no complications. The patient was admitted for pain control. She does have chronic pain for which she takes norco.  Due to severe constipation, norco was held and she was treated with tramadol and acetaminophen. She received a bowel regimen including an enema with a resulting large bowel movement. She was then transitioned back to her home norco.  She did continue to have some mild pain and mild splinting with deep inspiration, which should continue to improve as the PEG sites heals. She is ok to start using the PEG for nutrition and she may continue to take PO intake as tolerated. She is being discharged back to SNF in stable condition.      Physical Exam:-  Vitals:   Patient Vitals for the past 24 hrs:   BP Temp Temp src Pulse Resp SpO2   09/23/22 1206 -- -- -- -- 16 --   09/23/22 1129 121/69 98.5 °F (36.9 °C) Oral 97 16 95 %   09/23/22 1009 -- -- -- 91 12 95 %   09/23/22 0756 132/78 98.1 °F (36.7 °C) Oral 82 18 97 %   09/23/22 0315 (!) 146/70 98.5 °F (36.9 °C) Oral 87 14 95 %   09/22/22 2330 (!) 156/82 97.4 °F (36.3 °C) Oral 84 16 99 %   09/22/22 1916 -- -- -- -- 16 --   09/22/22 1915 126/71 98 °F (36.7 °C) Oral 93 16 95 %   09/22/22 1846 -- -- -- -- 18 --     Weight:       24 hour intake/output:     Intake/Output Summary (Last 24 hours) at 9/23/2022 1725  Last data filed at 9/23/2022 2922  Gross per 24 hour   Intake 890 ml   Output --   Net 890 ml       General appearance: No apparent distress, appears stated age and cooperative. HEENT: Normal cephalic, atraumatic without obvious deformity. Pupils equal, round, and reactive to light. Extra ocular muscles intact. Conjunctivae/corneas clear. Neck: Supple, with full range of motion. No jugular venous distention. Trachea midline. Respiratory:  Diminished breath sounds with faint end expiratory wheezes. Cardiovascular: Regular rate and rhythm with normal S1/S2 without murmurs, rubs or gallops. Abdomen: This and scapohoid abdomen. Tenderness to palpation at the site of the Peg tube. No rebound tenderness. Musculoskeletal:  Diffuse moderate muscle wasting noted throughout with bitemporal wasting. Skin: Skin color, texture, turgor normal.  No rashes or lesions. Neurologic:  Neurovascularly intact without any focal sensory/motor deficits.  Cranial nerves: II-XII intact, grossly non-focal.  Psychiatric: Alert and oriented, thought content appropriate, normal insight  Capillary Refill: Brisk,< 3 seconds   Peripheral Pulses: +2 palpable, equal bilaterally       Discharge Medications:-      Medication List        CONTINUE taking these medications      acetaminophen 325 MG tablet  Commonly known as: TYLENOL     albuterol sulfate  (90 Base) MCG/ACT inhaler  Commonly known as: PROVENTIL;VENTOLIN;PROAIR  Inhale 2 puffs into the lungs every 6 hours     ARIPiprazole 10 MG tablet  Commonly known as: ABILIFY     aspirin 81 MG tablet     atorvastatin 10 MG tablet  Commonly known as: LIPITOR  Take 1 tablet by mouth nightly     bisacodyl 10 MG suppository  Commonly known as: DULCOLAX     desvenlafaxine succinate 50 MG Tb24 extended release tablet  Commonly known as: PRISTIQ     docusate sodium 100 MG capsule  Commonly known as: Colace  Take 1 capsule by mouth 2 times daily     Ensure High Protein Liqd  Take 1 Can by mouth 3 times daily (with meals)     famotidine 40 MG tablet  Commonly known as: PEPCID     Fluticasone furoate-vilanterol 200-25 MCG/INH Aepb inhaler  Commonly known as: BREO ELLIPTA     furosemide 40 MG tablet  Commonly known as: LASIX     HYDROcodone-acetaminophen 5-325 MG per tablet  Commonly known as: NORCO     hydrOXYzine HCl 10 MG tablet  Commonly known as: ATARAX     ipratropium-albuterol 0.5-2.5 (3) MG/3ML Soln nebulizer solution  Commonly known as: DUONEB  Inhale 3 mLs into the lungs every 6 hours as needed for Shortness of Breath     magnesium hydroxide 400 MG/5ML suspension  Commonly known as: MILK OF MAGNESIA     megestrol 40 MG tablet  Commonly known as: MEGACE  Take 1 tablet by mouth 2 times daily     melatonin 3 MG Tabs tablet     mirtazapine 30 MG tablet  Commonly known as: REMERON     omeprazole 20 MG delayed release capsule  Commonly known as: PRILOSEC     ondansetron 4 MG tablet  Commonly known as: ZOFRAN     OXYGEN     polyethylene glycol 17 GM/SCOOP powder  Commonly known as: GLYCOLAX  Take 17 g by mouth daily     PROTONIX PO     sennosides-docusate sodium 8.6-50 MG tablet  Commonly known as: SENOKOT-S     simethicone 80 MG chewable tablet  Commonly known as: MYLICON     therapeutic multivitamin-minerals tablet     tiZANidine 2 MG tablet  Commonly known as: ZANAFLEX     Umeclidinium Bromide 62.5 MCG/INH Aepb  Commonly known as:  Incruse Ellipta  Inhale 1 puff into the lungs daily     vitamin C 250 MG tablet     * vitamin D 1000 UNIT Tabs tablet  Commonly known as: CHOLECALCIFEROL * Vitamin D3 50 MCG (2000 UT) Caps     zinc 50 MG Tabs tablet           * This list has 2 medication(s) that are the same as other medications prescribed for you. Read the directions carefully, and ask your doctor or other care provider to review them with you.                    Labs :-  Recent Results (from the past 72 hour(s))   COVID-19 & Influenza Combo    Collection Time: 09/23/22  1:10 PM   Result Value Ref Range    SARS-CoV-2 RNA, RT PCR NOT DETECTED NOT DETECTED    INFLUENZA A NOT DETECTED NOT DETECTED    INFLUENZA B NOT DETECTED NOT DETECTED        Microbiology:    Blood culture #1:   Lab Results   Component Value Date/Time    BC No growth-preliminary No growth  12/04/2021 05:15 PM    BC No growth-preliminary No growth  12/04/2021 05:15 PM       Blood culture #2:No results found for: Mehdi Murillo    Organism:  No results found for: LABGRAM    MRSA culture only:No results found for: [facility]    Urine culture:   Lab Results   Component Value Date/Time    LABURIN [place] count: >100,000 CFU/mL  11/16/2019 05:27 PM     Lab Results   Component Value Date/Time    ORG Mixed Growth 08/21/2022 12:30 PM        Respiratory culture: No results found for: CULTRESP    Aerobic and Anaerobic :  No results found for: LABAERO  No results found for: LABANAE    Urinalysis:      Lab Results   Component Value Date/Time    NITRU NEGATIVE 08/21/2022 12:30 PM    WBCUA 25-50 08/21/2022 12:30 PM    BACTERIA NONE SEEN 08/21/2022 12:30 PM    RBCUA 3-5 08/21/2022 12:30 PM    BLOODU TRACE 08/21/2022 12:30 PM    SPECGRAV 1.009 11/16/2019 05:27 PM    GLUCOSEU NEGATIVE 08/21/2022 12:30 PM       Radiology:-  CT ABDOMEN PELVIS W IV CONTRAST Additional Contrast? Oral    Result Date: 9/21/2022  PROCEDURE: CT ABDOMEN PELVIS W IV CONTRAST CLINICAL INFORMATION: PEG tube placement pain COMPARISON: 8/21/2022 TECHNIQUE: Axial 5 mm CT images were obtained through the abdomen and pelvis after the administration of 80  cc Isovue 370 intravenous contrast. Coronal and sagittal reconstructions were obtained. Oral contrast was administered apparently at endoscopy. Volume is not known. All CT scans at this facility use dose modulation, iterative reconstruction, and/or weight-based dosing when appropriate to reduce radiation dose to as low as reasonably achievable. FINDINGS: Lung bases: Minimal parenchymal scarring posterior lateral right lung base. Liver/gallbladder/bilary tree: Normal. Pancreas: The pancreas is atrophic. Spleen : Normal. Adrenal glands: Normal. Kidneys/ ureters/ bladder: Kidneys enhance symmetrically. Urinary bladder is moderately distended. Gastrointestinal:  There is PEG tube in place. There is subcutaneous gas in the left anterior chest wall on either side of the PEG tube and gas extending within the abdominis rectus muscle secondary to recent placement. Contrast remains within the gastrointestinal tract is no evidence of extravasation. There is severe constipation with extremely large fecal bolus indicating a fecal impaction. Edema around the rectum suggests possibility of cyst or stercoral colitis Retroperitoneum / lymph nodes: No pathologically enlarged lymph nodes. Severe calcific atherosclerosis of the aorta and iliac vessels Pelvis: Distention of the urinary bladder and fecal impaction are noted. Uterus is present. No free intraperitoneal air identified. Musculoskeletal: Normal.     1. 1. PEG tube is in place there is no extravasation of contrast identified. There is subcutaneous gas from placement of the tube. 2. Severe constipation and very large fecal impaction measuring up to 8 cm with appearance of possible stercoral colitis **This report has been created using voice recognition software. It may contain minor errors which are inherent in voice recognition technology. ** Final report electronically signed by Dr. Sharath Wade on 9/21/2022 3:40 PM       Follow-up scheduled after discharge :-    in 5-7 days with Delilah Zaragoza MD  in 2-3 weeks GI    Consultations during this hospital stay:-  [] NONE [] Cardiology  [] Nephrology  [] Hemo onco   [] GI   [] ID  [] Endocrine  [] Pulm    [] Neuro    [] Psych   [] Urology  [] ENT   [] G SURGERY   []Ortho    []CV surg    [] Palliative  [] Hospice [] Pain management   []    []TCU   [] PT/OT  OTHERS:-    Disposition: SNF  Condition at Discharge: Stable    Time Spent:- 40 minutes    Electronically signed by George Walter DO on 9/23/22 at 5:25 PM EDT   Discharging Hospitalist

## 2022-09-23 NOTE — PLAN OF CARE
Problem: Respiratory - Adult  Goal: Clear lung sounds  Outcome: Progressing   Patient mutually agrees on goals.

## 2022-09-23 NOTE — PLAN OF CARE
Problem: Chronic Conditions and Co-morbidities  Goal: Patient's chronic conditions and co-morbidity symptoms are monitored and maintained or improved  9/23/2022 1035 by Rosalio Sneed RN  Outcome: Progressing  9/23/2022 0018 by Barbara Gutiérrez RN  Flowsheets (Taken 9/22/2022 1933)  Care Plan - Patient's Chronic Conditions and Co-Morbidity Symptoms are Monitored and Maintained or Improved:   Monitor and assess patient's chronic conditions and comorbid symptoms for stability, deterioration, or improvement   Collaborate with multidisciplinary team to address chronic and comorbid conditions and prevent exacerbation or deterioration   Update acute care plan with appropriate goals if chronic or comorbid symptoms are exacerbated and prevent overall improvement and discharge     Problem: Safety - Adult  Goal: Free from fall injury  9/23/2022 1035 by Rosalio Sneed RN  Outcome: Progressing  Flowsheets (Taken 9/23/2022 0018 by Barbara Gutiérrez RN)  Free From Fall Injury: Instruct family/caregiver on patient safety  9/23/2022 0018 by Barbara Gutiérrez RN  Flowsheets (Taken 9/23/2022 0018)  Free From Fall Injury: Instruct family/caregiver on patient safety     Problem: Pain  Goal: Verbalizes/displays adequate comfort level or baseline comfort level  9/23/2022 1035 by Rosalio Sneed RN  Outcome: Progressing  Flowsheets (Taken 9/23/2022 0018 by Barbara Gutiérrez RN)  Verbalizes/displays adequate comfort level or baseline comfort level:   Encourage patient to monitor pain and request assistance   Assess pain using appropriate pain scale   Administer analgesics based on type and severity of pain and evaluate response   Implement non-pharmacological measures as appropriate and evaluate response  9/23/2022 0018 by Barbara Gutiérrez RN  Flowsheets (Taken 9/23/2022 0018)  Verbalizes/displays adequate comfort level or baseline comfort level:   Encourage patient to monitor pain and request assistance   Assess pain using appropriate pain scale   Administer analgesics based on type and severity of pain and evaluate response   Implement non-pharmacological measures as appropriate and evaluate response     Problem: Discharge Planning  Goal: Discharge to home or other facility with appropriate resources  9/23/2022 1035 by Renetta Barros RN  Outcome: Progressing  Flowsheets (Taken 9/22/2022 1933 by Bonnie Polanco RN)  Discharge to home or other facility with appropriate resources:   Identify barriers to discharge with patient and caregiver   Arrange for needed discharge resources and transportation as appropriate   Identify discharge learning needs (meds, wound care, etc)   Refer to discharge planning if patient needs post-hospital services based on physician order or complex needs related to functional status, cognitive ability or social support system  9/23/2022 0018 by Bonnie Polanco RN  Flowsheets (Taken 9/22/2022 1933)  Discharge to home or other facility with appropriate resources:   Identify barriers to discharge with patient and caregiver   Arrange for needed discharge resources and transportation as appropriate   Identify discharge learning needs (meds, wound care, etc)   Refer to discharge planning if patient needs post-hospital services based on physician order or complex needs related to functional status, cognitive ability or social support system     Problem: Skin/Tissue Integrity  Goal: Absence of new skin breakdown  Description: 1. Monitor for areas of redness and/or skin breakdown  2. Assess vascular access sites hourly  3. Every 4-6 hours minimum:  Change oxygen saturation probe site  4. Every 4-6 hours:  If on nasal continuous positive airway pressure, respiratory therapy assess nares and determine need for appliance change or resting period.   Outcome: Progressing     Problem: ABCDS Injury Assessment  Goal: Absence of physical injury  9/23/2022 1035 by Renetta Barros RN  Outcome: Progressing  Flowsheets (Taken 9/23/2022 0018 by Jeff Bryant RN)  Absence of Physical Injury: Implement safety measures based on patient assessment  9/23/2022 0018 by Jeff Bryant RN  Flowsheets (Taken 9/23/2022 0018)  Absence of Physical Injury: Implement safety measures based on patient assessment     Problem: Skin/Tissue Integrity - Adult  Goal: Skin integrity remains intact  9/23/2022 1035 by Fidel Mohamud RN  Outcome: Progressing  Flowsheets (Taken 9/22/2022 2040 by Jeff Bryant RN)  Skin Integrity Remains Intact:   Monitor for areas of redness and/or skin breakdown   Assess vascular access sites hourly  9/23/2022 0018 by Jeff Bryant RN  Flowsheets  Taken 9/22/2022 2040  Skin Integrity Remains Intact:   Monitor for areas of redness and/or skin breakdown   Assess vascular access sites hourly  Taken 9/22/2022 1933  Skin Integrity Remains Intact:   Monitor for areas of redness and/or skin breakdown   Assess vascular access sites hourly     Problem: Gastrointestinal - Adult  Goal: Maintains adequate nutritional intake  9/23/2022 1035 by Fidel Mohamud RN  Outcome: Progressing  Flowsheets (Taken 9/23/2022 0018 by Jeff Bryant RN)  Maintains adequate nutritional intake:   Monitor percentage of each meal consumed   Monitor intake and output, weight and lab values   Obtain nutritional consult as needed  9/23/2022 0018 by Jeff Bryant RN  Flowsheets (Taken 9/23/2022 0018)  Maintains adequate nutritional intake:   Monitor percentage of each meal consumed   Monitor intake and output, weight and lab values   Obtain nutritional consult as needed     Problem: Nutrition Deficit:  Goal: Optimize nutritional status  9/23/2022 1035 by Fidel Mohamud RN  Outcome: Progressing  Flowsheets (Taken 9/23/2022 0018 by Jeff Bryant RN)  Nutrient intake appropriate for improving, restoring, or maintaining nutritional needs:   Assess nutritional status and recommend course of action   Monitor oral intake, labs, and treatment plans   Recommend appropriate diets, oral nutritional supplements, and vitamin/mineral supplements   Provide specific nutrition education to patient or family as appropriate  9/23/2022 0018 by Willem Todd RN  Flowsheets (Taken 9/23/2022 0018)  Nutrient intake appropriate for improving, restoring, or maintaining nutritional needs:   Assess nutritional status and recommend course of action   Monitor oral intake, labs, and treatment plans   Recommend appropriate diets, oral nutritional supplements, and vitamin/mineral supplements   Provide specific nutrition education to patient or family as appropriate     Problem: Respiratory - Adult  Goal: Clear lung sounds  9/23/2022 1020 by Vicky Vaz RCP  Outcome: Progressing   Care plan reviewed with patient. Patient verbalize understanding of the plan of care and contribute to goal setting.

## 2022-09-23 NOTE — CARE COORDINATION
9/23/22, 12:41 PM EDT    DISCHARGE PLANNING EVALUATION    Precert approved for [facility], can accept today. Transport scheduled for 6:30. Waiting discharge order and physician signature on DANIEL. Spoke with daughter to update, she is in agreement with discharge today. Discussed with RN. Transfer packet on chart. 9/23/22, 1:57 PM EDT    Patient goals/plan/ treatment preferences discussed by  and . Patient goals/plan/ treatment preferences reviewed with patient/ family. Patient/ family verbalize understanding of discharge plan and are in agreement with goal/plan/treatment preferences. Understanding was demonstrated using the teach back method. AVS provided by RN at time of discharge, which includes all necessary medical information pertaining to the patients current course of illness, treatment, post-discharge goals of care, and treatment preferences.      Services At/After Discharge: [facility] (SNF) and In ambulance       IMM Letter  Observation Status Letter date given[de-identified] 09/21/22  Observation Status Letter time given[de-identified] 2053  Observation Status Letter given to Patient/Family/Significant other/Guardian/POA/by[de-identified] Pt Access

## 2022-10-05 ENCOUNTER — APPOINTMENT (OUTPATIENT)
Dept: GENERAL RADIOLOGY | Age: 77
DRG: 919 | End: 2022-10-05
Payer: MEDICARE

## 2022-10-05 ENCOUNTER — APPOINTMENT (OUTPATIENT)
Dept: CT IMAGING | Age: 77
DRG: 919 | End: 2022-10-05
Payer: MEDICARE

## 2022-10-05 ENCOUNTER — HOSPITAL ENCOUNTER (INPATIENT)
Age: 77
LOS: 3 days | Discharge: NURSING, REHAB OR LONG-TERM CARE | DRG: 919 | End: 2022-10-11
Attending: HOSPITALIST
Payer: MEDICARE

## 2022-10-05 DIAGNOSIS — K31.89 GASTRIC DISTENTION: Primary | ICD-10-CM

## 2022-10-05 DIAGNOSIS — R00.0 TACHYCARDIA: ICD-10-CM

## 2022-10-05 DIAGNOSIS — R10.32 LEFT LOWER QUADRANT ABDOMINAL PAIN: ICD-10-CM

## 2022-10-05 DIAGNOSIS — E43 SEVERE MALNUTRITION (HCC): Chronic | ICD-10-CM

## 2022-10-05 DIAGNOSIS — G89.4 CHRONIC PAIN DISORDER: ICD-10-CM

## 2022-10-05 DIAGNOSIS — R10.12 LEFT UPPER QUADRANT ABDOMINAL PAIN: ICD-10-CM

## 2022-10-05 LAB
ALBUMIN SERPL-MCNC: 3.5 G/DL (ref 3.5–5.1)
ALP BLD-CCNC: 66 U/L (ref 38–126)
ALT SERPL-CCNC: 14 U/L (ref 11–66)
ANION GAP SERPL CALCULATED.3IONS-SCNC: 13 MEQ/L (ref 8–16)
AST SERPL-CCNC: 16 U/L (ref 5–40)
BACTERIA: ABNORMAL /HPF
BASOPHILS # BLD: 0.2 %
BASOPHILS ABSOLUTE: 0 THOU/MM3 (ref 0–0.1)
BILIRUB SERPL-MCNC: 0.4 MG/DL (ref 0.3–1.2)
BILIRUBIN URINE: NEGATIVE
BLOOD, URINE: NEGATIVE
BUN BLDV-MCNC: 17 MG/DL (ref 7–22)
CALCIUM SERPL-MCNC: 10.7 MG/DL (ref 8.5–10.5)
CASTS 2: ABNORMAL /LPF
CASTS UA: ABNORMAL /LPF
CHARACTER, URINE: CLEAR
CHLORIDE BLD-SCNC: 93 MEQ/L (ref 98–111)
CO2: 27 MEQ/L (ref 23–33)
COLOR: YELLOW
CREAT SERPL-MCNC: 0.6 MG/DL (ref 0.4–1.2)
CRYSTALS, UA: ABNORMAL
EOSINOPHIL # BLD: 0.2 %
EOSINOPHILS ABSOLUTE: 0 THOU/MM3 (ref 0–0.4)
EPITHELIAL CELLS, UA: ABNORMAL /HPF
ERYTHROCYTE [DISTWIDTH] IN BLOOD BY AUTOMATED COUNT: 19.6 % (ref 11.5–14.5)
ERYTHROCYTE [DISTWIDTH] IN BLOOD BY AUTOMATED COUNT: 59.9 FL (ref 35–45)
GFR SERPL CREATININE-BSD FRML MDRD: > 90 ML/MIN/1.73M2
GLUCOSE BLD-MCNC: 167 MG/DL (ref 70–108)
GLUCOSE URINE: NEGATIVE MG/DL
HCT VFR BLD CALC: 37.2 % (ref 37–47)
HEMOGLOBIN: 11.8 GM/DL (ref 12–16)
IMMATURE GRANS (ABS): 0.08 THOU/MM3 (ref 0–0.07)
IMMATURE GRANULOCYTES: 0.5 %
KETONES, URINE: ABNORMAL
LEUKOCYTE ESTERASE, URINE: NEGATIVE
LIPASE: 14.1 U/L (ref 5.6–51.3)
LYMPHOCYTES # BLD: 7.9 %
LYMPHOCYTES ABSOLUTE: 1.3 THOU/MM3 (ref 1–4.8)
MCH RBC QN AUTO: 26.7 PG (ref 26–33)
MCHC RBC AUTO-ENTMCNC: 31.7 GM/DL (ref 32.2–35.5)
MCV RBC AUTO: 84.2 FL (ref 81–99)
MISCELLANEOUS 2: ABNORMAL
MONOCYTES # BLD: 8.8 %
MONOCYTES ABSOLUTE: 1.5 THOU/MM3 (ref 0.4–1.3)
NITRITE, URINE: NEGATIVE
NUCLEATED RED BLOOD CELLS: 0 /100 WBC
OSMOLALITY CALCULATION: 271.7 MOSMOL/KG (ref 275–300)
PH UA: 5.5 (ref 5–9)
PLATELET # BLD: 450 THOU/MM3 (ref 130–400)
PMV BLD AUTO: 10.6 FL (ref 9.4–12.4)
POTASSIUM REFLEX MAGNESIUM: 4.1 MEQ/L (ref 3.5–5.2)
PROTEIN UA: 30
RBC # BLD: 4.42 MILL/MM3 (ref 4.2–5.4)
RBC URINE: ABNORMAL /HPF
RENAL EPITHELIAL, UA: ABNORMAL
SEG NEUTROPHILS: 82.4 %
SEGMENTED NEUTROPHILS ABSOLUTE COUNT: 13.6 THOU/MM3 (ref 1.8–7.7)
SODIUM BLD-SCNC: 133 MEQ/L (ref 135–145)
SPECIFIC GRAVITY, URINE: 1.02 (ref 1–1.03)
TOTAL PROTEIN: 7.2 G/DL (ref 6.1–8)
UROBILINOGEN, URINE: 0.2 EU/DL (ref 0–1)
WBC # BLD: 16.5 THOU/MM3 (ref 4.8–10.8)
WBC UA: ABNORMAL /HPF
YEAST: ABNORMAL

## 2022-10-05 PROCEDURE — 2580000003 HC RX 258: Performed by: PHYSICIAN ASSISTANT

## 2022-10-05 PROCEDURE — 93005 ELECTROCARDIOGRAM TRACING: CPT | Performed by: PHYSICIAN ASSISTANT

## 2022-10-05 PROCEDURE — 6360000002 HC RX W HCPCS: Performed by: PHYSICIAN ASSISTANT

## 2022-10-05 PROCEDURE — 71045 X-RAY EXAM CHEST 1 VIEW: CPT

## 2022-10-05 PROCEDURE — 87040 BLOOD CULTURE FOR BACTERIA: CPT

## 2022-10-05 PROCEDURE — 83605 ASSAY OF LACTIC ACID: CPT

## 2022-10-05 PROCEDURE — 85025 COMPLETE CBC W/AUTO DIFF WBC: CPT

## 2022-10-05 PROCEDURE — 6370000000 HC RX 637 (ALT 250 FOR IP): Performed by: PHYSICIAN ASSISTANT

## 2022-10-05 PROCEDURE — 83690 ASSAY OF LIPASE: CPT

## 2022-10-05 PROCEDURE — 74176 CT ABD & PELVIS W/O CONTRAST: CPT

## 2022-10-05 PROCEDURE — 96375 TX/PRO/DX INJ NEW DRUG ADDON: CPT

## 2022-10-05 PROCEDURE — 96361 HYDRATE IV INFUSION ADD-ON: CPT

## 2022-10-05 PROCEDURE — 81001 URINALYSIS AUTO W/SCOPE: CPT

## 2022-10-05 PROCEDURE — 80053 COMPREHEN METABOLIC PANEL: CPT

## 2022-10-05 PROCEDURE — 84145 PROCALCITONIN (PCT): CPT

## 2022-10-05 PROCEDURE — 36415 COLL VENOUS BLD VENIPUNCTURE: CPT

## 2022-10-05 PROCEDURE — 99285 EMERGENCY DEPT VISIT HI MDM: CPT

## 2022-10-05 PROCEDURE — 96374 THER/PROPH/DIAG INJ IV PUSH: CPT

## 2022-10-05 PROCEDURE — G0378 HOSPITAL OBSERVATION PER HR: HCPCS

## 2022-10-05 RX ORDER — ONDANSETRON 2 MG/ML
4 INJECTION INTRAMUSCULAR; INTRAVENOUS ONCE
Status: COMPLETED | OUTPATIENT
Start: 2022-10-05 | End: 2022-10-05

## 2022-10-05 RX ORDER — 0.9 % SODIUM CHLORIDE 0.9 %
500 INTRAVENOUS SOLUTION INTRAVENOUS ONCE
Status: COMPLETED | OUTPATIENT
Start: 2022-10-05 | End: 2022-10-05

## 2022-10-05 RX ORDER — SIMETHICONE 80 MG
160 TABLET,CHEWABLE ORAL ONCE
Status: COMPLETED | OUTPATIENT
Start: 2022-10-05 | End: 2022-10-05

## 2022-10-05 RX ORDER — MORPHINE SULFATE 2 MG/ML
2 INJECTION, SOLUTION INTRAMUSCULAR; INTRAVENOUS ONCE
Status: COMPLETED | OUTPATIENT
Start: 2022-10-05 | End: 2022-10-05

## 2022-10-05 RX ADMIN — SIMETHICONE 160 MG: 80 TABLET, CHEWABLE ORAL at 23:17

## 2022-10-05 RX ADMIN — MORPHINE SULFATE 2 MG: 2 INJECTION, SOLUTION INTRAMUSCULAR; INTRAVENOUS at 20:28

## 2022-10-05 RX ADMIN — ONDANSETRON 4 MG: 2 INJECTION INTRAMUSCULAR; INTRAVENOUS at 20:28

## 2022-10-05 RX ADMIN — SODIUM CHLORIDE 500 ML: 9 INJECTION, SOLUTION INTRAVENOUS at 20:28

## 2022-10-05 ASSESSMENT — PAIN SCALES - GENERAL
PAINLEVEL_OUTOF10: 8
PAINLEVEL_OUTOF10: 7

## 2022-10-05 ASSESSMENT — PAIN DESCRIPTION - LOCATION
LOCATION: ABDOMEN

## 2022-10-05 ASSESSMENT — PAIN - FUNCTIONAL ASSESSMENT
PAIN_FUNCTIONAL_ASSESSMENT: 0-10

## 2022-10-05 ASSESSMENT — PAIN DESCRIPTION - ORIENTATION
ORIENTATION: RIGHT;LOWER

## 2022-10-06 ENCOUNTER — APPOINTMENT (OUTPATIENT)
Dept: GENERAL RADIOLOGY | Age: 77
DRG: 919 | End: 2022-10-06
Payer: MEDICARE

## 2022-10-06 LAB
ALLEN TEST: POSITIVE
BASE EXCESS (CALCULATED): 4.3 MMOL/L (ref -2.5–2.5)
COLLECTED BY:: ABNORMAL
DEVICE: ABNORMAL
EKG ATRIAL RATE: 125 BPM
EKG ATRIAL RATE: 136 BPM
EKG P AXIS: 65 DEGREES
EKG P AXIS: 70 DEGREES
EKG P-R INTERVAL: 126 MS
EKG P-R INTERVAL: 126 MS
EKG Q-T INTERVAL: 258 MS
EKG Q-T INTERVAL: 268 MS
EKG QRS DURATION: 64 MS
EKG QRS DURATION: 66 MS
EKG QTC CALCULATION (BAZETT): 386 MS
EKG QTC CALCULATION (BAZETT): 388 MS
EKG R AXIS: 28 DEGREES
EKG R AXIS: 35 DEGREES
EKG T AXIS: 70 DEGREES
EKG T AXIS: 78 DEGREES
EKG VENTRICULAR RATE: 125 BPM
EKG VENTRICULAR RATE: 136 BPM
HCO3: 29 MMOL/L (ref 23–28)
IFIO2: 3
INFLUENZA A: NOT DETECTED
INFLUENZA B: NOT DETECTED
LACTIC ACID: 0.7 MMOL/L (ref 0.5–2)
O2 SATURATION: 94 %
PCO2: 43 MMHG (ref 35–45)
PH BLOOD GAS: 7.44 (ref 7.35–7.45)
PO2: 68 MMHG (ref 71–104)
PROCALCITONIN: 0.23 NG/ML (ref 0.01–0.09)
SARS-COV-2 RNA, RT PCR: NOT DETECTED
SOURCE, BLOOD GAS: ABNORMAL

## 2022-10-06 PROCEDURE — 6370000000 HC RX 637 (ALT 250 FOR IP)

## 2022-10-06 PROCEDURE — 87186 SC STD MICRODIL/AGAR DIL: CPT

## 2022-10-06 PROCEDURE — 36600 WITHDRAWAL OF ARTERIAL BLOOD: CPT

## 2022-10-06 PROCEDURE — 97163 PT EVAL HIGH COMPLEX 45 MIN: CPT

## 2022-10-06 PROCEDURE — 6360000002 HC RX W HCPCS: Performed by: NURSE PRACTITIONER

## 2022-10-06 PROCEDURE — 2700000000 HC OXYGEN THERAPY PER DAY

## 2022-10-06 PROCEDURE — 93005 ELECTROCARDIOGRAM TRACING: CPT | Performed by: PHYSICIAN ASSISTANT

## 2022-10-06 PROCEDURE — 87147 CULTURE TYPE IMMUNOLOGIC: CPT

## 2022-10-06 PROCEDURE — 94640 AIRWAY INHALATION TREATMENT: CPT

## 2022-10-06 PROCEDURE — 99232 SBSQ HOSP IP/OBS MODERATE 35: CPT | Performed by: FAMILY MEDICINE

## 2022-10-06 PROCEDURE — 2580000003 HC RX 258

## 2022-10-06 PROCEDURE — 2580000003 HC RX 258: Performed by: NURSE PRACTITIONER

## 2022-10-06 PROCEDURE — 87077 CULTURE AEROBIC IDENTIFY: CPT

## 2022-10-06 PROCEDURE — 6370000000 HC RX 637 (ALT 250 FOR IP): Performed by: FAMILY MEDICINE

## 2022-10-06 PROCEDURE — 94669 MECHANICAL CHEST WALL OSCILL: CPT

## 2022-10-06 PROCEDURE — 87205 SMEAR GRAM STAIN: CPT

## 2022-10-06 PROCEDURE — 87636 SARSCOV2 & INF A&B AMP PRB: CPT

## 2022-10-06 PROCEDURE — 87070 CULTURE OTHR SPECIMN AEROBIC: CPT

## 2022-10-06 PROCEDURE — 2580000003 HC RX 258: Performed by: FAMILY MEDICINE

## 2022-10-06 PROCEDURE — 96365 THER/PROPH/DIAG IV INF INIT: CPT

## 2022-10-06 PROCEDURE — 97166 OT EVAL MOD COMPLEX 45 MIN: CPT

## 2022-10-06 PROCEDURE — 96361 HYDRATE IV INFUSION ADD-ON: CPT

## 2022-10-06 PROCEDURE — 87075 CULTR BACTERIA EXCEPT BLOOD: CPT

## 2022-10-06 PROCEDURE — G0378 HOSPITAL OBSERVATION PER HR: HCPCS

## 2022-10-06 PROCEDURE — 93010 ELECTROCARDIOGRAM REPORT: CPT | Performed by: INTERNAL MEDICINE

## 2022-10-06 PROCEDURE — 96366 THER/PROPH/DIAG IV INF ADDON: CPT

## 2022-10-06 PROCEDURE — 97110 THERAPEUTIC EXERCISES: CPT

## 2022-10-06 PROCEDURE — G0378 HOSPITAL OBSERVATION PER HR: HCPCS | Performed by: FAMILY MEDICINE

## 2022-10-06 PROCEDURE — 82803 BLOOD GASES ANY COMBINATION: CPT

## 2022-10-06 RX ORDER — ARIPIPRAZOLE 15 MG/1
15 TABLET ORAL DAILY
Status: DISCONTINUED | OUTPATIENT
Start: 2022-10-06 | End: 2022-10-11 | Stop reason: HOSPADM

## 2022-10-06 RX ORDER — PANTOPRAZOLE SODIUM 40 MG/10ML
40 INJECTION, POWDER, LYOPHILIZED, FOR SOLUTION INTRAVENOUS DAILY
Status: DISCONTINUED | OUTPATIENT
Start: 2022-10-07 | End: 2022-10-09

## 2022-10-06 RX ORDER — BISACODYL 10 MG
10 SUPPOSITORY, RECTAL RECTAL PRN
Status: DISCONTINUED | OUTPATIENT
Start: 2022-10-06 | End: 2022-10-11 | Stop reason: HOSPADM

## 2022-10-06 RX ORDER — SODIUM CHLORIDE 9 MG/ML
INJECTION, SOLUTION INTRAVENOUS PRN
Status: DISCONTINUED | OUTPATIENT
Start: 2022-10-06 | End: 2022-10-08

## 2022-10-06 RX ORDER — HYDROCODONE BITARTRATE AND ACETAMINOPHEN 5; 325 MG/1; MG/1
1 TABLET ORAL EVERY 8 HOURS SCHEDULED
Status: DISCONTINUED | OUTPATIENT
Start: 2022-10-06 | End: 2022-10-11 | Stop reason: HOSPADM

## 2022-10-06 RX ORDER — SENNA AND DOCUSATE SODIUM 50; 8.6 MG/1; MG/1
1 TABLET, FILM COATED ORAL DAILY
Status: DISCONTINUED | OUTPATIENT
Start: 2022-10-06 | End: 2022-10-11 | Stop reason: HOSPADM

## 2022-10-06 RX ORDER — PANTOPRAZOLE SODIUM 40 MG/1
40 TABLET, DELAYED RELEASE ORAL
Status: DISCONTINUED | OUTPATIENT
Start: 2022-10-06 | End: 2022-10-11 | Stop reason: HOSPADM

## 2022-10-06 RX ORDER — ASCORBIC ACID 500 MG
500 TABLET ORAL DAILY
Status: DISCONTINUED | OUTPATIENT
Start: 2022-10-06 | End: 2022-10-11 | Stop reason: HOSPADM

## 2022-10-06 RX ORDER — 0.9 % SODIUM CHLORIDE 0.9 %
500 INTRAVENOUS SOLUTION INTRAVENOUS ONCE
Status: COMPLETED | OUTPATIENT
Start: 2022-10-06 | End: 2022-10-06

## 2022-10-06 RX ORDER — VITAMIN B COMPLEX
2000 TABLET ORAL DAILY
Status: DISCONTINUED | OUTPATIENT
Start: 2022-10-06 | End: 2022-10-11 | Stop reason: HOSPADM

## 2022-10-06 RX ORDER — SODIUM CHLORIDE 9 MG/ML
INJECTION, SOLUTION INTRAVENOUS CONTINUOUS
Status: ACTIVE | OUTPATIENT
Start: 2022-10-06 | End: 2022-10-08

## 2022-10-06 RX ORDER — IBUPROFEN 600 MG/1
600 TABLET ORAL EVERY 6 HOURS PRN
Status: DISCONTINUED | OUTPATIENT
Start: 2022-10-06 | End: 2022-10-11 | Stop reason: HOSPADM

## 2022-10-06 RX ORDER — DESVENLAFAXINE 100 MG/1
100 TABLET, EXTENDED RELEASE ORAL DAILY
Status: DISCONTINUED | OUTPATIENT
Start: 2022-10-06 | End: 2022-10-11 | Stop reason: HOSPADM

## 2022-10-06 RX ORDER — ATORVASTATIN CALCIUM 10 MG/1
10 TABLET, FILM COATED ORAL NIGHTLY
Status: DISCONTINUED | OUTPATIENT
Start: 2022-10-06 | End: 2022-10-11 | Stop reason: HOSPADM

## 2022-10-06 RX ORDER — IPRATROPIUM BROMIDE AND ALBUTEROL SULFATE 2.5; .5 MG/3ML; MG/3ML
1 SOLUTION RESPIRATORY (INHALATION) EVERY 4 HOURS PRN
Status: DISCONTINUED | OUTPATIENT
Start: 2022-10-06 | End: 2022-10-11 | Stop reason: HOSPADM

## 2022-10-06 RX ORDER — MEGESTROL ACETATE 40 MG/1
40 TABLET ORAL 2 TIMES DAILY
Status: DISCONTINUED | OUTPATIENT
Start: 2022-10-06 | End: 2022-10-11 | Stop reason: HOSPADM

## 2022-10-06 RX ORDER — ENOXAPARIN SODIUM 100 MG/ML
30 INJECTION SUBCUTANEOUS DAILY
Status: DISCONTINUED | OUTPATIENT
Start: 2022-10-06 | End: 2022-10-11 | Stop reason: HOSPADM

## 2022-10-06 RX ORDER — DOCUSATE SODIUM 100 MG/1
100 CAPSULE, LIQUID FILLED ORAL 2 TIMES DAILY
Status: DISCONTINUED | OUTPATIENT
Start: 2022-10-06 | End: 2022-10-11 | Stop reason: HOSPADM

## 2022-10-06 RX ORDER — SODIUM CHLORIDE 0.9 % (FLUSH) 0.9 %
10 SYRINGE (ML) INJECTION PRN
Status: DISCONTINUED | OUTPATIENT
Start: 2022-10-06 | End: 2022-10-08 | Stop reason: SDUPTHER

## 2022-10-06 RX ORDER — ALBUTEROL SULFATE 90 UG/1
2 AEROSOL, METERED RESPIRATORY (INHALATION) EVERY 6 HOURS
Status: DISCONTINUED | OUTPATIENT
Start: 2022-10-06 | End: 2022-10-10

## 2022-10-06 RX ORDER — MULTIVITAMIN WITH IRON
1 TABLET ORAL DAILY
Status: DISCONTINUED | OUTPATIENT
Start: 2022-10-06 | End: 2022-10-09

## 2022-10-06 RX ORDER — HYDROXYZINE HYDROCHLORIDE 10 MG/1
10 TABLET, FILM COATED ORAL 2 TIMES DAILY
Status: DISCONTINUED | OUTPATIENT
Start: 2022-10-06 | End: 2022-10-11 | Stop reason: HOSPADM

## 2022-10-06 RX ORDER — POLYETHYLENE GLYCOL 3350 17 G/17G
17 POWDER, FOR SOLUTION ORAL DAILY PRN
Status: DISCONTINUED | OUTPATIENT
Start: 2022-10-06 | End: 2022-10-11 | Stop reason: HOSPADM

## 2022-10-06 RX ORDER — LANOLIN ALCOHOL/MO/W.PET/CERES
3 CREAM (GRAM) TOPICAL DAILY
Status: DISCONTINUED | OUTPATIENT
Start: 2022-10-06 | End: 2022-10-11 | Stop reason: HOSPADM

## 2022-10-06 RX ORDER — TRAMADOL HYDROCHLORIDE 50 MG/1
100 TABLET ORAL EVERY 6 HOURS PRN
Status: DISCONTINUED | OUTPATIENT
Start: 2022-10-06 | End: 2022-10-11 | Stop reason: HOSPADM

## 2022-10-06 RX ORDER — ONDANSETRON 4 MG/1
4 TABLET, ORALLY DISINTEGRATING ORAL EVERY 8 HOURS PRN
Status: DISCONTINUED | OUTPATIENT
Start: 2022-10-06 | End: 2022-10-11 | Stop reason: HOSPADM

## 2022-10-06 RX ORDER — ACETAMINOPHEN 325 MG/1
650 TABLET ORAL EVERY 6 HOURS PRN
Status: DISCONTINUED | OUTPATIENT
Start: 2022-10-06 | End: 2022-10-11 | Stop reason: HOSPADM

## 2022-10-06 RX ORDER — SODIUM CHLORIDE 0.9 % (FLUSH) 0.9 %
10 SYRINGE (ML) INJECTION EVERY 12 HOURS SCHEDULED
Status: DISCONTINUED | OUTPATIENT
Start: 2022-10-06 | End: 2022-10-08 | Stop reason: SDUPTHER

## 2022-10-06 RX ORDER — ONDANSETRON 2 MG/ML
4 INJECTION INTRAMUSCULAR; INTRAVENOUS EVERY 6 HOURS PRN
Status: DISCONTINUED | OUTPATIENT
Start: 2022-10-06 | End: 2022-10-11 | Stop reason: HOSPADM

## 2022-10-06 RX ORDER — MIRTAZAPINE 30 MG/1
30 TABLET, FILM COATED ORAL NIGHTLY
Status: DISCONTINUED | OUTPATIENT
Start: 2022-10-06 | End: 2022-10-11 | Stop reason: HOSPADM

## 2022-10-06 RX ORDER — IPRATROPIUM BROMIDE AND ALBUTEROL SULFATE 2.5; .5 MG/3ML; MG/3ML
1 SOLUTION RESPIRATORY (INHALATION)
Status: DISCONTINUED | OUTPATIENT
Start: 2022-10-06 | End: 2022-10-06

## 2022-10-06 RX ORDER — TRAMADOL HYDROCHLORIDE 50 MG/1
50 TABLET ORAL EVERY 6 HOURS PRN
Status: DISCONTINUED | OUTPATIENT
Start: 2022-10-06 | End: 2022-10-11 | Stop reason: HOSPADM

## 2022-10-06 RX ORDER — ASPIRIN 81 MG/1
81 TABLET, CHEWABLE ORAL DAILY
Status: DISCONTINUED | OUTPATIENT
Start: 2022-10-06 | End: 2022-10-11 | Stop reason: HOSPADM

## 2022-10-06 RX ADMIN — Medication 2000 UNITS: at 10:29

## 2022-10-06 RX ADMIN — PIPERACILLIN AND TAZOBACTAM 4500 MG: 4; .5 INJECTION, POWDER, FOR SOLUTION INTRAVENOUS at 16:31

## 2022-10-06 RX ADMIN — TRAMADOL HYDROCHLORIDE 100 MG: 50 TABLET, COATED ORAL at 01:44

## 2022-10-06 RX ADMIN — Medication 2 PUFF: at 22:22

## 2022-10-06 RX ADMIN — Medication 2 PUFF: at 10:22

## 2022-10-06 RX ADMIN — Medication 1 TABLET: at 10:29

## 2022-10-06 RX ADMIN — PANTOPRAZOLE SODIUM 40 MG: 40 TABLET, DELAYED RELEASE ORAL at 05:58

## 2022-10-06 RX ADMIN — HYDROCODONE BITARTRATE AND ACETAMINOPHEN 1 TABLET: 5; 325 TABLET ORAL at 10:30

## 2022-10-06 RX ADMIN — MEGESTROL ACETATE 40 MG: 40 TABLET ORAL at 10:31

## 2022-10-06 RX ADMIN — HYDROXYZINE HYDROCHLORIDE 10 MG: 10 TABLET ORAL at 10:29

## 2022-10-06 RX ADMIN — SODIUM CHLORIDE 500 ML: 9 INJECTION, SOLUTION INTRAVENOUS at 10:26

## 2022-10-06 RX ADMIN — PIPERACILLIN AND TAZOBACTAM 3375 MG: 3; .375 INJECTION, POWDER, FOR SOLUTION INTRAVENOUS at 21:36

## 2022-10-06 RX ADMIN — DESVENLAFAXINE SUCCINATE 100 MG: 100 TABLET, FILM COATED, EXTENDED RELEASE ORAL at 10:29

## 2022-10-06 RX ADMIN — ASPIRIN 81 MG 81 MG: 81 TABLET ORAL at 10:30

## 2022-10-06 RX ADMIN — ACETAMINOPHEN 650 MG: 325 TABLET ORAL at 05:58

## 2022-10-06 RX ADMIN — MOMETASONE FUROATE AND FORMOTEROL FUMARATE DIHYDRATE 2 PUFF: 200; 5 AEROSOL RESPIRATORY (INHALATION) at 22:22

## 2022-10-06 RX ADMIN — OXYCODONE HYDROCHLORIDE AND ACETAMINOPHEN 500 MG: 500 TABLET ORAL at 10:30

## 2022-10-06 RX ADMIN — DOCUSATE SODIUM 100 MG: 100 CAPSULE, LIQUID FILLED ORAL at 10:31

## 2022-10-06 RX ADMIN — SODIUM CHLORIDE, PRESERVATIVE FREE 10 ML: 5 INJECTION INTRAVENOUS at 10:29

## 2022-10-06 RX ADMIN — ARIPIPRAZOLE 15 MG: 15 TABLET ORAL at 10:29

## 2022-10-06 RX ADMIN — SODIUM CHLORIDE: 9 INJECTION, SOLUTION INTRAVENOUS at 19:53

## 2022-10-06 RX ADMIN — SENNOSIDES AND DOCUSATE SODIUM 1 TABLET: 50; 8.6 TABLET ORAL at 10:29

## 2022-10-06 RX ADMIN — SODIUM CHLORIDE: 9 INJECTION, SOLUTION INTRAVENOUS at 12:30

## 2022-10-06 ASSESSMENT — PAIN DESCRIPTION - LOCATION
LOCATION: ABDOMEN

## 2022-10-06 ASSESSMENT — ENCOUNTER SYMPTOMS
NAUSEA: 1
ABDOMINAL PAIN: 1
VOMITING: 1

## 2022-10-06 ASSESSMENT — PAIN DESCRIPTION - DESCRIPTORS: DESCRIPTORS: SHARP

## 2022-10-06 ASSESSMENT — PAIN SCALES - GENERAL
PAINLEVEL_OUTOF10: 8

## 2022-10-06 ASSESSMENT — PAIN DESCRIPTION - ORIENTATION: ORIENTATION: UPPER

## 2022-10-06 ASSESSMENT — PAIN - FUNCTIONAL ASSESSMENT: PAIN_FUNCTIONAL_ASSESSMENT: PREVENTS OR INTERFERES SOME ACTIVE ACTIVITIES AND ADLS

## 2022-10-06 NOTE — PROGRESS NOTES
[id number] [id number]  INPATIENT PHYSICAL THERAPY  EVALUATION  [facility] MED SURG 8B - 8B-33/033-A    Time In:   Time Out: 1435  Timed Code Treatment Minutes: 8 Minutes  Minutes: 16          Date: 10/6/2022  Patient Name: Ni Whitney,  Gender:  female        MRN: [id number]  : 1945  (65 y.o.)      Referring Practitioner: Dr. Ashley Henriquez  Diagnosis: gastric distention  Additional Pertinent Hx: Per H&P: Ni Whitney is a 65 y.o. female with a PMH of COPD, chronic respiratory failure, severe malnutrition s/p PEG placement who presented with abdominal pain. Patient reports a history of acutely worsening abdominal pain over the past two weeks. She recently underwent PEG placement due to severe malnutrition and has had pain around the PEG tube site since placement. She was previously admitted due to this worsening abdominal pain for which she was treated with an enema for constipation and tramadol for insertion site pain. Patient reports that her abdominal pain has worsened over the last few days, and she has had increased drainage around the insertion site of her tube.      Restrictions/Precautions:  Restrictions/Precautions: General Precautions, Fall Risk, Isolation  Position Activity Restriction  Other position/activity restrictions: PEG, 3L O2 at baseline    Subjective:  Chart Reviewed: Yes  Patient assessed for rehabilitation services?: Yes  Subjective: pt awake upon arrival and agreeable for PT    General:        Hearing: Within functional limits       Pain: not rated at PEG tube site, per pt    Vitals: Oxygen: 3L as baseline    Social/Functional History:    Type of Home: Facility ([facility])  Home Equipment: Oxygen (3L O2 at baseline)             ADL Assistance: Independent (per pt report)     Ambulation Assistance: Independent (per pt report)  Transfer Assistance: Independent (per pt report)          Additional Comments: Pt reported was independent with all tasks, although daughter reported since PEG placement on 9/22, pt has been in skilled wing completing rehab. Daughter reported pt has not been able to participate much in therapy since PEG tube placement. OBJECTIVE:  Range of Motion:  Bilateral Lower Extremity: WFL    Strength:  Bilateral Lower Extremity: >/=3/5, generalized weakness    Balance:  Static Sitting Balance:  Supervision  Dynamic Sitting Balance: Supervision  Static Standing Balance: Contact Guard Assistance, 1UE support    Bed Mobility:  Rolling to Right: Stand By Assistance   Supine to Sit: Contact Guard Assistance  Sit to Supine: Contact Guard Assistance   Scooting: Stand By Assistance  HOB up 30 degrees  Transfers:  Sit to Stand: Contact Guard Assistance  Stand to [address] support on bedrail  Ambulation:  Contact Guard Assistance  Distance: 10 steps  Surface: Level Tile  Device: 1UE support on furniture  Gait Deviations: Forward Flexed Posture, Slow Liz, Decreased Step Length Bilaterally, Narrow Base of Support, and min Unsteady Gait    Exercise:  Patient was guided in 1 set(s) 5-10 reps of exercise to both lower extremities. Glut sets, Quad sets, Seated marches, Seated heel/toe raises, and Long arc quads. Exercises were completed for increased independence with functional mobility. Functional Outcome Measures: Completed  -PAC Inpatient Mobility without Stair Climbing Raw Score : 15  AM-PAC Inpatient without Stair Climbing T-Scale Score : 43.03    ASSESSMENT:  Activity Tolerance:  Patient tolerance of  treatment: fair. Treatment Initiated: Treatment and education initiated within context of evaluation. Evaluation time included review of current medical information, gathering information related to past medical, social and functional history, completion of standardized testing, formal and informal observation of tasks, assessment of data and development of plan of care and goals.   Treatment time included skilled education and facilitation of tasks to increase safety and independence with functional mobility for improved independence and quality of life. Assessment: Body Structures, Functions, Activity Limitations Requiring Skilled Therapeutic Intervention: Decreased functional mobility , Decreased strength, Decreased endurance, Decreased tolerance to work activity, Decreased balance, Increased pain  Assessment: pt with pain at PEG tube site, generalized weakness, deconditioning, dec balance, inc assist for safe mobility, recommend cont PT to inc pt functional mobility  Therapy Prognosis: Good    Requires PT Follow-Up: Yes    Discharge Recommendations:  Discharge Recommendations: Continue to assess pending progress, Subacute/Skilled Nursing Facility, Patient would benefit from continued therapy after discharge    Patient Education:      . Patient Education  Education Given To: Patient  Education Provided: Role of Therapy, Plan of Care, Home Exercise Program  Education Method: Verbal  Barriers to Learning: None  Education Outcome: Verbalized understanding, Demonstrated understanding       Equipment Recommendations: Other: cont to assess needs    Plan:  Specific Instructions for Next Treatment: therex and mobility  General Plan:  (3-5X GM)  Specific Instructions for Next Treatment: therex and mobility    Goals:  Patient Goals : less pain  Short Term Goals  Time Frame for Short Term Goals: by discharge  Short Term Goal 1: bed mobility with MOD I to get in/out of bed  Short Term Goal 2: transfer with S to get in/out of chairs  Short Term Goal 3: amb 40'x1 with/without AD and SBA to walk safely in room  Long Term Goals  Time Frame for Long Term Goals : no LTGs set secondary to short ELOS    Following session, patient left in safe position with all fall risk precautions in place.

## 2022-10-06 NOTE — ED NOTES
Pt arrives to the ED via [facility] for right lower quadrant pain that began today. Pt has scans completed at the nursing home that were concerning and the pt was sent here. Pt on arrival is sob and tachycardic. Pt has c/o pain rating a 8/10. Pt denies any abnormal bowel or bladder issues, nausea, vomiting or chest pain. Pt has a peg tube that has been leaking for several days. Pt wears 3 L nasal canula at home.       Rufino Escalante RN  10/05/22 2017

## 2022-10-06 NOTE — ED PROVIDER NOTES
[facility]  eMERGENCY dEPARTMENT eNCOUnter          CHIEF COMPLAINT       Chief Complaint   Patient presents with    Abdominal Pain     RLQ       Nurses Notes reviewed and I agree except as noted inthe HPI. HISTORY OF PRESENT ILLNESS    Sd Lane is a 65 y.o. female who presents to the Emergency Department for the evaluation of abdominal pain. History obtained primarily from daughter at bedside. Patient was hospitalized 2 weeks ago for pain after placement of PEG tube. She received work-up at that time that revealed constipation but her pain was not relieved with subsequent bowel movements. She has had pain associated with the PEG tube since. States that for the past few days, she has been leaking around the tube and they notified GI who told him it was not normal.  However, there ECF told him it was normal and treated it with localized gauze and unknown ointment. Yesterday the [facility] doctor evaluated the patient and ordered x-ray which was done today, revealing concern for ileus versus obstruction and she was subsequently sent to the ED for further evaluation. She does report some associated nausea with an episode of small amount of brown emesis last night. States that her last bowel movement was 2 days ago and was formed. Denies any flatulence today. Daughter also notes that the patient was previously on an unknown pain medication for history of chronic compression fractures that were sustained 1 year ago. The pain medication was discontinued on her hospitalization 2 weeks ago due to the constipation and has not yet been restarted and she wonders whether this may be contributing. The HPI was provided by the patient. REVIEW OF SYSTEMS     Review of Systems   Gastrointestinal:  Positive for abdominal pain, nausea and vomiting. All other systems reviewed and are negative.     PAST MEDICAL HISTORY    has a past medical history of Chronic respiratory failure with hypoxia [facility]), Chronic respiratory failure with hypoxia and hypercapnia ([facility] [address] COPD (chronic obstructive pulmonary disease) ([facility] [address] Depression, Dysarthria, Gastro-esophageal reflux disease without esophagitis, Hyperlipidemia, Hyperlipidemia, Hypothyroidism, unspecified, Lung nodules, Pneumonia, Severe malnutrition (HCC), Severe malnutrition ([facility] [address] Severe malnutrition ([facility] [address] TIA (transient ischemic attack), and Type 2 diabetes mellitus with hyperglycemia ([facility] SURGICAL HISTORY      has a past surgical history that includes Tonsillectomy; Colonoscopy; Upper gastrointestinal endoscopy (N/A, 4/20/2022); and Gastrostomy tube placement (N/A, 9/21/2022).     CURRENT MEDICATIONS       Current Discharge Medication List        CONTINUE these medications which have NOT CHANGED    Details   furosemide (LASIX) 40 MG tablet Take 40 mg by mouth daily as needed (take for edema)      magnesium hydroxide (MILK OF MAGNESIA) 400 MG/5ML suspension Take by mouth daily as needed for Constipation      Multiple Vitamins-Minerals (THERAPEUTIC MULTIVITAMIN-MINERALS) tablet Take 1 tablet by mouth daily      famotidine (PEPCID) 40 MG tablet Take 40 mg by mouth every evening      Pantoprazole Sodium (PROTONIX PO) Take 40 mg by mouth Delayed release      mirtazapine (REMERON) 30 MG tablet Take 30 mg by mouth nightly      sennosides-docusate sodium (SENOKOT-S) 8.6-50 MG tablet Take 1 tablet by mouth daily      simethicone (MYLICON) 80 MG chewable tablet Take 80 mg by mouth every 8 hours as needed for Flatulence Give2 tabs PRN      tiZANidine (ZANAFLEX) 2 MG tablet Take 2 mg by mouth nightly as needed      ondansetron (ZOFRAN) 4 MG tablet Take 4 mg by mouth every 6 hours as needed for Nausea or Vomiting      polyethylene glycol (GLYCOLAX) 17 GM/SCOOP powder Take 17 g by mouth daily  Qty: 1530 g, Refills: 1      docusate sodium (COLACE) 100 mg capsule Take 1 capsule by mouth 2 times daily  Qty: 60 capsule, Refills: 0      melatonin 3 MG TABS tablet Take 3 mg by mouth daily      omeprazole (PRILOSEC) 20 MG delayed release capsule Take 20 mg by mouth Daily      Ascorbic Acid (VITAMIN C) 250 MG tablet Take 500 mg by mouth daily For 10 days d/t covid      zinc 50 MG TABS tablet Take 50 mg by mouth daily      hydrOXYzine (ATARAX) 10 MG tablet Take 10 mg by mouth 2 times daily itching       acetaminophen (TYLENOL) 325 MG tablet Take 650 mg by mouth every 6 hours as needed for Pain      bisacodyl (DULCOLAX) 10 MG suppository Place 10 mg rectally as needed for Constipation      Nutritional Supplements (ENSURE HIGH PROTEIN) LIQD Take 1 Can by mouth 3 times daily (with meals)  Qty: 90 Can, Refills: 0      megestrol (MEGACE) 40 MG tablet Take 1 tablet by mouth 2 times daily  Qty: 60 tablet, Refills: 1      albuterol sulfate  (90 Base) MCG/ACT inhaler Inhale 2 puffs into the lungs every 6 hours  Qty: 1 Inhaler, Refills: 3      Umeclidinium Bromide (INCRUSE ELLIPTA) 62.5 MCG/INH AEPB Inhale 1 puff into the lungs daily  Qty: 30 each, Refills: 0      Cholecalciferol (VITAMIN D3) 50 MCG (2000 UT) CAPS Take 2,000 Units by mouth daily      aspirin 81 MG tablet Take 81 mg by mouth daily      ipratropium-albuterol (DUONEB) 0.5-2.5 (3) MG/3ML SOLN nebulizer solution Inhale 3 mLs into the lungs every 6 hours as needed for Shortness of Breath  Qty: 360 mL, Refills: 2      atorvastatin (LIPITOR) 10 MG tablet Take 1 tablet by mouth nightly  Qty: 30 tablet, Refills: 3      vitamin D (CHOLECALCIFEROL) 1000 UNIT TABS tablet Take 2,000 Units by mouth daily      Fluticasone furoate-vilanterol (BREO ELLIPTA) 200-25 MCG/INH AEPB inhaler Inhale 1 puff into the lungs daily      OXYGEN Inhale 2-4 L into the lungs nightly Nightly and prn      ARIPiprazole (ABILIFY) 10 MG tablet Take 15 mg by mouth daily Give 1.5 tablet (10mg tabs) daily      desvenlafaxine succinate (PRISTIQ) 50 MG TB24 extended release tablet Take 100 mg by mouth daily              ALLERGIES     is allergic to spiriva handihaler [tiotropium bromide monohydrate]. FAMILY HISTORY     She indicated that her mother is . She indicated that her father is . family history includes Cancer in her mother; Other in her father. SOCIAL HISTORY      reports that she quit smoking about 3 years ago. Her smoking use included cigarettes. She started smoking about 60 years ago. She has a 112.00 pack-year smoking history. She has never used smokeless tobacco. She reports that she does not currently use alcohol. She reports that she does not currently use drugs. PHYSICAL EXAM     INITIAL VITALS:  height is 5' 2\" (1.575 m) and weight is 77 lb 4.8 oz (35.1 kg). Her oral temperature is 97.6 °F (36.4 °C). Her blood pressure is 138/84 and her pulse is 122 (abnormal). Her respiration is 22 and oxygen saturation is 96%. Physical Exam  Vitals and nursing note reviewed. HENT:      Head: Normocephalic and atraumatic. Cardiovascular:      Rate and Rhythm: Tachycardia present. Pulmonary:      Effort: Pulmonary effort is normal. No respiratory distress. Abdominal:      General: Bowel sounds are decreased. Tenderness: There is abdominal tenderness in the left upper quadrant. There is no right CVA tenderness, left CVA tenderness or guarding. Negative signs include Rodriguez's sign. Comments: PEG tube noted to left upper abdomen with some brown, gummy residue noted surrounding but no sign of cellulitis. Lower abdomen is ticklish, resulting in giggling with no guarding or rigidity throughout abdominal exam.   Skin:     General: Skin is warm and dry. Neurological:      General: No focal deficit present. Mental Status: She is alert.    Psychiatric:         Mood and Affect: Mood normal.       DIFFERENTIAL DIAGNOSIS:   Differential diagnoses are discussed    DIAGNOSTIC RESULTS     EKG: All EKG's are interpreted by the Emergency Department Physician who either signs or Co-signsthis chart in the absence of a cardiologist.    Elizabeth Medina. Rate: 136 bpm  PRinterval: 126 ms  QRS duration: 64 ms  QTc: 388 ms  P-R-T axes: 65, 28, 70  Sinus tachycardia with PACs. No STEMI. Compared to old EKG on 8-21-22      RADIOLOGY: non-plain film images(s) such as CT, Ultrasound and MRI are read by the radiologist.    XR CHEST PORTABLE   Final Result   Impression:      No acute processes. This document has been electronically signed by: Tish Adler MD on    10/05/2022 10:50 PM      CT ABDOMEN PELVIS WO CONTRAST Additional Contrast? None   Final Result   Impression:      Nonspecific gastric distention with indwelling gastrostomy tube      Please correlate clinically regarding tube function and/or ability to    decompress      No other significant abnormality      This document has been electronically signed by: Tish Adler MD on    10/05/2022 09:39 PM      All CTs at this facility use dose modulation techniques and iterative    reconstructions, and/or weight-based dosing   when appropriate to reduce radiation to a low as reasonably achievable.           LABS:      Labs Reviewed   CBC WITH AUTO DIFFERENTIAL - Abnormal; Notable for the following components:       Result Value    WBC 16.5 (*)     Hemoglobin 11.8 (*)     MCHC 31.7 (*)     RDW-CV 19.6 (*)     RDW-SD 59.9 (*)     Platelets 277 (*)     Segs Absolute 13.6 (*)     Monocytes Absolute 1.5 (*)     Immature Grans (Abs) 0.08 (*)     All other components within normal limits   COMPREHENSIVE METABOLIC PANEL W/ REFLEX TO MG FOR LOW K - Abnormal; Notable for the following components:    Glucose 167 (*)     Sodium 133 (*)     Chloride 93 (*)     Calcium 10.7 (*)     All other components within normal limits   OSMOLALITY - Abnormal; Notable for the following components:    Osmolality Calc 271.7 (*)     All other components within normal limits   URINE WITH REFLEXED MICRO - Abnormal; Notable for the following components:    Ketones, Urine TRACE (*)     Protein, UA 30 (*)     All other components within normal limits   PROCALCITONIN - Abnormal; Notable for the following components:    Procalcitonin 0.23 (*)     All other components within normal limits   BLOOD GAS, ARTERIAL - Abnormal; Notable for the following components:    PO2 68 (*)     HCO3 29 (*)     Base Excess (Calculated) 4.3 (*)     All other components within normal limits   CULTURE, BLOOD 1   CULTURE, BLOOD 2   LIPASE   ANION GAP   GLOMERULAR FILTRATION RATE, ESTIMATED   LACTIC ACID       EMERGENCY DEPARTMENT COURSE:   Vitals:    Vitals:    10/05/22 2145 10/05/22 2305 10/06/22 0010 10/06/22 0214   BP: (!) 142/89 (!) 131/59 138/84    Pulse: (!) 112 (!) 119 (!) 122    Resp: 17 23 22   Temp:   97.6 °F (36.4 °C)    TempSrc:   Oral    SpO2: 93% 94% 96%    Weight:   77 lb 4.8 oz (35.1 kg)    Height:          3:03 AM EDT: The patient was seen and evaluated. Patient presents with tachycardia and otherwise reassuring vital signs for complaint of abdominal pain. Per nursing note, she had complained of right lower quadrant pain but on my evaluation, story is pretty consistent that this is some left upper quadrant pain has been ongoing since placement of the PEG tube. She has ticklish lower abdominal exam and some reproducible mild tenderness to the left upper quadrant. No other acute medical complaints. She was treated with IV fluids, morphine, Zofran and is sleeping peacefully on follow-up examinations. Laboratory results revealed 16,000 white blood cell count with mild hyponatremia, lactic acid within normal limits. Urinalysis without infection and chest x-ray unremarkable. CT of the abdomen shows nonspecific gastric distention with indwelling gastrostomy tube, correlate clinically regarding tube functioning and/or ability to decompress. Given the patient's tachycardia, white blood cell count elevation, will admit for further management and GI consult.   Patient and family agreeable and she will be admitted to the hospitalist service. CRITICAL CARE:   None    CONSULTS:  Hospitalist    PROCEDURES:  None    FINAL IMPRESSION      1. Gastric distention    2. Left upper quadrant abdominal pain    3. Tachycardia          DISPOSITION/PLAN   Admit    PATIENT REFERRED TO:  No follow-up provider specified.     DISCHARGEMEDICATIONS:  Current Discharge Medication List          (Please note that portions of this note were completedwith a voice recognition program.  Efforts were made to edit the dictations but occasionally words are mis-transcribed.)        Rehana Rodriguez PA-C  10/06/22 9830

## 2022-10-06 NOTE — CARE COORDINATION
10/6/22, 11:13 AM EDT  Discharge Planning Evaluation  Social work consult received, patient from [facility]. Patient/Family preference is to return to [facility], per patient. The patient's current payor source at the facility is Medicare. Medicare skilled days available: yes  Insurance precert:  yes  Left message for Laurence Colon at the facility. Patient bed hold: yes  Anticipated transport plan: ambulance  Do they require COVID 19 test to return to [facility]  Is there a required time frame which which COVID test needs done:24hrs  Patient's Healthcare Decision Maker: Named in [address] AM  Left message for Laurence Colon at [facility] to return call. 2:30 PM  Spoke with Laurence Colon from [facility], they will take back. Will need pre-cert. Last time pre-cert was approved within hours. Will Start pre-cert when patient is ready for discharge.

## 2022-10-06 NOTE — PROGRESS NOTES
0010 Patient arrival to floor and cardiac monitor placed. 27 Maribel Ricks, [address] Carol[address] Vieira notified per perfectserve about HR sustaining 120-140. Placed order for EKG.   0100 EKG resulted to be sinus tachy. 0130 Dr. Tye Parker notified per perfectserve about EKG results. [address] [facility] Dr. Tye Parker states no new orders at this time and trying to control pain at this time. 1 Dr. Tye Parker notified about patient HR sustaining 110-118 while asleep, HR sustaining 130-140 when awake, and experiencing frequent PVCs. 2324 Dr. Tye Parker notified about consistent pain at 8/10.  0608 order placed by Dr. Tye Parker for ibuprofen d/t concern for constipation. Patient current HR on portable bedside monitor reading sinus tachycardia at 112 with multiform PVCs.

## 2022-10-06 NOTE — PROGRESS NOTES
[facility]  [facility]PATIENT OCCUPATIONAL THERAPY  [facility] MED SURG 8B  EVALUATION    Time:   Time In: 5421  Time Out: 1048  Timed Code Treatment Minutes: 0 Minutes  Minutes: 13          Date: 10/6/2022  Patient Name: Sarah Steward,   Gender: female      MRN: [id number]  : 1945  (65 y.o.)  Referring Practitioner: Jose David Hurd DO  Diagnosis: gastric distention  Additional Pertinent Hx: Per H&P: Sarah Steward is a 65 y.o. female with a PMH of COPD, chronic respiratory failure, severe malnutrition s/p PEG placement who presented with abdominal pain. Patient reports a history of acutely worsening abdominal pain over the past two weeks. She recently underwent PEG placement due to severe malnutrition and has had pain around the PEG tube site since placement. She was previously admitted due to this worsening abdominal pain for which she was treated with an enema for constipation and tramadol for insertion site pain. Patient reports that her abdominal pain has worsened over the last few days, and she has had increased drainage around the insertion site of her tube. Restrictions/Precautions:  Restrictions/Precautions: General Precautions, Fall Risk, Isolation  Position Activity Restriction  Other position/activity restrictions: PEG, 3L O2 at baseline    Subjective  Chart Reviewed: Yes, Orders, Progress Notes, History and Physical  Patient assessed for rehabilitation services?: Yes  Family / Caregiver Present: Yes (daughter)    Subjective: Pt supine in bed upon arrival, agreeable to OT session with minimal encouragement. Pain: Pt c/o over abdomen around PEG tube site although does not quantify. Pain limits activity tolerance. Vitals: Oxygen: 82% on arrival while on 1.5L O2 (despite baseline O2 requirements of 3L at [facility]). RN present and aware, titrated up to 3L.  Pt required ~2 minutes for Sp02 to recover to 90%  Heart Rate: 100 bpm on arrival; increased to 120 bpm while seated at EOB.    Social/Functional History:  Type of Home: Facility ([facility])  Home Equipment: Oxygen (3L O2 at baseline)           ADL Assistance: Independent (per pt report)  Ambulation Assistance: Independent (per pt report)  Transfer Assistance: Independent (per pt report)          Additional Comments: Pt reported was independent with all tasks, although daughter reported since PEG placement on 9/22, pt has been in skilled wing completing rehab. Daughter reported pt has not been able to participate much in therapy since PEG tube placement. VISION:WFL    HEARING:  WFL    COGNITION: Slow Processing, Decreased Recall, and Impaired Memory    RANGE OF MOTION:  Bilateral Upper Extremity:  WFL    STRENGTH:  Bilateral Upper Extremity:  Impaired - grossly 3+/5 to 3/5    ADL:   No ADL's completed this session. .-pt declined completing at this time, although anticipate pt would require mod-max assist for LB ADLs. BALANCE:  Sitting Balance:  Stand By Assistance, [employer]. While seated at EOB. Pt tolerates sitting at EOB only ~2-3 minutes, then reports increased pain at PEG tube site and initiates returning self to supine. BED MOBILITY:  Supine to Sit: Minimal Assistance, with head of bed raised, with rail, with verbal cues , with increased time for completion    Sit to Supine: Minimal Assistance, with verbal cues , with increased time for completion    Scooting: Stand By Assistance ; advancing hips forward to EOB; Assist X2 to scoot up in bed    TRANSFERS:  OTR to further assess as able/appropriate    FUNCTIONAL MOBILITY:  OTR to further assess as able/appropriate    Activity Tolerance:  Patient tolerance of  treatment: poor. Pt refused any further activity due to pain. Assessment:  Assessment: Pt presents requiring increased assistance for ADLs, transfers, and functional mobility compared to PLOF.  Pt will continue to benefit from OT services to improve independence with these tasks, in addition to overall strength/endurance to facilitate return to PLOF. Performance deficits / Impairments: Decreased functional mobility , Decreased ADL status, Decreased strength, Decreased cognition, Decreased endurance, Decreased balance  Prognosis: Fair  REQUIRES OT FOLLOW-UP: Yes  Decision Making: Medium Complexity    Treatment Initiated: Treatment and education initiated within context of evaluation. Evaluation time included review of current medical information, gathering information related to past medical, social and functional history, completion of standardized testing, formal and informal observation of tasks, assessment of data and development of plan of care and goals. Treatment time included skilled education and facilitation of tasks to increase safety and independence with ADL's for improved functional independence and quality of life. Discharge Recommendations:  Subacute/Skilled Nursing Facility    Patient Education:     Patient Education  Education Given To: Patient, Family  Education Provided: Role of Therapy, Plan of Care (importance of increasing activity)  Education Method: Demonstration, Verbal  Barriers to Learning: Cognition  Education Outcome: Verbalized understanding, Continued education needed    Equipment Recommendations:  Equipment Needed: No  Other: defer to next level of care    Plan:  Times Per Week: 3-5x  Current Treatment Recommendations: Strengthening, Balance training, Functional mobility training, Endurance training, Safety education & training, Patient/Caregiver education & training, Equipment evaluation, education, & procurement, Self-Care / ADL. See long-term goal time frame for expected duration of plan of care. If no long-term goals established, a short length of stay is anticipated. Goals:     Short Term Goals  Time Frame for Short Term Goals: by discharge  Short Term Goal 1: Pt will tolerate sitting at EOB X10 minutes with supervision in prep for ADL completion.   Short Term Goal 2: Pt will complete BADL tasks with minimal assistance and ECT prn to increase independence with self care tasks. Short Term Goal 3: Pt will tolerate further assessment of transfers/functional mobility by OTR when appropriate. Following session, patient left in safe position with all fall risk precautions in place.

## 2022-10-06 NOTE — RT PROTOCOL NOTE
RT Inhaler-Nebulizer Bronchodilator Protocol Note    There is a bronchodilator order in the chart from a provider indicating to follow the RT Bronchodilator Protocol and there is an Initiate RT Inhaler-Nebulizer Bronchodilator Protocol order as well (see protocol at bottom of note). CXR Findings:  XR CHEST PORTABLE    Result Date: 10/5/2022  Impression: No acute processes. This document has been electronically signed by: Carla Melendez MD on 10/05/2022 10:50 PM      The findings from the last RT Protocol Assessment were as follows:   History Pulmonary Disease: Chronic pulmonary disease  Respiratory Pattern: Mild dyspnea at rest, irregular pattern, or RR 21-25 bpm  Breath Sounds: Slightly diminished and/or crackles  Cough: Strong, spontaneous, non-productive  Indication for Bronchodilator Therapy:    Bronchodilator Assessment Score: 8  Pt takes Albuterol mdi Q6 at home, will do the same here. Aerosolized bronchodilator medication orders have been revised according to the RT Inhaler-Nebulizer Bronchodilator Protocol below. Respiratory Therapist to perform RT Therapy Protocol Assessment initially then follow the protocol. Repeat RT Therapy Protocol Assessment PRN for score 0-3 or on second treatment, BID, and PRN for scores above 3. No Indications - adjust the frequency to every 6 hours PRN wheezing or bronchospasm, if no treatments needed after 48 hours then discontinue using Per Protocol order mode. If indication present, adjust the RT bronchodilator orders based on the Bronchodilator Assessment Score as indicated below. Use Inhaler orders unless patient has one or more of the following: on home nebulizer, not able to hold breath for 10 seconds, is not alert and oriented, cannot activate and use MDI correctly, or respiratory rate 25 breaths per minute or more, then use the equivalent nebulizer order(s) with same Frequency and PRN reasons based on the score.   If a patient is on this medication at home then do not decrease Frequency below that used at home. 0-3 - enter or revise RT bronchodilator order(s) to equivalent RT Bronchodilator order with Frequency of every 4 hours PRN for wheezing or increased work of breathing using Per Protocol order mode. 4-6 - enter or revise RT Bronchodilator order(s) to two equivalent RT bronchodilator orders with one order with BID Frequency and one order with Frequency of every 4 hours PRN wheezing or increased work of breathing using Per Protocol order mode. 7-10 - enter or revise RT Bronchodilator order(s) to two equivalent RT bronchodilator orders with one order with TID Frequency and one order with Frequency of every 4 hours PRN wheezing or increased work of breathing using Per Protocol order mode. 11-13 - enter or revise RT Bronchodilator order(s) to one equivalent RT bronchodilator order with QID Frequency and an Albuterol order with Frequency of every 4 hours PRN wheezing or increased work of breathing using Per Protocol order mode. Greater than 13 - enter or revise RT Bronchodilator order(s) to one equivalent RT bronchodilator order with every 4 hours Frequency and an Albuterol order with Frequency of every 2 hours PRN wheezing or increased work of breathing using Per Protocol order mode. RT to enter RT Home Evaluation for COPD & MDI Assessment order using Per Protocol order mode.     Electronically signed by Nidia Powell RCP on 10/6/2022 at 10:39 AM

## 2022-10-06 NOTE — CARE COORDINATION
10/6/22, 11:15 AM EDT      DISCHARGE PLANNING EVALUATION    Ramón Mayorga  Admitted: 10/5/2022  Hospital Day: 0    Location: -33/033-A Reason for admit: Gastric distention [K31.89]    Past Medical History:   Diagnosis Date    Chronic respiratory failure with hypoxia (HCC)     Chronic respiratory failure with hypoxia and hypercapnia (HCC)     COPD (chronic obstructive pulmonary disease) ([facility] [address]     Depression     Dysarthria     Gastro-esophageal reflux disease without esophagitis 5/28/2021    Hyperlipidemia     Hyperlipidemia     Hypothyroidism, unspecified 5/28/2021    Lung nodules     Pneumonia     [place] 2 months ago    Severe malnutrition (HCC)     Severe malnutrition (HCC)     Severe malnutrition (HCC)     TIA (transient ischemic attack)     Type 2 diabetes mellitus with hyperglycemia ([facility] [address] 5/28/2021       Procedure: No.  Barriers to Discharge: Admitted through ER from [facility] for abd pain/gastric distention. Recent PEG placement and has experienced some pain since. Currently NPO. PT/OT. WBC's elevated and Procal 0.23. IVF at 75/hr. This CM sent Resident Dr Lucien Hamman message regarding need for GI consult as mentioned in his progress note yesterday. Order for GI consulted is not noted yet. It is noted that at 12:15 today GI was consulted. PCP: Kirsten Nina MD    Readmission Risk              Risk of Unplanned Readmission:  0         Patient's Healthcare Decision Maker: Named in [facility] [place]    Patient Goals/Plan/Treatment Preferences: CM visit for today is deferred as SW is consulted for continuity of services. CM will continue to follow for additional needs.      Transportation/Food Security/Housekeeping Addressed: no issues identified

## 2022-10-06 NOTE — ED NOTES
ED to inpatient nurses report    Chief Complaint   Patient presents with    Abdominal Pain     RLQ      Present to ED from nursing home  LOC: alert and orientated to name, place, date  Vital signs   Vitals:    10/1945 10/05/22 2030 10/05/22 2145 10/05/22 2305   BP: 137/87 124/85 (!) 142/89 (!) 131/59   Pulse:  (!) 139 (!) 112 (!) 119   Resp:  26 17 23   Temp:       TempSrc:       SpO2:  94% 93% 94%   Weight:       Height:          Oxygen Baseline 3L NC    Current needs required 3L NC   LDAs:   Peripheral IV 10/05/22 Right Forearm (Active)   Site Assessment Clean, dry & intact 10/05/22 2157   Line Status Normal saline locked 10/05/22 2157   Phlebitis Assessment No symptoms 10/05/22 2157   Infiltration Assessment 0 10/05/22 2157   Dressing Status Clean, dry & intact 10/05/22 2157     Mobility: Fully dependent  Pending ED orders: None  Present condition: Stable      C-SSRS Risk of Suicide: No Risk  Swallow Screening    Preferred Language: [place]     Electronically signed by Noel Segovia RN on 10/5/2022 at 11:26 PM       Noel Segovia RN  10/05/22 8501

## 2022-10-06 NOTE — ED NOTES
Pt. Resting in bed with even and unlabored respirations. Pt. States pain is a 8/10 at this time. Pt cathed for urine sample at this time. Pt. Updated about plan of care and treatment. Family at bedside. Pt. Has no further concerns, questions or needs at this time. Call light within reach.         Rufino Escalante RN  10/05/22 7324

## 2022-10-06 NOTE — PROGRESS NOTES
[facility]  PHYSICAL THERAPY MISSED TREATMENT NOTE  [facility]    Date: 10/6/2022  Patient Name: Yahir Jacome        MRN: [id number]   : 1945  (73 y.o.)  Gender: female                REASON FOR MISSED TREATMENT:  Family declined treatment. Pt in bed sleeping when PT arrived, daughter present. Family refusing therapy at this time, requesting to come back tomorrow. Will check back next available date.      Reese Gupta PT, DPT

## 2022-10-06 NOTE — DISCHARGE INSTR - COC
Continuity of Care Form    Patient Name: Susan Moses   :    MRN:  [id number]    Admit date:  10/5/2022  Discharge date:  10/11/2022    Code Status Order: Full Code   Advance Directives:     Admitting Physician:  Nemo Fontenot DO  PCP: Maryana Pan MD    Discharging Nurse: \[facility] [place] [facility]\"" Unit/Room#: 8B-33/033-A  Discharging Unit Phone Number: [phone]    Emergency Contact:   Extended Emergency Contact Information  Primary Emergency Contact: Gladys [address] [place] [address] Phone: [phone]  Mobile Phone: [phone]  Relation: Child  Hearing or visual needs: None  Other needs: None  Preferred language: English   needed? No  Secondary Emergency Contact: Kelsie Ramirezevaristo, [address] [place] [address] Phone: [phone]  Mobile Phone: [phone]  Relation: Child   needed?  No    Past Surgical History:  Past Surgical History:   Procedure Laterality Date    COLONOSCOPY      GASTROSTOMY TUBE PLACEMENT N/A 2022    EGD PEG TUBE PLACEMENT performed by Elsa Goodell, MD at [address] [place] [address] N/A 2022    EGD BIOPSY performed by Elsa Goodell, MD at [facility]       Immunization History:   Immunization History   Administered Date(s) Administered    COVID-19, PFIZER PURPLE top, DILUTE for use, (age 15 y+), 30mcg/0.3mL 2021, 2021, 2021    Influenza Virus Vaccine 10/04/2018       Active Problems:  Patient Active Problem List   Diagnosis Code    Pneumonia of right upper lobe due to infectious organism J18.9    Bandemia D72.825    Acute on chronic respiratory failure with hypoxia and hypercapnia (HCC) J96.21, J96.22    Weight loss, non-intentional R63.4    Severe protein-calorie malnutrition Kenneth Nam: less than 60% of standard weight) (HCC) E43    History of head lice V39.6    Hypoxia R09.02    Tobacco use disorder F17.200    Leukocytosis D72.829    Lactic acidosis E87.20    SIRS (systemic inflammatory response syndrome) ([facility]) R65.10    Patient underweight R63.6    Wasting syndrome ([facility] [address] R64    Dysarthria R47.1    TIA (transient ischemic attack) G45.9    Slurred speech R47.81    Bedbug bite W57. XXXA    COPD exacerbation ([facility] [address] J44.1    Hyperlipidemia E78.5    Severe malnutrition ([facility] [address] E43    General weakness R53.1    Acute and chronic respiratory failure with hypoxia ([facility]) J96.21    Chronic respiratory failure with hypercapnia ([facility]) M68.87    Acute metabolic encephalopathy N37.88    Sepsis secondary to UTI ([facility]) A41.9, N39.0    Acute respiratory failure with hypoxia ([facility]) J96.01    Schizoaffective disorder ([facility]) F25.9    Dyspnea and respiratory abnormalities R06.00, R06.89    Chronic pain disorder G89.4    Major depressive disorder, recurrent, moderate ([facility]) F33.1    Neurogenic claudication ([facility]) G95.19    Osteopenia of lumbar spine M85.88    Hypothyroidism, unspecified E03.9    Gastro-esophageal reflux disease without esophagitis K21.9    Vitamin B12 deficiency anemia, unspecified D51.9    Type 2 diabetes mellitus with hyperglycemia ([facility]) E11.65    Vitamin D deficiency, unspecified E55.9    Anorexia R63.0    Abdominal pain R10.9    Gastric distention K31.89       Isolation/Infection:   Isolation            Contact          Patient Infection Status       Infection Onset Added Last Indicated Last Indicated By Review Planned Expiration Resolved Resolved By    COVID-19 (Rule Out) 10/06/22 10/06/22 10/06/22 COVID-19 & Influenza Combo (Ordered) 10/16/22 10/20/22      VRE  12/05/18 12/05/18 Yovani Gutiérrez RN        Feces PCR 12/2018    Resolved    COVID-19 (Rule Out) 09/23/22 09/23/22 09/23/22 COVID-19 & Influenza Combo (Ordered)   09/23/22 Rule-Out Test Resulted    COVID-19 (Rule Out) 08/21/22 08/21/22 08/21/22 COVID-19, Rapid (Ordered)   08/21/22 Rule-Out Test Resulted    COVID-19 (Rule Out) 12/04/21 12/04/21 12/04/21 COVID-19, Rapid (Ordered)   12/04/21 Rule-Out Test Resulted            Nurse Assessment:  Last Vital Signs: /62   Pulse (!) 117   Temp 99.4 °F (37.4 °C) (Oral)   Resp 20   Ht 5' 2\" (1.575 m)   Wt 77 lb 4.8 oz (35.1 kg)   SpO2 92%   BMI 14.14 kg/m²     Last documented pain score (0-10 scale): Pain Level: 8  Last Weight:   Wt Readings from Last 1 Encounters:   10/06/22 77 lb 4.8 oz (35.1 kg)     Mental Status:  oriented and alert    IV Access:  - Peripheral IV - site  L Antecubital, insertion date: 10/5/22    Nursing Mobility/ADLs:  Walking   Assisted  Transfer  Assisted  Bathing  Assisted  Dressing  Assisted  Toileting  Assisted  Feeding  Independent  Med Admin  Dependent  Med Delivery   crushed and prefers mixed with applesauce    Wound Care Documentation and Therapy:        Elimination:  Continence: Bowel: Yes  Bladder: Yes  Urinary Catheter: None   Colostomy/Ileostomy/Ileal Conduit: No       Date of Last BM: 10/10/2022  No intake or output data in the 24 hours ending 10/06/22 1115  No intake/output data recorded. Safety Concerns: At Risk for Falls    Impairments/Disabilities:      None    Nutrition Therapy:  Current Nutrition Therapy:   - Oral Diet:  Full Liquid  - Oral Nutrition Supplement:  Standard  three times a day    Routes of Feeding: Oral  Liquids: Thin Liquids  Daily Fluid Restriction: no  Last Modified Barium Swallow with Video (Video Swallowing Test): not done    Treatments at the Time of Hospital Discharge:   Respiratory Treatments: Albuterol Inhaler 2 puffs TID & Q4 PRN, Duonebs Q4 PRN, Dulera 2 puffs BID  Oxygen Therapy:  is on oxygen at 3 L/min per nasal cannula.   Ventilator:    - No ventilator support  - BiPAP   IPAP: 12 cmH20, CPAP/EPAP: 6 cmH2O only when sleeping    Rehab Therapies: Physical Therapy, Occupational Therapy, and SLP  Weight Bearing Status/Restrictions: No weight bearing restrictions  Other Medical Equipment (for information only, NOT a DME order):  walker  Other Treatments: Monitor blood sugars ACHS    Patient's personal belongings (please select all that are sent with patient):  None    RN SIGNATURE:  Electronically signed by Carrie Castro RN on 10/10/22 at 3:00 PM EDT    CASE MANAGEMENT/SOCIAL WORK SECTION    Inpatient Status Date: 10/5/2022    Readmission Risk Assessment Score:  Readmission Risk              Risk of Unplanned Readmission:  0           Discharging to Facility/ Agency   Name: [facility]  Address: [address] [place] [address], [address] [place] [address]  Phone: [phone]  Fax: [phone]    Dialysis Facility (if applicable)   Name:  Address:  Dialysis Schedule:  Phone:  Fax:    / signature: Electronically signed by WEI Penaloza on 10/6/22 at 11:16 AM EDT    PHYSICIAN SECTION    Prognosis: Fair    Condition at Discharge: Stable    Rehab Potential (if transferring to Rehab): Fair    Recommended Labs or Other Treatments After Discharge: N/A    Physician Certification: I certify the above information and transfer of Ni Whitney  is necessary for the continuing treatment of the diagnosis listed and that she requires Brayden Mauricio for greater 30 days.      Update Admission H&P: No change in H&P    PHYSICIAN SIGNATURE:  Electronically signed by Brenda Fields MD on 10/11/22 at 9:01 AM EDT

## 2022-10-06 NOTE — H&P
Medicine Admission History & Physical      Patient:  Stone Hernandez  YOB: 1945  Date of Service: 10/5/2022  MRN: [id number]   Acct: [de-identified]   Primary Care Physician: Gaby Arana MD    Admitting Faculty MD: Jose Marcelino DO    Code Status: Full Code    Date of Service: Pt seen/examined in consultation on 10/5/2022     Assessment / Plan     Briefly, pt Stone Hernandez is a 65 y.o. female with a PMH of COPD, chronic respiratory failure, severe malnutrition s/p PEG placement who presented with abdominal pain. #Severe LUQ/RLQ abdominal pain: Presents with pain at PEG site and RLQ, 8-9/10. Has had severe, intermittent pain at PEG site since insertion. Yellow drainage surrounding site on examination. Patient reports BM day prior to admission. CT A/P demonstrated nonspecific gastric distention. -GI consulted due to severe pain around PEG tube following placement, eval for exchange  -Tramadol for pain management given history of constipation  -NPO    #Leukocytosis: WBC 16.5 on arrival. Procal 0.23. No clear source of infection. CXR demonstrated no acute intra-thoracic process. CT A/P demonstrated no obvious infection. ?Stress-induced in setting of >2 week history of severe pain. ABG pending. -CBC in AM    #Sinus tachycardia: Noted on EKG on arrival. Suspect related to underlying abdominal pain. No ST/T wave changes concerning for ischemia. -Monitor on telemetry     #Severe protein calorie malnutrition: Noted. Underwent PEG placement 9/22 due to decreased oral intake and inability to maintain adequate nutrition. BMI 14.08.   -Dietary consulted  -NPO at this time    #Chronic hypoxic respiratory failure: 3L O2 nc at baseline. Likely 2/2 COPD history. Presents on home O2, but noted splinting and prolonged expiration with lip pursing on examination. Notes worsening SOB at home, but denies sputum production.  Low-suspicion for COPDe at this time  -Home inhalers re-started  -Breathing treatment ordered  -Pulmonary toilet  -ABG ordered    #COPD: PFTs 2018: FEV1/FVC 0.45, severe obstruction, good bronchodilator response, severe loss of diffusion capacity. On home albuterol inhaler, Incruse Ellipta. Suspect labored respirations found on exam are chronic and not acutely worsened. -Treatment per above    #HTN: Per chart review. BP stable on arrival. No noted home anti-hypertensives. #HLD: Per chart review. Continue home Lipitor    #Pseudohyponatremia: Na 133 on arrival, corrects to 135 for hyperglycemia     Fluids: lactated Ringer's  Electrolyte: Replete as needed   Nutrition: NPO  GI: Protonix  DVT ppx:  Lovenox held in caution if patient undergoes GI procedure    Admit to: med/surg    General Medicine History and Physical     Chief Complaint:  Abdominal pain    History of Present Illness:  Yahir Jacome is a 65 y.o. female with a PMH of COPD, chronic respiratory failure, severe malnutrition s/p PEG placement who presented with abdominal pain. Patient reports a history of acutely worsening abdominal pain over the past two weeks. She recently underwent PEG placement due to severe malnutrition and has had pain around the PEG tube site since placement. She was previously admitted due to this worsening abdominal pain for which she was treated with an enema for constipation and tramadol for insertion site pain. Patient reports that her abdominal pain has worsened over the last few days, and she has had increased drainage around the insertion site of her tube. She also endorses a 1x episode of non-bloody brown emesis day prior to arrival. Patient daughter in room to assist with history states that patient has had continued abdominal pain and presented to provider at [facility] who obtained a plain film and advised patient to go to ED for further evaluation. On examination, insertion site is non-erythematous and draining green, thick liquid. Patient denies any acute chest pain, visual changes.  She does have worsening SOB over the last few weeks, but denies sputum production. Last bowel movement was two days prior to arrival. She denies any other acute complaints. Past Medical History Past Surgical History    has a past medical history of Chronic respiratory failure with hypoxia (HCC), Chronic respiratory failure with hypoxia and hypercapnia (HCC), COPD (chronic obstructive pulmonary disease) ([facility] [address] Depression, Dysarthria, Gastro-esophageal reflux disease without esophagitis, Hyperlipidemia, Hyperlipidemia, Hypothyroidism, unspecified, Lung nodules, Pneumonia, Severe malnutrition (HCC), Severe malnutrition ([address] Severe malnutrition ([address] TIA (transient ischemic attack), and Type 2 diabetes mellitus with hyperglycemia ([facility] [address] has a past surgical history that includes Tonsillectomy; Colonoscopy; Upper gastrointestinal endoscopy (N/A, 4/20/2022); and Gastrostomy tube placement (N/A, 9/21/2022). Social History Family History    reports that she quit smoking about 3 years ago. Her smoking use included cigarettes. She started smoking about 60 years ago. She has a 112.00 pack-year smoking history. She has never used smokeless tobacco. She reports that she does not currently use alcohol. She reports that she does not currently use drugs. family history includes Cancer in her mother; Other in her father.      Outpatient Medications   Current Outpatient Medications   Medication Instructions    acetaminophen (TYLENOL) 650 mg, Oral, EVERY 6 HOURS PRN    albuterol sulfate  (90 Base) MCG/ACT inhaler 2 puffs, Inhalation, EVERY 6 HOURS    ARIPiprazole (ABILIFY) 15 mg, Oral, DAILY, Give 1.5 tablet (10mg tabs) daily    aspirin 81 mg, Oral, DAILY    atorvastatin (LIPITOR) 10 mg, Oral, NIGHTLY    bisacodyl (DULCOLAX) 10 mg, Rectal, PRN    desvenlafaxine succinate (PRISTIQ) 100 mg, Oral, DAILY    docusate sodium (COLACE) 100 mg, Oral, 2 TIMES DAILY    famotidine (PEPCID) 40 mg, Oral, EVERY EVENING    Fluticasone furoate-vilanterol (BREO ELLIPTA) 200-25 MCG/INH AEPB inhaler 1 puff, Inhalation, DAILY    furosemide (LASIX) 40 mg, Oral, DAILY PRN    hydrOXYzine HCl (ATARAX) 10 mg, Oral, 2 TIMES DAILY, itching    ipratropium-albuterol (DUONEB) 0.5-2.5 (3) MG/3ML SOLN nebulizer solution 3 mLs, Inhalation, EVERY 6 HOURS PRN    magnesium hydroxide (MILK OF MAGNESIA) 400 MG/5ML suspension Oral, DAILY PRN    megestrol (MEGACE) 40 mg, Oral, 2 TIMES DAILY    melatonin 3 mg, Oral, DAILY    mirtazapine (REMERON) 30 mg, Oral, NIGHTLY    Multiple Vitamins-Minerals (THERAPEUTIC MULTIVITAMIN-MINERALS) tablet 1 tablet, Oral, DAILY    Nutritional Supplements (ENSURE HIGH PROTEIN) LIQD 1 Can, Oral, 3 TIMES DAILY WITH MEALS    omeprazole (PRILOSEC) 20 mg, Oral, DAILY    ondansetron (ZOFRAN) 4 mg, Oral, EVERY 6 HOURS PRN    OXYGEN 2-4 L, Inhalation, NIGHTLY, Nightly and prn     Pantoprazole Sodium (PROTONIX PO) 40 mg, Oral, Delayed release    polyethylene glycol (GLYCOLAX) 17 g, Oral, DAILY    sennosides-docusate sodium (SENOKOT-S) 8.6-50 MG tablet 1 tablet, Oral, DAILY    simethicone (MYLICON) 80 mg, Oral, EVERY 8 HOURS PRN, Give2 tabs PRN    tiZANidine (ZANAFLEX) 2 mg, Oral, NIGHTLY PRN    Umeclidinium Bromide (INCRUSE ELLIPTA) 62.5 MCG/INH AEPB 1 puff, Inhalation, DAILY    vitamin C 500 mg, Oral, DAILY, For 10 days d/t covid     vitamin D (CHOLECALCIFEROL) 2,000 Units, Oral, DAILY    Vitamin D3 2,000 Units, Oral, DAILY    zinc 50 mg, Oral, DAILY        Allergies   Spiriva handihaler [tiotropium bromide monohydrate]     Immunizations   Immunization History   Administered Date(s) Administered    COVID-19, PFIZER PURPLE top, DILUTE for use, (age 15 y+), 30mcg/0.3mL 01/04/2021, 01/25/2021, 09/30/2021    Influenza Virus Vaccine 10/04/2018        Review of Systems - negative except for the aforementioned    Objective     Vitals:  BP (!) 131/59   Pulse (!) 119   Temp 98 °F (36.7 °C) (Oral)   Resp 23   Ht 5' 2\" (1.575 m)   Wt 77 lb (34.9 kg) SpO2 94%   BMI 14.08 kg/m²     Exam:  General appearance: No apparent distress, appears stated age and cooperative. HEENT: Pupils equal, round, and reactive to light. Conjunctivae/corneas clear. Neck: Supple, with full range of motion. No jugular venous distention. Trachea midline. Respiratory:  Normal respiratory effort. Clear to auscultation, bilaterally without Rales/Wheezes/Rhonchi. Cardiovascular: Regular rate and rhythm with normal S1/S2 without murmurs, rubs or gallops. Abdomen: Soft, mild distention, TTP PEG tube site and RLQ. Tympanic BS  Musculoskeletal: passive and active ROM x 4 extremities. Skin: Skin color, texture, turgor normal.  No rashes or lesions. Neurologic:  Neurovascularly intact without any focal sensory/motor deficits.  Cranial nerves: II-XII intact, grossly non-focal.  Psychiatric: Alert and oriented, thought content appropriate, normal insight  Extremities: No peripheral edema noted  Capillary Refill: Brisk,< 3 seconds   Peripheral Pulses: +2 palpable, equal bilaterally        Labs:   Results for orders placed or performed during the hospital encounter of 10/05/22   CBC with Auto Differential   Result Value Ref Range    WBC 16.5 (H) 4.8 - 10.8 thou/mm3    RBC 4.42 4.20 - 5.40 mill/mm3    Hemoglobin 11.8 (L) 12.0 - 16.0 gm/dl    Hematocrit 37.2 37.0 - 47.0 %    MCV 84.2 81.0 - 99.0 fL    MCH 26.7 26.0 - 33.0 pg    MCHC 31.7 (L) 32.2 - 35.5 gm/dl    RDW-CV 19.6 (H) 11.5 - 14.5 %    RDW-SD 59.9 (H) 35.0 - 45.0 fL    Platelets 918 (H) 650 - 400 thou/mm3    MPV 10.6 9.4 - 12.4 fL    Seg Neutrophils 82.4 %    Lymphocytes 7.9 %    Monocytes 8.8 %    Eosinophils 0.2 %    Basophils 0.2 %    Immature Granulocytes 0.5 %    Segs Absolute 13.6 (H) 1.8 - 7.7 thou/mm3    Lymphocytes Absolute 1.3 1.0 - 4.8 thou/mm3    Monocytes Absolute 1.5 (H) 0.4 - 1.3 thou/mm3    Eosinophils Absolute 0.0 0.0 - 0.4 thou/mm3    Basophils Absolute 0.0 0.0 - 0.1 thou/mm3    Immature Grans (Abs) 0.08 (H) 0.00 - 0.07 thou/mm3    nRBC 0 /100 wbc   Comprehensive Metabolic Panel w/ Reflex to MG   Result Value Ref Range    Glucose 167 (H) 70 - 108 mg/dL    Creatinine 0.6 0.4 - 1.2 mg/dL    BUN 17 7 - 22 mg/dL    Sodium 133 (L) 135 - 145 meq/L    Potassium reflex Magnesium 4.1 3.5 - 5.2 meq/L    Chloride 93 (L) 98 - 111 meq/L    CO2 27 23 - 33 meq/L    Calcium 10.7 (H) 8.5 - 10.5 mg/dL    AST 16 5 - 40 U/L    Alkaline Phosphatase 66 38 - 126 U/L    Total Protein 7.2 6.1 - 8.0 g/dL    Albumin 3.5 3.5 - 5.1 g/dL    Total Bilirubin 0.4 0.3 - 1.2 mg/dL    ALT 14 11 - 66 U/L   Lipase   Result Value Ref Range    Lipase 14.1 5.6 - 51.3 U/L   Anion Gap   Result Value Ref Range    Anion Gap 13.0 8.0 - 16.0 meq/L   Glomerular Filtration Rate, Estimated   Result Value Ref Range    Est, Glom Filt Rate >90 ml/min/1.73m2   Osmolality   Result Value Ref Range    Osmolality Calc 271.7 (L) 275.0 - 300.0 mOsmol/kg   Urine with Reflexed Micro   Result Value Ref Range    Glucose, Ur NEGATIVE NEGATIVE mg/dl    Bilirubin Urine NEGATIVE NEGATIVE    Ketones, Urine TRACE (A) NEGATIVE    Specific Gravity, Urine 1.025 1.002 - 1.030    Blood, Urine NEGATIVE NEGATIVE    pH, UA 5.5 5.0 - 9.0    Protein, UA 30 (A) NEGATIVE    Urobilinogen, Urine 0.2 0.0 - 1.0 eu/dl    Nitrite, Urine NEGATIVE NEGATIVE    Leukocyte Esterase, Urine NEGATIVE NEGATIVE    Color, UA YELLOW STRAW-YELLOW    Character, Urine CLEAR CLEAR-SL CLOUD    RBC, UA 5-10 0-2/hpf /hpf    WBC, UA 2-4 0-4/hpf /hpf    Epithelial Cells, UA 0-2 3-5/hpf /hpf    Bacteria, UA NONE SEEN FEW/NONE SEEN /hpf    Casts UA NONE SEEN NONE SEEN /lpf    Crystals, UA NONE SEEN NONE SEEN    Renal Epithelial, UA NONE SEEN NONE SEEN    Yeast, UA NONE SEEN NONE SEEN    CASTS 2 NONE SEEN NONE SEEN /lpf    MISCELLANEOUS 2 NONE SEEN    EKG Emergency   Result Value Ref Range    Ventricular Rate 136 BPM    Atrial Rate 136 BPM    P-R Interval 126 ms    QRS Duration 64 ms    Q-T Interval 258 ms    QTc Calculation (Bazett) 388 ms P Axis 65 degrees    R Axis 28 degrees    T Axis 70 degrees       Diagnostics:  CT ABDOMEN PELVIS WO CONTRAST Additional Contrast? None    Result Date: 10/5/2022  CT abdomen and pelvis without contrast Comparison: 9/21/2022 Findings: Lung bases are clear. No acute bony abnormalities. Degenerative change and old vertebral compression fractures. Degenerative change noted bilateral hips. Considerable gastric distention with gastrostomy tube mid stomach. Please correlate regarding function of tube and ability to decompress. Transverse colon upper normal caliber without dilation. No seen of the get small bowel distention is demonstrated. Liver and spleen are unremarkable. Gallbladder unremarkable. Pancreas and adrenal glands are within normal limits. Bilateral kidneys demonstrate no evidence for stone or hydronephrosis. No focal renal abnormality or ureteral dilation is demonstrated. No evidence for aortic aneurysm. No free fluid or adenopathy is noted in the pelvis. No evidence for diverticulitis. Appendix not identified. Uterus normal size. No adnexal abnormalities. Impression: Nonspecific gastric distention with indwelling gastrostomy tube Please correlate clinically regarding tube function and/or ability to decompress No other significant abnormality This document has been electronically signed by: Kermit Lovett MD on 10/05/2022 09:39 PM All CTs at this facility use dose modulation techniques and iterative reconstructions, and/or weight-based dosing when appropriate to reduce radiation to a low as reasonably achievable. XR CHEST PORTABLE    Result Date: 10/5/2022  1 view chest x-ray. Comparison: 8/21/2022 Findings: Lungs are clear without acute infiltrates. No pneumothorax. Heart size normal. No acute bony abnormalities. Impression: No acute processes.  This document has been electronically signed by: Kermit Lovett MD on 10/05/2022 10:50 PM    Micro:  [id number]    Signed:  Rosina Iglesias MD  Internal Medicine, PGY-2  10/05/22  11:24 PM    Staff: Yao Yoon DO

## 2022-10-06 NOTE — ED NOTES
Pt. Resting in bed with even and unlabored respirations. Pt. States pain is a 8/10 at this time. Pt medicated per mar. Pt. Updated about plan for ct scan. Family at bedside. Pt. Has no further concerns, questions or needs at this time. Call light within reach.          Shyla Mejia RN  10/05/22 2048

## 2022-10-06 NOTE — ED NOTES
Report received from [facility], [facility]. Pt resting in bed with eyes closed. Family at bedside. No distress noted. Call light within reach.      Yony Sewell RN  10/05/22 6412

## 2022-10-06 NOTE — PROGRESS NOTES
Comprehensive Nutrition Assessment    Type and Reason for Visit:  Initial, Positive Nutrition Screen    Nutrition Recommendations/Plan:   When enteral nutrition is initiated recommend Jevity 1.5 at 70 ml/hr from 7pm to 7am with free water flush 30 ml q 4 hrs (while on IVF) or 100 ml q 4hrs (no IVF). Thanks. When diet is advanced recommend speech eval to appropriate modified diet (per ECF pt was on a mechanical soft diet with thin liquids)     Malnutrition Assessment:  Malnutrition Status:  Severe malnutrition (10/06/22 1354)    Context:  Chronic Illness     Findings of the 6 clinical characteristics of malnutrition:  Energy Intake:  No significant decrease in energy intake  Weight Loss:   (-16.5% weight loss in 9 months per EMR)     Body Fat Loss:  Severe body fat loss Orbital   Muscle Mass Loss:  Severe muscle mass loss Temples (temporalis)  Fluid Accumulation:  No significant fluid accumulation     Strength:  Not Performed    Nutrition Assessment:     When diet is advanced recommend speech eval to appropriate modified diet (per ECF pt was on a mechanical soft diet with thin liquids)  When enteral nutrition is initiated recommend Jevity 1.5 at 70 ml/hr from 7pm to 7am with free water flush 30 ml q 4 hrs (while on IVF) or 100 ml q 4hrs (no IVF). Nutrition Related Findings:    Pt. Report/Treatments/Miscellaneous: Pt seen resting in bed this am; Spoke with RN about TF and PEG tube evaluation; Pt currently NPO; TF recs provided; Recommend speech eval when able to take po.    GI Status: No BM  Pertinent Labs: Na 133, BUN 17, Cr 0.6, Glucose 187  Pertinent Meds: Vitamin C, Colace, Megace, Remeron, MVI, Protonix, Senokot, Vitamin D     Wound Type: None       Current Nutrition Intake & Therapies:    Average Meal Intake: NPO  Average Supplements Intake: NPO  Diet NPO Exceptions are: Sips of Water with Meds  Current Tube Feeding (TF) Orders:  Feeding Route: PEG  Formula: Standard with Fiber  Schedule: Continuous  Feeding Regimen: Recommend Jevity 1.5 at 70 ml/hr from 7pm to 7am  Water Flushes: Recommend free water flush 30 ml q 4 hrs (while on IVF) or 100 ml q 4hrs (no IVF)  Current TF & Flush Orders Provides: ECF Regimen; Jevity 1.2 at 70 ml/hr with free water flushes 100 ml q 4 hrs (7pm to 7 am); 1008 kcal, 47 grams protein, 142 grams CHO, 14 grams fiber, 678 ml free water (978 w/ flushes) in total volume 840 ml in 12 hours  Goal TF & Flush Orders Provides: Jevity 1.5 at 70 ml/hr; 1260 kcal, 54 grams protein, 181 grams CHO, 18 grams fiber, 638 ml free water (938 w/ flushes) in total volume 840 ml in 12 hours    Anthropometric Measures:  Height: 5' 2\" (157.5 cm)  Ideal Body Weight (IBW): 110 lbs (50 kg)    Admission Body Weight: 77 lb 4.8 oz (35.1 kg)  Current Body Weight: 77 lb 4.8 oz (35.1 kg) (No Edema), 70.3 % IBW.  Weight Source: Standing Scale  Current BMI (kg/m2): 14.1        Weight Adjustment For: No Adjustment                 BMI Categories: Underweight (BMI less than 22) age over 72    Estimated Daily Nutrient Needs:  Energy Requirements Based On: Kcal/kg  Weight Used for Energy Requirements: Current (35.1 kg)  Energy (kcal/day): 5592-6079 (35-40 g/kg)  Weight Used for Protein Requirements: Current (35.1 kg)  Protein (g/day): 53 grams or more (1.2 g/kg)  Method Used for Fluid Requirements: 1 ml/kcal  Fluid (ml/day): 5672-0268 ml    Nutrition Diagnosis:   Severe malnutrition related to inadequate protein-energy intake as evidenced by Criteria as identified in malnutrition assessment    Nutrition Interventions:   Food and/or Nutrient Delivery:  (Start TF when appropriate and initiated po diet)  Nutrition Education/Counseling: Education not indicated  Coordination of Nutrition Care: Continue to monitor while inpatient       Goals:     Goals: Meet at least 75% of estimated needs, by next RD assessment       Nutrition Monitoring and Evaluation:   Behavioral-Environmental Outcomes: None Identified  Food/Nutrient Intake Outcomes: Diet Advancement/Tolerance, Enteral Nutrition Intake/Tolerance  Physical Signs/Symptoms Outcomes: Biochemical Data, GI Status, Skin, Weight, Fluid Status or Edema    Discharge Planning:     Too soon to determine     Rosa Maria Fraser, Aubree N [address] [place] [address]  Contact: [phone]

## 2022-10-06 NOTE — PROGRESS NOTES
PROGRESS NOTE      Patient:  Wes Anthony    Unit/Bed:8B-33/033-A    YOB: 1945    MRN: [id number]     Acct: [de-identified]    PCP: Kwaku Agarwal MD    Date of Admission: 10/5/2022 LOS: 0    Date of Evaluation:  10/6/2022    Anticipated Discharge: Pending clinical course    Assessment/Plan:    Chronic hypoxemic respiratory failure  On 3L at baseline, due to history of COPD, not in acute exacerbation  -Continue home inhalers  -Pulmonary hygiene  -ABG supports hypoxemia     SIRS  On admission presented with leukocytosis 16.5, tachypnea 25, and tachycardia 139  Procal, and lactic normal, low suspicion for sepsis  -blood cultures x 2 pending  -COVID and influenza negative   -G-tube culture pending    Severe abdominal pain  At PEG site, tenderness to palpation at site. Mucus and erythema present at insertion site  CT scan(10/5): Nonspecific gastric distention with indwelling G-tube  PEG tube fell out, GI notified. -NPO, changed meds to IV  -General surgery consulted for second opinion    Leukocytosis  On arrival WBC 16.5,   Considering PEG tube infection, culture pending  CXR(10/5): no acute process   -Monitor with CBC     Sinus tachycardia  HR on arrival, likely due to dehydration  UA without signs of infections, positive for ketones  -Improvement with IV hydration     Severe protein calorie malnutrition  Previously poor PO intake, BMI 14.14  Dietitian consulted  -NPO    COPD  PFTs 2018: FEV1/FVC 0.45  -On albuterol and breo ellipta  -Monitor respiratory status    HTN   Stable, no noted home medications    HLD  Stable, home Lipitor     Mild Hyponatremia  Due to poor PO intake  Dietitian consulted, continue IV fluids. -Monitor CMP          Chief Complaint: abdominal pain    Hospital Course: Per initial H&P    \"Queenie Lezama is a 65 y.o. female with a PMH of COPD, chronic respiratory failure, severe malnutrition s/p PEG placement who presented with abdominal pain.      Patient reports a history of acutely worsening abdominal pain over the past two weeks. She recently underwent PEG placement due to severe malnutrition and has had pain around the PEG tube site since placement. She was previously admitted due to this worsening abdominal pain for which she was treated with an enema for constipation and tramadol for insertion site pain. Patient reports that her abdominal pain has worsened over the last few days, and she has had increased drainage around the insertion site of her tube. She also endorses a 1x episode of non-bloody brown emesis day prior to arrival. Patient daughter in room to assist with history states that patient has had continued abdominal pain and presented to provider at [facility] who obtained a plain film and advised patient to go to ED for further evaluation. On examination, insertion site is non-erythematous and draining green, thick liquid. Patient denies any acute chest pain, visual changes. She does have worsening SOB over the last few weeks, but denies sputum production. Last bowel movement was two days prior to arrival. She denies any other acute complaints. \"    Subjective/HPI:   Fredy Agosto seen and examined at bedside. Daughter at bedside. Abdominal pain only at the PEG tube site. GI contacted. Patient otherwise comfortable. She denies headache, SOB, CP, N/V/D.       PMH, SURGICAL HX, FH, SOCIAL HX reviewed and updated as needed.     Medications:  Reviewed    Infusion Medications    sodium chloride      sodium chloride 75 mL/hr at 10/06/22 1230     Scheduled Medications    albuterol sulfate HFA  2 puff Inhalation Q6H    ARIPiprazole  15 mg Oral Daily    vitamin C  500 mg Oral Daily    aspirin  81 mg Oral Daily    atorvastatin  10 mg Oral Nightly    Vitamin D  2,000 Units Oral Daily    desvenlafaxine succinate  100 mg Oral Daily    docusate sodium  100 mg Oral BID    mometasone-formoterol  2 puff Inhalation BID    hydrOXYzine HCl  10 mg Oral BID    megestrol  40 mg Oral BID    melatonin 3 mg Oral Daily    mirtazapine  30 mg Oral Nightly    multivitamin  1 tablet Oral Daily    pantoprazole  40 mg Oral QAM AC    sennosides-docusate sodium  1 tablet Oral Daily    sodium chloride flush  10 mL IntraVENous 2 times per day    [Held by provider] enoxaparin  30 mg SubCUTAneous Daily    HYDROcodone 5 mg - acetaminophen  1 tablet Oral 3 times per day     PRN Meds: bisacodyl, sodium chloride flush, sodium chloride, ondansetron **OR** ondansetron, polyethylene glycol, acetaminophen **OR** acetaminophen, traMADol **OR** traMADol, ibuprofen, ipratropium-albuterol, diatrizoate meglumine-sodium    No intake or output data in the 24 hours ending 10/06/22 1316    Exam:  /65   Pulse (!) 109   Temp 99.4 °F (37.4 °C) (Oral)   Resp 18   Ht 5' 2\" (1.575 m)   Wt 77 lb 4.8 oz (35.1 kg)   SpO2 100%   BMI 14.14 kg/m²     Physical Exam  Vitals reviewed. Constitutional:       General: She is not in acute distress. Appearance: Normal appearance. She is not ill-appearing or toxic-appearing. HENT:      Head: Normocephalic and atraumatic. Right Ear: External ear normal.      Left Ear: External ear normal.      Nose: Nose normal.   Eyes:      General:         Right eye: No discharge. Left eye: No discharge. Conjunctiva/sclera: Conjunctivae normal.   Cardiovascular:      Rate and Rhythm: Regular rhythm. Tachycardia present. Pulses: Normal pulses. Heart sounds: Normal heart sounds. No murmur heard. Pulmonary:      Effort: Pulmonary effort is normal. No respiratory distress. Breath sounds: Normal breath sounds. No wheezing or rales. Abdominal:      General: Abdomen is flat. Bowel sounds are normal.      Palpations: Abdomen is soft. Tenderness: There is abdominal tenderness (at PEG tube site). Comments: Slight mucus and erythema around insertion site. Musculoskeletal:         General: Normal range of motion. Cervical back: Normal range of motion and neck supple. Skin:     General: Skin is warm and dry. Capillary Refill: Capillary refill takes less than 2 seconds. Findings: No rash. Neurological:      General: No focal deficit present. Mental Status: She is alert. Psychiatric:         Mood and Affect: Mood normal.         Thought Content: Thought content normal.      All labs reviewed and interpreted by me:  Labs:   Recent Labs     10/05/22  2015   WBC 16.5*   HGB 11.8*   HCT 37.2   *     Recent Labs     10/05/22  2015   *   K 4.1   CL 93*   CO2 27   BUN 17   CREATININE 0.6   CALCIUM 10.7*     Recent Labs     10/05/22  2015   AST 16   ALT 14   BILITOT 0.4   ALKPHOS 66     No results for input(s): INR in the last 72 hours. No results for input(s): Pamla Clines in the last 72 hours. Urinalysis:      Lab Results   Component Value Date/Time    NITRU NEGATIVE 10/05/2022 09:33 PM    WBCUA 2-4 10/05/2022 09:33 PM    BACTERIA NONE SEEN 10/05/2022 09:33 PM    RBCUA 5-10 10/05/2022 09:33 PM    BLOODU NEGATIVE 10/05/2022 09:33 PM    SPECGRAV 1.009 11/16/2019 05:27 PM    GLUCOSEU NEGATIVE 10/05/2022 09:33 PM       All radiology images and reports reviewed and interpreted by me:  Radiology:  XR CHEST PORTABLE   Final Result   Impression:      No acute processes. This document has been electronically signed by: Miranda Grimm MD on    10/05/2022 10:50 PM      CT ABDOMEN PELVIS WO CONTRAST Additional Contrast? None   Final Result   Impression:      Nonspecific gastric distention with indwelling gastrostomy tube      Please correlate clinically regarding tube function and/or ability to    decompress      No other significant abnormality      This document has been electronically signed by: Miranda Grimm MD on    10/05/2022 09:39 PM      All CTs at this facility use dose modulation techniques and iterative    reconstructions, and/or weight-based dosing   when appropriate to reduce radiation to a low as reasonably achievable. Diet: Diet NPO Exceptions are: Sips of Water with Meds      DVT prophylaxis: [x] Lovenox                                 [] SCDs                                 [] SQ Heparin                                 [] Encourage ambulation           [] Already on Anticoagulation     Disposition:    [] Home       [] TCU       [] Rehab       [] Psych       [x] SNF       [] [place]       [] Other-    Code Status: Full Code    PT/OT Eval Status: Consulted      Electronically signed by Karen Flores MD on 10/6/2022 at 1:16 PM

## 2022-10-07 LAB
ANION GAP SERPL CALCULATED.3IONS-SCNC: 12 MEQ/L (ref 8–16)
BASOPHILS # BLD: 0.4 %
BASOPHILS ABSOLUTE: 0 THOU/MM3 (ref 0–0.1)
BUN BLDV-MCNC: 17 MG/DL (ref 7–22)
CALCIUM SERPL-MCNC: 8.8 MG/DL (ref 8.5–10.5)
CHLORIDE BLD-SCNC: 103 MEQ/L (ref 98–111)
CO2: 23 MEQ/L (ref 23–33)
CREAT SERPL-MCNC: 0.5 MG/DL (ref 0.4–1.2)
EOSINOPHIL # BLD: 3.2 %
EOSINOPHILS ABSOLUTE: 0.3 THOU/MM3 (ref 0–0.4)
ERYTHROCYTE [DISTWIDTH] IN BLOOD BY AUTOMATED COUNT: 19.5 % (ref 11.5–14.5)
ERYTHROCYTE [DISTWIDTH] IN BLOOD BY AUTOMATED COUNT: 63.1 FL (ref 35–45)
GFR SERPL CREATININE-BSD FRML MDRD: > 90 ML/MIN/1.73M2
GLUCOSE BLD-MCNC: 67 MG/DL (ref 70–108)
HCT VFR BLD CALC: 30.6 % (ref 37–47)
HEMOGLOBIN: 9.1 GM/DL (ref 12–16)
IMMATURE GRANS (ABS): 0.03 THOU/MM3 (ref 0–0.07)
IMMATURE GRANULOCYTES: 0.4 %
LYMPHOCYTES # BLD: 18.3 %
LYMPHOCYTES ABSOLUTE: 1.5 THOU/MM3 (ref 1–4.8)
MCH RBC QN AUTO: 26.5 PG (ref 26–33)
MCHC RBC AUTO-ENTMCNC: 29.7 GM/DL (ref 32.2–35.5)
MCV RBC AUTO: 89 FL (ref 81–99)
MONOCYTES # BLD: 9.8 %
MONOCYTES ABSOLUTE: 0.8 THOU/MM3 (ref 0.4–1.3)
NUCLEATED RED BLOOD CELLS: 0 /100 WBC
PLATELET # BLD: 316 THOU/MM3 (ref 130–400)
PMV BLD AUTO: 10.1 FL (ref 9.4–12.4)
POTASSIUM REFLEX MAGNESIUM: 4 MEQ/L (ref 3.5–5.2)
RBC # BLD: 3.44 MILL/MM3 (ref 4.2–5.4)
SEG NEUTROPHILS: 67.9 %
SEGMENTED NEUTROPHILS ABSOLUTE COUNT: 5.6 THOU/MM3 (ref 1.8–7.7)
SODIUM BLD-SCNC: 138 MEQ/L (ref 135–145)
WBC # BLD: 8.2 THOU/MM3 (ref 4.8–10.8)

## 2022-10-07 PROCEDURE — 94640 AIRWAY INHALATION TREATMENT: CPT

## 2022-10-07 PROCEDURE — 36415 COLL VENOUS BLD VENIPUNCTURE: CPT

## 2022-10-07 PROCEDURE — 97535 SELF CARE MNGMENT TRAINING: CPT

## 2022-10-07 PROCEDURE — 94760 N-INVAS EAR/PLS OXIMETRY 1: CPT

## 2022-10-07 PROCEDURE — 85025 COMPLETE CBC W/AUTO DIFF WBC: CPT

## 2022-10-07 PROCEDURE — 80048 BASIC METABOLIC PNL TOTAL CA: CPT

## 2022-10-07 PROCEDURE — G0378 HOSPITAL OBSERVATION PER HR: HCPCS

## 2022-10-07 PROCEDURE — 96366 THER/PROPH/DIAG IV INF ADDON: CPT

## 2022-10-07 PROCEDURE — C9113 INJ PANTOPRAZOLE SODIUM, VIA: HCPCS

## 2022-10-07 PROCEDURE — 6360000002 HC RX W HCPCS

## 2022-10-07 PROCEDURE — 96375 TX/PRO/DX INJ NEW DRUG ADDON: CPT

## 2022-10-07 PROCEDURE — 6370000000 HC RX 637 (ALT 250 FOR IP): Performed by: FAMILY MEDICINE

## 2022-10-07 PROCEDURE — 99231 SBSQ HOSP IP/OBS SF/LOW 25: CPT | Performed by: FAMILY MEDICINE

## 2022-10-07 PROCEDURE — 6370000000 HC RX 637 (ALT 250 FOR IP)

## 2022-10-07 PROCEDURE — 2580000003 HC RX 258

## 2022-10-07 PROCEDURE — 94669 MECHANICAL CHEST WALL OSCILL: CPT

## 2022-10-07 PROCEDURE — 2700000000 HC OXYGEN THERAPY PER DAY

## 2022-10-07 PROCEDURE — G0378 HOSPITAL OBSERVATION PER HR: HCPCS | Performed by: FAMILY MEDICINE

## 2022-10-07 PROCEDURE — 6360000002 HC RX W HCPCS: Performed by: NURSE PRACTITIONER

## 2022-10-07 PROCEDURE — 6360000002 HC RX W HCPCS: Performed by: INTERNAL MEDICINE

## 2022-10-07 PROCEDURE — 97110 THERAPEUTIC EXERCISES: CPT

## 2022-10-07 PROCEDURE — 2580000003 HC RX 258: Performed by: NURSE PRACTITIONER

## 2022-10-07 PROCEDURE — 96376 TX/PRO/DX INJ SAME DRUG ADON: CPT

## 2022-10-07 RX ADMIN — SODIUM CHLORIDE: 9 INJECTION, SOLUTION INTRAVENOUS at 05:10

## 2022-10-07 RX ADMIN — SODIUM CHLORIDE, PRESERVATIVE FREE 10 ML: 5 INJECTION INTRAVENOUS at 19:38

## 2022-10-07 RX ADMIN — MOMETASONE FUROATE AND FORMOTEROL FUMARATE DIHYDRATE 2 PUFF: 200; 5 AEROSOL RESPIRATORY (INHALATION) at 19:04

## 2022-10-07 RX ADMIN — Medication 2 PUFF: at 19:04

## 2022-10-07 RX ADMIN — HYDROMORPHONE HYDROCHLORIDE 0.5 MG: 1 INJECTION, SOLUTION INTRAMUSCULAR; INTRAVENOUS; SUBCUTANEOUS at 10:44

## 2022-10-07 RX ADMIN — PIPERACILLIN AND TAZOBACTAM 3375 MG: 3; .375 INJECTION, POWDER, FOR SOLUTION INTRAVENOUS at 13:51

## 2022-10-07 RX ADMIN — Medication 2 PUFF: at 16:15

## 2022-10-07 RX ADMIN — HYDROCODONE BITARTRATE AND ACETAMINOPHEN 1 TABLET: 5; 325 TABLET ORAL at 22:04

## 2022-10-07 RX ADMIN — Medication 2 PUFF: at 10:32

## 2022-10-07 RX ADMIN — SODIUM CHLORIDE: 9 INJECTION, SOLUTION INTRAVENOUS at 11:42

## 2022-10-07 RX ADMIN — HYDROCODONE BITARTRATE AND ACETAMINOPHEN 1 TABLET: 5; 325 TABLET ORAL at 13:51

## 2022-10-07 RX ADMIN — HYDROMORPHONE HYDROCHLORIDE 0.5 MG: 1 INJECTION, SOLUTION INTRAMUSCULAR; INTRAVENOUS; SUBCUTANEOUS at 19:37

## 2022-10-07 RX ADMIN — SODIUM CHLORIDE: 9 INJECTION, SOLUTION INTRAVENOUS at 19:34

## 2022-10-07 RX ADMIN — PIPERACILLIN AND TAZOBACTAM 3375 MG: 3; .375 INJECTION, POWDER, FOR SOLUTION INTRAVENOUS at 20:56

## 2022-10-07 RX ADMIN — PIPERACILLIN AND TAZOBACTAM 3375 MG: 3; .375 INJECTION, POWDER, FOR SOLUTION INTRAVENOUS at 05:51

## 2022-10-07 RX ADMIN — SODIUM CHLORIDE, PRESERVATIVE FREE 10 ML: 5 INJECTION INTRAVENOUS at 09:27

## 2022-10-07 RX ADMIN — TRAMADOL HYDROCHLORIDE 100 MG: 50 TABLET, COATED ORAL at 11:45

## 2022-10-07 RX ADMIN — Medication 2 PUFF: at 06:40

## 2022-10-07 RX ADMIN — MOMETASONE FUROATE AND FORMOTEROL FUMARATE DIHYDRATE 2 PUFF: 200; 5 AEROSOL RESPIRATORY (INHALATION) at 10:32

## 2022-10-07 RX ADMIN — PANTOPRAZOLE SODIUM 40 MG: 40 INJECTION, POWDER, FOR SOLUTION INTRAVENOUS at 09:26

## 2022-10-07 ASSESSMENT — PAIN - FUNCTIONAL ASSESSMENT
PAIN_FUNCTIONAL_ASSESSMENT: PREVENTS OR INTERFERES SOME ACTIVE ACTIVITIES AND ADLS
PAIN_FUNCTIONAL_ASSESSMENT: PREVENTS OR INTERFERES SOME ACTIVE ACTIVITIES AND ADLS

## 2022-10-07 ASSESSMENT — PAIN SCALES - GENERAL
PAINLEVEL_OUTOF10: 4
PAINLEVEL_OUTOF10: 4
PAINLEVEL_OUTOF10: 6
PAINLEVEL_OUTOF10: 5
PAINLEVEL_OUTOF10: 4
PAINLEVEL_OUTOF10: 8
PAINLEVEL_OUTOF10: 0
PAINLEVEL_OUTOF10: 5
PAINLEVEL_OUTOF10: 7
PAINLEVEL_OUTOF10: 6
PAINLEVEL_OUTOF10: 5
PAINLEVEL_OUTOF10: 7

## 2022-10-07 ASSESSMENT — PAIN SCALES - WONG BAKER
WONGBAKER_NUMERICALRESPONSE: 4
WONGBAKER_NUMERICALRESPONSE: 4
WONGBAKER_NUMERICALRESPONSE: 6
WONGBAKER_NUMERICALRESPONSE: 4
WONGBAKER_NUMERICALRESPONSE: 4
WONGBAKER_NUMERICALRESPONSE: 6
WONGBAKER_NUMERICALRESPONSE: 6
WONGBAKER_NUMERICALRESPONSE: 2
WONGBAKER_NUMERICALRESPONSE: 4

## 2022-10-07 ASSESSMENT — PAIN DESCRIPTION - DESCRIPTORS
DESCRIPTORS: SHARP
DESCRIPTORS: ACHING
DESCRIPTORS: SHARP
DESCRIPTORS: ACHING

## 2022-10-07 ASSESSMENT — PAIN DESCRIPTION - LOCATION
LOCATION: ABDOMEN
LOCATION: ABDOMEN
LOCATION: ABDOMEN;INCISION
LOCATION: ABDOMEN
LOCATION: ABDOMEN

## 2022-10-07 ASSESSMENT — PAIN DESCRIPTION - ORIENTATION
ORIENTATION: LEFT;UPPER

## 2022-10-07 ASSESSMENT — PAIN DESCRIPTION - PAIN TYPE: TYPE: ACUTE PAIN

## 2022-10-07 NOTE — PROGRESS NOTES
[facility] 8B  Occupational Therapy  Daily Note  Time:    Time In:   Time Out: 5560  Timed Code Treatment Minutes: 24 Minutes  Minutes: 24          Date: 10/7/2022  Patient Name: Enedelia Sexton,   Gender: female      Room: 8B-33/033-A  MRN: [id number]  : 1945  (65 y.o.)  Referring Practitioner: Medardo Luevano DO  Diagnosis: gastric distention  Additional Pertinent Hx: Per H&P: Enedelia Sexton is a 65 y.o. female with a PMH of COPD, chronic respiratory failure, severe malnutrition s/p PEG placement who presented with abdominal pain. Patient reports a history of acutely worsening abdominal pain over the past two weeks. She recently underwent PEG placement due to severe malnutrition and has had pain around the PEG tube site since placement. She was previously admitted due to this worsening abdominal pain for which she was treated with an enema for constipation and tramadol for insertion site pain. Patient reports that her abdominal pain has worsened over the last few days, and she has had increased drainage around the insertion site of her tube. Restrictions/Precautions:  Restrictions/Precautions: General Precautions, Fall Risk, Isolation  Position Activity Restriction  Other position/activity restrictions: PEG, 3L O2 at baseline      SUBJECTIVE: Pt with call light on upon therapist arrival. Pt stating she needed to use bathroom. Pt agreeable to OT Session and allowing this therapist to assist     PAIN: 0 /10:      Vitals: Oxygen: Pt on O2 throughout of 3L. Pt with no complaints of increased SOB during     COGNITION: WNL    ADL:   Grooming: Contact Guard Assistance. Standing at sink to wash hands with education on walker safety to keep walker in front of her at all times  Lower Extremity Dressin Michelle [address]. For socks   Toileting: Minimal Assistance.   For assistance during clothing management after 1 cue provided to initiate pulling depends down   Toilet Transfer: Contact Guard Assistance. Form BSC over toilet . BALANCE:  Sitting Balance:  Stand By Assistance. At EOB in prep for standing and ambulation   Standing Balance: Contact Guard Assistance. During bilateral hand release during clothing management     BED MOBILITY:  Supine to Sit: Stand By Assistance    Sit to Supine: Stand By Assistance      TRANSFERS:  Sit to Stand:  [employer]. From EOB with education on kenny placement during for increased safety   Stand to Sit: [employer]. Onto EOB with education on safty with reaching back for surface sitting on     FUNCTIONAL MOBILITY:  Assistive Device: Rolling Walker  Assist Level:  Minimal Assistance. Distance: To and from bathroom  Unsteady throughout with cues for safety      ADDITIONAL ACTIVITIES:  Pt educated on bilateral UE AROM ex to increase her UB strength and endurance for ADL tasks. Pt verbalizing and demo understanding completing ex with rest breaks between. ASSESSMENT:     Activity Tolerance:  Patient tolerance of  treatment: fair. Discharge Recommendations: Subacute/skilled nursing facility  Equipment Recommendations: Equipment Needed: No  Other: defer to next level of care  Plan: Times Per Week: 3-5x  Current Treatment Recommendations: Strengthening, Balance training, Functional mobility training, Endurance training, Safety education & training, Patient/Caregiver education & training, Equipment evaluation, education, & procurement, Self-Care / ADL    Patient Education  Patient Education:  safety with transfers and mobility using W    Goals  Short Term Goals  Time Frame for Short Term Goals: by discharge  Short Term Goal 1: Pt will tolerate sitting at EOB X10 minutes with supervision in prep for ADL completion. Short Term Goal 2: Pt will complete BADL tasks with minimal assistance and ECT prn to increase independence with self care tasks.   Short Term Goal 3: Pt will tolerate further assessment of transfers/functional mobility by OTR when appropriate. Revised Short-Term Goals  Short Term Goals  Time Frame for Short Term Goals: by discharge  Short Term Goal 1: Pt will tolerate sitting at EOB X10 minutes with supervision in prep for ADL completion. Short Term Goal 2: Pt will complete BADL tasks with minimal assistance and ECT prn to increase independence with self care tasks. Short Term Goal 3: Pt to navigate to/from bathroom using AD with CGA and no cues for mobility to complete ADL tasks  Short Term Goal 4: Pt to demo dyanmic standing balance with no UE support and CGA to complete clothign management after dressign and toileting tasks    Following session, patient left in safe position with all fall risk precautions in place.

## 2022-10-07 NOTE — PLAN OF CARE
Problem: Respiratory - Adult  Goal: Achieves optimal ventilation and oxygenation  Outcome: Progressing     Inhalers to improve breath sounds and for lung maintenance. Patient currently on 3LNC (home dose). Patient mutually agreed on goals.

## 2022-10-07 NOTE — CARE COORDINATION
10/7/22, 2:13 PM EDT    DISCHARGE ON GOING EVALUATION    Via [place]obi 32 day: 0  Location: -33/033-A Reason for admit: Gastric distention [K31.89]   Procedure: No.  Barriers to Discharge: GI has seen. NPO. Consult to Gen Surg. IVF at 125/hr. Zosyn IV. PCP: Kati Resendez MD  Patient Goals/Plan/Treatment Preferences: Return to [facility] pending pre-cert and medical readiness.

## 2022-10-07 NOTE — CARE COORDINATION
10/7/22, 9:28 AM EDT    DISCHARGE PLANNING EVALUATION    Spoke with Saroj Goldman at [facility], requested pre-cert to start as patient should be ready today or tomorrow.

## 2022-10-07 NOTE — RT PROTOCOL NOTE
RT Inhaler-Nebulizer Bronchodilator Protocol Note    There is a bronchodilator order in the chart from a provider indicating to follow the RT Bronchodilator Protocol and there is an Initiate RT Inhaler-Nebulizer Bronchodilator Protocol order as well (see protocol at bottom of note). CXR Findings:  XR CHEST PORTABLE    Result Date: 10/5/2022  Impression: No acute processes. This document has been electronically signed by: Maday Candelaria MD on 10/05/2022 10:50 PM      The findings from the last RT Protocol Assessment were as follows:   History Pulmonary Disease: Chronic pulmonary disease  Respiratory Pattern: Dyspnea on exertion or RR 21-25 bpm  Breath Sounds: Slightly diminished and/or crackles  Cough: Strong, spontaneous, non-productive  Indication for Bronchodilator Therapy: Decreased or absent breath sounds  Bronchodilator Assessment Score: 6    Aerosolized bronchodilator medication orders have been revised according to the RT Inhaler-Nebulizer Bronchodilator Protocol below. Respiratory Therapist to perform RT Therapy Protocol Assessment initially then follow the protocol. Repeat RT Therapy Protocol Assessment PRN for score 0-3 or on second treatment, BID, and PRN for scores above 3. No Indications - adjust the frequency to every 6 hours PRN wheezing or bronchospasm, if no treatments needed after 48 hours then discontinue using Per Protocol order mode. If indication present, adjust the RT bronchodilator orders based on the Bronchodilator Assessment Score as indicated below. Use Inhaler orders unless patient has one or more of the following: on home nebulizer, not able to hold breath for 10 seconds, is not alert and oriented, cannot activate and use MDI correctly, or respiratory rate 25 breaths per minute or more, then use the equivalent nebulizer order(s) with same Frequency and PRN reasons based on the score.   If a patient is on this medication at home then do not decrease Frequency below that used at home. 0-3 - enter or revise RT bronchodilator order(s) to equivalent RT Bronchodilator order with Frequency of every 4 hours PRN for wheezing or increased work of breathing using Per Protocol order mode. 4-6 - enter or revise RT Bronchodilator order(s) to two equivalent RT bronchodilator orders with one order with BID Frequency and one order with Frequency of every 4 hours PRN wheezing or increased work of breathing using Per Protocol order mode. 7-10 - enter or revise RT Bronchodilator order(s) to two equivalent RT bronchodilator orders with one order with TID Frequency and one order with Frequency of every 4 hours PRN wheezing or increased work of breathing using Per Protocol order mode. 11-13 - enter or revise RT Bronchodilator order(s) to one equivalent RT bronchodilator order with QID Frequency and an Albuterol order with Frequency of every 4 hours PRN wheezing or increased work of breathing using Per Protocol order mode. Greater than 13 - enter or revise RT Bronchodilator order(s) to one equivalent RT bronchodilator order with every 4 hours Frequency and an Albuterol order with Frequency of every 2 hours PRN wheezing or increased work of breathing using Per Protocol order mode. RT to enter RT Home Evaluation for COPD & MDI Assessment order using Per Protocol order mode.     Electronically signed by Katia Frye RCP on 10/7/2022 at 7:21 PM

## 2022-10-07 NOTE — PLAN OF CARE
Problem: Respiratory - Adult  Goal: Clear lung sounds  Outcome: Progressing     Problem: Respiratory - Adult  Goal: Achieves optimal ventilation and oxygenation  10/7/2022 1040 by Jacek Stringer RCP  Outcome: Progressing   Patient mutually agreed on goals.

## 2022-10-07 NOTE — CONSULTS
of  27, AST 16, ALT 66, total protein 7.12, albumin 3.5, total bilirubin  0.4, lipase 14.1. CT scan of the abdomen and pelvis was done, which was  reported by  the radiologist as nonspecific gastric distention with an  indwelling gastrostomy tube in place. Please correlate clinically  regarding tube function and inability to decompress. No other  significant abnormality. The CT scan did not show any free air and also  reported as no free fluid or adenopathy. Subsequently, the patient was  admitted to the hospital and we are consulted for further evaluation. I  have asked the nurse that _____ is that in the patient's condition, I  advised her to get the Gastrografin via the G-tube or position of the  G-tube, and she did _____ back stating that the G-tube came out and they  tried to do the gastrostomy tube and also she mentioned to me that there  is increased discharge from the G-tube site. I advised her to get the  antibiotics from the attending physician. During my evaluation, the  patient's daughter is at bedside. PAST MEDICAL HISTORY:  Chronic respiratory failure with hypoxia, chronic  obstructive pulmonary disease, depression, dysarthria, gastroesophageal  reflux disease, hyperlipidemia, hypothyroidism, lung nodules, pneumonia,  severe malnutrition, transient ischemic attack, and type 2 diabetes  mellitus. PAST SURGICAL HISTORY:  Tonsillectomy, colonoscopy, EGD, gastrostomy  tube placement by me on 09/21/2022.     MEDICATIONS:  Acetaminophen 650 mg orally every six hours p.r.n.,  Albuterol Sulfate HFA 2 puffs inhalation every six hours, aripiprazole,  Abilify 15 mg 1.5 tablets daily, aspirin 81 mg orally daily,  atorvastatin 10 mg orally at night, bisacodyl 10 mg rectally,  desvenlafaxine succinate 100 mg orally daily, docusate sodium, Colace  100 mg orally two times daily, famotidine 40 mg orally every evening,  fluticasone, vilanterol one puff inhalation daily, furosemide 40 mg  daily, hydroxyzine 10 mg two times daily, ipratropium/albuterol DuoNeb 3  mL inhalation every six hours, magnesium hydroxide 400 mg orally daily,  megestrol 40 mg orally two times daily, mirtazapine (Remeron) 30 mg at  night, melatonin 3 mg orally daily, multivitamin mineral one tablet  orally daily, nutritional supplement one can orally three times daily,  ondansetron 4 mg orally every six hours p.r.n., oxygen 2 to 4 liters  inhalation, pantoprazole 40 mg orally delayed release capsule,  polyethylene glycol 17 g orally daily, Senokot/docusate sodium one  tablet orally daily, simethicone 80 mg orally every eight hours,  tizanidine (Zanaflex) 2 mg at night, umeclidinium bromide one puff  inhalation daily, vitamin c 500 mg orally daily, vitamin D, vitamin D3,  zinc.    ALLERGIES:  SPIRIVA HAND INHALER. SOCIAL HISTORY:  History of tobacco, quit smoking three years ago. Her  smoking include cigarettes, about 60. She started smoking 60 years ago. She has 112-pack years of smoking. No alcohol. No other illicit drug  use. FAMILY HISTORY:  Family history of cancer in her mother and her father. REVIEW OF SYSTEMS:  14-point review of systems was reviewed. Pertinent  positives as mentioned in the HPI and past medical history, otherwise,  non-contributory. PHYSICAL EXAMINATION:  VITAL SIGNS:  Blood pressure 131/59, pulse 119, temperature 98,  respiratory rate 23, weight 34.9 kg, BMI 14.08 kg/m2. GENERAL:  A 65year-old pleasant female lying in bed, appears to be in  no acute distress, complains of pain in the right upper quadrant. HEENT:  Normocephalic, atraumatic. Pupils are equal, round, and  reactive to light. Anicteric sclerae. No erythema of oropharynx. NECK:  Supple, no JVD. No thyromegaly. CHEST:  No axillary or supraclavicular adenopathy. LUNGS:  Equal to expansion on inspection. No rales. HEART:  Regular. Normal S1 and S2. No S3 or murmurs. ABDOMEN:  Soft.   Distention at the previous PEG tube site noted.  Tenderness at the previous PEG tube on deep palpation. Tympanic bowel  sounds. No rebound or guarding. RECTAL EXAMINATION:  Deferred. EXTREMITIES: No clubbing, cyanosis, or edema. SKIN:  No skin rash. NEUROLOGIC:  Generalized weakness. DIAGNOSTIC DATA:  As in HPI. IMPRESSION:  A 19-year-old pleasant female who has abdominal pain. The  patient had G-tube placed on 09/21/2022, and the patient had discharge  from the G-tube site. She had CT scan, which did not show any free air  or free fluid, and the G-tube was dislodged before Gastrografin studies  because of the pain. Definitely, the patient needs further evaluation. RECOMMENDATIONS:  My recommendations at this time, keep the patient  nothing by mouth. Place the patient on Zosyn 3.375 gm IV q.8 hours,  keep nothing by mouth, and also get the surgical consult for opinion  regarding the pain and also consider total parenteral nutrition  tomorrow. All the questions were answered up to the patient's daughter  satisfaction. Thank you Dr. Michelle Marcial for letting me see the patient. Do not hesitate to  call me if you have any questions. My recommendations are above. Renuka Amor M.D.    D: 10/06/2022 16:16:28       T: 10/06/2022 21:27:33     [email or web address]  Job#: [id number]     Doc#: [id number]    CC:  Mort Cowden PA-C Rudean Pica M.D.

## 2022-10-07 NOTE — PROGRESS NOTES
PROGRESS NOTE      Patient:  Wes Anthony    Unit/Bed:8B-33/033-A    YOB: 1945    MRN: [id number]     Acct: [de-identified]    PCP: Kwaku Agarwal MD    Date of Admission: 10/5/2022 LOS: 2    Date of Evaluation:  10/7/2022    Anticipated Discharge: Pending clinical course/ PEG tube placement     Assessment/Plan:    Chronic hypoxemic respiratory failure  On 3L at baseline, due to history of COPD, not in acute exacerbation  -Continue home inhalers  -Pulmonary hygiene  -ABG supports hypoxemia     SIRS  On admission presented with leukocytosis 16.5, tachypnea 25, and tachycardia 139  Procal, and lactic normal, low suspicion for sepsis  -blood cultures x 2 pending  -COVID and influenza negative   -G-tube culture: positive for staphylococcus (skin ganga)     Severe abdominal pain  At PEG site, tenderness to palpation at site. Mucus and erythema present at insertion site  CT scan(10/5): Nonspecific gastric distention with indwelling G-tube  PEG tube dislodged, GI notified.    -NPO, changed meds to IV  -General surgery consulted for second opinion, appreciate recommendations     Leukocytosis  On arrival WBC 16.5, stable (10/7)  Considering PEG tube infection, culture pending  CXR(10/5): no acute process   -Monitor with CBC in AM    Anemia  On admission 11.8, down trending  -likely due to dehydration, secondary to poor PO intake  -Check CBC in AM     Sinus tachycardia  HR on arrival, likely due to dehydration  UA without signs of infections, positive for ketones  -Improvement with IV hydration      Hypotension   Likely secondary to poor PO intake, low fluid intake, pain medication  - Fluid bolus PRN to maintain pressure    Severe protein calorie malnutrition  Previously poor PO intake, BMI 14.14  Dietitian consulted, appreciate recommendations  -NPO, awaiting PEG tube placement     COPD  PFTs 2018: FEV1/FVC 0.45  -On albuterol and breo ellipta  -Monitor respiratory status     HTN   Stable, no noted home medications     HLD  Stable, home Lipitor      Mild Hyponatremia  Due to poor PO intake  Dietitian consulted, continue IV fluids. -Monitor CMP          Chief Complaint: abdominal pain    Hospital Course: Per initial H&P     \"Diane Cosette Saint is a 65 y.o. female with a PMH of COPD, chronic respiratory failure, severe malnutrition s/p PEG placement who presented with abdominal pain. Patient reports a history of acutely worsening abdominal pain over the past two weeks. She recently underwent PEG placement due to severe malnutrition and has had pain around the PEG tube site since placement. She was previously admitted due to this worsening abdominal pain for which she was treated with an enema for constipation and tramadol for insertion site pain. Patient reports that her abdominal pain has worsened over the last few days, and she has had increased drainage around the insertion site of her tube. She also endorses a 1x episode of non-bloody brown emesis day prior to arrival. Patient daughter in room to assist with history states that patient has had continued abdominal pain and presented to provider at [facility] who obtained a plain film and advised patient to go to ED for further evaluation. On examination, insertion site is non-erythematous and draining green, thick liquid. Patient denies any acute chest pain, visual changes. She does have worsening SOB over the last few weeks, but denies sputum production. Last bowel movement was two days prior to arrival. She denies any other acute complaints. \"    10/6/22: PEG Tube was dislodged. 10/7/22: Resting well, surgery consulted, [facility]    Subjective/HPI:   Renan Caro seen and examined at bedside, feels better overall. Still endorses abdominal pain/ tenderness. NPO. She denies headache, SOB, CP, N/V/D.       PMH, SURGICAL HX, FH, SOCIAL HX reviewed and updated as needed.     Medications:  Reviewed    Infusion Medications    sodium chloride sodium chloride 125 mL/hr at 10/07/22 0510     Scheduled Medications    albuterol sulfate HFA  2 puff Inhalation Q6H    [Held by provider] ARIPiprazole  15 mg Oral Daily    [Held by provider] vitamin C  500 mg Oral Daily    [Held by provider] aspirin  81 mg Oral Daily    [Held by provider] atorvastatin  10 mg Oral Nightly    [Held by provider] Vitamin D  2,000 Units Oral Daily    [Held by provider] desvenlafaxine succinate  100 mg Oral Daily    [Held by provider] docusate sodium  100 mg Oral BID    mometasone-formoterol  2 puff Inhalation BID    [Held by provider] hydrOXYzine HCl  10 mg Oral BID    [Held by provider] megestrol  40 mg Oral BID    [Held by provider] melatonin  3 mg Oral Daily    [Held by provider] mirtazapine  30 mg Oral Nightly    [Held by provider] multivitamin  1 tablet Oral Daily    [Held by provider] pantoprazole  40 mg Oral QAM AC    [Held by provider] sennosides-docusate sodium  1 tablet Oral Daily    sodium chloride flush  10 mL IntraVENous 2 times per day    [Held by provider] enoxaparin  30 mg SubCUTAneous Daily    HYDROcodone 5 mg - acetaminophen  1 tablet Oral 3 times per day    piperacillin-tazobactam  3,375 mg IntraVENous Q8H    pantoprazole  40 mg IntraVENous Daily     PRN Meds: bisacodyl, sodium chloride flush, sodium chloride, ondansetron **OR** ondansetron, polyethylene glycol, acetaminophen **OR** acetaminophen, traMADol **OR** traMADol, ibuprofen, ipratropium-albuterol, diatrizoate meglumine-sodium, HYDROmorphone    No intake or output data in the 24 hours ending 10/07/22 0922    Exam:  BP (!) 107/57   Pulse 86   Temp 97.5 °F (36.4 °C) (Oral)   Resp 17   Ht 5' 2\" (1.575 m)   Wt 82 lb 10.8 oz (37.5 kg)   SpO2 96%   BMI 15.12 kg/m²     Physical Exam  Vitals reviewed. Constitutional:       General: She is not in acute distress. Appearance: Normal appearance. She is not ill-appearing or toxic-appearing. HENT:      Head: Normocephalic and atraumatic.       Right Ear: External ear normal.      Left Ear: External ear normal.      Nose: Nose normal.   Eyes:      General:         Right eye: No discharge. Left eye: No discharge. Conjunctiva/sclera: Conjunctivae normal.   Cardiovascular:      Rate and Rhythm: Normal rate and regular rhythm. Pulses: Normal pulses. Heart sounds: Normal heart sounds. No murmur heard. No gallop. Pulmonary:      Effort: Pulmonary effort is normal. No respiratory distress. Breath sounds: Normal breath sounds. No wheezing, rhonchi or rales. Abdominal:      General: Abdomen is flat. Bowel sounds are normal. There is no distension. Palpations: Abdomen is soft. Comments: Abd pad and dressing over PEG tube site   Musculoskeletal:         General: Normal range of motion. Cervical back: Neck supple. Skin:     General: Skin is warm and dry. Capillary Refill: Capillary refill takes less than 2 seconds. Findings: No rash. Neurological:      General: No focal deficit present. Mental Status: She is alert. Psychiatric:         Mood and Affect: Mood normal.         Thought Content: Thought content normal.      All labs reviewed and interpreted by me:  Labs:   Recent Labs     10/05/22  2015 10/07/22  0554   WBC 16.5* 8.2   HGB 11.8* 9.1*   HCT 37.2 30.6*   * 316     Recent Labs     10/05/22  2015 10/07/22  0554   * 138   K 4.1 4.0   CL 93* 103   CO2 27 23   BUN 17 17   CREATININE 0.6 0.5   CALCIUM 10.7* 8.8     Recent Labs     10/05/22  2015   AST 16   ALT 14   BILITOT 0.4   ALKPHOS 66     No results for input(s): INR in the last 72 hours. No results for input(s): Stormy Smack in the last 72 hours.     Urinalysis:      Lab Results   Component Value Date/Time    NITRU NEGATIVE 10/05/2022 09:33 PM    WBCUA 2-4 10/05/2022 09:33 PM    BACTERIA NONE SEEN 10/05/2022 09:33 PM    RBCUA 5-10 10/05/2022 09:33 PM    BLOODU NEGATIVE 10/05/2022 09:33 PM    SPECGRAV 1.009 11/16/2019 05:27 PM GLUCOSEU NEGATIVE 10/05/2022 09:33 PM       All radiology images and reports reviewed and interpreted by me:  Radiology:  XR CHEST PORTABLE   Final Result   Impression:      No acute processes. This document has been electronically signed by: Bren Teran MD on    10/05/2022 10:50 PM      CT ABDOMEN PELVIS WO CONTRAST Additional Contrast? None   Final Result   Impression:      Nonspecific gastric distention with indwelling gastrostomy tube      Please correlate clinically regarding tube function and/or ability to    decompress      No other significant abnormality      This document has been electronically signed by: Bren Teran MD on    10/05/2022 09:39 PM      All CTs at this facility use dose modulation techniques and iterative    reconstructions, and/or weight-based dosing   when appropriate to reduce radiation to a low as reasonably achievable.       XR INJ CONTRAST GASTRIC TUBE PERC    (Results Pending)       Diet: Diet NPO Exceptions are: Sips of Water with Meds    Bethea: no    Microbiology: yes      Telemetry Review of past 24 hours:  Sinus / Sinus Tachy    DVT prophylaxis: [x] Lovenox                                 [] SCDs                                 [] SQ Heparin                                 [] Encourage ambulation           [] Already on Anticoagulation     Disposition:    [] Home       [] TCU       [] Rehab       [] Psych       [x] SNF       [] [facility]       [] Other-    Code Status: Full Code    PT/OT Eval Status: Consulted      Electronically signed by Little Roth MD on 10/7/2022 at 9:22 AM

## 2022-10-08 LAB
ANION GAP SERPL CALCULATED.3IONS-SCNC: 11 MEQ/L (ref 8–16)
BASOPHILS # BLD: 0.4 %
BASOPHILS ABSOLUTE: 0 THOU/MM3 (ref 0–0.1)
BUN BLDV-MCNC: 9 MG/DL (ref 7–22)
CALCIUM SERPL-MCNC: 8.8 MG/DL (ref 8.5–10.5)
CHLORIDE BLD-SCNC: 104 MEQ/L (ref 98–111)
CO2: 25 MEQ/L (ref 23–33)
CREAT SERPL-MCNC: 0.4 MG/DL (ref 0.4–1.2)
EOSINOPHIL # BLD: 3.1 %
EOSINOPHILS ABSOLUTE: 0.2 THOU/MM3 (ref 0–0.4)
ERYTHROCYTE [DISTWIDTH] IN BLOOD BY AUTOMATED COUNT: 19.2 % (ref 11.5–14.5)
ERYTHROCYTE [DISTWIDTH] IN BLOOD BY AUTOMATED COUNT: 61 FL (ref 35–45)
GFR SERPL CREATININE-BSD FRML MDRD: > 90 ML/MIN/1.73M2
GLUCOSE BLD-MCNC: 62 MG/DL (ref 70–108)
HCT VFR BLD CALC: 31.8 % (ref 37–47)
HEMOGLOBIN: 9.7 GM/DL (ref 12–16)
IMMATURE GRANS (ABS): 0.04 THOU/MM3 (ref 0–0.07)
IMMATURE GRANULOCYTES: 0.5 %
LYMPHOCYTES # BLD: 17.8 %
LYMPHOCYTES ABSOLUTE: 1.4 THOU/MM3 (ref 1–4.8)
MCH RBC QN AUTO: 26.6 PG (ref 26–33)
MCHC RBC AUTO-ENTMCNC: 30.5 GM/DL (ref 32.2–35.5)
MCV RBC AUTO: 87.1 FL (ref 81–99)
MONOCYTES # BLD: 8.9 %
MONOCYTES ABSOLUTE: 0.7 THOU/MM3 (ref 0.4–1.3)
NUCLEATED RED BLOOD CELLS: 0 /100 WBC
PLATELET # BLD: 342 THOU/MM3 (ref 130–400)
PMV BLD AUTO: 9.7 FL (ref 9.4–12.4)
POTASSIUM SERPL-SCNC: 4 MEQ/L (ref 3.5–5.2)
RBC # BLD: 3.65 MILL/MM3 (ref 4.2–5.4)
SEG NEUTROPHILS: 69.3 %
SEGMENTED NEUTROPHILS ABSOLUTE COUNT: 5.4 THOU/MM3 (ref 1.8–7.7)
SODIUM BLD-SCNC: 140 MEQ/L (ref 135–145)
WBC # BLD: 7.8 THOU/MM3 (ref 4.8–10.8)

## 2022-10-08 PROCEDURE — 99232 SBSQ HOSP IP/OBS MODERATE 35: CPT | Performed by: FAMILY MEDICINE

## 2022-10-08 PROCEDURE — 96372 THER/PROPH/DIAG INJ SC/IM: CPT

## 2022-10-08 PROCEDURE — 2580000003 HC RX 258

## 2022-10-08 PROCEDURE — 94669 MECHANICAL CHEST WALL OSCILL: CPT

## 2022-10-08 PROCEDURE — 6360000002 HC RX W HCPCS

## 2022-10-08 PROCEDURE — 6370000000 HC RX 637 (ALT 250 FOR IP)

## 2022-10-08 PROCEDURE — 1200000003 HC TELEMETRY R&B

## 2022-10-08 PROCEDURE — 83036 HEMOGLOBIN GLYCOSYLATED A1C: CPT

## 2022-10-08 PROCEDURE — C9113 INJ PANTOPRAZOLE SODIUM, VIA: HCPCS

## 2022-10-08 PROCEDURE — 94640 AIRWAY INHALATION TREATMENT: CPT

## 2022-10-08 PROCEDURE — 2580000003 HC RX 258: Performed by: FAMILY MEDICINE

## 2022-10-08 PROCEDURE — 2580000003 HC RX 258: Performed by: NURSE PRACTITIONER

## 2022-10-08 PROCEDURE — 6360000002 HC RX W HCPCS: Performed by: NURSE PRACTITIONER

## 2022-10-08 PROCEDURE — 96376 TX/PRO/DX INJ SAME DRUG ADON: CPT

## 2022-10-08 PROCEDURE — 6360000002 HC RX W HCPCS: Performed by: INTERNAL MEDICINE

## 2022-10-08 PROCEDURE — 96366 THER/PROPH/DIAG IV INF ADDON: CPT

## 2022-10-08 PROCEDURE — 6370000000 HC RX 637 (ALT 250 FOR IP): Performed by: FAMILY MEDICINE

## 2022-10-08 PROCEDURE — 36415 COLL VENOUS BLD VENIPUNCTURE: CPT

## 2022-10-08 PROCEDURE — 85025 COMPLETE CBC W/AUTO DIFF WBC: CPT

## 2022-10-08 PROCEDURE — 80048 BASIC METABOLIC PNL TOTAL CA: CPT

## 2022-10-08 PROCEDURE — 2700000000 HC OXYGEN THERAPY PER DAY

## 2022-10-08 RX ORDER — ALBUTEROL SULFATE 90 UG/1
2 AEROSOL, METERED RESPIRATORY (INHALATION) EVERY 4 HOURS PRN
Status: DISCONTINUED | OUTPATIENT
Start: 2022-10-08 | End: 2022-10-11 | Stop reason: HOSPADM

## 2022-10-08 RX ORDER — INSULIN LISPRO 100 [IU]/ML
0-4 INJECTION, SOLUTION INTRAVENOUS; SUBCUTANEOUS NIGHTLY
Status: DISCONTINUED | OUTPATIENT
Start: 2022-10-08 | End: 2022-10-11 | Stop reason: HOSPADM

## 2022-10-08 RX ORDER — INSULIN LISPRO 100 [IU]/ML
0-8 INJECTION, SOLUTION INTRAVENOUS; SUBCUTANEOUS
Status: DISCONTINUED | OUTPATIENT
Start: 2022-10-09 | End: 2022-10-11 | Stop reason: HOSPADM

## 2022-10-08 RX ORDER — LIDOCAINE HYDROCHLORIDE 10 MG/ML
5 INJECTION, SOLUTION EPIDURAL; INFILTRATION; INTRACAUDAL; PERINEURAL ONCE
Status: COMPLETED | OUTPATIENT
Start: 2022-10-08 | End: 2022-10-09

## 2022-10-08 RX ORDER — LEUCINE, PHENYLALANINE, LYSINE, METHIONINE, ISOLEUCINE, VALINE, HISTIDINE, THREONINE, TRYPTOPHAN, ALANINE, GLYCINE, ARGININE, PROLINE, SERINE, TYROSINE, DEXTROSE 311; 238; 247; 170; 255; 247; 204; 179; 77; 880; 438; 489; 289; 213; 17; 5 MG/100ML; MG/100ML; MG/100ML; MG/100ML; MG/100ML; MG/100ML; MG/100ML; MG/100ML; MG/100ML; MG/100ML; MG/100ML; MG/100ML; MG/100ML; MG/100ML; MG/100ML; G/100ML
2000 INJECTION INTRAVENOUS CONTINUOUS
Status: DISCONTINUED | OUTPATIENT
Start: 2022-10-08 | End: 2022-10-11 | Stop reason: HOSPADM

## 2022-10-08 RX ORDER — SODIUM CHLORIDE 9 MG/ML
INJECTION, SOLUTION INTRAVENOUS CONTINUOUS
Status: DISCONTINUED | OUTPATIENT
Start: 2022-10-08 | End: 2022-10-11 | Stop reason: HOSPADM

## 2022-10-08 RX ORDER — SODIUM CHLORIDE 0.9 % (FLUSH) 0.9 %
5-40 SYRINGE (ML) INJECTION PRN
Status: DISCONTINUED | OUTPATIENT
Start: 2022-10-08 | End: 2022-10-11 | Stop reason: HOSPADM

## 2022-10-08 RX ORDER — SODIUM CHLORIDE 0.9 % (FLUSH) 0.9 %
5-40 SYRINGE (ML) INJECTION EVERY 12 HOURS SCHEDULED
Status: DISCONTINUED | OUTPATIENT
Start: 2022-10-08 | End: 2022-10-11 | Stop reason: HOSPADM

## 2022-10-08 RX ORDER — SODIUM CHLORIDE 9 MG/ML
25 INJECTION, SOLUTION INTRAVENOUS PRN
Status: DISCONTINUED | OUTPATIENT
Start: 2022-10-08 | End: 2022-10-11 | Stop reason: HOSPADM

## 2022-10-08 RX ADMIN — HYDROMORPHONE HYDROCHLORIDE 0.5 MG: 1 INJECTION, SOLUTION INTRAMUSCULAR; INTRAVENOUS; SUBCUTANEOUS at 14:04

## 2022-10-08 RX ADMIN — Medication 2 PUFF: at 08:01

## 2022-10-08 RX ADMIN — HYDROCODONE BITARTRATE AND ACETAMINOPHEN 1 TABLET: 5; 325 TABLET ORAL at 06:05

## 2022-10-08 RX ADMIN — HYDROCODONE BITARTRATE AND ACETAMINOPHEN 1 TABLET: 5; 325 TABLET ORAL at 22:22

## 2022-10-08 RX ADMIN — HYDROCODONE BITARTRATE AND ACETAMINOPHEN 1 TABLET: 5; 325 TABLET ORAL at 14:07

## 2022-10-08 RX ADMIN — PANTOPRAZOLE SODIUM 40 MG: 40 INJECTION, POWDER, FOR SOLUTION INTRAVENOUS at 07:52

## 2022-10-08 RX ADMIN — SODIUM CHLORIDE: 9 INJECTION, SOLUTION INTRAVENOUS at 18:07

## 2022-10-08 RX ADMIN — MOMETASONE FUROATE AND FORMOTEROL FUMARATE DIHYDRATE 2 PUFF: 200; 5 AEROSOL RESPIRATORY (INHALATION) at 08:01

## 2022-10-08 RX ADMIN — TRAMADOL HYDROCHLORIDE 100 MG: 50 TABLET, COATED ORAL at 20:00

## 2022-10-08 RX ADMIN — Medication 2 PUFF: at 12:23

## 2022-10-08 RX ADMIN — HYDROMORPHONE HYDROCHLORIDE 0.5 MG: 1 INJECTION, SOLUTION INTRAMUSCULAR; INTRAVENOUS; SUBCUTANEOUS at 07:51

## 2022-10-08 RX ADMIN — SODIUM CHLORIDE: 9 INJECTION, SOLUTION INTRAVENOUS at 02:28

## 2022-10-08 RX ADMIN — HYDROMORPHONE HYDROCHLORIDE 0.5 MG: 1 INJECTION, SOLUTION INTRAMUSCULAR; INTRAVENOUS; SUBCUTANEOUS at 18:09

## 2022-10-08 RX ADMIN — SODIUM CHLORIDE, PRESERVATIVE FREE 10 ML: 5 INJECTION INTRAVENOUS at 07:52

## 2022-10-08 RX ADMIN — PIPERACILLIN AND TAZOBACTAM 3375 MG: 3; .375 INJECTION, POWDER, FOR SOLUTION INTRAVENOUS at 22:26

## 2022-10-08 RX ADMIN — PIPERACILLIN AND TAZOBACTAM 3375 MG: 3; .375 INJECTION, POWDER, FOR SOLUTION INTRAVENOUS at 14:09

## 2022-10-08 RX ADMIN — ENOXAPARIN SODIUM 30 MG: 100 INJECTION SUBCUTANEOUS at 07:52

## 2022-10-08 RX ADMIN — Medication 2 PUFF: at 17:40

## 2022-10-08 RX ADMIN — PIPERACILLIN AND TAZOBACTAM 3375 MG: 3; .375 INJECTION, POWDER, FOR SOLUTION INTRAVENOUS at 05:13

## 2022-10-08 RX ADMIN — MOMETASONE FUROATE AND FORMOTEROL FUMARATE DIHYDRATE 2 PUFF: 200; 5 AEROSOL RESPIRATORY (INHALATION) at 17:39

## 2022-10-08 ASSESSMENT — PAIN SCALES - GENERAL
PAINLEVEL_OUTOF10: 7
PAINLEVEL_OUTOF10: 5
PAINLEVEL_OUTOF10: 8
PAINLEVEL_OUTOF10: 7
PAINLEVEL_OUTOF10: 8
PAINLEVEL_OUTOF10: 6
PAINLEVEL_OUTOF10: 5
PAINLEVEL_OUTOF10: 3
PAINLEVEL_OUTOF10: 8
PAINLEVEL_OUTOF10: 0

## 2022-10-08 ASSESSMENT — PAIN DESCRIPTION - ORIENTATION
ORIENTATION: LEFT;UPPER
ORIENTATION: LEFT

## 2022-10-08 ASSESSMENT — PAIN DESCRIPTION - LOCATION
LOCATION: ABDOMEN

## 2022-10-08 ASSESSMENT — PAIN - FUNCTIONAL ASSESSMENT
PAIN_FUNCTIONAL_ASSESSMENT: PREVENTS OR INTERFERES SOME ACTIVE ACTIVITIES AND ADLS
PAIN_FUNCTIONAL_ASSESSMENT: ACTIVITIES ARE NOT PREVENTED

## 2022-10-08 ASSESSMENT — PAIN DESCRIPTION - ONSET
ONSET: ON-GOING
ONSET: ON-GOING

## 2022-10-08 ASSESSMENT — PAIN SCALES - WONG BAKER
WONGBAKER_NUMERICALRESPONSE: 8
WONGBAKER_NUMERICALRESPONSE: 2
WONGBAKER_NUMERICALRESPONSE: 8
WONGBAKER_NUMERICALRESPONSE: 2
WONGBAKER_NUMERICALRESPONSE: 0
WONGBAKER_NUMERICALRESPONSE: 0
WONGBAKER_NUMERICALRESPONSE: 4

## 2022-10-08 ASSESSMENT — PAIN DESCRIPTION - DESCRIPTORS
DESCRIPTORS: ACHING
DESCRIPTORS: SHARP
DESCRIPTORS: ACHING
DESCRIPTORS: ACHING;THROBBING

## 2022-10-08 ASSESSMENT — PAIN DESCRIPTION - PAIN TYPE
TYPE: ACUTE PAIN

## 2022-10-08 ASSESSMENT — PAIN DESCRIPTION - FREQUENCY
FREQUENCY: CONTINUOUS
FREQUENCY: CONTINUOUS

## 2022-10-08 NOTE — PROGRESS NOTES
Surg seen in consult to follow long discussion with patient and daughter present regarding her abdominal pain that did come on post placed of a PEG tube CT scans were reviewed patient's abdomen is soft I believe her pain is emanating from the colon full of stool on the right side discussed the fact that no abnormal findings were found patient likely needs TPN for a period of time then replacement of a tube at some point to see no issues with her abdominal wall or her abdominal pain

## 2022-10-08 NOTE — PROGRESS NOTES
PROGRESS NOTE      Patient:  Ifrah Babb    Unit/Bed:8B-33/033-A    YOB: 1945    MRN: [id number]     Acct: [de-identified]    PCP: Omar Plummer MD    Date of Admission: 10/5/2022 LOS: 2    Date of Evaluation:  10/8/2022    Anticipated Discharge: Pending clinical course/ PEG tube placement     Assessment/Plan:  SIRS/dehydration  -blood cultures x 2 pending  -COVID and influenza negative   -IVF as ordered    Severe abdominal pain  Will start TPN tonight. Holding on replacing PEG tube per gen surg recommendations which we appreciate  -NPO, changed meds to IV    Chronic hypoxemic respiratory failure  On 3L at baseline, due to history of COPD, not in acute exacerbation  -Continue home inhalers  -Pulmonary hygiene    Anemia  Monitor. Transfuse for hgb <7    Severe protein calorie malnutrition  Previously poor PO intake, BMI 14.14  Dietitian consulted, appreciate recommendations  -NPO, awaiting PEG tube placement     COPD  PFTs 2018: FEV1/FVC 0.45  -On albuterol and breo ellipta  -Monitor respiratory status     HTN   Stable, no noted home medications     HLD  Stable, home Lipitor      Mild Hyponatremia  Due to poor PO intake  Dietitian consulted, continue IV fluids. -Monitor CMP          Chief Complaint: abdominal pain    Hospital Course: Per initial H&P     \"Queenie Ham is a 65 y.o. female with a PMH of COPD, chronic respiratory failure, severe malnutrition s/p PEG placement who presented with abdominal pain. Patient reports a history of acutely worsening abdominal pain over the past two weeks. She recently underwent PEG placement due to severe malnutrition and has had pain around the PEG tube site since placement. She was previously admitted due to this worsening abdominal pain for which she was treated with an enema for constipation and tramadol for insertion site pain.  Patient reports that her abdominal pain has worsened over the last few days, and she has had increased drainage around the insertion site of her tube. She also endorses a 1x episode of non-bloody brown emesis day prior to arrival. Patient daughter in room to assist with history states that patient has had continued abdominal pain and presented to provider at [facility] who obtained a plain film and advised patient to go to ED for further evaluation. On examination, insertion site is non-erythematous and draining green, thick liquid. Patient denies any acute chest pain, visual changes. She does have worsening SOB over the last few weeks, but denies sputum production. Last bowel movement was two days prior to arrival. She denies any other acute complaints. \"    10/6/22: PEG Tube was dislodged. 10/7/22: Resting well, surgery consulted, [facility]    Subjective/HPI:   Pain continues in the peg tube site. Denies cp, sob, n/v/d.        PMH, SURGICAL HX, FH, SOCIAL HX reviewed and updated as needed.     Medications:  Reviewed    Infusion Medications    CLINIMIX 4.25/5      sodium chloride      sodium chloride      sodium chloride 125 mL/hr at 10/08/22 0228     Scheduled Medications    [START ON 10/9/2022] insulin lispro  0-8 Units SubCUTAneous TID WC    insulin lispro  0-4 Units SubCUTAneous Nightly    lidocaine 1 % injection  5 mL IntraDERmal Once    sodium chloride flush  5-40 mL IntraVENous 2 times per day    albuterol sulfate HFA  2 puff Inhalation Q6H    [Held by provider] ARIPiprazole  15 mg Oral Daily    [Held by provider] vitamin C  500 mg Oral Daily    [Held by provider] aspirin  81 mg Oral Daily    [Held by provider] atorvastatin  10 mg Oral Nightly    [Held by provider] Vitamin D  2,000 Units Oral Daily    [Held by provider] desvenlafaxine succinate  100 mg Oral Daily    [Held by provider] docusate sodium  100 mg Oral BID    mometasone-formoterol  2 puff Inhalation BID    [Held by provider] hydrOXYzine HCl  10 mg Oral BID    [Held by provider] megestrol  40 mg Oral BID    [Held by provider] melatonin  3 mg Oral Daily    [Held by provider] mirtazapine  30 mg Oral Nightly    [Held by provider] multivitamin  1 tablet Oral Daily    [Held by provider] pantoprazole  40 mg Oral QAM AC    [Held by provider] sennosides-docusate sodium  1 tablet Oral Daily    enoxaparin  30 mg SubCUTAneous Daily    HYDROcodone 5 mg - acetaminophen  1 tablet Oral 3 times per day    piperacillin-tazobactam  3,375 mg IntraVENous Q8H    pantoprazole  40 mg IntraVENous Daily     PRN Meds: albuterol sulfate HFA, sodium chloride flush, sodium chloride, bisacodyl, ondansetron **OR** ondansetron, polyethylene glycol, acetaminophen **OR** acetaminophen, traMADol **OR** traMADol, ibuprofen, ipratropium-albuterol, diatrizoate meglumine-sodium, HYDROmorphone    No intake or output data in the 24 hours ending 10/08/22 1116    Exam:  /61   Pulse 88   Temp 97.8 °F (36.6 °C) (Oral)   Resp 16   Ht 5' 2\" (1.575 m)   Wt 91 lb 0.6 oz (41.3 kg)   SpO2 96%   BMI 16.65 kg/m²     Physical Exam  Cardiovascular:      Rate and Rhythm: Normal rate and regular rhythm. Pulmonary:      Effort: Pulmonary effort is normal.      Breath sounds: Normal breath sounds. Abdominal:      Palpations: Abdomen is soft. Tenderness: There is abdominal tenderness. Neurological:      Mental Status: She is alert. All labs reviewed and interpreted by me:  Labs:   Recent Labs     10/05/22  2015 10/07/22  0554 10/08/22  0619   WBC 16.5* 8.2 7.8   HGB 11.8* 9.1* 9.7*   HCT 37.2 30.6* 31.8*   * 316 342       Recent Labs     10/05/22  2015 10/07/22  0554 10/08/22  0619   * 138 140   K 4.1 4.0 4.0   CL 93* 103 104   CO2 27 23 25   BUN 17 17 9   CREATININE 0.6 0.5 0.4   CALCIUM 10.7* 8.8 8.8       Recent Labs     10/05/22  2015   AST 16   ALT 14   BILITOT 0.4   ALKPHOS 66       No results for input(s): INR in the last 72 hours. No results for input(s): Trung Byrne in the last 72 hours.     Urinalysis:      Lab Results   Component Value Date/Time    NITRU NEGATIVE 10/05/2022 09:33 PM    WBCUA 2-4 10/05/2022 09:33 PM    BACTERIA NONE SEEN 10/05/2022 09:33 PM    RBCUA 5-10 10/05/2022 09:33 PM    BLOODU NEGATIVE 10/05/2022 09:33 PM    SPECGRAV 1.009 11/16/2019 05:27 PM    GLUCOSEU NEGATIVE 10/05/2022 09:33 PM       All radiology images and reports reviewed and interpreted by me:  Radiology:  XR CHEST PORTABLE   Final Result   Impression:      No acute processes. This document has been electronically signed by: Maday Candelaria MD on    10/05/2022 10:50 PM      CT ABDOMEN PELVIS WO CONTRAST Additional Contrast? None   Final Result   Impression:      Nonspecific gastric distention with indwelling gastrostomy tube      Please correlate clinically regarding tube function and/or ability to    decompress      No other significant abnormality      This document has been electronically signed by: Maday Candelaria MD on    10/05/2022 09:39 PM      All CTs at this facility use dose modulation techniques and iterative    reconstructions, and/or weight-based dosing   when appropriate to reduce radiation to a low as reasonably achievable.       XR INJ CONTRAST GASTRIC TUBE PERC    (Results Pending)       Diet: Diet NPO Exceptions are: Sips of Water with Meds      DVT prophylaxis: [x] Lovenox                                 [] SCDs                                 [] SQ Heparin                                 [] Encourage ambulation           [] Already on Anticoagulation     Disposition:    [] Home       [] TCU       [] Rehab       [] Psych       [x] SNF       [] [facility]       [] Other-    Code Status: Full Code    PT/OT Eval Status: Consulted      Electronically signed by Adilson Burger DO on 10/8/2022 at 11:16 AM

## 2022-10-08 NOTE — PLAN OF CARE
Problem: Respiratory - Adult  Goal: Clear lung sounds  Outcome: Progressing   Pt continues on MDI's and acapella to clear lung sounds/improve aeration, to help clear secretions, for maintenance of COPD and pt does MDI's at home. Patient mutually agreed on goals.

## 2022-10-08 NOTE — PROGRESS NOTES
Pharmacy Consult for PPN Initiation    Assessment:   Indication for TPN: NPO since 10/6, planning on replacement of PEG tube  IV access: peripheral  Dosing weight:  41.3 kg    Plan:  10 kcal/kg = 413 kcal  Clinimix 4.25/5 at a rate of 50 mL/hr will provide 408 kcal. Will plan to convert to 3-in-1 TPN on Monday 10/10/22 as appropriate. Will order medium dose sliding scale humalog insulin for coverage when PPN starts. Patient is currently running 0.9% sodium chloride at 125mL/hr will adjust this rate to 75mL/hr when PPN starts this evening (to maintain a total rate of 125mL/hr) Patient is already on Protonix IV 40mg daily. BMP, Mag, iCal, & Phos ordered for tomorrow with AM labs. Pharmacy will continue to follow and monitor. Thank you for the consult.     Du Jules PharmD 10/8/2022 10:32 AM

## 2022-10-08 NOTE — PLAN OF CARE
Problem: Respiratory - Adult  Goal: Clear lung sounds  10/8/2022 1744 by Christine Burk RCP  Outcome: Progressing  10/8/2022 0810 by Christine Burk RCP  Outcome: Progressing   Pt continues on MDI's and acapella to clear lung sounds/improve aeration, to help clear secretions, for maintenance of COPD and pt does MDI's at home. Patient mutually agreed on goals.

## 2022-10-09 ENCOUNTER — APPOINTMENT (OUTPATIENT)
Dept: GENERAL RADIOLOGY | Age: 77
DRG: 919 | End: 2022-10-09
Payer: MEDICARE

## 2022-10-09 LAB
ANION GAP SERPL CALCULATED.3IONS-SCNC: 15 MEQ/L (ref 8–16)
AVERAGE GLUCOSE: 99 MG/DL (ref 70–126)
BUN BLDV-MCNC: 5 MG/DL (ref 7–22)
CALCIUM IONIZED: 1.16 MMOL/L (ref 1.12–1.32)
CALCIUM SERPL-MCNC: 8.7 MG/DL (ref 8.5–10.5)
CHLORIDE BLD-SCNC: 100 MEQ/L (ref 98–111)
CO2: 24 MEQ/L (ref 23–33)
CREAT SERPL-MCNC: 0.4 MG/DL (ref 0.4–1.2)
GFR SERPL CREATININE-BSD FRML MDRD: > 90 ML/MIN/1.73M2
GLUCOSE BLD-MCNC: 133 MG/DL (ref 70–108)
GLUCOSE BLD-MCNC: 146 MG/DL (ref 70–108)
GLUCOSE BLD-MCNC: 187 MG/DL (ref 70–108)
GLUCOSE BLD-MCNC: 55 MG/DL (ref 70–108)
GLUCOSE BLD-MCNC: 75 MG/DL (ref 70–108)
HBA1C MFR BLD: 5.3 % (ref 4.4–6.4)
MAGNESIUM: 1.6 MG/DL (ref 1.6–2.4)
PHOSPHORUS: 1.7 MG/DL (ref 2.4–4.7)
POTASSIUM SERPL-SCNC: 3.1 MEQ/L (ref 3.5–5.2)
SODIUM BLD-SCNC: 139 MEQ/L (ref 135–145)

## 2022-10-09 PROCEDURE — 80048 BASIC METABOLIC PNL TOTAL CA: CPT

## 2022-10-09 PROCEDURE — 6370000000 HC RX 637 (ALT 250 FOR IP): Performed by: FAMILY MEDICINE

## 2022-10-09 PROCEDURE — 6360000002 HC RX W HCPCS: Performed by: NURSE PRACTITIONER

## 2022-10-09 PROCEDURE — 6360000002 HC RX W HCPCS

## 2022-10-09 PROCEDURE — 36415 COLL VENOUS BLD VENIPUNCTURE: CPT

## 2022-10-09 PROCEDURE — 2500000003 HC RX 250 WO HCPCS

## 2022-10-09 PROCEDURE — 2500000003 HC RX 250 WO HCPCS: Performed by: FAMILY MEDICINE

## 2022-10-09 PROCEDURE — 71045 X-RAY EXAM CHEST 1 VIEW: CPT

## 2022-10-09 PROCEDURE — 2580000003 HC RX 258

## 2022-10-09 PROCEDURE — 82948 REAGENT STRIP/BLOOD GLUCOSE: CPT

## 2022-10-09 PROCEDURE — 83735 ASSAY OF MAGNESIUM: CPT

## 2022-10-09 PROCEDURE — 94669 MECHANICAL CHEST WALL OSCILL: CPT

## 2022-10-09 PROCEDURE — 94640 AIRWAY INHALATION TREATMENT: CPT

## 2022-10-09 PROCEDURE — 36556 INSERT NON-TUNNEL CV CATH: CPT | Performed by: FAMILY MEDICINE

## 2022-10-09 PROCEDURE — 6370000000 HC RX 637 (ALT 250 FOR IP)

## 2022-10-09 PROCEDURE — 84100 ASSAY OF PHOSPHORUS: CPT

## 2022-10-09 PROCEDURE — C9113 INJ PANTOPRAZOLE SODIUM, VIA: HCPCS

## 2022-10-09 PROCEDURE — 2580000003 HC RX 258: Performed by: FAMILY MEDICINE

## 2022-10-09 PROCEDURE — 02HV33Z INSERTION OF INFUSION DEVICE INTO SUPERIOR VENA CAVA, PERCUTANEOUS APPROACH: ICD-10-PCS | Performed by: FAMILY MEDICINE

## 2022-10-09 PROCEDURE — 1200000003 HC TELEMETRY R&B

## 2022-10-09 PROCEDURE — 2580000003 HC RX 258: Performed by: NURSE PRACTITIONER

## 2022-10-09 PROCEDURE — 82330 ASSAY OF CALCIUM: CPT

## 2022-10-09 PROCEDURE — 94760 N-INVAS EAR/PLS OXIMETRY 1: CPT

## 2022-10-09 RX ORDER — POTASSIUM CHLORIDE 20 MEQ/1
40 TABLET, EXTENDED RELEASE ORAL PRN
Status: DISCONTINUED | OUTPATIENT
Start: 2022-10-09 | End: 2022-10-11 | Stop reason: HOSPADM

## 2022-10-09 RX ORDER — MULTIVIT/FOLIC ACID/HERBAL 275 400-200/30
30 LIQUID (ML) ORAL DAILY
Status: DISCONTINUED | OUTPATIENT
Start: 2022-10-09 | End: 2022-10-09 | Stop reason: RX

## 2022-10-09 RX ORDER — POTASSIUM CHLORIDE 7.45 MG/ML
10 INJECTION INTRAVENOUS PRN
Status: DISCONTINUED | OUTPATIENT
Start: 2022-10-09 | End: 2022-10-11 | Stop reason: HOSPADM

## 2022-10-09 RX ORDER — LEUCINE, PHENYLALANINE, LYSINE, METHIONINE, ISOLEUCINE, VALINE, HISTIDINE, THREONINE, TRYPTOPHAN, ALANINE, GLYCINE, ARGININE, PROLINE, SERINE, TYROSINE, DEXTROSE 311; 238; 247; 170; 255; 247; 204; 179; 77; 880; 438; 489; 289; 213; 17; 5 MG/100ML; MG/100ML; MG/100ML; MG/100ML; MG/100ML; MG/100ML; MG/100ML; MG/100ML; MG/100ML; MG/100ML; MG/100ML; MG/100ML; MG/100ML; MG/100ML; MG/100ML; G/100ML
2000 INJECTION INTRAVENOUS CONTINUOUS
Status: DISPENSED | OUTPATIENT
Start: 2022-10-09 | End: 2022-10-10

## 2022-10-09 RX ORDER — MAGNESIUM SULFATE IN WATER 40 MG/ML
2000 INJECTION, SOLUTION INTRAVENOUS PRN
Status: DISCONTINUED | OUTPATIENT
Start: 2022-10-09 | End: 2022-10-11 | Stop reason: HOSPADM

## 2022-10-09 RX ADMIN — TRAMADOL HYDROCHLORIDE 100 MG: 50 TABLET, COATED ORAL at 20:50

## 2022-10-09 RX ADMIN — MOMETASONE FUROATE AND FORMOTEROL FUMARATE DIHYDRATE 2 PUFF: 200; 5 AEROSOL RESPIRATORY (INHALATION) at 16:27

## 2022-10-09 RX ADMIN — PANTOPRAZOLE SODIUM 40 MG: 40 INJECTION, POWDER, FOR SOLUTION INTRAVENOUS at 08:37

## 2022-10-09 RX ADMIN — LIDOCAINE HYDROCHLORIDE 5 ML: 10 INJECTION, SOLUTION EPIDURAL; INFILTRATION; INTRACAUDAL; PERINEURAL at 00:21

## 2022-10-09 RX ADMIN — POTASSIUM BICARBONATE 40 MEQ: 782 TABLET, EFFERVESCENT ORAL at 08:37

## 2022-10-09 RX ADMIN — SODIUM CHLORIDE, PRESERVATIVE FREE 10 ML: 5 INJECTION INTRAVENOUS at 20:54

## 2022-10-09 RX ADMIN — PIPERACILLIN AND TAZOBACTAM 3375 MG: 3; .375 INJECTION, POWDER, FOR SOLUTION INTRAVENOUS at 20:56

## 2022-10-09 RX ADMIN — ATORVASTATIN CALCIUM 10 MG: 10 TABLET, FILM COATED ORAL at 20:49

## 2022-10-09 RX ADMIN — MOMETASONE FUROATE AND FORMOTEROL FUMARATE DIHYDRATE 2 PUFF: 200; 5 AEROSOL RESPIRATORY (INHALATION) at 04:59

## 2022-10-09 RX ADMIN — Medication 2 PUFF: at 16:26

## 2022-10-09 RX ADMIN — MEGESTROL ACETATE 40 MG: 40 TABLET ORAL at 20:50

## 2022-10-09 RX ADMIN — POTASSIUM PHOSPHATE, MONOBASIC AND POTASSIUM PHOSPHATE, DIBASIC 10 MMOL: 224; 236 INJECTION, SOLUTION, CONCENTRATE INTRAVENOUS at 08:55

## 2022-10-09 RX ADMIN — LEUCINE, PHENYLALANINE, LYSINE, METHIONINE, ISOLEUCINE, VALINE, HISTIDINE, THREONINE, TRYPTOPHAN, ALANINE, GLYCINE, ARGININE, PROLINE, SERINE, TYROSINE, DEXTROSE 2000 ML: 311; 238; 247; 170; 255; 247; 204; 179; 77; 880; 438; 489; 289; 213; 17; 5 INJECTION INTRAVENOUS at 00:32

## 2022-10-09 RX ADMIN — HYDROCODONE BITARTRATE AND ACETAMINOPHEN 1 TABLET: 5; 325 TABLET ORAL at 22:33

## 2022-10-09 RX ADMIN — Medication 2 PUFF: at 04:55

## 2022-10-09 RX ADMIN — DOCUSATE SODIUM 100 MG: 100 CAPSULE, LIQUID FILLED ORAL at 20:53

## 2022-10-09 RX ADMIN — MIRTAZAPINE 30 MG: 30 TABLET, FILM COATED ORAL at 20:49

## 2022-10-09 RX ADMIN — ENOXAPARIN SODIUM 30 MG: 100 INJECTION SUBCUTANEOUS at 08:37

## 2022-10-09 RX ADMIN — PIPERACILLIN AND TAZOBACTAM 3375 MG: 3; .375 INJECTION, POWDER, FOR SOLUTION INTRAVENOUS at 14:00

## 2022-10-09 RX ADMIN — MAGNESIUM SULFATE HEPTAHYDRATE 2000 MG: 40 INJECTION, SOLUTION INTRAVENOUS at 08:52

## 2022-10-09 RX ADMIN — Medication 3 MG: at 20:53

## 2022-10-09 RX ADMIN — HYDROCODONE BITARTRATE AND ACETAMINOPHEN 1 TABLET: 5; 325 TABLET ORAL at 06:38

## 2022-10-09 RX ADMIN — Medication 2 PUFF: at 11:24

## 2022-10-09 RX ADMIN — SODIUM CHLORIDE: 9 INJECTION, SOLUTION INTRAVENOUS at 13:58

## 2022-10-09 RX ADMIN — HYDROCODONE BITARTRATE AND ACETAMINOPHEN 1 TABLET: 5; 325 TABLET ORAL at 14:05

## 2022-10-09 RX ADMIN — PIPERACILLIN AND TAZOBACTAM 3375 MG: 3; .375 INJECTION, POWDER, FOR SOLUTION INTRAVENOUS at 06:10

## 2022-10-09 ASSESSMENT — PAIN SCALES - GENERAL
PAINLEVEL_OUTOF10: 2
PAINLEVEL_OUTOF10: 7
PAINLEVEL_OUTOF10: 6
PAINLEVEL_OUTOF10: 2
PAINLEVEL_OUTOF10: 7
PAINLEVEL_OUTOF10: 6
PAINLEVEL_OUTOF10: 7
PAINLEVEL_OUTOF10: 8

## 2022-10-09 ASSESSMENT — PAIN - FUNCTIONAL ASSESSMENT
PAIN_FUNCTIONAL_ASSESSMENT: ACTIVITIES ARE NOT PREVENTED
PAIN_FUNCTIONAL_ASSESSMENT: ACTIVITIES ARE NOT PREVENTED

## 2022-10-09 ASSESSMENT — PAIN DESCRIPTION - DESCRIPTORS
DESCRIPTORS: ACHING
DESCRIPTORS: ACHING;DISCOMFORT;SHARP
DESCRIPTORS: ACHING;DISCOMFORT;SPASM

## 2022-10-09 ASSESSMENT — PAIN DESCRIPTION - PAIN TYPE: TYPE: ACUTE PAIN

## 2022-10-09 ASSESSMENT — PAIN DESCRIPTION - LOCATION
LOCATION: ABDOMEN

## 2022-10-09 ASSESSMENT — PAIN DESCRIPTION - FREQUENCY: FREQUENCY: CONTINUOUS

## 2022-10-09 ASSESSMENT — PAIN DESCRIPTION - ORIENTATION
ORIENTATION: LEFT;UPPER
ORIENTATION: MID

## 2022-10-09 ASSESSMENT — PAIN DESCRIPTION - ONSET: ONSET: ON-GOING

## 2022-10-09 NOTE — PROGRESS NOTES
PROGRESS NOTE      Patient:  Carol Rocha    Unit/Bed:8B-33/033-A    YOB: 1945    MRN: [id number]     Acct: [de-identified]    PCP: Shiv Sierra MD    Date of Admission: 10/5/2022 LOS:     Date of Evaluation:  10/9/2022    Anticipated Discharge: Pending clinical course/ PEG tube placement     Assessment/Plan:    Chronic hypoxemic respiratory failure  On 3L at baseline, due to history of COPD, not in acute exacerbation  ABG supports hypoxemia   -Continue home inhalers and Pulmonary hygiene  -Blood cultures negative x2  -COVID and influenza negative  -IVF as ordered    SIRS, resolved  On admission presented with leukocytosis 16.5, tachypnea 25, and tachycardia 139  Procal, and lactic normal, low suspicion for sepsis  -blood cultures x 2 (10/8)  -COVID and influenza negative   -G-tube culture: positive for staphylococcus (skin ganga)     Severe abdominal pain  At PEG site, tenderness to palpation at site. Mucus and erythema present at insertion site  CT scan(10/5): Nonspecific gastric distention with indwelling G-tube  PEG tube dislodged, GI notified.    TPN started(10/8), per surgery wait to replace peg tube  -NPO, changed meds to IV  -General surgery consulted for second opinion(10/7), appreciate recommendations  -GI recs(10/9), change to full liquid diet     Leukocytosis  On arrival WBC 16.5, stable (10/7)  Considering PEG tube infection, culture pending  CXR(10/5): no acute process   -Monitor with CBC in AM     Anemia  On admission 11.8, down trending  -likely due to dehydration, secondary to poor PO intake  -Transfuse for Hgb <7  -Check CBC in AM     Sinus tachycardia  HR on arrival, likely due to dehydration  UA without signs of infections, positive for ketones  -Improvement with IV hydration      Hypotension   Likely secondary to poor PO intake, low fluid intake, pain medication  - Fluid bolus PRN to maintain pressure     Severe protein calorie malnutrition  Previously poor PO intake, BMI 15.47  Dietitian consulted, appreciate recommendations  -TPN was started (10/8)  -GI rec full liquid diet (10/9)     COPD  PFTs 2018: FEV1/FVC 0.45  -On albuterol and breo ellipta  -Monitor respiratory status     HTN   Stable, no noted home medications     HLD  Stable, home Lipitor      Mild Hyponatremia  Due to poor PO intake  Dietitian consulted, continue IV fluids. -Monitor CMP            Chief Complaint: Abdominal Pain    Hospital Course: Per initial H&P     \"Queenie Zhu is a 65 y.o. female with a PMH of COPD, chronic respiratory failure, severe malnutrition s/p PEG placement who presented with abdominal pain. Patient reports a history of acutely worsening abdominal pain over the past two weeks. She recently underwent PEG placement due to severe malnutrition and has had pain around the PEG tube site since placement. She was previously admitted due to this worsening abdominal pain for which she was treated with an enema for constipation and tramadol for insertion site pain. Patient reports that her abdominal pain has worsened over the last few days, and she has had increased drainage around the insertion site of her tube. She also endorses a 1x episode of non-bloody brown emesis day prior to arrival. Patient daughter in room to assist with history states that patient has had continued abdominal pain and presented to provider at [facility] who obtained a plain film and advised patient to go to ED for further evaluation. On examination, insertion site is non-erythematous and draining green, thick liquid. Patient denies any acute chest pain, visual changes. She does have worsening SOB over the last few weeks, but denies sputum production. Last bowel movement was two days prior to arrival. She denies any other acute complaints. \"     10/6/22: PEG Tube was dislodged.       10/7/22: Resting well, surgery consulted, [facility]    10/8/22: Central lined placed for TPN     10/9/22: Stable, restarted on full liquid diet as per GI recommendations    Subjective/HPI:   Will Davis seen and examined at bedside, feels better overall. Abdominal pain has resolved  She denies headache, SOB, CP, N/V/D.       PMH, SURGICAL HX, FH, SOCIAL HX reviewed and updated as needed.     Medications:  Reviewed    Infusion Medications    CLINIMIX 4.25/5 50 mL/hr at 10/09/22 0303    sodium chloride 75 mL/hr at 10/08/22 1807    sodium chloride       Scheduled Medications    phosphorus replacement protocol   Other RX Placeholder    potassium phosphate IVPB  10 mmol IntraVENous Once    insulin lispro  0-8 Units SubCUTAneous TID WC    insulin lispro  0-4 Units SubCUTAneous Nightly    sodium chloride flush  5-40 mL IntraVENous 2 times per day    albuterol sulfate HFA  2 puff Inhalation Q6H    [Held by provider] ARIPiprazole  15 mg Oral Daily    [Held by provider] vitamin C  500 mg Oral Daily    [Held by provider] aspirin  81 mg Oral Daily    [Held by provider] atorvastatin  10 mg Oral Nightly    [Held by provider] Vitamin D  2,000 Units Oral Daily    [Held by provider] desvenlafaxine succinate  100 mg Oral Daily    [Held by provider] docusate sodium  100 mg Oral BID    mometasone-formoterol  2 puff Inhalation BID    [Held by provider] hydrOXYzine HCl  10 mg Oral BID    [Held by provider] megestrol  40 mg Oral BID    [Held by provider] melatonin  3 mg Oral Daily    [Held by provider] mirtazapine  30 mg Oral Nightly    [Held by provider] multivitamin  1 tablet Oral Daily    [Held by provider] pantoprazole  40 mg Oral QAM AC    [Held by provider] sennosides-docusate sodium  1 tablet Oral Daily    enoxaparin  30 mg SubCUTAneous Daily    HYDROcodone 5 mg - acetaminophen  1 tablet Oral 3 times per day    piperacillin-tazobactam  3,375 mg IntraVENous Q8H    pantoprazole  40 mg IntraVENous Daily     PRN Meds: potassium chloride **OR** potassium alternative oral replacement **OR** potassium chloride, magnesium sulfate, albuterol sulfate HFA, sodium All labs reviewed and interpreted by me:  Labs:   Recent Labs     10/07/22  0554 10/08/22  0619   WBC 8.2 7.8   HGB 9.1* 9.7*   HCT 30.6* 31.8*    342     Recent Labs     10/07/22  0554 10/08/22  0619 10/09/22  0610    140 139   K 4.0 4.0 3.1*    104 100   CO2 23 25 24   BUN 17 9 5*   CREATININE 0.5 0.4 0.4   CALCIUM 8.8 8.8 8.7   PHOS  --   --  1.7*     No results for input(s): AST, ALT, BILIDIR, BILITOT, ALKPHOS in the last 72 hours. No results for input(s): INR in the last 72 hours. No results for input(s): Violeta Dami in the last 72 hours. Urinalysis:      Lab Results   Component Value Date/Time    NITRU NEGATIVE 10/05/2022 09:33 PM    WBCUA 2-4 10/05/2022 09:33 PM    BACTERIA NONE SEEN 10/05/2022 09:33 PM    RBCUA 5-10 10/05/2022 09:33 PM    BLOODU NEGATIVE 10/05/2022 09:33 PM    SPECGRAV 1.009 11/16/2019 05:27 PM    GLUCOSEU NEGATIVE 10/05/2022 09:33 PM       All radiology images and reports reviewed and interpreted by me:  Radiology:  XR CHEST PORTABLE   Final Result   Impression:   Acceptable right IJ central venous catheter placement. Mild lung hyperinflation. This document has been electronically signed by: Hannah Vega. Khadra Ibarra on    10/09/2022 03:51 AM      XR CHEST PORTABLE   Final Result   Impression:      No acute processes.       This document has been electronically signed by: Monique Bergeron MD on    10/05/2022 10:50 PM      CT ABDOMEN PELVIS WO CONTRAST Additional Contrast? None   Final Result   Impression:      Nonspecific gastric distention with indwelling gastrostomy tube      Please correlate clinically regarding tube function and/or ability to    decompress      No other significant abnormality      This document has been electronically signed by: Monique Bergeron MD on    10/05/2022 09:39 PM      All CTs at this facility use dose modulation techniques and iterative    reconstructions, and/or weight-based dosing   when appropriate to reduce radiation to a low as reasonably achievable.       XR INJ CONTRAST GASTRIC TUBE PERC    (Results Pending)       Diet: Diet NPO Exceptions are: Sips of Water with Meds    Bethea: no    Microbiology: yes     Telemetry Review of past 24 hours:  Sinus    DVT prophylaxis: [x] Lovenox                                 [] SCDs                                 [] SQ Heparin                                 [] Encourage ambulation           [] Already on Anticoagulation     Disposition:    [] Home       [] TCU       [] Rehab       [] Psych       [x] SNF       [] [place]       [] Other-    Code Status: Full Code    PT/OT Eval Status: Consulted      Electronically signed by Maribeth Daniel MD on 10/9/2022 at 9:17 AM

## 2022-10-09 NOTE — PLAN OF CARE
Problem: Discharge Planning  Goal: Discharge to home or other facility with appropriate resources  10/9/2022 1817 by Tierney Donald RN  Outcome: Progressing  Flowsheets (Taken 10/9/2022 1817)  Discharge to home or other facility with appropriate resources: Identify barriers to discharge with patient and caregiver  10/9/2022 1816 by Tierney Donald RN  Outcome: Progressing  Flowsheets (Taken 10/9/2022 1816)  Discharge to home or other facility with appropriate resources: Identify barriers to discharge with patient and caregiver     Problem: Pain  Goal: Verbalizes/displays adequate comfort level or baseline comfort level  10/9/2022 1817 by iTerney Donald RN  Outcome: Progressing  Flowsheets (Taken 10/9/2022 1817)  Verbalizes/displays adequate comfort level or baseline comfort level:   Assess pain using appropriate pain scale   Encourage patient to monitor pain and request assistance  10/9/2022 1816 by Tierney Donald RN  Outcome: Progressing  Flowsheets (Taken 10/9/2022 1816)  Verbalizes/displays adequate comfort level or baseline comfort level:   Assess pain using appropriate pain scale   Encourage patient to monitor pain and request assistance     Problem: Skin/Tissue Integrity  Goal: Absence of new skin breakdown  Description: 1. Monitor for areas of redness and/or skin breakdown  2. Assess vascular access sites hourly  3. Every 4-6 hours minimum:  Change oxygen saturation probe site  4. Every 4-6 hours:  If on nasal continuous positive airway pressure, respiratory therapy assess nares and determine need for appliance change or resting period.   10/9/2022 1817 by Tierney Donald RN  Outcome: Progressing  10/9/2022 1816 by Tierney Donald RN  Outcome: Progressing     Problem: Safety - Adult  Goal: Free from fall injury  10/9/2022 1817 by Tierney Donald RN  Outcome: Yoly Dejesus (Taken 10/9/2022 1817)  Free From Fall Injury: Instruct family/caregiver on patient safety  10/9/2022 1816 by Tierney Donald RN  Outcome: Progressing     Problem: ABCDS Injury Assessment  Goal: Absence of physical injury  10/9/2022 1817 by Hellen Yanez RN  Outcome: Progressing  Flowsheets (Taken 10/9/2022 1817)  Absence of Physical Injury: Implement safety measures based on patient assessment  10/9/2022 1816 by Hellen Yanez RN  Outcome: Progressing  Flowsheets (Taken 10/9/2022 1816)  Absence of Physical Injury: Implement safety measures based on patient assessment     Problem: Nutrition Deficit:  Goal: Optimize nutritional status  10/9/2022 1817 by Hellen Yanez RN  Outcome: Progressing  Flowsheets (Taken 10/9/2022 1817)  Nutrient intake appropriate for improving, restoring, or maintaining nutritional needs:   Assess nutritional status and recommend course of action   Monitor oral intake, labs, and treatment plans  10/9/2022 1816 by Hellen Yanez RN  Outcome: Progressing  Flowsheets (Taken 10/9/2022 1816)  Nutrient intake appropriate for improving, restoring, or maintaining nutritional needs:   Assess nutritional status and recommend course of action   Monitor oral intake, labs, and treatment plans     Problem: Respiratory - Adult  Goal: Achieves optimal ventilation and oxygenation  10/9/2022 1817 by Hellen Yanez RN  Outcome: Progressing  10/9/2022 1816 by Hellen Yanez RN  Outcome: Progressing  Flowsheets (Taken 10/9/2022 1816)  Achieves optimal ventilation and oxygenation:   Assess for changes in respiratory status   Assess for changes in mentation and behavior  10/9/2022 1632 by Hafsa Dorman RCP  Outcome: Progressing   Care plan reviewed with patient. Patient verbalize understanding of the plan of care and contribute to goal setting.

## 2022-10-09 NOTE — PROGRESS NOTES
Progress note: GI    Patient - Gentry Solitario,  Age - 65 y.o.    - 1945      Room Number - 8B-33/033-A   MRN -  [id number]   [facility]  Date of Admission -  10/5/2022  7:29 PM    SUBJECTIVE:   Pain is better. No n,v. Now pain is 4-5 . OBJECTIVE   VITALS    height is 5' 2\" (1.575 m) and weight is 84 lb 9.6 oz (38.4 kg). Her oral temperature is 97.9 °F (36.6 °C). Her blood pressure is 126/67 and her pulse is 72. Her respiration is 19 and oxygen saturation is 96%. Wt Readings from Last 3 Encounters:   10/09/22 84 lb 9.6 oz (38.4 kg)   22 81 lb (36.7 kg)   22 83 lb 6.4 oz (37.8 kg)       I/O (24 Hours)  No intake or output data in the 24 hours ending 10/09/22 0913    General Appearance  Awake, alert, oriented,  not  In acute distress  HEENT - normocephalic, atraumatic, pink conjunctiva,  anicteric sclera  Neck - Supple, no mass  Lungs -  Bilateral good air entry, no rhonchi, no wheeze  Cardiovascular - Heart sounds are normal.  Regular rate and rhythm without murmur, gallop or rub. Abdomen - soft, not distended, +tender, no rebound, no organomegally,  Neurologic - Awake, alert, oriented to name, place and time. no deficit, generalized weakness  Skin - No bruising or bleeding  Extremities - No edema, no cyanosis, clubbing     MEDICATIONS:      phosphorus replacement protocol   Other RX Placeholder    potassium phosphate IVPB  10 mmol IntraVENous Once    insulin lispro  0-8 Units SubCUTAneous TID WC    insulin lispro  0-4 Units SubCUTAneous Nightly    sodium chloride flush  5-40 mL IntraVENous 2 times per day    albuterol sulfate HFA  2 puff Inhalation Q6H    [Held by provider] ARIPiprazole  15 mg Oral Daily    [Held by provider] vitamin C  500 mg Oral Daily    [Held by provider] aspirin  81 mg Oral Daily    [Held by provider] atorvastatin  10 mg Oral Nightly    [Held by provider] Vitamin D  2,000 Units Oral Daily    [Held by provider] desvenlafaxine succinate  100 mg Oral Daily    [Held by provider] docusate sodium  100 mg Oral BID    mometasone-formoterol  2 puff Inhalation BID    [Held by provider] hydrOXYzine HCl  10 mg Oral BID    [Held by provider] megestrol  40 mg Oral BID    [Held by provider] melatonin  3 mg Oral Daily    [Held by provider] mirtazapine  30 mg Oral Nightly    [Held by provider] multivitamin  1 tablet Oral Daily    [Held by provider] pantoprazole  40 mg Oral QAM AC    [Held by provider] sennosides-docusate sodium  1 tablet Oral Daily    enoxaparin  30 mg SubCUTAneous Daily    HYDROcodone 5 mg - acetaminophen  1 tablet Oral 3 times per day    piperacillin-tazobactam  3,375 mg IntraVENous Q8H    pantoprazole  40 mg IntraVENous Daily      CLINIMIX 4.25/5 50 mL/hr at 10/09/22 0303    sodium chloride 75 mL/hr at 10/08/22 1807    sodium chloride       potassium chloride **OR** potassium alternative oral replacement **OR** potassium chloride, magnesium sulfate, albuterol sulfate HFA, sodium chloride flush, sodium chloride, bisacodyl, ondansetron **OR** ondansetron, polyethylene glycol, acetaminophen **OR** acetaminophen, traMADol **OR** traMADol, ibuprofen, ipratropium-albuterol, diatrizoate meglumine-sodium, HYDROmorphone      LABS:     CBC:   Recent Labs     10/07/22  0554 10/08/22  0619   WBC 8.2 7.8   HGB 9.1* 9.7*    342     BMP:    Recent Labs     10/07/22  0554 10/08/22  0619 10/09/22  0610    140 139   K 4.0 4.0 3.1*    104 100   CO2 23 25 24   BUN 17 9 5*   CREATININE 0.5 0.4 0.4   GLUCOSE 67* 62* 75     Calcium:  Recent Labs     10/09/22  0610   CALCIUM 8.7     Ionized Calcium:No results for input(s): IONCA in the last 72 hours. Magnesium:  Recent Labs     10/09/22  0610   MG 1.6     Phosphorus:  Recent Labs     10/09/22  0610   PHOS 1.7*     BNP:No results for input(s): BNP in the last 72 hours.   Glucose:  Recent Labs     10/09/22  0225   POCGLU 55*     HgbA1C: No results for input(s): LABA1C in the last 72 hours.  INR: No results for input(s): INR in the last 72 hours. Hepatic: No results for input(s): ALKPHOS, ALT, AST, PROT, BILITOT, BILIDIR, LABALBU in the last 72 hours. Amylase and Lipase:No results for input(s): LACTA, AMYLASE in the last 72 hours. Lactic Acid: No results for input(s): LACTA in the last 72 hours. Troponin: No results for input(s): CKTOTAL, CKMB, TROPONINI in the last 72 hours. BNP: No results for input(s): BNP in the last 72 hours. CULTURES:   UA: No results for input(s): SPECGRAV, PHUR, COLORU, CLARITYU, MUCUS, PROTEINU, BLOODU, RBCUA, WBCUA, BACTERIA, NITRU, GLUCOSEU, BILIRUBINUR, UROBILINOGEN, KETUA, LABCAST, LABCASTTY, AMORPHOS in the last 72 hours. Invalid input(s): CRYSTALS  Micro:   Lab Results   Component Value Date/Time    BC No growth 24 hours. No growth 48 hours.  10/05/2022 11:34 PM         IMAGING:         Problem list of patient:     Patient Active Problem List   Diagnosis    Pneumonia of right upper lobe due to infectious organism    Bandemia    Acute on chronic respiratory failure with hypoxia and hypercapnia (HCC)    Weight loss, non-intentional    Severe protein-calorie malnutrition Teola Jovita: less than 60% of standard weight) (HCC)    History of head lice    Hypoxia    Tobacco use disorder    Leukocytosis    Lactic acidosis    SIRS (systemic inflammatory response syndrome) ([facility] [address]    Patient underweight    Wasting syndrome (HCC)    Dysarthria    TIA (transient ischemic attack)    Slurred speech    Bedbug bite    COPD exacerbation (HCC)    Hyperlipidemia    Severe malnutrition (HCC)    General weakness    Acute and chronic respiratory failure with hypoxia (HCC)    Chronic respiratory failure with hypercapnia (HCC)    Acute metabolic encephalopathy    Sepsis secondary to UTI ([address]    Acute respiratory failure with hypoxia (HCC)    Schizoaffective disorder (HCC)    Dyspnea and respiratory abnormalities    Chronic pain disorder    Major depressive disorder, recurrent, moderate (HCC)    Neurogenic claudication (HCC)    Osteopenia of lumbar spine    Hypothyroidism, unspecified    Gastro-esophageal reflux disease without esophagitis    Vitamin B12 deficiency anemia, unspecified    Type 2 diabetes mellitus with hyperglycemia (HCC)    Vitamin D deficiency, unspecified    Anorexia    Abdominal pain    Gastric distention         ASSESSMENT/PLAN     Patient Active Problem List   Diagnosis    Pneumonia of right upper lobe due to infectious organism    Bandemia    Acute on chronic respiratory failure with hypoxia and hypercapnia (HCC)    Weight loss, non-intentional    Severe protein-calorie malnutrition Dawood Brittanie: less than 60% of standard weight) (HCC)    History of head lice    Hypoxia    Tobacco use disorder    Leukocytosis    Lactic acidosis    SIRS (systemic inflammatory response syndrome) ([address]    Patient underweight    Wasting syndrome (HCC)    Dysarthria    TIA (transient ischemic attack)    Slurred speech    Bedbug bite    COPD exacerbation (HCC)    Hyperlipidemia    Severe malnutrition (HCC)    General weakness    Acute and chronic respiratory failure with hypoxia (HCC)    Chronic respiratory failure with hypercapnia (HCC)    Acute metabolic encephalopathy    Sepsis secondary to UTI ([address]    Acute respiratory failure with hypoxia (HCC)    Schizoaffective disorder (HCC)    Dyspnea and respiratory abnormalities    Chronic pain disorder    Major depressive disorder, recurrent, moderate (HCC)    Neurogenic claudication (HCC)    Osteopenia of lumbar spine    Hypothyroidism, unspecified    Gastro-esophageal reflux disease without esophagitis    Vitamin B12 deficiency anemia, unspecified    Type 2 diabetes mellitus with hyperglycemia (HCC)    Vitamin D deficiency, unspecified    Anorexia    Abdominal pain    Gastric distention   Abdominal pain improved. Passing gas, pain improved. , denied nausea.   Rec- Full liquids  Enure 1 can po tid  Mvi po daily  PT/OT         Bridget Min MD, MD, FACP 10/9/2022 9:13 AM

## 2022-10-09 NOTE — PROGRESS NOTES
Pharmacy Consult for TPN/PPN Monitoring & Adjustment  IV Access: peripheral      Dosing weight:  41.3 kg  Current insulin regimen: medium sliding scale  Diet (besides TPN): Liquid Diet    Plan:  Continue Clinimix 4.25/5 at 50 ml/hr. Summary of changes for tonight's TPN: none    Electrolyte Replacement:   Potassium chloride: 40 mEq  Magnesium sulfate: 0 g  Calcium gluconate: 0 g  Potassium phosphate: 10 mmol    Monitoring:  BMP, Mag, iCal, & Phos ordered for tomorrow with AM labs. Pharmacy will continue to follow. Thank you for the consult.      Josselin Lewis PharmD, BCPS 10/9/2022 12:22 PM

## 2022-10-09 NOTE — PLAN OF CARE
Problem: Respiratory - Adult  Goal: Achieves optimal ventilation and oxygenation  Outcome: Progressing   Patient continues on inhalers per at home; tolerating well.   Will continue to monitor patients respiratory status

## 2022-10-09 NOTE — PROCEDURES
Central Venous Catheterization Procedure Note    Indication:  [] Lack of adequate peripheral IV access  [] Infusion of medications with high risk of extravasation injury  [] Hemodynamic monitoring  [] Hemodialysis  [] Extracorporeal therapies  [x] Other: TPN (CVC placement requested by primary hospitalist service)              Time Out:  [x] Time out was completed immediately prior to the start of the procedure, which included verification of the correct patient, correct site and agreement on the procedure to be done. [] Time out was not done because procedure was emergent    Consent:  [x] The patient/surrogate decision maker was informed of the procedure indications, risks, benefits and alternatives. Questions were answered and consent was obtained to proceed with the procedure. [] Consent was not obtained, because the procedure was emergent, or the patient was unable to consent and the surrogate decision maker was not available. Type of Central Line Being Placed:   [x] Central venous    [] Hemodialysis  [] Pulmonary artery    Location:   [x] Internal Jugular Vein [x] Right [] Left  [] Subclavian Vein  [] Right [] Left  [] Femoral Vein   [] Right [] Left    Central Line Bundle:  [x] I washed/disinfected my hands prior to starting procedure  [x] Hat      [x] Mask      [x] Sterile gown      [x] Sterile gloves were worn throughout the procedure  [x] Everyone in the room during the procedure wore a mask  [x] Chlorhexidine was utilized to prep the skin and allowed to dry completely  [x] Full body drape was used to cover the patient    Ultrasound Guidance:   [x] Yes      [] No    Anesthetic Used:  [x] Lidocaine      [] Other    Sterile Technique Used Throughout Procedure?   [x] Yes      [] No    Description/Findings:   [x] Dark, non-pulsatile blood return obtained from all ports  [] Appropriate waveform(s) seen  [] Other    Complications:  [x] None apparent  [] Other:    EBL: Less than 5 cc    Number of Attempts: 1    Follow-up CXR: Reviewed by me. Line placement appropriate. No pneumothorax.     Procedure Performed By: Dr. Verdon Closs    Electronically signed by Amaya Rawls MD on 10/9/2022 at 12:44 AM

## 2022-10-10 ENCOUNTER — APPOINTMENT (OUTPATIENT)
Dept: GENERAL RADIOLOGY | Age: 77
DRG: 919 | End: 2022-10-10
Payer: MEDICARE

## 2022-10-10 LAB
AEROBIC CULTURE: ABNORMAL
AEROBIC CULTURE: ABNORMAL
ALLEN TEST: POSITIVE
ANAEROBIC CULTURE: ABNORMAL
ANION GAP SERPL CALCULATED.3IONS-SCNC: 8 MEQ/L (ref 8–16)
BASE EXCESS (CALCULATED): 10.5 MMOL/L (ref -2.5–2.5)
BUN BLDV-MCNC: 6 MG/DL (ref 7–22)
CALCIUM IONIZED: 1.13 MMOL/L (ref 1.12–1.32)
CALCIUM SERPL-MCNC: 8.7 MG/DL (ref 8.5–10.5)
CHLORIDE BLD-SCNC: 101 MEQ/L (ref 98–111)
CO2: 35 MEQ/L (ref 23–33)
COLLECTED BY:: ABNORMAL
CREAT SERPL-MCNC: 0.4 MG/DL (ref 0.4–1.2)
DEVICE: ABNORMAL
GFR SERPL CREATININE-BSD FRML MDRD: > 90 ML/MIN/1.73M2
GLUCOSE BLD-MCNC: 114 MG/DL (ref 70–108)
GLUCOSE BLD-MCNC: 117 MG/DL (ref 70–108)
GLUCOSE BLD-MCNC: 119 MG/DL (ref 70–108)
GLUCOSE BLD-MCNC: 155 MG/DL (ref 70–108)
GLUCOSE BLD-MCNC: 202 MG/DL (ref 70–108)
GLUCOSE BLD-MCNC: 64 MG/DL (ref 70–108)
GLUCOSE BLD-MCNC: 97 MG/DL (ref 70–108)
GRAM STAIN RESULT: ABNORMAL
HCO3: 38 MMOL/L (ref 23–28)
IFIO2: 32
MAGNESIUM: 1.8 MG/DL (ref 1.6–2.4)
O2 SATURATION: 95 %
ORGANISM: ABNORMAL
PCO2: 62 MMHG (ref 35–45)
PH BLOOD GAS: 7.39 (ref 7.35–7.45)
PHOSPHORUS: 1.5 MG/DL (ref 2.4–4.7)
PO2: 79 MMHG (ref 71–104)
POTASSIUM SERPL-SCNC: 3.6 MEQ/L (ref 3.5–5.2)
SODIUM BLD-SCNC: 144 MEQ/L (ref 135–145)
SOURCE, BLOOD GAS: ABNORMAL

## 2022-10-10 PROCEDURE — 97535 SELF CARE MNGMENT TRAINING: CPT

## 2022-10-10 PROCEDURE — 82330 ASSAY OF CALCIUM: CPT

## 2022-10-10 PROCEDURE — 71045 X-RAY EXAM CHEST 1 VIEW: CPT

## 2022-10-10 PROCEDURE — 84100 ASSAY OF PHOSPHORUS: CPT

## 2022-10-10 PROCEDURE — 6370000000 HC RX 637 (ALT 250 FOR IP)

## 2022-10-10 PROCEDURE — 1200000003 HC TELEMETRY R&B

## 2022-10-10 PROCEDURE — 36600 WITHDRAWAL OF ARTERIAL BLOOD: CPT

## 2022-10-10 PROCEDURE — 2580000003 HC RX 258

## 2022-10-10 PROCEDURE — 99221 1ST HOSP IP/OBS SF/LOW 40: CPT | Performed by: SURGERY

## 2022-10-10 PROCEDURE — 82948 REAGENT STRIP/BLOOD GLUCOSE: CPT

## 2022-10-10 PROCEDURE — 80048 BASIC METABOLIC PNL TOTAL CA: CPT

## 2022-10-10 PROCEDURE — 82803 BLOOD GASES ANY COMBINATION: CPT

## 2022-10-10 PROCEDURE — 2500000003 HC RX 250 WO HCPCS: Performed by: FAMILY MEDICINE

## 2022-10-10 PROCEDURE — 94669 MECHANICAL CHEST WALL OSCILL: CPT

## 2022-10-10 PROCEDURE — 6370000000 HC RX 637 (ALT 250 FOR IP): Performed by: FAMILY MEDICINE

## 2022-10-10 PROCEDURE — 94761 N-INVAS EAR/PLS OXIMETRY MLT: CPT

## 2022-10-10 PROCEDURE — 83735 ASSAY OF MAGNESIUM: CPT

## 2022-10-10 PROCEDURE — 97116 GAIT TRAINING THERAPY: CPT

## 2022-10-10 PROCEDURE — 6360000002 HC RX W HCPCS: Performed by: NURSE PRACTITIONER

## 2022-10-10 PROCEDURE — 94640 AIRWAY INHALATION TREATMENT: CPT

## 2022-10-10 PROCEDURE — 6360000002 HC RX W HCPCS

## 2022-10-10 PROCEDURE — 2580000003 HC RX 258: Performed by: NURSE PRACTITIONER

## 2022-10-10 PROCEDURE — 94760 N-INVAS EAR/PLS OXIMETRY 1: CPT

## 2022-10-10 PROCEDURE — 97530 THERAPEUTIC ACTIVITIES: CPT

## 2022-10-10 PROCEDURE — 2700000000 HC OXYGEN THERAPY PER DAY

## 2022-10-10 PROCEDURE — 2500000003 HC RX 250 WO HCPCS

## 2022-10-10 RX ORDER — ALBUTEROL SULFATE 90 UG/1
2 AEROSOL, METERED RESPIRATORY (INHALATION) 3 TIMES DAILY
Status: DISCONTINUED | OUTPATIENT
Start: 2022-10-10 | End: 2022-10-11

## 2022-10-10 RX ORDER — HYDROCODONE BITARTRATE AND ACETAMINOPHEN 5; 325 MG/1; MG/1
1 TABLET ORAL EVERY 8 HOURS PRN
Qty: 21 TABLET | Refills: 0 | Status: SHIPPED | OUTPATIENT
Start: 2022-10-10 | End: 2022-10-11 | Stop reason: SDUPTHER

## 2022-10-10 RX ADMIN — LEUCINE, PHENYLALANINE, LYSINE, METHIONINE, ISOLEUCINE, VALINE, HISTIDINE, THREONINE, TRYPTOPHAN, ALANINE, GLYCINE, ARGININE, PROLINE, SERINE, TYROSINE, DEXTROSE 2000 ML: 311; 238; 247; 170; 255; 247; 204; 179; 77; 880; 438; 489; 289; 213; 17; 5 INJECTION INTRAVENOUS at 06:29

## 2022-10-10 RX ADMIN — ARIPIPRAZOLE 15 MG: 15 TABLET ORAL at 09:03

## 2022-10-10 RX ADMIN — INSULIN LISPRO 2 UNITS: 100 INJECTION, SOLUTION INTRAVENOUS; SUBCUTANEOUS at 13:24

## 2022-10-10 RX ADMIN — DOCUSATE SODIUM 100 MG: 100 CAPSULE, LIQUID FILLED ORAL at 20:10

## 2022-10-10 RX ADMIN — Medication 2000 UNITS: at 11:12

## 2022-10-10 RX ADMIN — Medication 2 PUFF: at 02:32

## 2022-10-10 RX ADMIN — Medication 3 MG: at 20:10

## 2022-10-10 RX ADMIN — MIRTAZAPINE 30 MG: 30 TABLET, FILM COATED ORAL at 20:10

## 2022-10-10 RX ADMIN — PIPERACILLIN AND TAZOBACTAM 3375 MG: 3; .375 INJECTION, POWDER, FOR SOLUTION INTRAVENOUS at 21:45

## 2022-10-10 RX ADMIN — ATORVASTATIN CALCIUM 10 MG: 10 TABLET, FILM COATED ORAL at 20:10

## 2022-10-10 RX ADMIN — MEGESTROL ACETATE 40 MG: 40 TABLET ORAL at 09:02

## 2022-10-10 RX ADMIN — POTASSIUM PHOSPHATE, MONOBASIC AND POTASSIUM PHOSPHATE, DIBASIC 10 MMOL: 224; 236 INJECTION, SOLUTION, CONCENTRATE INTRAVENOUS at 11:17

## 2022-10-10 RX ADMIN — DESVENLAFAXINE SUCCINATE 100 MG: 100 TABLET, FILM COATED, EXTENDED RELEASE ORAL at 09:03

## 2022-10-10 RX ADMIN — OXYCODONE HYDROCHLORIDE AND ACETAMINOPHEN 500 MG: 500 TABLET ORAL at 09:03

## 2022-10-10 RX ADMIN — ALBUTEROL SULFATE 2 PUFF: 90 AEROSOL, METERED RESPIRATORY (INHALATION) at 23:39

## 2022-10-10 RX ADMIN — PANTOPRAZOLE SODIUM 40 MG: 40 TABLET, DELAYED RELEASE ORAL at 09:03

## 2022-10-10 RX ADMIN — HYDROXYZINE HYDROCHLORIDE 10 MG: 10 TABLET ORAL at 09:03

## 2022-10-10 RX ADMIN — HYDROCODONE BITARTRATE AND ACETAMINOPHEN 1 TABLET: 5; 325 TABLET ORAL at 13:24

## 2022-10-10 RX ADMIN — Medication 2 PUFF: at 08:26

## 2022-10-10 RX ADMIN — ENOXAPARIN SODIUM 30 MG: 100 INJECTION SUBCUTANEOUS at 09:04

## 2022-10-10 RX ADMIN — ASPIRIN 81 MG 81 MG: 81 TABLET ORAL at 09:03

## 2022-10-10 RX ADMIN — ALBUTEROL SULFATE 2 PUFF: 90 AEROSOL, METERED RESPIRATORY (INHALATION) at 14:44

## 2022-10-10 RX ADMIN — MEGESTROL ACETATE 40 MG: 40 TABLET ORAL at 20:11

## 2022-10-10 RX ADMIN — PIPERACILLIN AND TAZOBACTAM 3375 MG: 3; .375 INJECTION, POWDER, FOR SOLUTION INTRAVENOUS at 13:31

## 2022-10-10 RX ADMIN — HYDROCODONE BITARTRATE AND ACETAMINOPHEN 1 TABLET: 5; 325 TABLET ORAL at 05:35

## 2022-10-10 RX ADMIN — MOMETASONE FUROATE AND FORMOTEROL FUMARATE DIHYDRATE 2 PUFF: 200; 5 AEROSOL RESPIRATORY (INHALATION) at 08:27

## 2022-10-10 RX ADMIN — PIPERACILLIN AND TAZOBACTAM 3375 MG: 3; .375 INJECTION, POWDER, FOR SOLUTION INTRAVENOUS at 05:40

## 2022-10-10 ASSESSMENT — PAIN SCALES - GENERAL
PAINLEVEL_OUTOF10: 7
PAINLEVEL_OUTOF10: 7
PAINLEVEL_OUTOF10: 6
PAINLEVEL_OUTOF10: 7
PAINLEVEL_OUTOF10: 7

## 2022-10-10 ASSESSMENT — PAIN DESCRIPTION - LOCATION
LOCATION: ABDOMEN
LOCATION: ABDOMEN

## 2022-10-10 ASSESSMENT — PAIN DESCRIPTION - ORIENTATION: ORIENTATION: MID

## 2022-10-10 ASSESSMENT — PAIN DESCRIPTION - DESCRIPTORS
DESCRIPTORS: ACHING
DESCRIPTORS: DISCOMFORT

## 2022-10-10 NOTE — RT PROTOCOL NOTE
RT Inhaler-Nebulizer Bronchodilator Protocol Note    There is a bronchodilator order in the chart from a provider indicating to follow the RT Bronchodilator Protocol and there is an Initiate RT Inhaler-Nebulizer Bronchodilator Protocol order as well (see protocol at bottom of note). CXR Findings:  XR CHEST PORTABLE    Result Date: 10/9/2022  Impression: Acceptable right IJ central venous catheter placement. Mild lung hyperinflation. This document has been electronically signed by: Rangel Logan. Dorene Lombardi on 10/09/2022 03:51 AM      The findings from the last RT Protocol Assessment were as follows:   History Pulmonary Disease: Chronic pulmonary disease  Respiratory Pattern: Mild dyspnea at rest, irregular pattern, or RR 21-25 bpm  Breath Sounds: Slightly diminished and/or crackles  Cough: Strong, spontaneous, non-productive  Indication for Bronchodilator Therapy: On home bronchodilators, Decreased or absent breath sounds  Bronchodilator Assessment Score: 8    Aerosolized bronchodilator medication orders have been revised according to the RT Inhaler-Nebulizer Bronchodilator Protocol below. Respiratory Therapist to perform RT Therapy Protocol Assessment initially then follow the protocol. Repeat RT Therapy Protocol Assessment PRN for score 0-3 or on second treatment, BID, and PRN for scores above 3. No Indications - adjust the frequency to every 6 hours PRN wheezing or bronchospasm, if no treatments needed after 48 hours then discontinue using Per Protocol order mode. If indication present, adjust the RT bronchodilator orders based on the Bronchodilator Assessment Score as indicated below.   Use Inhaler orders unless patient has one or more of the following: on home nebulizer, not able to hold breath for 10 seconds, is not alert and oriented, cannot activate and use MDI correctly, or respiratory rate 25 breaths per minute or more, then use the equivalent nebulizer order(s) with same Frequency and PRN reasons based on the score. If a patient is on this medication at home then do not decrease Frequency below that used at home. 0-3 - enter or revise RT bronchodilator order(s) to equivalent RT Bronchodilator order with Frequency of every 4 hours PRN for wheezing or increased work of breathing using Per Protocol order mode. 4-6 - enter or revise RT Bronchodilator order(s) to two equivalent RT bronchodilator orders with one order with BID Frequency and one order with Frequency of every 4 hours PRN wheezing or increased work of breathing using Per Protocol order mode. 7-10 - enter or revise RT Bronchodilator order(s) to two equivalent RT bronchodilator orders with one order with TID Frequency and one order with Frequency of every 4 hours PRN wheezing or increased work of breathing using Per Protocol order mode. 11-13 - enter or revise RT Bronchodilator order(s) to one equivalent RT bronchodilator order with QID Frequency and an Albuterol order with Frequency of every 4 hours PRN wheezing or increased work of breathing using Per Protocol order mode. Greater than 13 - enter or revise RT Bronchodilator order(s) to one equivalent RT bronchodilator order with every 4 hours Frequency and an Albuterol order with Frequency of every 2 hours PRN wheezing or increased work of breathing using Per Protocol order mode. RT to enter RT Home Evaluation for COPD & MDI Assessment order using Per Protocol order mode.     Electronically signed by Finn Enriquez RCP on 10/10/2022 at 8:32 AM

## 2022-10-10 NOTE — PROGRESS NOTES
[facility] 88 PROGRESS NOTE      Patient: Carol Rocha  Room #: 9T-96/827-Z            YOB: 1945  Age: 65 y.o. Gender: female            Admit Date & Time: 10/5/2022  7:29 PM    Assessment:  Queenie was sitting up in bed. She has her daughter Barbara Ismay here with her as a source of support. Interventions:  I introduced myself and offered support through presence and connection. Outcomes:  Pt and daughter know that  support can be requested through unit associates. Plan:    Follow-up at another time to see if there are any spiritual / emotional needs.     Electronically signed by Vikram Medina on 10/10/2022 at 2:35 PM.  [address] [facility]  [phone]

## 2022-10-10 NOTE — PROGRESS NOTES
[address] [address]  INPATIENT PHYSICAL THERAPY  Daily Note  [facility] SURG 8B - 8B-33/033-A    Time In: 0930  Time Out: 9716  Timed Code Treatment Minutes: 25 Minutes  Minutes: 25          Date: 10/10/2022  Patient Name: Julianne Donovan,  Gender:  female        MRN: [id number]  : 1945  (65 y.o.)     Referring Practitioner: Dr. Kendy Soto  Diagnosis: gastric distention  Additional Pertinent Hx: Per H&P: Julianne Donovan is a 65 y.o. female with a PMH of COPD, chronic respiratory failure, severe malnutrition s/p PEG placement who presented with abdominal pain. Patient reports a history of acutely worsening abdominal pain over the past two weeks. She recently underwent PEG placement due to severe malnutrition and has had pain around the PEG tube site since placement. She was previously admitted due to this worsening abdominal pain for which she was treated with an enema for constipation and tramadol for insertion site pain. Patient reports that her abdominal pain has worsened over the last few days, and she has had increased drainage around the insertion site of her tube. Prior Level of Function:  Type of Home: Facility ([facility])  Home Equipment: Oxygen (3L O2 at baseline)        ADL Assistance: Independent (per pt report)  Ambulation Assistance: Independent (per pt report)  Transfer Assistance: Independent (per pt report)  Additional Comments: Pt reported was independent with all tasks, although daughter reported since PEG placement on , pt has been in skilled wing completing rehab. Daughter reported pt has not been able to participate much in therapy since PEG tube placement. Restrictions/Precautions:  Restrictions/Precautions: General Precautions, Fall Risk, Isolation  Position Activity Restriction  Other position/activity restrictions: PEG, 3L O2 at baseline     SUBJECTIVE: Pt. Laying in bed and pleasantly agrees to therapy session. Pt. Incontinent of urine upon arrival. Agrees to use restroom. Extra time spent retrieving BS chair and placing it in room during session. Pt. Agrees to sit up in BS chair at end of session. PAIN: None indicated    Vitals: Vitals not assessed per clinical judgement, see nursing flowsheet    OBJECTIVE:  Bed Mobility:  Rolling to Right: Stand By Assistance   Supine to Sit: Stand By Assistance    Transfers:  Sit to Stand: Stand By Assistance  Stand to Sit:Stand By Assistance    Ambulation:  Contact Guard Assistance  Distance: 13' x 2  Surface: Level Tile  Device:Rolling Walker  Gait Deviations: Forward Flexed Posture, Slow Liz, Decreased Step Length Bilaterally, Decreased Gait Speed, Decreased Heel Strike Bilaterally, Unsteady Gait, and Decreased Terminal Knee Extension    Balance:  Static Sitting Balance:  Supervision, To sit on commode with no trunk support. Dynamic Standing Balance: Contact Guard Assistance, Minimal Assistance while preparing for ambulation and while completing clothing management. Pt. With one LOB requiring min A to correct while standing at EOB in prep for ambulation. Pt. Denies dizziness/light-headedness. Exercise:  None    Functional Outcome Measures: Not completed       ASSESSMENT:  Assessment: Patient progressing toward established goals. Activity Tolerance:  Patient tolerance of  treatment: good. Equipment Recommendations: Other: cont to assess needs  Discharge Recommendations: Continue to assess pending progress, Subacute/Skilled Nursing Facility, Patient would benefit from continued therapy after discharge     Plan: Specific Instructions for Next Treatment: therex and mobility  General Plan:  (3-5X GM)  Specific Instructions for Next Treatment: therex and mobility    Patient Education  Patient Education: Plan of Care, Functional Mobility, Reviewed Prior Education, Health Promotion and Wellness Education, Safety    Goals:  Patient Goals : less pain  Short Term Goals  Time Frame for Short Term Goals: by discharge  Short Term Goal 1: bed mobility with MOD I to get in/out of bed  Short Term Goal 2: transfer with S to get in/out of chairs  Short Term Goal 3: amb 40'x1 with/without AD and SBA to walk safely in room  Long Term Goals  Time Frame for Long Term Goals : no LTGs set secondary to short ELOS    Following session, patient left in safe position with all fall risk precautions in place.

## 2022-10-10 NOTE — PROGRESS NOTES
PROGRESS NOTE      Patient:  Stone Hernandez    Unit/Bed:8B-33/033-A    YOB: 1945    MRN: [id number]     Acct: [de-identified]    PCP: Gaby Arana MD    Date of Admission: 10/5/2022 LOS: 2    Date of Evaluation:  10/10/2022    Anticipated Discharge: Pending clinical course    Assessment/Plan:    Chronic hypoxemic respiratory failure  On 3L at baseline, due to history of COPD, not in acute exacerbation  ABG supports hypoxemia   -Continue home inhalers and Pulmonary hygiene  -Blood cultures negative x2  -COVID and influenza negative  -IVF as ordered    SIRS, resolved  On admission presented with leukocytosis 16.5, tachypnea 25, and tachycardia 139  Procal, and lactic normal, low suspicion for sepsis  -blood cultures x 2 (10/8)  -COVID and influenza negative   -G-tube culture: positive for staphylococcus (skin ganga)     Severe abdominal pain  At PEG site, tenderness to palpation at site. Mucus and erythema present at insertion site  CT scan(10/5): Nonspecific gastric distention with indwelling G-tube  PEG tube dislodged, GI notified. TPN started(10/8), per surgery wait to replace peg tube  -NPO, changed meds to IV  -General surgery consulted for second opinion(10/7), appreciate recommendations  -GI recs(10/9), change to full liquid diet     Leukocytosis, improved  On arrival WBC 16.5, stable (10/7)  Considering PEG tube infection, culture showed S.  Aureus (possible skin ganga)  CXR(10/5): no acute process   -Monitor with CBC in AM     Anemia  On admission 11.8, down trending  -likely due to dehydration, secondary to poor PO intake  -Transfuse for Hgb <7  -Check CBC in AM     Sinus tachycardia  HR on arrival, likely due to dehydration  UA without signs of infections, positive for ketones  -Improvement with IV hydration      Hypotension   Likely secondary to poor PO intake, low fluid intake, pain medication  - Fluid bolus PRN to maintain pressure     Severe protein calorie malnutrition  Previously poor PO intake, BMI 15.47  Dietitian consulted, appreciate recommendations  -TPN was started (10/8)  -GI rec full liquid diet (10/9)     COPD  PFTs 2018: FEV1/FVC 0.45  -On albuterol and breo ellipta  -Monitor respiratory status     HTN   Stable, no noted home medications     HLD  Stable, home Lipitor      Mild Hyponatremia  Due to poor PO intake  Dietitian consulted, continue IV fluids. -Monitor CMP            Chief Complaint: Abdominal Pain    Hospital Course: Per initial H&P     \"Queenie Lezama is a 65 y.o. female with a PMH of COPD, chronic respiratory failure, severe malnutrition s/p PEG placement who presented with abdominal pain. Patient reports a history of acutely worsening abdominal pain over the past two weeks. She recently underwent PEG placement due to severe malnutrition and has had pain around the PEG tube site since placement. She was previously admitted due to this worsening abdominal pain for which she was treated with an enema for constipation and tramadol for insertion site pain. Patient reports that her abdominal pain has worsened over the last few days, and she has had increased drainage around the insertion site of her tube. She also endorses a 1x episode of non-bloody brown emesis day prior to arrival. Patient daughter in room to assist with history states that patient has had continued abdominal pain and presented to provider at [facility] who obtained a plain film and advised patient to go to ED for further evaluation. On examination, insertion site is non-erythematous and draining green, thick liquid. Patient denies any acute chest pain, visual changes. She does have worsening SOB over the last few weeks, but denies sputum production. Last bowel movement was two days prior to arrival. She denies any other acute complaints. \"     10/6/22: PEG Tube was dislodged.       10/7/22: Resting well, surgery [facility]     10/8/22: Central lined placed for TPN      10/9/22: Stable, restarted on full liquid diet as per GI recommendations    10/10/22: Stable, started on full liquid, has BM yesterday. Subjective/HPI:   Dante Palma seen and examined at bedside, feels better overall. States there is left sided abdominal pain, had a bowel movement yesterday. She denies headache, SOB, CP, N/V/D.       PMH, SURGICAL HX, FH, SOCIAL HX reviewed and updated as needed.     Medications:  Reviewed    Infusion Medications    CLINIMIX 4.25/5 2,000 mL (10/10/22 0629)    CLINIMIX 4.25/5 50 mL/hr at 10/09/22 1409    sodium chloride 75 mL/hr at 10/09/22 1358    sodium chloride       Scheduled Medications    albuterol sulfate HFA  2 puff Inhalation TID    phosphorus replacement protocol   Other RX Placeholder    potassium phosphate IVPB  10 mmol IntraVENous Once    insulin lispro  0-8 Units SubCUTAneous TID WC    insulin lispro  0-4 Units SubCUTAneous Nightly    sodium chloride flush  5-40 mL IntraVENous 2 times per day    ARIPiprazole  15 mg Oral Daily    vitamin C  500 mg Oral Daily    aspirin  81 mg Oral Daily    atorvastatin  10 mg Oral Nightly    Vitamin D  2,000 Units Oral Daily    desvenlafaxine succinate  100 mg Oral Daily    docusate sodium  100 mg Oral BID    mometasone-formoterol  2 puff Inhalation BID    hydrOXYzine HCl  10 mg Oral BID    megestrol  40 mg Oral BID    melatonin  3 mg Oral Daily    mirtazapine  30 mg Oral Nightly    pantoprazole  40 mg Oral QAM AC    sennosides-docusate sodium  1 tablet Oral Daily    enoxaparin  30 mg SubCUTAneous Daily    HYDROcodone 5 mg - acetaminophen  1 tablet Oral 3 times per day    piperacillin-tazobactam  3,375 mg IntraVENous Q8H     PRN Meds: potassium chloride **OR** potassium alternative oral replacement **OR** potassium chloride, magnesium sulfate, albuterol sulfate HFA, sodium chloride flush, sodium chloride, bisacodyl, ondansetron **OR** ondansetron, polyethylene glycol, acetaminophen **OR** acetaminophen, traMADol **OR** traMADol, ibuprofen, ipratropium-albuterol, diatrizoate meglumine-sodium, HYDROmorphone      Intake/Output Summary (Last 24 hours) at 10/10/2022 1020  Last data filed at 10/9/2022 2206  Gross per 24 hour   Intake 100 ml   Output --   Net 100 ml       Exam:  BP (!) 145/88   Pulse (!) 110   Temp 97.8 °F (36.6 °C) (Oral)   Resp 18   Ht 5' 2\" (1.575 m)   Wt 83 lb 8 oz (37.9 kg)   SpO2 98%   BMI 15.27 kg/m²     Physical Exam  Vitals reviewed. Constitutional:       General: She is not in acute distress. Appearance: Normal appearance. She is not ill-appearing. HENT:      Head: Normocephalic and atraumatic. Right Ear: External ear normal.      Left Ear: External ear normal.      Nose: Nose normal.   Eyes:      General:         Right eye: No discharge. Left eye: No discharge. Conjunctiva/sclera: Conjunctivae normal.   Cardiovascular:      Rate and Rhythm: Normal rate and regular rhythm. Pulses: Normal pulses. Heart sounds: Normal heart sounds. No murmur heard. Pulmonary:      Effort: Pulmonary effort is normal. No respiratory distress. Breath sounds: Normal breath sounds. Abdominal:      General: Abdomen is flat. Bowel sounds are normal.      Palpations: Abdomen is soft. Comments: Abd pad and dressing over PEG tube site, healing well    Musculoskeletal:         General: Normal range of motion. Cervical back: Normal range of motion and neck supple. Skin:     General: Skin is warm and dry. Capillary Refill: Capillary refill takes less than 2 seconds. Findings: No rash. Neurological:      General: No focal deficit present. Mental Status: She is alert. Psychiatric:         Mood and Affect: Mood normal.         Thought Content:  Thought content normal.      All labs reviewed and interpreted by me:  Labs:   Recent Labs     10/08/22  0619   WBC 7.8   HGB 9.7*   HCT 31.8*        Recent Labs     10/08/22  0619 10/09/22  0610 10/10/22  0625    139 144 K 4.0 3.1* 3.6    100 101   CO2 25 24 35*   BUN 9 5* 6*   CREATININE 0.4 0.4 0.4   CALCIUM 8.8 8.7 8.7   PHOS  --  1.7* 1.5*     No results for input(s): AST, ALT, BILIDIR, BILITOT, ALKPHOS in the last 72 hours. No results for input(s): INR in the last 72 hours. No results for input(s): Jessica Marmolejo in the last 72 hours. Urinalysis:      Lab Results   Component Value Date/Time    NITRU NEGATIVE 10/05/2022 09:33 PM    WBCUA 2-4 10/05/2022 09:33 PM    BACTERIA NONE SEEN 10/05/2022 09:33 PM    RBCUA 5-10 10/05/2022 09:33 PM    BLOODU NEGATIVE 10/05/2022 09:33 PM    SPECGRAV 1.009 11/16/2019 05:27 PM    GLUCOSEU NEGATIVE 10/05/2022 09:33 PM       All radiology images and reports reviewed and interpreted by me:  Radiology:  XR CHEST PORTABLE   Final Result   Impression:   Acceptable right IJ central venous catheter placement. Mild lung hyperinflation. This document has been electronically signed by: Panchito Muller on    10/09/2022 03:51 AM      XR CHEST PORTABLE   Final Result   Impression:      No acute processes. This document has been electronically signed by: Juan Queen MD on    10/05/2022 10:50 PM      CT ABDOMEN PELVIS WO CONTRAST Additional Contrast? None   Final Result   Impression:      Nonspecific gastric distention with indwelling gastrostomy tube      Please correlate clinically regarding tube function and/or ability to    decompress      No other significant abnormality      This document has been electronically signed by: Juan Queen MD on    10/05/2022 09:39 PM      All CTs at this facility use dose modulation techniques and iterative    reconstructions, and/or weight-based dosing   when appropriate to reduce radiation to a low as reasonably achievable. XR INJ CONTRAST GASTRIC TUBE PERC    (Results Pending)       Diet: ADULT DIET;  Full Liquid  ADULT ORAL NUTRITION SUPPLEMENT; Lunch, Breakfast, Dinner; Standard High Calorie/High Protein Oral Supplement    Neema: no    Microbiology: yes     Telemetry Review of past 24 hours:  Sinus tachycardia    DVT prophylaxis: [x] Lovenox                                 [] SCDs                                 [] SQ Heparin                                 [] Encourage ambulation           [] Already on Anticoagulation     Disposition:    [] Home       [] TCU       [] Rehab       [] Psych       [x] SNF       [] [place]       [] Other-    Code Status: Full Code    PT/OT Eval Status: Consulted      Electronically signed by Breanna Basurto MD on 10/10/2022 at 10:20 AM

## 2022-10-10 NOTE — PLAN OF CARE
Problem: Respiratory - Adult  Goal: Clear lung sounds  Outcome: Progressing  Note: Txs to help improve lung aeration. Acapella to clear secretions. Patient mutually agreed on goals.

## 2022-10-10 NOTE — PROGRESS NOTES
[address] [facility] 8B  Occupational Therapy  Daily Note  Time:    Time In: 6147  Time Out: 1108  Timed Code Treatment Minutes: 23 Minutes  Minutes: 23          Date: 10/10/2022  Patient Name: Renan Caro,   Gender: female      Room: -33/033-A  MRN: [id number]  : 1945  (65 y.o.)  Referring Practitioner: Galileo Shanks DO  Diagnosis: gastric distention  Additional Pertinent Hx: Per H&P: Renan Caro is a 65 y.o. female with a PMH of COPD, chronic respiratory failure, severe malnutrition s/p PEG placement who presented with abdominal pain. Patient reports a history of acutely worsening abdominal pain over the past two weeks. She recently underwent PEG placement due to severe malnutrition and has had pain around the PEG tube site since placement. She was previously admitted due to this worsening abdominal pain for which she was treated with an enema for constipation and tramadol for insertion site pain. Patient reports that her abdominal pain has worsened over the last few days, and she has had increased drainage around the insertion site of her tube. Restrictions/Precautions:  Restrictions/Precautions: General Precautions, Fall Risk, Isolation  Position Activity Restriction  Other position/activity restrictions: PEG, 3L O2 at baseline      SUBJECTIVE: Pt sitting up in bedside chair. Pt reporting she would like to complete ADL tasks     PAIN: 0  /10:      Vitals: Vitals not assessed per clinical judgement, see nursing flowsheet    COGNITION: WNL    ADL:   Grooming: Supervision. Seated to wash face   Bathing: Maximum Assistance. For sponge bathing with encouragement ot complete UB bathing at least with pt continuing to want therapist to complete d/t mulitple lines. Expect pt could assist wth UB. Upper Extremity Dressing: Moderate Assistance. Donning gown with cues to left arms up   Lower Extremity Dressing: Maximum Assistance. Slippers and depends .     BALANCE:  Sitting Balance:  Stand By Assistance. During ADL tasks   Standing Balance: Contact Guard Assistance. With bilateral UE support on walker     BED MOBILITY:  Not Tested    TRANSFERS:  Sit to Stand:  Minimal Assistance. From bedside chair with cues for hand placement   Stand to Sit: Contact Guard Assistance. Into chair with cues to reach back for surface stting on     FUNCTIONAL MOBILITY:  Pt declining any mobility at this time and why ADL tasks completed in bedside chair         ASSESSMENT:     Activity Tolerance:  Patient tolerance of  treatment: fair. Discharge Recommendations: Continue to assess pending progress  Equipment Recommendations: Equipment Needed: No  Other: defer to next level of care  Plan: Times Per Week: 3-5x  Current Treatment Recommendations: Strengthening, Balance training, Functional mobility training, Endurance training, Safety education & training, Patient/Caregiver education & training, Equipment evaluation, education, & procurement, Self-Care / ADL    Patient Education  Patient Education:  safety with transfers     Goals  Short Term Goals  Time Frame for Short Term Goals: by discharge  Short Term Goal 1: Pt will tolerate sitting at EOB X10 minutes with supervision in prep for ADL completion. Short Term Goal 2: Pt will complete BADL tasks with minimal assistance and ECT prn to increase independence with self care tasks. Short Term Goal 3: Pt to navigate to/from bathroom using AD with CGA and no cues for mobility to complete ADL tasks  Short Term Goal 4: Pt to demo dyanmic standing balance with no UE support and CGA to complete clothign management after dressign and toileting tasks    Following session, patient left in safe position with all fall risk precautions in place.

## 2022-10-10 NOTE — CARE COORDINATION
10/10/22, 3:01 PM EDT    DISCHARGE PLANNING EVALUATION    Spoke with Nubia Mullen at [facility], confirmed pre-cert is good through tomorrow. Informed Nubia Mullen patient may be discharged today, trying to confirm with physician. Confirming discharge with no TPN, no PEG tube and supplemental feedings. Nubia Mullen stated that pre-cert may not be good as it was based on the PEG tube. She find out and will get back.

## 2022-10-10 NOTE — PROGRESS NOTES
Order received for CVC removal per Dr Tanya Guallpa. Clarified order with primary RN before removal. Patient placed in bed. Transparent dressing removed. Skin accessed appears clean and dry. Sutures removed, area cleaned with chloro prep, bio patch applied, sterile gauze placed over bio patch, CVC removed with green tip intact, pressure held with sterile gauze for four minutes. New transparent dressing applied. Educated the patient on remaining in bed for one hour, dressing stays on for 24 hours, signs and symptoms of infection and notify primary nurse if any blood or oozing. Lorenzo Christiansen RN notified.

## 2022-10-10 NOTE — PROGRESS NOTES
Comprehensive Nutrition Assessment    Type and Reason for Visit:  Reassess (TPN)    Nutrition Recommendations/Plan: If TPN is continued recommend Dose Weight 37.9 kg; 1.4 g/kg, 30% lipids, and 15 kcal/kg for the next 3 in 1 custom bag  Will monitor for refeeding syndrome as pt is chronically malnourished  Current diet Full liquids w/ ONS Ensure Enlive TID  May consider Speech Eval as pt was on modified diet and TF at [facility]     Malnutrition Assessment:  Malnutrition Status:  Severe malnutrition (10/06/22 1354)    Context:  Chronic Illness     Findings of the 6 clinical characteristics of malnutrition:  Energy Intake:  No significant decrease in energy intake  Weight Loss:   (-16.5% weight loss in 9 months per EMR)     Body Fat Loss:  Severe body fat loss Orbital   Muscle Mass Loss:  Severe muscle mass loss Temples (temporalis)  Fluid Accumulation:  No significant fluid accumulation     Strength:  Not Performed    Nutrition Assessment:    Pt. severely malnourished AEB criteria listed above. At risk for further nutritional compromise r/t severe abd pain (PEG tube dislodged), TPN started 10/8,  ? Replacement of PEG tube, and underlying medical condition (Hx; Severe malnutrition, TIA, T2DM (A1C 5.3 10/8/21), COPD, Depression, HLD, Hypothyroidism). Nutrition Related Findings:    Pt. Report/Treatments/Miscellaneous: Pt currently on Full liquids with ONS; Waiting to replace PEG per General surgery; Spoke with nursing this am about TPN as it was started over the weekend (10/8): ? Continuation of TPN for tonight; Central line is placed; Spoke with pharmacy about TPN dosage.    GI Status: BM 10/10  Pertinent Labs: Na 144, BUN 6, Cr 0.4, Glucose 119, K 3.6, Mg 1.8, Phos 1.5  Pertinent Meds: Vitamin C, Colace, Megace, Remeron, MVI, Protonix, Senokot, Vitamin D, Abx, IVF, Remeron     Wound Type: None       Current Nutrition Intake & Therapies:    Average Meal Intake: Unable to assess  Average Supplements Intake: Unable to assess  ADULT DIET; Full Liquid  ADULT ORAL NUTRITION SUPPLEMENT; Lunch, Breakfast, Dinner; Standard High Calorie/High Protein Oral Supplement  Current Parenteral Nutrition Orders:  Type and Formula: 3-in-1 Custom   Lipids: Daily  Rate/Volume: N/a  Current PN Order Provides: 4.25/5 Clinimix at 50 ml/hr; 408 kcal, 60 grams CHO, 51 grams protein  Goal PN Orders Provides: TPN Dose Weight 37.9 kg; 1.4 g/kg, 30% lipids, and 15 kcal/kg provides 569 kcal, 53 g Protein, 17 grams lipids, 55 grams CHO    Anthropometric Measures:  Height: 5' 2\" (157.5 cm)  Ideal Body Weight (IBW): 110 lbs (50 kg)    Admission Body Weight: 77 lb 4.8 oz (35.1 kg)  Current Body Weight: 83 lb 8 oz (37.9 kg) (10/10; No Edema), 75.9 % IBW.  Weight Source: Standing Scale  Current BMI (kg/m2): 15.3  Usual Body Weight:  (Per EMR 3/24/21 98 lbs 6 oz, 4/26/22 83 lbs 6 oz, 10/6/22 77 lbs 5 oz)     Weight Adjustment For: No Adjustment                 BMI Categories: Underweight (BMI less than 22) age over 72    Estimated Daily Nutrient Needs:  Energy Requirements Based On: Kcal/kg  Weight Used for Energy Requirements: Current (35.1 kg)  Energy (kcal/day): 8546-3443 (35-40 g/kg)  Weight Used for Protein Requirements: Current (35.1 kg)  Protein (g/day): 53 grams or more (1.2 g/kg)  Method Used for Fluid Requirements: 1 ml/kcal  Fluid (ml/day): 6610-9298 ml    Nutrition Diagnosis:   Severe malnutrition related to inadequate protein-energy intake as evidenced by Criteria as identified in malnutrition assessment    Nutrition Interventions:   Food and/or Nutrient Delivery:  (? TPN Continue)  Nutrition Education/Counseling: Education not indicated  Coordination of Nutrition Care: Continue to monitor while inpatient       Goals:  Previous Goal Met: Progressing toward Goal(s)  Goals: Meet at least 75% of estimated needs, by next RD assessment       Nutrition Monitoring and Evaluation:   Behavioral-Environmental Outcomes: None Identified  Food/Nutrient Intake Outcomes: Diet Advancement/Tolerance, Enteral Nutrition Intake/Tolerance  Physical Signs/Symptoms Outcomes: Biochemical Data, GI Status, Skin, Weight, Fluid Status or Edema    Discharge Planning:     Too soon to determine     Aubree Grimm N [address] [place] [address]  Contact: [phone]

## 2022-10-10 NOTE — CARE COORDINATION
10/10/22, 12:22 PM EDT    DISCHARGE ON GOING EVALUATION    Via Tobi 32 day: 2  Location: -33/033-A Reason for admit: Gastric distention [K31.89]  Abdominal pain [R10.9]   Procedure: No.   Barriers to Discharge: Pt was started on TPN on 10/8. Current diet is full liquids and ONS. Cont on Zosyn for pneumonia. GI continues to follow. Replacement of PEG vs PO intake. PT/OT. PCP: Jacquie Lam MD  Readmission Risk Score: 19.5%  Patient Goals/Plan/Treatment Preferences: Return to [facility] pending pre-cert and medical readiness. SW following.

## 2022-10-11 VITALS
HEART RATE: 102 BPM | DIASTOLIC BLOOD PRESSURE: 64 MMHG | TEMPERATURE: 97.7 F | WEIGHT: 83.5 LBS | BODY MASS INDEX: 15.36 KG/M2 | OXYGEN SATURATION: 100 % | RESPIRATION RATE: 20 BRPM | SYSTOLIC BLOOD PRESSURE: 121 MMHG | HEIGHT: 62 IN

## 2022-10-11 LAB
ALLEN TEST: POSITIVE
ANION GAP SERPL CALCULATED.3IONS-SCNC: 4 MEQ/L (ref 8–16)
BASE EXCESS (CALCULATED): 10.7 MMOL/L (ref -2.5–2.5)
BLOOD CULTURE, ROUTINE: NORMAL
BLOOD CULTURE, ROUTINE: NORMAL
BUN BLDV-MCNC: 9 MG/DL (ref 7–22)
CALCIUM SERPL-MCNC: 9 MG/DL (ref 8.5–10.5)
CHLORIDE BLD-SCNC: 100 MEQ/L (ref 98–111)
CO2: 37 MEQ/L (ref 23–33)
COLLECTED BY:: ABNORMAL
CREAT SERPL-MCNC: 0.3 MG/DL (ref 0.4–1.2)
DEVICE: ABNORMAL
ERYTHROCYTE [DISTWIDTH] IN BLOOD BY AUTOMATED COUNT: 19.8 % (ref 11.5–14.5)
ERYTHROCYTE [DISTWIDTH] IN BLOOD BY AUTOMATED COUNT: 62.9 FL (ref 35–45)
GFR SERPL CREATININE-BSD FRML MDRD: > 90 ML/MIN/1.73M2
GLUCOSE BLD-MCNC: 152 MG/DL (ref 70–108)
GLUCOSE BLD-MCNC: 91 MG/DL (ref 70–108)
GLUCOSE BLD-MCNC: 94 MG/DL (ref 70–108)
HCO3: 38 MMOL/L (ref 23–28)
HCT VFR BLD CALC: 34 % (ref 37–47)
HEMOGLOBIN: 10.4 GM/DL (ref 12–16)
IFIO2: 32
MCH RBC QN AUTO: 26.8 PG (ref 26–33)
MCHC RBC AUTO-ENTMCNC: 30.6 GM/DL (ref 32.2–35.5)
MCV RBC AUTO: 87.6 FL (ref 81–99)
O2 SATURATION: 99 %
PCO2: 64 MMHG (ref 35–45)
PH BLOOD GAS: 7.38 (ref 7.35–7.45)
PHOSPHORUS: 2.1 MG/DL (ref 2.4–4.7)
PLATELET # BLD: 362 THOU/MM3 (ref 130–400)
PMV BLD AUTO: 9.6 FL (ref 9.4–12.4)
PO2: 134 MMHG (ref 71–104)
POTASSIUM SERPL-SCNC: 4.3 MEQ/L (ref 3.5–5.2)
RBC # BLD: 3.88 MILL/MM3 (ref 4.2–5.4)
SARS-COV-2, NAAT: NOT  DETECTED
SODIUM BLD-SCNC: 141 MEQ/L (ref 135–145)
SOURCE, BLOOD GAS: ABNORMAL
WBC # BLD: 10.4 THOU/MM3 (ref 4.8–10.8)

## 2022-10-11 PROCEDURE — 94669 MECHANICAL CHEST WALL OSCILL: CPT

## 2022-10-11 PROCEDURE — 99233 SBSQ HOSP IP/OBS HIGH 50: CPT | Performed by: INTERNAL MEDICINE

## 2022-10-11 PROCEDURE — 82948 REAGENT STRIP/BLOOD GLUCOSE: CPT

## 2022-10-11 PROCEDURE — 6370000000 HC RX 637 (ALT 250 FOR IP): Performed by: FAMILY MEDICINE

## 2022-10-11 PROCEDURE — 94660 CPAP INITIATION&MGMT: CPT

## 2022-10-11 PROCEDURE — 6370000000 HC RX 637 (ALT 250 FOR IP)

## 2022-10-11 PROCEDURE — 94640 AIRWAY INHALATION TREATMENT: CPT

## 2022-10-11 PROCEDURE — 6360000002 HC RX W HCPCS: Performed by: NURSE PRACTITIONER

## 2022-10-11 PROCEDURE — 97110 THERAPEUTIC EXERCISES: CPT

## 2022-10-11 PROCEDURE — 84100 ASSAY OF PHOSPHORUS: CPT

## 2022-10-11 PROCEDURE — 2500000003 HC RX 250 WO HCPCS

## 2022-10-11 PROCEDURE — 80048 BASIC METABOLIC PNL TOTAL CA: CPT

## 2022-10-11 PROCEDURE — 87635 SARS-COV-2 COVID-19 AMP PRB: CPT

## 2022-10-11 PROCEDURE — 36600 WITHDRAWAL OF ARTERIAL BLOOD: CPT

## 2022-10-11 PROCEDURE — 97530 THERAPEUTIC ACTIVITIES: CPT

## 2022-10-11 PROCEDURE — 82803 BLOOD GASES ANY COMBINATION: CPT

## 2022-10-11 PROCEDURE — 36415 COLL VENOUS BLD VENIPUNCTURE: CPT

## 2022-10-11 PROCEDURE — 6360000002 HC RX W HCPCS

## 2022-10-11 PROCEDURE — 85027 COMPLETE CBC AUTOMATED: CPT

## 2022-10-11 PROCEDURE — 94761 N-INVAS EAR/PLS OXIMETRY MLT: CPT

## 2022-10-11 PROCEDURE — 2580000003 HC RX 258

## 2022-10-11 PROCEDURE — 2580000003 HC RX 258: Performed by: NURSE PRACTITIONER

## 2022-10-11 PROCEDURE — 2700000000 HC OXYGEN THERAPY PER DAY

## 2022-10-11 RX ORDER — MEGESTROL ACETATE 40 MG/ML
200 SUSPENSION ORAL 2 TIMES DAILY
Qty: 300 ML | Refills: 0 | Status: SHIPPED | OUTPATIENT
Start: 2022-10-11 | End: 2022-11-10

## 2022-10-11 RX ORDER — HYDROCODONE BITARTRATE AND ACETAMINOPHEN 5; 325 MG/1; MG/1
1 TABLET ORAL EVERY 8 HOURS PRN
Qty: 21 TABLET | Refills: 0 | Status: SHIPPED | OUTPATIENT
Start: 2022-10-11 | End: 2022-10-18

## 2022-10-11 RX ORDER — ALBUTEROL SULFATE 90 UG/1
2 AEROSOL, METERED RESPIRATORY (INHALATION) 2 TIMES DAILY
Status: DISCONTINUED | OUTPATIENT
Start: 2022-10-11 | End: 2022-10-11 | Stop reason: HOSPADM

## 2022-10-11 RX ADMIN — OXYCODONE HYDROCHLORIDE AND ACETAMINOPHEN 500 MG: 500 TABLET ORAL at 08:37

## 2022-10-11 RX ADMIN — ALBUTEROL SULFATE 2 PUFF: 90 AEROSOL, METERED RESPIRATORY (INHALATION) at 07:36

## 2022-10-11 RX ADMIN — SODIUM PHOSPHATE, MONOBASIC, MONOHYDRATE AND SODIUM PHOSPHATE, DIBASIC, ANHYDROUS 6 MMOL: 276; 142 INJECTION, SOLUTION INTRAVENOUS at 11:51

## 2022-10-11 RX ADMIN — HYDROCODONE BITARTRATE AND ACETAMINOPHEN 1 TABLET: 5; 325 TABLET ORAL at 00:28

## 2022-10-11 RX ADMIN — PIPERACILLIN AND TAZOBACTAM 3375 MG: 3; .375 INJECTION, POWDER, FOR SOLUTION INTRAVENOUS at 04:00

## 2022-10-11 RX ADMIN — MOMETASONE FUROATE AND FORMOTEROL FUMARATE DIHYDRATE 2 PUFF: 200; 5 AEROSOL RESPIRATORY (INHALATION) at 07:36

## 2022-10-11 RX ADMIN — PANTOPRAZOLE SODIUM 40 MG: 40 TABLET, DELAYED RELEASE ORAL at 05:41

## 2022-10-11 RX ADMIN — ENOXAPARIN SODIUM 30 MG: 100 INJECTION SUBCUTANEOUS at 08:35

## 2022-10-11 RX ADMIN — HYDROXYZINE HYDROCHLORIDE 10 MG: 10 TABLET ORAL at 14:54

## 2022-10-11 RX ADMIN — ARIPIPRAZOLE 15 MG: 15 TABLET ORAL at 08:37

## 2022-10-11 RX ADMIN — HYDROCODONE BITARTRATE AND ACETAMINOPHEN 1 TABLET: 5; 325 TABLET ORAL at 14:51

## 2022-10-11 RX ADMIN — MEGESTROL ACETATE 40 MG: 40 TABLET ORAL at 08:36

## 2022-10-11 RX ADMIN — ASPIRIN 81 MG 81 MG: 81 TABLET ORAL at 08:35

## 2022-10-11 RX ADMIN — DOCUSATE SODIUM 100 MG: 100 CAPSULE, LIQUID FILLED ORAL at 08:35

## 2022-10-11 RX ADMIN — DESVENLAFAXINE SUCCINATE 100 MG: 100 TABLET, FILM COATED, EXTENDED RELEASE ORAL at 08:37

## 2022-10-11 RX ADMIN — SENNOSIDES AND DOCUSATE SODIUM 1 TABLET: 50; 8.6 TABLET ORAL at 08:35

## 2022-10-11 RX ADMIN — HYDROXYZINE HYDROCHLORIDE 10 MG: 10 TABLET ORAL at 08:37

## 2022-10-11 RX ADMIN — Medication 2000 UNITS: at 08:36

## 2022-10-11 ASSESSMENT — PAIN SCALES - GENERAL
PAINLEVEL_OUTOF10: 6
PAINLEVEL_OUTOF10: 6

## 2022-10-11 ASSESSMENT — PAIN DESCRIPTION - DESCRIPTORS
DESCRIPTORS: DISCOMFORT;ACHING
DESCRIPTORS: ACHING

## 2022-10-11 ASSESSMENT — PAIN DESCRIPTION - LOCATION
LOCATION: ABDOMEN
LOCATION: ABDOMEN

## 2022-10-11 NOTE — CONSULTS
[address] [id number]                                  CONSULTATION    PATIENT NAME: Santhosh Lyons                    :        1945  MED REC NO:   [id number]                           ROOM:       0033  ACCOUNT NO:   [de-identified]                           ADMIT DATE: 10/05/2022  PROVIDER:     Dread Collins. Dov Meneses MD    CONSULT DATE:  10/07/2022    CHIEF COMPLAINT:  Abdominal pain. Status post PEG tube placement, PEG  tube now removed. HISTORY OF PRESENT ILLNESS:  The patient is a 65-year-old white female,  basically with failure to thrive, very poor oral intake. Had undergone  a PEG tube placement per Dr. Lizzie Anderson on 2022. The patient went  home, but then came back to the emergency room for severe abdominal pain  and was admitted, I believe, for 24 hours and then went home. A CT scan  performed at that time, basically the day of the placement, was read as  the PEG tube in place and no extravasation of contrast was identified as  there was dye put down through the PEG tube. The patient was also noted  on 2022 on the CT to have severe constipation and very large fecal  impaction measuring up to 8 cm. The patient was given some enemas,  apparently had bowel movements and then was discharged home. The  patient returned to the emergency room on the 10/05/2022, was admitted  per the Medicine Service again for continued abdominal pain. Repeat CT  scan again was read as nonspecific gastric distention, indwelling  G-tube, no other abnormalities noted. Surgery has been asked to see the  patient due to the abdominal pain and second opinion regarding the  G-tube. At the time of this exam, the daughter is at the bedside. She  is complaining of pain. The PEG tube is out.     PAST MEDICAL HISTORY:  Positive for sinus tachycardia, severe protein  malnutrition, chronic hypoxemic respiratory failure, COPD, hypertension,  hyperlipidemia. PAST SURGICAL HISTORY:  Includes a remote tonsillectomy. She has had  colonoscopy and upper endoscopies and then the PEG tube placement as  mentioned above. SOCIAL HISTORY:  The patient has a long history of smoking, apparently  quit about 3 years ago with a greater than 112-pack-year history. FAMILY HISTORY:  Positive for cancer in her mother. MEDICATIONS AT HOME:  Include albuterol, Lipitor, Dulcolax, Colace,  Pepcid, Atarax, DuoNeb, milk of magnesia, Megace, Remeron, Prilosec,  Zofran, Protonix, GlycoLax, Senokot, Zanaflex, Ellipta. ALLERGIES:  TO SPIRIVA. REVIEW OF SYSTEMS:  10-point review of systems is otherwise negative. PHYSICAL EXAMINATION:  GENERAL:  The patient is a 69-year-old white female. She is resting in  bed. She appears to be in no distress. HEAD, EARS, EYES, NOSE AND THROAT:  Pupils are equal.  EOMs are intact. Sclerae are clear. NECK:  Soft. CARDIAC:  S1, S2.  LUNGS:  Respirations are clear. ABDOMEN:  Distended. The G-tube site is clean. The PEG tube has  already been removed and/or fell out. She has pain to palpation in the  right lower quadrant with some distention in this area. EXTREMITIES:  Upper and lower extremities are otherwise normal.    ASSESSMENT AND PLAN:  Abdominal pain, status post PEG placement. The  daughter relates that she had immediate pain after placement of the PEG  which necessitated her to come into the emergency room. CT scan was  actually reviewed with the patient and daughter at the bedside as we  pulled the films up. It is difficult to know with someway, somehow,  there may have been some nerves in the muscle creating the pain as it  came on just after it was placed. I do not believe there is any  problems now at this point in time.   She seems to have some resolution  of the pain at the PEG site with the PEG tube, however, other abdominal  pain I suggest is due to her severe constipation, you can feel stool in  the right lower quadrant as she is so slender. I do not feel there is  any other surgical issue ongoing. It would be reasonable to do calorie  counts and consider TPN for a period of time and then replacement of the  PEG tube. Again, no other surgical issues. SANDRA CARVER [facility], MD    D: 10/10/2022 14:55:27       T: 10/10/2022 14:59:36     [email or web address]  Job#: [id number]     Doc#: [id number]    CC:

## 2022-10-11 NOTE — PROGRESS NOTES
Report called and given to Leroy El 7271 the nurse at [facility], states verbal understanding. Patient alert and stable, transport scheduled for this evening.

## 2022-10-11 NOTE — DISCHARGE SUMMARY
DISCHARGE SUMMARY      Patient Identification:   Ifrah Babb   :   MRN: [id number]   Account: [de-identified]      Patient's PCP: Omar Plummer MD    Admit Date: 10/5/2022     Discharge Date:   10/11/22    Admitting Physician: No admitting provider for patient encounter. Discharge Physician: Rangel Finnegan MD     Discharge Diagnoses:    Chronic hypoxemic respiratory failure  SIRS, resolved  Severe abdominal pain  Leukocytosis, improved  Anemia  Sinus tachycardia  Hypotension  Severe protein calorie malnutrition  COPD  HTN  HLD  Mild Hyponatremia    The patient was seen and examined on day of discharge and this discharge summary is in conjunction with any daily progress note from day of discharge. Hospital Course:   Ifrah Babb is a 65 y.o. female with a PMH of COPD, chronic respiratory failure, severe malnutrition s/p PEG placement admitted to [facility] on 10/5/2022 for abdominal pain at the PEG tube site. Patient reports a history of acutely worsening abdominal pain over the past two weeks. She recently underwent PEG placement due to severe malnutrition and has had pain around the PEG tube site since placement. She was previously admitted due to this worsening abdominal pain for which she was treated with an enema for constipation and tramadol for insertion site pain. Patient reports that her abdominal pain has worsened over the last few days, and she has had increased drainage around the insertion site of her tube. She also endorses a 1x episode of non-bloody brown emesis day prior to arrival. Patient daughter in room to assist with history states that patient has had continued abdominal pain and presented to provider at [facility] who obtained a plain film and advised patient to go to ED for further evaluation. On examination, insertion site is non-erythematous and draining green, thick liquid. Patient denies any acute chest pain, visual changes.  She does have worsening SOB over the last few weeks, but denies sputum production. Last bowel movement was two days prior to arrival. She denies any other acute complaints    10/6/22: PEG Tube was dislodged. GI was notified and consulted. 10/7/22: Resting well, surgery consulted for second opinion on PEG tube placement, recommended TPN. Pre-cert at [facility] started. 10/8/22: Central lined placed for TPN. TPN started. 10/9/22: Stable, GI recommended discontinuing TPN, and switching to full liquid. Followed GI recommendations       10/10/22: Stable, started on full liquid, has BM yesterday. Tolerated full liquid diet. CVC pulled and had an tachycardia and tachypnea, obtained CXR to rule out an acute process. CXR was negative    10/11/22: Stable overnight and this morning. Ready for discharge to [facility]. Consider SLP and Dietian at [facility]. Exam:     Vitals:  Vitals:    10/11/22 0603 10/11/22 0830 10/11/22 0831 10/11/22 1131   BP:   132/62 121/64   Pulse:  94  (!) 121   Resp: 20  18 20   Temp:   98 °F (36.7 °C) 97.7 °F (36.5 °C)   TempSrc:   Oral Oral   SpO2: 98%  96% 93%   Weight:       Height:         Weight: Weight: 83 lb 8 oz (37.9 kg)     24 hour intake/output:  Intake/Output Summary (Last 24 hours) at 10/11/2022 1317  Last data filed at 10/11/2022 1131  Gross per 24 hour   Intake 640 ml   Output --   Net 640 ml         General appearance:  No apparent distress, appears stated age and cooperative. Sleeping in bed. HEENT:  Normal cephalic, atraumatic without obvious deformity. Pupils equal, round, and reactive to light. Extra ocular muscles intact. Conjunctivae/corneas clear. Neck: Supple. No jugular venous distention. Trachea midline. Respiratory:  Normal respiratory effort. Clear to auscultation, bilaterally without Rales/Wheezes/Rhonchi. Cardiovascular:  Regular rate and rhythm with normal S1/S2 without murmurs, rubs or gallops. Abdomen: Soft, non-tender, non-distended with normal bowel sounds. Abd pad and dressing over PEG tube site, healing well    Musculoskeletal:  No clubbing, cyanosis or edema bilaterally. Full range of motion without deformity. Skin: Skin color, texture, turgor normal.  No rashes or lesions. Neurologic:  Neurovascularly intact without any focal sensory/motor deficits. Cranial nerves: II-XII intact, grossly non-focal.  Psychiatric:  Alert and oriented, thought content appropriate, normal insight  Capillary Refill: Brisk,< 3 seconds   Peripheral Pulses: +2 palpable, equal bilaterally       Labs: For convenience and continuity at follow-up the following most recent labs are provided:      CBC:    Lab Results   Component Value Date/Time    WBC 10.4 10/11/2022 06:00 AM    HGB 10.4 10/11/2022 06:00 AM    HCT 34.0 10/11/2022 06:00 AM     10/11/2022 06:00 AM       Renal:    Lab Results   Component Value Date/Time     10/11/2022 06:00 AM    K 4.3 10/11/2022 06:00 AM    K 4.0 10/07/2022 05:54 AM     10/11/2022 06:00 AM    CO2 37 10/11/2022 06:00 AM    BUN 9 10/11/2022 06:00 AM    CREATININE 0.3 10/11/2022 06:00 AM    CALCIUM 9.0 10/11/2022 06:00 AM    PHOS 2.1 10/11/2022 06:00 AM         Significant Diagnostic Studies    Radiology:   XR CHEST PORTABLE   Final Result   1. No acute cardiopulmonary abnormality. This document has been electronically signed by: Soco Ruiz MD on    10/10/2022 05:39 PM      XR CHEST PORTABLE   Final Result   Impression:   Acceptable right IJ central venous catheter placement. Mild lung hyperinflation. This document has been electronically signed by: Agustin Riley on    10/09/2022 03:51 AM      XR CHEST PORTABLE   Final Result   Impression:      No acute processes.       This document has been electronically signed by: Rosie Hurd MD on    10/05/2022 10:50 PM      CT ABDOMEN PELVIS WO CONTRAST Additional Contrast? None   Final Result   Impression:      Nonspecific gastric distention with indwelling gastrostomy tube      Please correlate clinically regarding tube function and/or ability to    decompress      No other significant abnormality      This document has been electronically signed by: Alva Purcell MD on    10/05/2022 09:39 PM      All CTs at this facility use dose modulation techniques and iterative    reconstructions, and/or weight-based dosing   when appropriate to reduce radiation to a low as reasonably achievable. XR INJ CONTRAST GASTRIC TUBE PERC    (Results Pending)          Consults:     IP CONSULT TO SOCIAL WORK  IP CONSULT TO GI  IP CONSULT TO GENERAL SURGERY  IP CONSULT TO PHARMACY    Disposition:    [] Home       [] TCU       [] Rehab       [] Psych       [x] SNF       [] [place]       [] Other-    Condition at Discharge: Stable    Code Status:  Full Code     Patient Instructions:    Discharge lab work: Activity: activity as tolerated with support at SNF  Diet: ADULT DIET; Full Liquid  ADULT ORAL NUTRITION SUPPLEMENT; Lunch, Breakfast, Dinner; Standard High Calorie/High Protein Oral Supplement      Follow-up visits:   Establish with SLP and Dietian at [facility])  Follow up with Dr. Rell Rincon as scheduled      Discharge Medications:        Medication List        START taking these medications      HYDROcodone-acetaminophen 5-325 MG per tablet  Commonly known as: Norco  Take 1 tablet by mouth every 8 hours as needed for Pain for up to 7 days. Intended supply: 7 days.  Take lowest dose possible to manage pain     megestrol 40 MG/ML suspension  Commonly known as: MEGACE  Take 5 mLs by mouth 2 times daily  Replaces: megestrol 40 MG tablet            CONTINUE taking these medications      acetaminophen 325 MG tablet  Commonly known as: TYLENOL     albuterol sulfate  (90 Base) MCG/ACT inhaler  Commonly known as: PROVENTIL;VENTOLIN;PROAIR  Inhale 2 puffs into the lungs every 6 hours     ARIPiprazole 10 MG tablet  Commonly known as: ABILIFY     aspirin 81 MG tablet     atorvastatin 10 MG tablet  Commonly known as: LIPITOR  Take 1 tablet by mouth nightly     bisacodyl 10 MG suppository  Commonly known as: DULCOLAX     desvenlafaxine succinate 50 MG Tb24 extended release tablet  Commonly known as: PRISTIQ     docusate sodium 100 MG capsule  Commonly known as: Colace  Take 1 capsule by mouth 2 times daily     Ensure High Protein Liqd  Take 1 Can by mouth 3 times daily (with meals)     famotidine 40 MG tablet  Commonly known as: PEPCID     Fluticasone furoate-vilanterol 200-25 MCG/INH Aepb inhaler  Commonly known as: BREO ELLIPTA     furosemide 40 MG tablet  Commonly known as: LASIX     hydrOXYzine HCl 10 MG tablet  Commonly known as: ATARAX     ipratropium-albuterol 0.5-2.5 (3) MG/3ML Soln nebulizer solution  Commonly known as: DUONEB  Inhale 3 mLs into the lungs every 6 hours as needed for Shortness of Breath     magnesium hydroxide 400 MG/5ML suspension  Commonly known as: MILK OF MAGNESIA     melatonin 3 MG Tabs tablet     mirtazapine 30 MG tablet  Commonly known as: REMERON     omeprazole 20 MG delayed release capsule  Commonly known as: PRILOSEC     ondansetron 4 MG tablet  Commonly known as: ZOFRAN     OXYGEN     polyethylene glycol 17 GM/SCOOP powder  Commonly known as: GLYCOLAX  Take 17 g by mouth daily     PROTONIX PO     sennosides-docusate sodium 8.6-50 MG tablet  Commonly known as: SENOKOT-S     simethicone 80 MG chewable tablet  Commonly known as: MYLICON     therapeutic multivitamin-minerals tablet     tiZANidine 2 MG tablet  Commonly known as: ZANAFLEX     Umeclidinium Bromide 62.5 MCG/INH Aepb  Commonly known as: Incruse Ellipta  Inhale 1 puff into the lungs daily     vitamin C 250 MG tablet     * vitamin D 1000 UNIT Tabs tablet  Commonly known as: CHOLECALCIFEROL     * Vitamin D3 50 MCG (2000 UT) Caps     zinc 50 MG Tabs tablet           * This list has 2 medication(s) that are the same as other medications prescribed for you.  Read the directions carefully, and ask your doctor or other care provider to review them with you. STOP taking these medications      megestrol 40 MG tablet  Commonly known as: MEGACE  Replaced by: megestrol 40 MG/ML suspension               Where to Get Your Medications        These medications were sent to [address], [address] [facility] [address] [facility]  [address]  [address] [facility], [address] [address]      Phone: [phone]   megestrol 40 MG/ML suspension       You can get these medications from any pharmacy    Bring a paper prescription for each of these medications  HYDROcodone-acetaminophen 5-325 MG per tablet         Time Spent on discharge is more than 45 minutes in the examination, evaluation, counseling and review of medications and discharge plan. Signed: Thank you Abhishek Botello MD for the opportunity to be involved in this patient's care.     Electronically signed by Yoana Banuelos MD on 10/11/2022 at 1:17 PM

## 2022-10-11 NOTE — RT PROTOCOL NOTE
RT Inhaler-Nebulizer Bronchodilator Protocol Note    There is a bronchodilator order in the chart from a provider indicating to follow the RT Bronchodilator Protocol and there is an Initiate RT Inhaler-Nebulizer Bronchodilator Protocol order as well (see protocol at bottom of note). CXR Findings:  XR CHEST PORTABLE    Result Date: 10/10/2022  1. No acute cardiopulmonary abnormality. This document has been electronically signed by: Debbie Nelson MD on 10/10/2022 05:39 PM      The findings from the last RT Protocol Assessment were as follows:   History Pulmonary Disease: Chronic pulmonary disease  Respiratory Pattern: Dyspnea on exertion or RR 21-25 bpm  Breath Sounds: Slightly diminished and/or crackles  Cough: Strong, spontaneous, non-productive  Indication for Bronchodilator Therapy: On home bronchodilators, Decreased or absent breath sounds  Bronchodilator Assessment Score: 6    Aerosolized bronchodilator medication orders have been revised according to the RT Inhaler-Nebulizer Bronchodilator Protocol below. Respiratory Therapist to perform RT Therapy Protocol Assessment initially then follow the protocol. Repeat RT Therapy Protocol Assessment PRN for score 0-3 or on second treatment, BID, and PRN for scores above 3. No Indications - adjust the frequency to every 6 hours PRN wheezing or bronchospasm, if no treatments needed after 48 hours then discontinue using Per Protocol order mode. If indication present, adjust the RT bronchodilator orders based on the Bronchodilator Assessment Score as indicated below. Use Inhaler orders unless patient has one or more of the following: on home nebulizer, not able to hold breath for 10 seconds, is not alert and oriented, cannot activate and use MDI correctly, or respiratory rate 25 breaths per minute or more, then use the equivalent nebulizer order(s) with same Frequency and PRN reasons based on the score.   If a patient is on this medication at home then do not decrease Frequency below that used at home. 0-3 - enter or revise RT bronchodilator order(s) to equivalent RT Bronchodilator order with Frequency of every 4 hours PRN for wheezing or increased work of breathing using Per Protocol order mode. 4-6 - enter or revise RT Bronchodilator order(s) to two equivalent RT bronchodilator orders with one order with BID Frequency and one order with Frequency of every 4 hours PRN wheezing or increased work of breathing using Per Protocol order mode. 7-10 - enter or revise RT Bronchodilator order(s) to two equivalent RT bronchodilator orders with one order with TID Frequency and one order with Frequency of every 4 hours PRN wheezing or increased work of breathing using Per Protocol order mode. 11-13 - enter or revise RT Bronchodilator order(s) to one equivalent RT bronchodilator order with QID Frequency and an Albuterol order with Frequency of every 4 hours PRN wheezing or increased work of breathing using Per Protocol order mode. Greater than 13 - enter or revise RT Bronchodilator order(s) to one equivalent RT bronchodilator order with every 4 hours Frequency and an Albuterol order with Frequency of every 2 hours PRN wheezing or increased work of breathing using Per Protocol order mode. RT to enter RT Home Evaluation for COPD & MDI Assessment order using Per Protocol order mode.     Electronically signed by Kimber Hurd RCP on 10/11/2022 at 7:43 AM

## 2022-10-11 NOTE — PROGRESS NOTES
[address] [place] [address] 8B  Occupational Therapy  Daily Note  Time:   Time In: 410  Time Out: 1326  Timed Code Treatment Minutes: 25 Minutes  Minutes: 25          Date: 10/11/2022  Patient Name: Kayla Jenkins,   Gender: female      Room: -33/033-A  MRN: [id number]  : 1945  (65 y.o.)  Referring Practitioner: Maryann Ruiz DO  Diagnosis: gastric distention  Additional Pertinent Hx: Per H&P: Kayla Jenkins is a 65 y.o. female with a PMH of COPD, chronic respiratory failure, severe malnutrition s/p PEG placement who presented with abdominal pain. Patient reports a history of acutely worsening abdominal pain over the past two weeks. She recently underwent PEG placement due to severe malnutrition and has had pain around the PEG tube site since placement. She was previously admitted due to this worsening abdominal pain for which she was treated with an enema for constipation and tramadol for insertion site pain. Patient reports that her abdominal pain has worsened over the last few days, and she has had increased drainage around the insertion site of her tube. Restrictions/Precautions:  Restrictions/Precautions: General Precautions, Fall Risk, Isolation  Position Activity Restriction  Other position/activity restrictions: PEG, 3L O2 at baseline     SUBJECTIVE: Patient seated in bedside chair upon arrival; agreeable to therapy this date. Patient pleasant and cooperative throughout session. PAIN: 0/10:     Vitals: Oxygen: 97% static, 3L O2, dropping to 93% with activity however patient demonstrates increased SOB    COGNITION: Slow Processing, Decreased Problem Solving, and Decreased Safety Awareness    ADL:   No ADL's completed this session. Declined this date . BALANCE:  Sitting Balance:  Stand By Assistance. Standing Balance: Contact Guard Assistance.  RW, no LOB    BED MOBILITY:  Supine to Sit: Stand By Assistance, X 1, with head of bed raised, with rail, with increased time for completion      TRANSFERS:  Sit to Stand:  Contact Guard Assistance, X 1, with increased time for completion, cues for hand placement. Stand to Sit: Contact Guard Assistance, X 1, with increased time for completion, cues for hand placement, with verbal cues, to/from chair with arms. FUNCTIONAL MOBILITY:  Assistive Device: Rolling Walker  Assist Level:  Contact Guard Assistance. Distance:  EOB to bedside chair  Slow pace     ADDITIONAL ACTIVITIES:  Patient completed BUE strengthening exercises with skilled education on HEP: completed x10 reps x1 set with a 2lb hand weight in all joints and all planes in order to improve UE strength and activity tolerance required for BADL routine and toilet / shower transfers. Patient tolerated good, requiring minimal rest breaks. Patient also required minimal verbal/visual cues for technique. ASSESSMENT:     Activity Tolerance:  Patient tolerance of  treatment: good. Discharge Recommendations: Subacute/skilled nursing facility  Equipment Recommendations: Equipment Needed: No  Other: defer to next level of care  Plan: Times Per Week: 3-5x  Current Treatment Recommendations: Strengthening, Balance training, Functional mobility training, Endurance training, Safety education & training, Patient/Caregiver education & training, Equipment evaluation, education, & procurement, Self-Care / ADL    Patient Education  Patient Education: Role of OT, Plan of Care, ADL's, IADL's, Energy Conservation, Home Exercise Program, Home Safety, Importance of Increasing Activity, and Assistive Device Safety    Goals  Short Term Goals  Time Frame for Short Term Goals: by discharge  Short Term Goal 1: Pt will tolerate sitting at EOB X10 minutes with supervision in prep for ADL completion. Short Term Goal 2: Pt will complete BADL tasks with minimal assistance and ECT prn to increase independence with self care tasks.   Short Term Goal 3: Pt to navigate to/from bathroom using AD with CGA and no cues for mobility to complete ADL tasks  Short Term Goal 4: Pt to demo dyanmic standing balance with no UE support and CGA to complete clothign management after dressign and toileting tasks    Following session, patient left in safe position with all fall risk precautions in place.

## 2022-10-11 NOTE — PLAN OF CARE
Problem: Respiratory - Adult  Goal: Clear lung sounds  Outcome: Progressing   Continue with Albuterol, Dulera and acapella  Patient agreed on goals

## 2022-10-11 NOTE — PROGRESS NOTES
Pt going to SNF. We will not provide medication's for this facility. They fill their own meds. Spoke with Lanie Sorenson RN and she is aware that we will not have anything for the pt. Jeannie Lainez 10/11/2022,1:11 PM

## 2022-10-11 NOTE — CARE COORDINATION
10/11/22, 3:09 PM EDT    Patient goals/plan/ treatment preferences discussed by  and . Patient goals/plan/ treatment preferences reviewed with patient/ family. Patient/ family verbalize understanding of discharge plan and are in agreement with goal/plan/treatment preferences. Understanding was demonstrated using the teach back method. AVS provided by RN at time of discharge, which includes all necessary medical information pertaining to the patients current course of illness, treatment, post-discharge goals of care, and treatment preferences. Services At/After Discharge: [facility] (SNF), Aide services, [facility], Nursing service, OT, and PT       IMM Letter  IMM Letter given to Patient/Family/Significant other/Guardian/POA/by[de-identified] Char AARON Case Manager. IMM Letter date given[de-identified] 10/11/22  IMM Letter time given[de-identified] 5018  Observation Status Letter date given[de-identified] 10/05/22  Observation Status Letter time given[de-identified] 9369  Observation Status Letter given to Patient/Family/Significant other/Guardian/POA/by[de-identified] Pt Access. Patient discharged to [facility] skilled medicare bed. Guerda Milton at [facility] informed of discharge and 4:40 transport. Faxed AVS and MAR, RN aware to call report.

## 2022-10-11 NOTE — PROGRESS NOTES
Comprehensive Nutrition Assessment    Type and Reason for Visit:  Reassess   Addenurm: At 1130 I spoke with Dr Bari Antunez who reports pt doesn't have PEG in and not going to be on TPN due to on Full Liquid diet & tolerating it. Not able to give PEG tube feeds at this time due to PEG was removed &  site needs to heal. Encouraged pt to drink Ensure Plus or Ensure Enlive TID between meals. Nutrition Recommendations/Plan:   RN reports pt to be discharged today most likely. Please advise if diet can be advanced or not. Consider SLP swallow eval to insure swallowing safety. Pt currently on Full Liquid diet & denies swallowing difficulty. Pt was on Mechanical Soft with Thin Liquids at [facility] prior to admit. Pt continues to have pain at PEG site level 6 & I notified RN. TF not ordered/infusing. Highly doubt pt will be able to meet needs via po diet only. When able to use PEG, consider Jevity 1.2 ( ECF formula) 20ml/hr & increase by 10ml every 8 hrs as tolerated to goal 35ml/hr x 24 hrs/day. Pt a refeed risk. If tolerated, could resume back to ECF regimen Jevity 1.2 oziel/ml 70ml/hr 7pm-7A with 100ml free water flush every 4 hrs. This would meet 100% estimated kcal & protein needs & could be adjusted based on oral intake. Continue Ensure Plus or Ensure Enlive TID based on inventory. Malnutrition Assessment:  Malnutrition Status:  Severe malnutrition (10/11/22 0955)    Context:  Chronic Illness     Findings of the 6 clinical characteristics of malnutrition:  Energy Intake:  Mild decrease in energy intake (Comment) (po varied 1-100% not consistently good on full liquid diet (10/10-10/11))  Weight Loss:   (15.3% wt loss in 1 year & 6months per EMR)     Body Fat Loss:  Severe body fat loss Orbital   Muscle Mass Loss:  Severe muscle mass loss Temples (temporalis)  Fluid Accumulation:  No significant fluid accumulation     Strength:  Not Performed       Nutrition Assessment:    Pt.   Not improving AEB intake appears inadequate only consumed Ensure Plus nothing else at breakfast .   At risk for further nutritional compromise  Severe Malnutrition, r/t severe abd pain (PEG tube dislodged),   and underlying medical condition (Hx; Severe malnutrition, TIA, T2DM (A1C 5.3 10/8/21), COPD, Depression, HLD, Hypothyroidism). Nutrition Related Findings:    Pt. Report/Treatments/Miscellaneous: Pt seen, consumed all of Ensure Plus nothing else at breakfast (350kcals, 17 grams protein). Encourage dpt to try drinking Ensure Enlive or Ensure Plus between meals in order to increase kca/protein intake. TPN was not started. Pt remains of full liquid diet. Pt denies swallowing difficulty. RN mentions will look into why pt remains on full liquid diet & plans for tube feeds or not. Pt reports having level 6 pain at PEG site & I reported this to nurse. Pt denies N/V. Pt denies feeling constipated. GI Status: BM x 1 (10/10)  Pertinent Labs: (10/11) Phos 2.1, Hemoglobin 10.4  Pertinent Meds: Vitamin C, Colace, Humalog, Megace, Senokot, Vitamin D     Wound Type: None       Current Nutrition Intake & Therapies:    Average Meal Intake:  (drank all of ensure but ate nothing else at breakfast per tray observation (10/11))  Average Supplements Intake: %  ADULT DIET; Full Liquid  ADULT ORAL NUTRITION SUPPLEMENT; Lunch, Breakfast, Dinner; Standard High Calorie/High Protein Oral Supplement      Anthropometric Measures:  Height: 5' 2\" (157.5 cm)  Ideal Body Weight (IBW): 110 lbs (50 kg)    Admission Body Weight: 77 lb 4.8 oz (35.1 kg)  Current Body Weight: 83 lb 8 oz (37.9 kg) ((10/10) no edema), 75.9 % IBW.  Weight Source: Standing Scale  Current BMI (kg/m2): 15.3  Usual Body Weight:  (Per EMR 3/24/21 98 lbs 6 oz, 4/26/22 83 lbs 6 oz, 10/6/22 77 lbs 5 oz)     Weight Adjustment For: No Adjustment                 BMI Categories: Underweight (BMI less than 22) age over 72    Estimated Daily Nutrient Needs:  Energy Requirements Based On: Kcal/kg  Weight Used for Energy Requirements: Current (35.1 kg)  Energy (kcal/day): 6260-1903 (35-40 g/kg)  Weight Used for Protein Requirements: Current (35.1 kg)  Protein (g/day): 53 grams or more (1.2 g/kg)  Method Used for Fluid Requirements: 1 ml/kcal  Fluid (ml/day): 0547-8579 ml    Nutrition Diagnosis:   Severe malnutrition related to inadequate protein-energy intake as evidenced by Criteria as identified in malnutrition assessment    Nutrition Interventions:   Food and/or Nutrient Delivery: Continue Oral Nutrition Supplement (Please advise if diet can be advanced as appropriate ( Consider SLP swallow anastasia Pt was on modidied diet & tube feeding at [facility]))  Nutrition Education/Counseling: Education initiated (Encouraged good oral intake as tolerated. Encouraged ONS intake ( May want to try between meals to boost intake))  Coordination of Nutrition Care: Continue to monitor while inpatient       Goals:  Previous Goal Met: No Progress toward Goal(s)  Goals: Meet at least 75% of estimated needs, by next RD assessment       Nutrition Monitoring and Evaluation:   Behavioral-Environmental Outcomes: None Identified  Food/Nutrient Intake Outcomes: Diet Advancement/Tolerance, Enteral Nutrition Intake/Tolerance  Physical Signs/Symptoms Outcomes: Biochemical Data, GI Status, Skin, Weight, Fluid Status or Edema    Discharge Planning:     Too soon to determine     Frantz Hitchcock RD, LD  Contact: [phone]

## 2022-10-12 NOTE — PROGRESS NOTES
Physician Progress Note      PATIENT:               Erik Hansen  CSN #:                  [id number]  :                       1945  ADMIT DATE:       10/5/2022 7:29 PM  [address] DATE:        10/11/2022 4:38 PM  RESPONDING  PROVIDER #:        Elvira Bergeron TEXT:    Pt admitted with severe abdominal pain and noted to have displaced PEG tube   and colon full of stool. If possible, please document in progress notes and   discharge summary the relationship, if any, between the following: The medical record reflects the following:  Risk Factors: abdominal pain  Clinical Indicators: CT abd/pelvis showed nonspecific gastric distention with   indwelling G-tube; tenderness on palpation at PEG site, culture of PEG site +   MRSA  Treatment: Labs, imaging, monitoring, GS consult, GI consult, PEG culture  Options provided:  -- Abdominal pain due to infection of PEG tube, not related to PEG tube   placement  -- Abdominal pain due to infection of PEG tube, due to PEG tube placement  -- Abdominal pain due to displaced PEG tube  -- Abdominal pain due to constipation  -- Abdominal pain unrelated to stool in colon or PEG tube  -- Other - I will add my own diagnosis  -- Disagree - Not applicable / Not valid  -- Disagree - Clinically unable to determine / Unknown  -- Refer to Clinical Documentation Reviewer    PROVIDER RESPONSE TEXT:    Provider disagreed with this query. Abdominal was was due to PEG tube(per patient). CT obtained showed distention,   upon examination the site looked erythematous. PEG tube later became   dislogded. Around the same time cultures were sent. GI initally had placed PEG   tube, so they were reconsulted for recommendations, Surgery was consulted for   a second opinion if a PEG tube should be replaced.     Query created by: Sandra Morales on 10/10/2022 10:44 AM      Electronically signed by:  Lindsay Boone 10/12/2022 7:43 PM

## 2023-07-25 ENCOUNTER — OFFICE VISIT (OUTPATIENT)
Dept: PULMONOLOGY | Age: 78
End: 2023-07-25

## 2023-07-25 VITALS
DIASTOLIC BLOOD PRESSURE: 60 MMHG | HEIGHT: 62 IN | WEIGHT: 78.6 LBS | OXYGEN SATURATION: 99 % | HEART RATE: 131 BPM | BODY MASS INDEX: 14.46 KG/M2 | SYSTOLIC BLOOD PRESSURE: 96 MMHG | TEMPERATURE: 97.4 F

## 2023-07-25 DIAGNOSIS — J44.9 COPD, SEVERE (HCC): Primary | ICD-10-CM

## 2023-07-25 DIAGNOSIS — Z87.891 PERSONAL HISTORY OF TOBACCO USE: ICD-10-CM

## 2023-07-25 DIAGNOSIS — J43.2 CENTRILOBULAR EMPHYSEMA (HCC): ICD-10-CM

## 2023-07-25 RX ORDER — FLUTICASONE FUROATE AND VILANTEROL 200; 25 UG/1; UG/1
1 POWDER RESPIRATORY (INHALATION) DAILY
Qty: 1 EACH | Refills: 11 | Status: SHIPPED | OUTPATIENT
Start: 2023-07-25

## 2023-07-25 RX ORDER — FLUTICASONE FUROATE AND VILANTEROL 200; 25 UG/1; UG/1
1 POWDER RESPIRATORY (INHALATION) DAILY
Qty: 1 EACH | Refills: 11 | Status: SHIPPED | OUTPATIENT
Start: 2023-07-25 | End: 2023-07-25

## 2023-07-25 ASSESSMENT — ENCOUNTER SYMPTOMS
EYES NEGATIVE: 1
ALLERGIC/IMMUNOLOGIC NEGATIVE: 1
GASTROINTESTINAL NEGATIVE: 1
WHEEZING: 0
COUGH: 0
SHORTNESS OF BREATH: 1

## 2023-07-25 NOTE — PROGRESS NOTES
[facility]    Patient: Violeta López, 77 y.o.   : 1945      Subjective     Chief Complaint   Patient presents with    COPD     1yr COPD f/u w/o testing. Doing well. Is accompanied by her daughter, Semaj Morillo. DIANA Borges is here for follow up for her Severe COPD. No testing to review today   Current inhaler use of Breo, Incruse and Albuterol PRN   Former smoker quit in  2ppd  x 56 years with a 80 PYH  Last LDCT done in  - WNL  Last spirometry testing done in 2018 FEV1 45%  Home oxygen supplementation last 6MWT done 3/4/21- daughter believes she needs tested today for her oxygen concentrator   No recent illnesses or hospitalizations   Resides at [address] [place] [address] History:   Since last visit any new medical issues? No  New ER or hospital visits? No  Any new or changes in medicines? No  Using inhalers? Yes   Are they helpful?  Yes   Flu vaccine- NA  Pneumonia vaccine- UTD  Covid vaccine- UTD    Past Medical hx   PMH:  Past Medical History:   Diagnosis Date    Chronic respiratory failure with hypoxia (HCC)     Chronic respiratory failure with hypoxia and hypercapnia (HCC)     COPD (chronic obstructive pulmonary disease) (HCC)     Depression     Dysarthria     Gastro-esophageal reflux disease without esophagitis 2021    Hyperlipidemia     Hyperlipidemia     Hypothyroidism, unspecified 2021    Lung nodules     Pneumonia     Pnumonia 2 months ago    Severe malnutrition (HCC)     Severe malnutrition (HCC)     Severe malnutrition (HCC)     TIA (transient ischemic attack)     Type 2 diabetes mellitus with hyperglycemia ([address]) 2021     SURGICAL HISTORY:  Past Surgical History:   Procedure Laterality Date    COLONOSCOPY      GASTROSTOMY TUBE PLACEMENT N/A 2022    EGD PEG TUBE PLACEMENT performed by Debby Griffith MD at [address] N/A 2022    EGD BIOPSY performed by

## 2024-03-21 ENCOUNTER — HOSPITAL ENCOUNTER (EMERGENCY)
Age: 79
Discharge: HOME | End: 2024-03-21
Payer: MEDICARE

## 2024-03-21 ENCOUNTER — HOSPITAL ENCOUNTER (EMERGENCY)
Age: 79
Discharge: HOME | End: 2024-03-22
Payer: MEDICARE

## 2024-03-21 VITALS
HEART RATE: 84 BPM | SYSTOLIC BLOOD PRESSURE: 127 MMHG | OXYGEN SATURATION: 97 % | TEMPERATURE: 97.7 F | RESPIRATION RATE: 17 BRPM | DIASTOLIC BLOOD PRESSURE: 81 MMHG

## 2024-03-21 DIAGNOSIS — K62.3 RECTAL PROLAPSE: Primary | ICD-10-CM

## 2024-03-21 LAB
ANION GAP SERPL CALC-SCNC: 13 MEQ/L (ref 8–16)
BASOPHILS ABSOLUTE: 0 THOU/MM3 (ref 0–0.1)
BASOPHILS NFR BLD AUTO: 0.6 %
BUN SERPL-MCNC: 15 MG/DL (ref 7–22)
CALCIUM SERPL-MCNC: 9 MG/DL (ref 8.5–10.5)
CHLORIDE SERPL-SCNC: 101 MEQ/L (ref 98–111)
CO2 SERPL-SCNC: 27 MEQ/L (ref 23–33)
CREAT SERPL-MCNC: 0.7 MG/DL (ref 0.4–1.2)
DEPRECATED RDW RBC AUTO: 50.4 FL (ref 35–45)
EOSINOPHIL NFR BLD AUTO: 2 %
EOSINOPHILS ABSOLUTE: 0.2 THOU/MM3 (ref 0–0.4)
ERYTHROCYTE [DISTWIDTH] IN BLOOD BY AUTOMATED COUNT: 14.7 % (ref 11.5–14.5)
GFR SERPL CREATININE-BSD FRML MDRD: > 60 ML/MIN/1.73M2
GLUCOSE SERPL-MCNC: 84 MG/DL (ref 70–108)
HCT VFR BLD AUTO: 36.4 % (ref 37–47)
HGB BLD-MCNC: 11.6 GM/DL (ref 12–16)
IMM GRANULOCYTES # BLD AUTO: 0.02 THOU/MM3 (ref 0–0.07)
IMM GRANULOCYTES NFR BLD AUTO: 0.3 %
LYMPHOCYTES ABSOLUTE: 1.7 THOU/MM3 (ref 1–4.8)
LYMPHOCYTES NFR BLD AUTO: 21.6 %
MCH RBC QN AUTO: 29.8 PG (ref 26–33)
MCHC RBC AUTO-ENTMCNC: 31.9 GM/DL (ref 32.2–35.5)
MCV RBC AUTO: 93.6 FL (ref 81–99)
MONOCYTES ABSOLUTE: 1 THOU/MM3 (ref 0.4–1.3)
MONOCYTES NFR BLD AUTO: 12.4 %
NEUTROPHILS NFR BLD AUTO: 63.1 %
NRBC BLD AUTO-RTO: 0 /100 WBC
OSMOLALITY SERPL CALC.SUM OF ELEC: 281.3 MOSMOL/KG (ref 275–300)
PLATELET # BLD AUTO: 266 THOU/MM3 (ref 130–400)
PMV BLD AUTO: 9.9 FL (ref 9.4–12.4)
POTASSIUM SERPL-SCNC: 3.8 MEQ/L (ref 3.5–5.2)
RBC # BLD AUTO: 3.89 MILL/MM3 (ref 4.2–5.4)
SEGMENTED NEUTROPHILS ABSOLUTE COUNT: 5 THOU/MM3 (ref 1.8–7.7)
SODIUM SERPL-SCNC: 141 MEQ/L (ref 135–145)
WBC # BLD AUTO: 8 THOU/MM3 (ref 4.8–10.8)

## 2024-03-21 PROCEDURE — 80048 BASIC METABOLIC PNL TOTAL CA: CPT

## 2024-03-21 PROCEDURE — 85025 COMPLETE CBC W/AUTO DIFF WBC: CPT

## 2024-03-21 PROCEDURE — 99284 EMERGENCY DEPT VISIT MOD MDM: CPT

## 2024-03-21 PROCEDURE — 36415 COLL VENOUS BLD VENIPUNCTURE: CPT

## 2024-03-21 PROCEDURE — 2580000003 HC RX 258

## 2024-03-21 PROCEDURE — 93005 ELECTROCARDIOGRAM TRACING: CPT

## 2024-03-21 RX ORDER — 0.9 % SODIUM CHLORIDE 0.9 %
1000 INTRAVENOUS SOLUTION INTRAVENOUS ONCE
Status: COMPLETED | OUTPATIENT
Start: 2024-03-21 | End: 2024-03-21

## 2024-03-21 RX ADMIN — SODIUM CHLORIDE 1000 ML: 9 INJECTION, SOLUTION INTRAVENOUS at 17:05

## 2024-03-21 ASSESSMENT — PAIN - FUNCTIONAL ASSESSMENT
PAIN_FUNCTIONAL_ASSESSMENT: 0-10
PAIN_FUNCTIONAL_ASSESSMENT: NONE - DENIES PAIN
PAIN_FUNCTIONAL_ASSESSMENT: NONE - DENIES PAIN

## 2024-03-21 ASSESSMENT — PAIN SCALES - GENERAL: PAINLEVEL_OUTOF10: 2

## 2024-03-21 NOTE — DISCHARGE INSTRUCTIONS
Your imaging the emergency department for rectal bleeding from rectal prolapse.  This means that your rectum and process of are very bothersome and tenderness of inside out.  We were able to reduce this and get it to go back inside.  You should follow-up closely with GI or general surgery.  Oftentimes they will perform colonoscopies and occasionally these do require surgical repair.  I recommend he follow-up with send injection.  She has a follow-up closely with your PCP return to ED if any new or worsening complaints or concerns.  I do see that you have Dulcolax ordered as needed for constipation.  Recommend you avoid this.  You can use over-the-counter MiraLAX 1 capful daily as well as senna to help soften your stools) from happening again.  You can have this recur if you have difficult bowel movements so recommend keeping her stools soft.  Regular stool softeners.  Reviewed return to ER if any new or worsening complaints or concerns otherwise follow-up as directed.

## 2024-03-22 VITALS
HEART RATE: 91 BPM | DIASTOLIC BLOOD PRESSURE: 69 MMHG | WEIGHT: 77.5 LBS | OXYGEN SATURATION: 98 % | BODY MASS INDEX: 14.26 KG/M2 | HEIGHT: 62 IN | TEMPERATURE: 97.5 F | RESPIRATION RATE: 18 BRPM | SYSTOLIC BLOOD PRESSURE: 125 MMHG

## 2024-03-22 LAB
EKG ATRIAL RATE: 102 BPM
EKG P AXIS: 78 DEGREES
EKG P-R INTERVAL: 130 MS
EKG Q-T INTERVAL: 312 MS
EKG QRS DURATION: 74 MS
EKG QTC CALCULATION (BAZETT): 406 MS
EKG R AXIS: 76 DEGREES
EKG T AXIS: 78 DEGREES
EKG VENTRICULAR RATE: 102 BPM

## 2024-03-22 PROCEDURE — 93010 ELECTROCARDIOGRAM REPORT: CPT | Performed by: INTERNAL MEDICINE

## 2024-03-22 RX ORDER — POLYETHYLENE GLYCOL 3350 17 G/17G
17 POWDER, FOR SOLUTION ORAL DAILY
Qty: 1530 G | Refills: 1 | Status: SHIPPED | OUTPATIENT
Start: 2024-03-22 | End: 2024-04-21

## 2024-03-22 RX ORDER — SENNOSIDES A AND B 8.6 MG/1
1 TABLET, FILM COATED ORAL DAILY
Qty: 30 TABLET | Refills: 0 | Status: SHIPPED | OUTPATIENT
Start: 2024-03-22 | End: 2024-04-21

## 2024-03-22 RX ORDER — PSYLLIUM SEED (WITH DEXTROSE)
3.4 POWDER (GRAM) ORAL DAILY
Qty: 861 G | Refills: 0 | Status: SHIPPED | OUTPATIENT
Start: 2024-03-22 | End: 2024-04-01

## 2024-03-22 ASSESSMENT — ENCOUNTER SYMPTOMS
ABDOMINAL PAIN: 0
BACK PAIN: 0
COUGH: 0
VOMITING: 0
ANAL BLEEDING: 1
COUGH: 0
DIARRHEA: 0
EYE PAIN: 0
VOMITING: 0
NAUSEA: 0
SHORTNESS OF BREATH: 0
NAUSEA: 0
BACK PAIN: 0
ABDOMINAL PAIN: 0
SHORTNESS OF BREATH: 0

## 2024-03-22 ASSESSMENT — PAIN - FUNCTIONAL ASSESSMENT: PAIN_FUNCTIONAL_ASSESSMENT: NONE - DENIES PAIN

## 2024-03-22 NOTE — DISCHARGE INSTRUCTIONS
You were evaluated emergency room in the emergency department for prolapse of your rectum.  I would recommend that you follow-up closely with your primary care provider.  Some things that you can do to prevent this or provide some direct pressure to your perineum or around your rectum while you are having a bowel movement.  Avoid straining and rather just allow the bowel movement happen.  You should sit on the toilet for some time in order to facilitate this.  I would recommend a scheduled bowel regimen of once daily MiraLAX, once daily Senokot as well as increasing dietary fiber.  I did write some prescriptions for all of these medications for home.  You can additionally increase your oral hydration with regular water or Gatorade or Pedialyte.  These can all be very helpful to keep the stools soft.  You should still follow-up outpatient with general surgery and with a GI specialist for more comprehensive evaluation.  Again there is nothing to be admitted to the hospital for at this point in time.  This can happen again and should it happen you should discuss it with the nursing staff.  The nurses can discuss it with the physician on-call or the facility provider and they should be able to perform a reduction of this.  I also think that is perfectly reasonable for the nurses to perform a reduction of this.  It is very simple to do and require simple direct pressure over the area.  If he requires excessive force of course do not hesitate to return to the ER.  Feel free to return for any other new or worsening complaints or concerns otherwise follow-up as directed.

## 2024-03-22 NOTE — ED PROVIDER NOTES
Hyperlipidemia     Hypothyroidism, unspecified 5/28/2021    Lung nodules     Pneumonia     Pnumonia 2 months ago    Severe malnutrition (HCC)     Severe malnutrition (HCC)     Severe malnutrition (HCC)     TIA (transient ischemic attack)     Type 2 diabetes mellitus with hyperglycemia (HCC) 5/28/2021         SURGICAL HISTORY       Past Surgical History:   Procedure Laterality Date    COLONOSCOPY      GASTROSTOMY TUBE PLACEMENT N/A 9/21/2022    EGD PEG TUBE PLACEMENT performed by Huy Villanueva MD at [facility]    TONSILLECTOMY      UPPER GASTROINTESTINAL ENDOSCOPY N/A 4/20/2022    EGD BIOPSY performed by Huy Villanueva MD at [facility]         CURRENT MEDICATIONS       Discharge Medication List as of 3/21/2024  6:40 PM        CONTINUE these medications which have NOT CHANGED    Details   fluticasone furoate-vilanterol (BREO ELLIPTA) 200-25 MCG/ACT AEPB inhaler Inhale 1 puff into the lungs daily, Disp-1 each, R-11Print      umeclidinium bromide (INCRUSE ELLIPTA) 62.5 MCG/ACT inhaler Inhale 1 puff into the lungs daily, Disp-1 each, R-11Print      megestrol (MEGACE) 40 MG/ML suspension Take 5 mLs by mouth 2 times daily, Disp-300 mL, R-0Normal      furosemide (LASIX) 40 MG tablet Take 40 mg by mouth daily as needed (take for edema)Historical Med      magnesium hydroxide (MILK OF MAGNESIA) 400 MG/5ML suspension Take by mouth daily as needed for ConstipationHistorical Med      Multiple Vitamins-Minerals (THERAPEUTIC MULTIVITAMIN-MINERALS) tablet Take 1 tablet by mouth dailyHistorical Med      famotidine (PEPCID) 40 MG tablet Take 40 mg by mouth every eveningHistorical Med      Pantoprazole Sodium (PROTONIX PO) Take 40 mg by mouth Delayed releaseHistorical Med      mirtazapine (REMERON) 30 MG tablet Take 30 mg by mouth nightlyHistorical Med      sennosides-docusate sodium (SENOKOT-S) 8.6-50 MG tablet Take 1 tablet by mouth dailyHistorical Med      simethicone (MYLICON) 80 MG chewable tablet Take 80 mg by mouth

## 2024-03-22 NOTE — ED NOTES
Pt ambulated to bathroom with no difficulties. Assisted pt back to room. Assessed pt rectum no evidence of prolapse. VS stable. Pt denies further needs at this time.

## 2024-03-22 NOTE — ED TRIAGE NOTES
Pt to ED c/o prolapse anus. PT states she was here earlier for the same thing. Pt states \" It just fell out again\". Pt denies being in any pain at this time. Pt VS stable.

## 2024-03-22 NOTE — ED PROVIDER NOTES
[facility] EMERGENCY DEPT  EMERGENCY DEPARTMENT ENCOUNTER      Pt Name: Queenie Abreu  MRN: [id number]  Birthdate 1945  Date of evaluation: 3/21/2024  Provider: Jese Childers DO    CHIEF COMPLAINT       Chief Complaint   Patient presents with    prolapse anus          HISTORY OF PRESENT ILLNESS   (Location/Symptom, Timing/Onset, Context/Setting, Quality, Duration, Modifying Factors, Severity)  Note limiting factors.   Queenie Abreu is a 78 y.o. female who presents to the emergency department for recurrence of rectal prolapse.  She was asked to self night for the same complaints and had a rectum reduced without difficulty.  She reportedly got up to go to the bathroom sat down and at that point in time her rectum prolapse.  She was sent back in by her nursing facility.  She denies any other physical complaints or concerns.  She denies any pain.    HPI    Nursing Notes were reviewed.    REVIEW OF SYSTEMS    (2-9 systems for level 4, 10 or more for level 5)     Review of Systems   Constitutional:  Negative for fever.   HENT:  Negative for ear pain.    Eyes:  Negative for pain.   Respiratory:  Negative for cough and shortness of breath.    Cardiovascular:  Negative for chest pain.   Gastrointestinal:  Negative for abdominal pain, diarrhea, nausea and vomiting.        Rectal prolapse   Genitourinary:  Negative for dysuria and flank pain.   Musculoskeletal:  Negative for back pain and neck pain.   Skin:  Negative for rash.   Neurological:  Negative for headaches.   Psychiatric/Behavioral:  Negative for confusion.        Except as noted above the remainder of the review of systems was reviewed and negative.       PAST MEDICAL HISTORY     Past Medical History:   Diagnosis Date    Chronic respiratory failure with hypoxia (HCC)     Chronic respiratory failure with hypoxia and hypercapnia (HCC)     COPD (chronic obstructive pulmonary disease) (HCC)     Depression     Dysarthria     Gastro-esophageal reflux disease

## 2024-04-24 ENCOUNTER — HOSPITAL ENCOUNTER (EMERGENCY)
Age: 79
Discharge: HOME | End: 2024-04-24
Payer: MEDICARE

## 2024-04-24 VITALS
WEIGHT: 78 LBS | OXYGEN SATURATION: 98 % | HEIGHT: 62 IN | HEART RATE: 107 BPM | SYSTOLIC BLOOD PRESSURE: 123 MMHG | DIASTOLIC BLOOD PRESSURE: 73 MMHG | RESPIRATION RATE: 14 BRPM | TEMPERATURE: 97.4 F | BODY MASS INDEX: 14.35 KG/M2

## 2024-04-24 DIAGNOSIS — K62.3 RECTAL PROLAPSE: Primary | ICD-10-CM

## 2024-04-24 PROCEDURE — 99284 EMERGENCY DEPT VISIT MOD MDM: CPT

## 2024-04-24 ASSESSMENT — PAIN - FUNCTIONAL ASSESSMENT: PAIN_FUNCTIONAL_ASSESSMENT: 0-10

## 2024-04-24 ASSESSMENT — PAIN SCALES - GENERAL: PAINLEVEL_OUTOF10: 0

## 2024-04-24 NOTE — ED PROVIDER NOTES
[facility] EMERGENCY DEPT      EMERGENCY MEDICINE     Pt Name: Queenie Abreu  MRN: [id number]  Birthdate 1945  Date of evaluation: 4/24/2024  Provider: Ratna Castro PA-C    CHIEF COMPLAINT       Chief Complaint   Patient presents with    Rectal Prolapse     HISTORY OF PRESENT ILLNESS   Queenie Abreu is a pleasant 78 y.o. female who presents to the emergency department from from nursing home, for evaluation of rectal prolapse.  Patient has a history of prolapse, patient states she had a bowel movement today and felt a bulge.  She did not give much more detail other than this, on evaluation she was not uncomfortable.  She denies any fever, abdominal pain, nausea, vomiting, and constipation.      PASTMEDICAL HISTORY     Past Medical History:   Diagnosis Date    Chronic respiratory failure with hypoxia ([facility])     Chronic respiratory failure with hypoxia and hypercapnia ([facility])     COPD (chronic obstructive pulmonary disease) ([facility])     Depression     Dysarthria     Gastro-esophageal reflux disease without esophagitis 5/28/2021    Hyperlipidemia     Hyperlipidemia     Hypothyroidism, unspecified 5/28/2021    Lung nodules     Pneumonia     Pnumonia 2 months ago    Severe malnutrition ([facility])     Severe malnutrition ([facility])     Severe malnutrition ([facility])     TIA (transient ischemic attack)     Type 2 diabetes mellitus with hyperglycemia ([facility]) 5/28/2021       Patient Active Problem List   Diagnosis Code    Pneumonia of right upper lobe due to infectious organism J18.9    Bandemia D72.825    Acute on chronic respiratory failure with hypoxia and hypercapnia ([facility]) J96.21, J96.22    Weight loss, non-intentional R63.4    Severe protein-calorie malnutrition ([place]: less than 60% of standard weight) ([facility]) E43    History of head lice B85.0    Hypoxia R09.02    Tobacco use disorder F17.200    Leukocytosis D72.829    Lactic acidosis E87.20    SIRS (systemic inflammatory response syndrome) ([facility]) R65.10    Patient underweight R63.6

## 2024-04-24 NOTE — DISCHARGE INSTRUCTIONS
Please call and make appointments for the following, as suggested for your last ED visit.  Please follow-up with Follow up with [facility]  Follow up with [facility] (General Surgery)    Please also continue using the stool softeners provided at last visit.    Return if prolapse is nonreducible, severe pain, severe rectal bleeding, or any other concerns.

## 2024-04-24 NOTE — ED TRIAGE NOTES
Patient presents via [facility] to ER from [facility] with complaints of rectal prolapse with hx of rectal prolapse. Patient denies any pain. EMS reports patient normally on 3.5L to 4L O2.

## 2024-05-13 ENCOUNTER — OFFICE VISIT (OUTPATIENT)
Dept: SURGERY | Age: 79
End: 2024-05-13
Payer: MEDICARE

## 2024-05-13 VITALS
OXYGEN SATURATION: 93 % | TEMPERATURE: 97.9 F | HEART RATE: 114 BPM | WEIGHT: 77 LBS | DIASTOLIC BLOOD PRESSURE: 64 MMHG | SYSTOLIC BLOOD PRESSURE: 124 MMHG | RESPIRATION RATE: 16 BRPM | BODY MASS INDEX: 14.17 KG/M2 | HEIGHT: 62 IN

## 2024-05-13 DIAGNOSIS — E03.9 HYPOTHYROIDISM, UNSPECIFIED TYPE: ICD-10-CM

## 2024-05-13 DIAGNOSIS — G89.4 CHRONIC PAIN DISORDER: ICD-10-CM

## 2024-05-13 DIAGNOSIS — E43 SEVERE MALNUTRITION (HCC): Chronic | ICD-10-CM

## 2024-05-13 DIAGNOSIS — K62.3 RECTAL PROLAPSE: Primary | ICD-10-CM

## 2024-05-13 DIAGNOSIS — K21.9 GASTRO-ESOPHAGEAL REFLUX DISEASE WITHOUT ESOPHAGITIS: ICD-10-CM

## 2024-05-13 DIAGNOSIS — E11.65 TYPE 2 DIABETES MELLITUS WITH HYPERGLYCEMIA, UNSPECIFIED WHETHER LONG TERM INSULIN USE (HCC): ICD-10-CM

## 2024-05-13 DIAGNOSIS — M85.88 OSTEOPENIA OF LUMBAR SPINE: ICD-10-CM

## 2024-05-13 PROCEDURE — 1036F TOBACCO NON-USER: CPT | Performed by: SURGERY

## 2024-05-13 PROCEDURE — 99203 OFFICE O/P NEW LOW 30 MIN: CPT | Performed by: SURGERY

## 2024-05-13 PROCEDURE — G8419 CALC BMI OUT NRM PARAM NOF/U: HCPCS | Performed by: SURGERY

## 2024-05-13 PROCEDURE — 1123F ACP DISCUSS/DSCN MKR DOCD: CPT | Performed by: SURGERY

## 2024-05-13 PROCEDURE — G8427 DOCREV CUR MEDS BY ELIG CLIN: HCPCS | Performed by: SURGERY

## 2024-05-13 PROCEDURE — 1090F PRES/ABSN URINE INCON ASSESS: CPT | Performed by: SURGERY

## 2024-05-13 PROCEDURE — G8399 PT W/DXA RESULTS DOCUMENT: HCPCS | Performed by: SURGERY

## 2024-05-13 NOTE — PROGRESS NOTES
Laya Reddy MD   General Surgery  New Patient Evaluation in Office  Pt Name: Queenie Abreu  Date of Birth 1945   Today's Date: 5/13/2024  Medical Record Number: [id number]  Primary Care Provider: Rudy San MD  Chief Complaint:  Chief Complaint   Patient presents with    Surgical Consult     NP refer Dr. DESTIN Villanueva-Rectal prolapse       ASSESSMENT      1. Rectal prolapse    2. Severe malnutrition (HCC)    3. Chronic pain disorder    4. Osteopenia of lumbar spine    5. Hypothyroidism, unspecified type    6. Gastro-esophageal reflux disease without esophagitis    7. Type 2 diabetes mellitus with hyperglycemia, unspecified whether long term insulin use (HCC)       BMI 14  PLANS   1.  Patient with rectal prolapse over the last 2 to 3 months.  Seen in ER 3 times since.  Manually reducible.  1 visit no prolapse by the time the patient was in the emergency department.  No prior colonoscopy.  Patient referred by gastroenterology.  Patient with a BMI of 14.  I do not think she would tolerate an abdominal procedure.  Would recommend colonoscopy prior to any rectal procedure.  Patient may be better served by evaluation by colorectal specialist to see if they feel she is a candidate for a perineal procedure.  2.  Patient examined medical records reviewed.  Prolapse not reproduced in office.          SUBJECTIVE     Queenie is a 78 y.o. year old female who is presenting today in the office for rectal prolapse by Dr. Villanueva.  She was referred to him after being seen in the emergency department 3 times since early March for a bulge after having bowel movement.  She has had blood with bowel movements due to rectal prolapse on toilet paper in the toilet.  No mucus described.  No dark stools.  She takes MiraLAX daily for constipation.  She resides in an extended care facility.  She is markedly malnourished BMI is 14.  Had a feeding tube at 1 point briefly.  She reports she quit smoking in 2019.  She has never

## 2024-05-15 ASSESSMENT — ENCOUNTER SYMPTOMS
NAUSEA: 0
ANAL BLEEDING: 1
COUGH: 1
BLOOD IN STOOL: 0
SHORTNESS OF BREATH: 1
TROUBLE SWALLOWING: 0
COLOR CHANGE: 0
WHEEZING: 0
SORE THROAT: 0
VOMITING: 0
VOICE CHANGE: 0
ABDOMINAL PAIN: 0

## 2024-08-09 ENCOUNTER — OFFICE VISIT (OUTPATIENT)
Dept: PULMONOLOGY | Age: 79
End: 2024-08-09
Payer: MEDICARE

## 2024-08-09 VITALS
TEMPERATURE: 98.9 F | HEART RATE: 110 BPM | DIASTOLIC BLOOD PRESSURE: 80 MMHG | BODY MASS INDEX: 14.28 KG/M2 | HEIGHT: 62 IN | SYSTOLIC BLOOD PRESSURE: 132 MMHG | WEIGHT: 77.6 LBS | OXYGEN SATURATION: 94 %

## 2024-08-09 DIAGNOSIS — Z87.891 PERSONAL HISTORY OF TOBACCO USE: ICD-10-CM

## 2024-08-09 DIAGNOSIS — J44.9 COPD, SEVERE (HCC): Primary | ICD-10-CM

## 2024-08-09 DIAGNOSIS — J43.2 CENTRILOBULAR EMPHYSEMA (HCC): ICD-10-CM

## 2024-08-09 PROCEDURE — 1090F PRES/ABSN URINE INCON ASSESS: CPT | Performed by: NURSE PRACTITIONER

## 2024-08-09 PROCEDURE — G8399 PT W/DXA RESULTS DOCUMENT: HCPCS | Performed by: NURSE PRACTITIONER

## 2024-08-09 PROCEDURE — 99213 OFFICE O/P EST LOW 20 MIN: CPT | Performed by: NURSE PRACTITIONER

## 2024-08-09 PROCEDURE — 1036F TOBACCO NON-USER: CPT | Performed by: NURSE PRACTITIONER

## 2024-08-09 PROCEDURE — 3023F SPIROM DOC REV: CPT | Performed by: NURSE PRACTITIONER

## 2024-08-09 PROCEDURE — G8427 DOCREV CUR MEDS BY ELIG CLIN: HCPCS | Performed by: NURSE PRACTITIONER

## 2024-08-09 PROCEDURE — 1123F ACP DISCUSS/DSCN MKR DOCD: CPT | Performed by: NURSE PRACTITIONER

## 2024-08-09 PROCEDURE — G8419 CALC BMI OUT NRM PARAM NOF/U: HCPCS | Performed by: NURSE PRACTITIONER

## 2024-08-09 RX ORDER — FLUTICASONE FUROATE AND VILANTEROL 200; 25 UG/1; UG/1
1 POWDER RESPIRATORY (INHALATION) DAILY
Qty: 1 EACH | Refills: 11 | Status: SHIPPED | OUTPATIENT
Start: 2024-08-09 | End: 2024-08-09

## 2024-08-09 RX ORDER — ALBUTEROL SULFATE 90 UG/1
2 AEROSOL, METERED RESPIRATORY (INHALATION) EVERY 4 HOURS PRN
Qty: 1 EACH | Refills: 3 | Status: SHIPPED | OUTPATIENT
Start: 2024-08-09

## 2024-08-09 RX ORDER — IPRATROPIUM BROMIDE AND ALBUTEROL SULFATE 2.5; .5 MG/3ML; MG/3ML
3 SOLUTION RESPIRATORY (INHALATION) EVERY 4 HOURS PRN
Qty: 360 ML | Refills: 3 | Status: SHIPPED | OUTPATIENT
Start: 2024-08-09 | End: 2024-08-09 | Stop reason: SDUPTHER

## 2024-08-09 RX ORDER — FLUTICASONE PROPIONATE AND SALMETEROL 500; 50 UG/1; UG/1
1 POWDER RESPIRATORY (INHALATION) 2 TIMES DAILY
Qty: 1 EACH | Refills: 11 | Status: SHIPPED | OUTPATIENT
Start: 2024-08-09

## 2024-08-09 RX ORDER — MIDODRINE HYDROCHLORIDE 5 MG/1
TABLET ORAL
COMMUNITY
Start: 2024-07-25

## 2024-08-09 RX ORDER — MIDODRINE HYDROCHLORIDE 10 MG/1
TABLET ORAL
COMMUNITY
Start: 2024-06-18

## 2024-08-09 RX ORDER — FLUTICASONE PROPIONATE AND SALMETEROL 500; 50 UG/1; UG/1
POWDER RESPIRATORY (INHALATION)
COMMUNITY
Start: 2024-07-29 | End: 2024-08-09 | Stop reason: SDUPTHER

## 2024-08-09 RX ORDER — IPRATROPIUM BROMIDE AND ALBUTEROL SULFATE 2.5; .5 MG/3ML; MG/3ML
3 SOLUTION RESPIRATORY (INHALATION) EVERY 4 HOURS PRN
Qty: 360 ML | Refills: 3 | Status: SHIPPED | OUTPATIENT
Start: 2024-08-09

## 2024-08-09 ASSESSMENT — ENCOUNTER SYMPTOMS
EYES NEGATIVE: 1
SHORTNESS OF BREATH: 1
ALLERGIC/IMMUNOLOGIC NEGATIVE: 1
WHEEZING: 1
GASTROINTESTINAL NEGATIVE: 1
COUGH: 1

## 2024-08-09 NOTE — PATIENT INSTRUCTIONS
-A1A swab in office today   -Continue Advair 500-50 mcg 1 puff twice a day - rinse after use   -Continue Albuterol PRN for SOB/wheezing   -Continue Duonebs every 4-6 hours PRN for SOB/wheezing   -Activity as tolerated   -Titrate oxygen to keep 88% or greater- patient should be be in the high 90s  -Encouraged incentive spirometry use twice a day 10 times  -Encouraged nutritional supplements for weight gain   -Advised to maintain pneumonia vaccine with PCP and to take flu vaccine this coming season.  -Advised patient to call office with any changes, questions, or concerns regarding respiratory status    Will see Queenie Abreu back in: 1 year

## 2024-08-09 NOTE — PROGRESS NOTES
[facility]    Patient: JENNIFER DELGADO, 79 y.o.   : 1945    Pt of Azlu Hagen CNP     Subjective     Chief Complaint   Patient presents with    Follow-up     COPD 1 year f/u with no testing.         HPI  Jennifer is here for follow up for her Severe COPD with no new testing to review today.    Former smoker quit in 2019 with a 114.3 total PYH  Current home oxygen order 4LPM with activity only; 6MWT last office visit 2023  BMI 14- same as one year ago   Current inhaler use of Advair 500-50 BID and Albuterol PRN- Incruse stopped   Issues with blood pressure PCP at [facility] handling   SOB mainly with heavy exertion and with hot and humid weather   Intermittent cough and wheezing- no hemoptysis   Patient with kyphosis limiting inspiratory effort   Home oxygen using continuously at [facility]- now on skilled side     Progress History:   Since last visit any new medical issues? No  New ER or hospital visits? No  Any new or changes in medicines? No  Using inhalers? Yes   Are they helpful? Yes   Immunization History   Administered Date(s) Administered    COVID-19, PFIZER Bivalent, DO NOT Dilute, (age 12y+), IM, 30 mcg/0.3 mL 10/14/2022    COVID-19, PFIZER PURPLE top, DILUTE for use, (age 12 y+), 30mcg/0.3mL 2021, 2021, 2021    Influenza Virus Vaccine 10/04/2018    Pneumococcal, PPSV23, PNEUMOVAX 23, (age 2y+), SC/IM, 0.5mL 2020       Tobacco Use      Smoking status: Former        Packs/day: 0.00        Years: 2.0 packs/day for 57.2 years (114.3 ttl pk-yrs)        Types: Cigarettes        Start date: 1962        Quit date: 3/3/2019        Years since quittin.4      Smokeless tobacco: Never      Tobacco comments: Quit smoking 2019     Past Medical hx   PMH:  Past Medical History:   Diagnosis Date    Chronic respiratory failure with hypoxia (HCC)     Chronic respiratory failure with hypoxia and hypercapnia (HCC)     COPD (chronic obstructive

## 2024-10-31 ENCOUNTER — APPOINTMENT (OUTPATIENT)
Dept: GENERAL RADIOLOGY | Age: 79
DRG: 189 | End: 2024-10-31
Payer: MEDICARE

## 2024-10-31 ENCOUNTER — HOSPITAL ENCOUNTER (INPATIENT)
Age: 79
LOS: 8 days | Discharge: NURSING, REHAB OR LONG-TERM CARE | DRG: 189 | End: 2024-11-08
Attending: EMERGENCY MEDICINE | Admitting: STUDENT IN AN ORGANIZED HEALTH CARE EDUCATION/TRAINING PROGRAM
Payer: MEDICARE

## 2024-10-31 DIAGNOSIS — R09.02 HYPOXEMIA: ICD-10-CM

## 2024-10-31 DIAGNOSIS — J44.1 COPD EXACERBATION (HCC): Primary | ICD-10-CM

## 2024-10-31 DIAGNOSIS — R06.02 SHORTNESS OF BREATH: ICD-10-CM

## 2024-10-31 DIAGNOSIS — J44.9 COPD, SEVERE (HCC): ICD-10-CM

## 2024-10-31 LAB
ALBUMIN SERPL BCG-MCNC: 3.5 G/DL (ref 3.5–5.1)
ALP SERPL-CCNC: 51 U/L (ref 38–126)
ALT SERPL W/O P-5'-P-CCNC: 10 U/L (ref 11–66)
ANION GAP SERPL CALC-SCNC: 8 MEQ/L (ref 8–16)
ARTERIAL PATENCY WRIST A: POSITIVE
AST SERPL-CCNC: 15 U/L (ref 5–40)
BACTERIA URNS QL MICRO: ABNORMAL /HPF
BASE EXCESS BLDA CALC-SCNC: 9.5 MMOL/L (ref -2.5–2.5)
BASOPHILS ABSOLUTE: 0 THOU/MM3 (ref 0–0.1)
BASOPHILS NFR BLD AUTO: 0.6 %
BDY SITE: ABNORMAL
BILIRUB CONJ SERPL-MCNC: < 0.1 MG/DL (ref 0.1–13.8)
BILIRUB SERPL-MCNC: 0.3 MG/DL (ref 0.3–1.2)
BILIRUB UR QL STRIP.AUTO: ABNORMAL
BUN SERPL-MCNC: 15 MG/DL (ref 7–22)
CALCIUM SERPL-MCNC: 9.1 MG/DL (ref 8.5–10.5)
CASTS #/AREA URNS LPF: ABNORMAL /LPF
CASTS 2: ABNORMAL /LPF
CHARACTER UR: CLEAR
CHLORIDE SERPL-SCNC: 99 MEQ/L (ref 98–111)
CO2 SERPL-SCNC: 36 MEQ/L (ref 23–33)
COLLECTED BY:: ABNORMAL
COLOR, UA: YELLOW
CREAT SERPL-MCNC: 0.6 MG/DL (ref 0.4–1.2)
CRYSTALS URNS MICRO: ABNORMAL
DEPRECATED RDW RBC AUTO: 57.3 FL (ref 35–45)
DEVICE: ABNORMAL
EKG ATRIAL RATE: 102 BPM
EKG P AXIS: 79 DEGREES
EKG P-R INTERVAL: 126 MS
EKG Q-T INTERVAL: 306 MS
EKG QRS DURATION: 62 MS
EKG QTC CALCULATION (BAZETT): 398 MS
EKG R AXIS: 75 DEGREES
EKG T AXIS: 73 DEGREES
EKG VENTRICULAR RATE: 102 BPM
EOSINOPHIL NFR BLD AUTO: 1.3 %
EOSINOPHILS ABSOLUTE: 0.1 THOU/MM3 (ref 0–0.4)
EPITHELIAL CELLS, UA: ABNORMAL /HPF
ERYTHROCYTE [DISTWIDTH] IN BLOOD BY AUTOMATED COUNT: 18 % (ref 11.5–14.5)
FLUAV RNA RESP QL NAA+PROBE: NOT DETECTED
FLUBV RNA RESP QL NAA+PROBE: NOT DETECTED
GFR SERPL CREATININE-BSD FRML MDRD: > 90 ML/MIN/1.73M2
GLUCOSE SERPL-MCNC: 100 MG/DL (ref 70–108)
GLUCOSE UR QL STRIP.AUTO: NEGATIVE MG/DL
HCO3 BLDA-SCNC: 37 MMOL/L (ref 23–28)
HCT VFR BLD AUTO: 36.3 % (ref 37–47)
HGB BLD-MCNC: 10.6 GM/DL (ref 12–16)
HGB UR QL STRIP.AUTO: NEGATIVE
IMM GRANULOCYTES # BLD AUTO: 0.03 THOU/MM3 (ref 0–0.07)
IMM GRANULOCYTES NFR BLD AUTO: 0.4 %
KETONES UR QL STRIP.AUTO: 40
LACTIC ACID, SEPSIS: 1 MMOL/L (ref 0.5–1.9)
LIPASE SERPL-CCNC: 10.8 U/L (ref 5.6–51.3)
LYMPHOCYTES ABSOLUTE: 0.8 THOU/MM3 (ref 1–4.8)
LYMPHOCYTES NFR BLD AUTO: 10.5 %
MCH RBC QN AUTO: 25.5 PG (ref 26–33)
MCHC RBC AUTO-ENTMCNC: 29.2 GM/DL (ref 32.2–35.5)
MCV RBC AUTO: 87.5 FL (ref 81–99)
MISCELLANEOUS 2: ABNORMAL
MONOCYTES ABSOLUTE: 0.5 THOU/MM3 (ref 0.4–1.3)
MONOCYTES NFR BLD AUTO: 6.1 %
MUCOUS THREADS URNS QL MICRO: ABNORMAL
NEUTROPHILS ABSOLUTE: 6.4 THOU/MM3 (ref 1.8–7.7)
NEUTROPHILS NFR BLD AUTO: 81.1 %
NITRITE UR QL STRIP: NEGATIVE
NRBC BLD AUTO-RTO: 0 /100 WBC
NT-PROBNP SERPL IA-MCNC: 525.2 PG/ML (ref 0–449)
OSMOLALITY SERPL CALC.SUM OF ELEC: 285.9 MOSMOL/KG (ref 275–300)
PCO2 BLDA: 64 MMHG (ref 35–45)
PH BLDA: 7.37 [PH] (ref 7.35–7.45)
PH UR STRIP.AUTO: 5 [PH] (ref 5–9)
PLATELET # BLD AUTO: 313 THOU/MM3 (ref 130–400)
PMV BLD AUTO: 10.1 FL (ref 9.4–12.4)
PO2 BLDA: 69 MMHG (ref 71–104)
POTASSIUM SERPL-SCNC: 4.9 MEQ/L (ref 3.5–5.2)
PROT SERPL-MCNC: 6.3 G/DL (ref 6.1–8)
PROT UR STRIP.AUTO-MCNC: 30 MG/DL
RBC # BLD AUTO: 4.15 MILL/MM3 (ref 4.2–5.4)
RBC URINE: ABNORMAL /HPF
RENAL EPI CELLS #/AREA URNS HPF: ABNORMAL /[HPF]
SAO2 % BLDA: 92 %
SARS-COV-2 RNA RESP QL NAA+PROBE: NOT DETECTED
SODIUM SERPL-SCNC: 143 MEQ/L (ref 135–145)
SP GR UR REFRACT.AUTO: 1.03 (ref 1–1.03)
TROPONIN, HIGH SENSITIVITY: 52 NG/L (ref 0–12)
TROPONIN, HIGH SENSITIVITY: 54 NG/L (ref 0–12)
UROBILINOGEN, URINE: 1 EU/DL (ref 0–1)
VENTILATION MODE VENT: ABNORMAL
WBC # BLD AUTO: 7.9 THOU/MM3 (ref 4.8–10.8)
WBC #/AREA URNS HPF: ABNORMAL /HPF
WBC #/AREA URNS HPF: NEGATIVE /[HPF]
YEAST LIKE FUNGI URNS QL MICRO: ABNORMAL

## 2024-10-31 PROCEDURE — 82803 BLOOD GASES ANY COMBINATION: CPT

## 2024-10-31 PROCEDURE — 2700000000 HC OXYGEN THERAPY PER DAY

## 2024-10-31 PROCEDURE — 6370000000 HC RX 637 (ALT 250 FOR IP): Performed by: EMERGENCY MEDICINE

## 2024-10-31 PROCEDURE — 94640 AIRWAY INHALATION TREATMENT: CPT

## 2024-10-31 PROCEDURE — 93005 ELECTROCARDIOGRAM TRACING: CPT | Performed by: EMERGENCY MEDICINE

## 2024-10-31 PROCEDURE — 99285 EMERGENCY DEPT VISIT HI MDM: CPT

## 2024-10-31 PROCEDURE — 71045 X-RAY EXAM CHEST 1 VIEW: CPT

## 2024-10-31 PROCEDURE — 87636 SARSCOV2 & INF A&B AMP PRB: CPT

## 2024-10-31 PROCEDURE — 83605 ASSAY OF LACTIC ACID: CPT

## 2024-10-31 PROCEDURE — 84484 ASSAY OF TROPONIN QUANT: CPT

## 2024-10-31 PROCEDURE — 36415 COLL VENOUS BLD VENIPUNCTURE: CPT

## 2024-10-31 PROCEDURE — 80048 BASIC METABOLIC PNL TOTAL CA: CPT

## 2024-10-31 PROCEDURE — 85025 COMPLETE CBC W/AUTO DIFF WBC: CPT

## 2024-10-31 PROCEDURE — 83880 ASSAY OF NATRIURETIC PEPTIDE: CPT

## 2024-10-31 PROCEDURE — 2580000003 HC RX 258: Performed by: EMERGENCY MEDICINE

## 2024-10-31 PROCEDURE — 87641 MR-STAPH DNA AMP PROBE: CPT

## 2024-10-31 PROCEDURE — 6360000002 HC RX W HCPCS: Performed by: EMERGENCY MEDICINE

## 2024-10-31 PROCEDURE — 81001 URINALYSIS AUTO W/SCOPE: CPT

## 2024-10-31 PROCEDURE — 80076 HEPATIC FUNCTION PANEL: CPT

## 2024-10-31 PROCEDURE — 83690 ASSAY OF LIPASE: CPT

## 2024-10-31 PROCEDURE — 2060000000 HC ICU INTERMEDIATE R&B

## 2024-10-31 PROCEDURE — 36600 WITHDRAWAL OF ARTERIAL BLOOD: CPT

## 2024-10-31 PROCEDURE — 99223 1ST HOSP IP/OBS HIGH 75: CPT | Performed by: PHYSICIAN ASSISTANT

## 2024-10-31 PROCEDURE — 93010 ELECTROCARDIOGRAM REPORT: CPT | Performed by: INTERNAL MEDICINE

## 2024-10-31 RX ORDER — SODIUM CHLORIDE 0.9 % (FLUSH) 0.9 %
10 SYRINGE (ML) INJECTION EVERY 12 HOURS SCHEDULED
Status: DISCONTINUED | OUTPATIENT
Start: 2024-10-31 | End: 2024-11-08 | Stop reason: HOSPADM

## 2024-10-31 RX ORDER — LANOLIN ALCOHOL/MO/W.PET/CERES
6 CREAM (GRAM) TOPICAL NIGHTLY PRN
Status: DISCONTINUED | OUTPATIENT
Start: 2024-10-31 | End: 2024-11-01

## 2024-10-31 RX ORDER — ACETAMINOPHEN 325 MG/1
650 TABLET ORAL EVERY 6 HOURS PRN
Status: DISCONTINUED | OUTPATIENT
Start: 2024-10-31 | End: 2024-11-08 | Stop reason: HOSPADM

## 2024-10-31 RX ORDER — ATORVASTATIN CALCIUM 10 MG/1
10 TABLET, FILM COATED ORAL NIGHTLY
Status: DISCONTINUED | OUTPATIENT
Start: 2024-11-01 | End: 2024-11-01

## 2024-10-31 RX ORDER — SODIUM CHLORIDE 0.9 % (FLUSH) 0.9 %
10 SYRINGE (ML) INJECTION PRN
Status: DISCONTINUED | OUTPATIENT
Start: 2024-10-31 | End: 2024-11-08 | Stop reason: HOSPADM

## 2024-10-31 RX ORDER — POLYETHYLENE GLYCOL 3350 17 G/17G
17 POWDER, FOR SOLUTION ORAL DAILY PRN
Status: DISCONTINUED | OUTPATIENT
Start: 2024-10-31 | End: 2024-11-08 | Stop reason: HOSPADM

## 2024-10-31 RX ORDER — SODIUM PHOSPHATE, DIBASIC AND SODIUM PHOSPHATE, MONOBASIC 7; 19 G/230ML; G/230ML
1 ENEMA RECTAL
Status: ON HOLD | COMMUNITY
End: 2024-11-01

## 2024-10-31 RX ORDER — SIMETHICONE 80 MG
80 TABLET,CHEWABLE ORAL EVERY 8 HOURS PRN
Status: DISCONTINUED | OUTPATIENT
Start: 2024-10-31 | End: 2024-11-08 | Stop reason: HOSPADM

## 2024-10-31 RX ORDER — ONDANSETRON 2 MG/ML
4 INJECTION INTRAMUSCULAR; INTRAVENOUS EVERY 6 HOURS PRN
Status: DISCONTINUED | OUTPATIENT
Start: 2024-10-31 | End: 2024-11-08 | Stop reason: HOSPADM

## 2024-10-31 RX ORDER — MAGNESIUM SULFATE IN WATER 40 MG/ML
2000 INJECTION, SOLUTION INTRAVENOUS PRN
Status: DISCONTINUED | OUTPATIENT
Start: 2024-10-31 | End: 2024-11-08 | Stop reason: HOSPADM

## 2024-10-31 RX ORDER — ENOXAPARIN SODIUM 100 MG/ML
30 INJECTION SUBCUTANEOUS DAILY
Status: DISCONTINUED | OUTPATIENT
Start: 2024-11-01 | End: 2024-11-01

## 2024-10-31 RX ORDER — IPRATROPIUM BROMIDE AND ALBUTEROL SULFATE 2.5; .5 MG/3ML; MG/3ML
2 SOLUTION RESPIRATORY (INHALATION) ONCE
Status: COMPLETED | OUTPATIENT
Start: 2024-10-31 | End: 2024-10-31

## 2024-10-31 RX ORDER — SENNA AND DOCUSATE SODIUM 50; 8.6 MG/1; MG/1
2 TABLET, FILM COATED ORAL 2 TIMES DAILY
Status: DISCONTINUED | OUTPATIENT
Start: 2024-11-01 | End: 2024-11-08 | Stop reason: HOSPADM

## 2024-10-31 RX ORDER — MULTIVITAMIN WITH IRON
1 TABLET ORAL DAILY
Status: DISCONTINUED | OUTPATIENT
Start: 2024-11-01 | End: 2024-11-08 | Stop reason: HOSPADM

## 2024-10-31 RX ORDER — ACETAMINOPHEN 650 MG/1
650 SUPPOSITORY RECTAL EVERY 6 HOURS PRN
Status: DISCONTINUED | OUTPATIENT
Start: 2024-10-31 | End: 2024-11-08 | Stop reason: HOSPADM

## 2024-10-31 RX ORDER — POTASSIUM CHLORIDE 7.45 MG/ML
10 INJECTION INTRAVENOUS PRN
Status: DISCONTINUED | OUTPATIENT
Start: 2024-10-31 | End: 2024-11-03

## 2024-10-31 RX ORDER — HYDROCODONE BITARTRATE AND ACETAMINOPHEN 5; 325 MG/1; MG/1
1 TABLET ORAL 3 TIMES DAILY
Status: ON HOLD | COMMUNITY
End: 2024-11-17 | Stop reason: HOSPADM

## 2024-10-31 RX ORDER — POTASSIUM CHLORIDE 1500 MG/1
40 TABLET, EXTENDED RELEASE ORAL PRN
Status: ACTIVE | OUTPATIENT
Start: 2024-10-31 | End: 2024-11-07

## 2024-10-31 RX ORDER — SODIUM CHLORIDE 9 MG/ML
INJECTION, SOLUTION INTRAVENOUS PRN
Status: DISCONTINUED | OUTPATIENT
Start: 2024-10-31 | End: 2024-11-08 | Stop reason: HOSPADM

## 2024-10-31 RX ORDER — ASPIRIN 81 MG/1
81 TABLET, CHEWABLE ORAL DAILY
Status: DISCONTINUED | OUTPATIENT
Start: 2024-11-01 | End: 2024-11-08 | Stop reason: HOSPADM

## 2024-10-31 RX ORDER — MIRTAZAPINE 15 MG/1
15 TABLET, FILM COATED ORAL NIGHTLY
Status: DISCONTINUED | OUTPATIENT
Start: 2024-11-01 | End: 2024-11-08 | Stop reason: HOSPADM

## 2024-10-31 RX ORDER — LIDOCAINE 4 G/G
1 PATCH TOPICAL DAILY
Status: ON HOLD | COMMUNITY
End: 2024-11-17

## 2024-10-31 RX ORDER — BUDESONIDE AND FORMOTEROL FUMARATE DIHYDRATE 160; 4.5 UG/1; UG/1
2 AEROSOL RESPIRATORY (INHALATION)
Status: DISCONTINUED | OUTPATIENT
Start: 2024-11-01 | End: 2024-11-08 | Stop reason: HOSPADM

## 2024-10-31 RX ORDER — LIDOCAINE 4 G/G
1 PATCH TOPICAL DAILY
Status: DISCONTINUED | OUTPATIENT
Start: 2024-11-01 | End: 2024-11-08 | Stop reason: HOSPADM

## 2024-10-31 RX ORDER — MIDODRINE HYDROCHLORIDE 10 MG/1
10 TABLET ORAL EVERY 8 HOURS PRN
Status: DISCONTINUED | OUTPATIENT
Start: 2024-10-31 | End: 2024-11-08 | Stop reason: HOSPADM

## 2024-10-31 RX ORDER — HYDROCODONE BITARTRATE AND ACETAMINOPHEN 5; 325 MG/1; MG/1
1 TABLET ORAL 3 TIMES DAILY
Status: DISCONTINUED | OUTPATIENT
Start: 2024-11-01 | End: 2024-11-08 | Stop reason: HOSPADM

## 2024-10-31 RX ORDER — VITAMIN B COMPLEX
2000 TABLET ORAL DAILY
Status: DISCONTINUED | OUTPATIENT
Start: 2024-11-01 | End: 2024-11-08 | Stop reason: HOSPADM

## 2024-10-31 RX ORDER — PANTOPRAZOLE SODIUM 40 MG/1
40 TABLET, DELAYED RELEASE ORAL
Status: DISCONTINUED | OUTPATIENT
Start: 2024-11-01 | End: 2024-11-08 | Stop reason: HOSPADM

## 2024-10-31 RX ORDER — POLYETHYLENE GLYCOL 3350 17 G/17G
17 POWDER, FOR SOLUTION ORAL DAILY
Status: ON HOLD | COMMUNITY
End: 2024-11-17

## 2024-10-31 RX ORDER — ONDANSETRON 4 MG/1
4 TABLET, ORALLY DISINTEGRATING ORAL EVERY 8 HOURS PRN
Status: DISCONTINUED | OUTPATIENT
Start: 2024-10-31 | End: 2024-11-08 | Stop reason: HOSPADM

## 2024-10-31 RX ORDER — MIDODRINE HYDROCHLORIDE 5 MG/1
5 TABLET ORAL EVERY 8 HOURS PRN
Status: DISCONTINUED | OUTPATIENT
Start: 2024-10-31 | End: 2024-11-08 | Stop reason: HOSPADM

## 2024-10-31 RX ADMIN — IPRATROPIUM BROMIDE AND ALBUTEROL SULFATE 2 DOSE: .5; 3 SOLUTION RESPIRATORY (INHALATION) at 19:32

## 2024-10-31 RX ADMIN — WATER 125 MG: 1 INJECTION INTRAMUSCULAR; INTRAVENOUS; SUBCUTANEOUS at 22:27

## 2024-10-31 ASSESSMENT — PAIN - FUNCTIONAL ASSESSMENT
PAIN_FUNCTIONAL_ASSESSMENT: NONE - DENIES PAIN

## 2024-10-31 NOTE — ED TRIAGE NOTES
Pt presents to ED via EMS from [facility] due to hypoxia and tachycardia. EMS reports that patient HR was in the 130s and o2 in the 80s on her baseline 3 L o2. On arrival they state she was hypotensive, hypoxic, and tachycardic with BP of 90/40, HR in the 120s and o2 90%. Pt notes she does not have any chest pain, shortness of breath, or pain. Fluids started per squad, BP on arrival 115/60. Pt 83% on 5L NC on arrival, pt 90% on 6L NC. Pt states, \"I don't feel sick at all.\" Pt alert and oriented x4.

## 2024-10-31 NOTE — ED NOTES
Pt resting in bed. Covid swab obtained. Vitals assessed. Family at bedside. Report from Imelda AARON. Assumed care at this time.

## 2024-10-31 NOTE — ED PROVIDER NOTES
I performed a history and physical examination of the patient and discussed management with the resident. I reviewed the resident’s note and agree with the documented findings and plan of care. Any areas of disagreement are noted on the chart. I was personally present for the key portions of any procedures. I have documented in the chart those procedures where I was not present during the key portions. I have reviewed the emergency nurses triage note. I agree with the chief complaint, past medical history, past surgical history, allergies, medications, social and family history as documented unless otherwise noted below. Documentation of the HPI, Physical Exam and Medical Decision Making performed by medical students or scribes is based on my personal performance of the HPI, PE and MDM. For Phys Assistant/ Nurse Practitioner cases/documentation I have personally evaluated this patient and have completed at least one if not all key elements of the E/M (history, physical exam, and MDM). My findings are as noted below.    In other words, I personally saw and examined the patient I have reviewed and agreed with the resident findings including all diagnostic interpretations and treatment plans as written.  I was present for the key portion of any procedures performed and the inclusive time noted in any critical care statement.    Patient presents today for shortness of breath.  This is an 79-year-old female with a history of CHF COPD coming in today for shortness of breath she was apparently hypoxic on her normal 3 to 4 L.  This is a woman that states that she does not feel short of breath.  Here today she is 89% on 5 L.  Physical exam reveals bilateral lung sounds which are decreased throughout she sounds very tight.  Maybe some fine crackles underneath.  There is no pitting edema in the lower extremities.  Patient is on diuretics.  Patient denies any overt chest pain.  Patient has had a cough which has been mildly

## 2024-10-31 NOTE — ED PROVIDER NOTES
[facility] EMERGENCY DEPT  EMERGENCY DEPARTMENT ENCOUNTER          Pt Name: Queenie Abreu  MRN: [id number]  Birthdate 1945  Date of evaluation: 10/31/2024  Physician: Aye Elena MD  Supervising Attending Physician: Juan M Cedeño DO       CHIEF COMPLAINT       Chief Complaint   Patient presents with    hypoxia         HISTORY OF PRESENT ILLNESS    HPI  Queenie Abreu is a 79 y.o. female past medical history of COPD on 2 to 4 L O2 via nasal cannula, diabetes, TIA and hypothyroidism who presents to the emergency department from nursing home for evaluation of hypoxia with reported SpO2 of 83% on 5 L today.  Per EMS patient is normally on 2 to 4 L.  Per patient she does not feel short of breath and is unsure why she is here.  Denies having a cough. Denies fever, chills, headache, lightheadedness, dizziness, vision changes, tinnitus,  congestion, sore throat, neck pain/stiffness, chest pain, shortness of breath, abdominal pain, nausea, vomiting, constipation, diarrhea, dysuria, blood in the urine or stool, numbness/tingling/weakness in extremities.    The patient has no other acute complaints at this time.      PAST MEDICAL AND SURGICAL HISTORY     Past Medical History:   Diagnosis Date    Chronic respiratory failure with hypoxia     Chronic respiratory failure with hypoxia and hypercapnia     COPD (chronic obstructive pulmonary disease) (HCC)     Depression     Dysarthria     Gastro-esophageal reflux disease without esophagitis 5/28/2021    Hyperlipidemia     Hyperlipidemia     Hypothyroidism, unspecified 5/28/2021    Lung nodules     Pneumonia     Pnumonia 2 months ago    Severe malnutrition (HCC)     Severe malnutrition (HCC)     Severe malnutrition (HCC)     TIA (transient ischemic attack)     Type 2 diabetes mellitus with hyperglycemia (HCC) 5/28/2021     Past Surgical History:   Procedure Laterality Date    COLONOSCOPY      GASTROSTOMY TUBE PLACEMENT N/A 9/21/2022    EGD PEG TUBE

## 2024-11-01 LAB
ANION GAP SERPL CALC-SCNC: 14 MEQ/L (ref 8–16)
APTT PPP: 32.5 SECONDS (ref 22–38)
ARTERIAL PATENCY WRIST A: POSITIVE
ARTERIAL PATENCY WRIST A: POSITIVE
BASE EXCESS BLDA CALC-SCNC: 10.9 MMOL/L (ref -2.5–2.5)
BASE EXCESS BLDA CALC-SCNC: 7.8 MMOL/L (ref -2.5–2.5)
BASOPHILS ABSOLUTE: 0 THOU/MM3 (ref 0–0.1)
BASOPHILS NFR BLD AUTO: 0.6 %
BDY SITE: ABNORMAL
BDY SITE: ABNORMAL
BUN SERPL-MCNC: 15 MG/DL (ref 7–22)
CALCIUM SERPL-MCNC: 8.8 MG/DL (ref 8.5–10.5)
CHLORIDE SERPL-SCNC: 97 MEQ/L (ref 98–111)
CO2 SERPL-SCNC: 28 MEQ/L (ref 23–33)
COLLECTED BY:: ABNORMAL
COLLECTED BY:: ABNORMAL
CREAT SERPL-MCNC: 0.4 MG/DL (ref 0.4–1.2)
DEPRECATED RDW RBC AUTO: 56.8 FL (ref 35–45)
DEPRECATED RDW RBC AUTO: 57 FL (ref 35–45)
DEVICE: ABNORMAL
DEVICE: ABNORMAL
EKG ATRIAL RATE: 102 BPM
EKG ATRIAL RATE: 103 BPM
EKG P AXIS: 101 DEGREES
EKG P AXIS: 79 DEGREES
EKG P-R INTERVAL: 118 MS
EKG P-R INTERVAL: 130 MS
EKG Q-T INTERVAL: 304 MS
EKG Q-T INTERVAL: 318 MS
EKG QRS DURATION: 66 MS
EKG QRS DURATION: 68 MS
EKG QTC CALCULATION (BAZETT): 398 MS
EKG QTC CALCULATION (BAZETT): 414 MS
EKG R AXIS: 65 DEGREES
EKG R AXIS: 71 DEGREES
EKG T AXIS: 136 DEGREES
EKG T AXIS: 80 DEGREES
EKG VENTRICULAR RATE: 102 BPM
EKG VENTRICULAR RATE: 103 BPM
EOSINOPHIL NFR BLD AUTO: 0 %
EOSINOPHILS ABSOLUTE: 0 THOU/MM3 (ref 0–0.4)
ERYTHROCYTE [DISTWIDTH] IN BLOOD BY AUTOMATED COUNT: 17.8 % (ref 11.5–14.5)
ERYTHROCYTE [DISTWIDTH] IN BLOOD BY AUTOMATED COUNT: 18.2 % (ref 11.5–14.5)
FIO2 ON VENT O2 ANALYZER: 30 %
FIO2 ON VENT O2 ANALYZER: 4 %
GFR SERPL CREATININE-BSD FRML MDRD: > 90 ML/MIN/1.73M2
GLUCOSE BLD STRIP.AUTO-MCNC: 122 MG/DL (ref 70–108)
GLUCOSE SERPL-MCNC: 112 MG/DL (ref 70–108)
HCO3 BLDA-SCNC: 36 MMOL/L (ref 23–28)
HCO3 BLDA-SCNC: 37 MMOL/L (ref 23–28)
HCT VFR BLD AUTO: 36.5 % (ref 37–47)
HCT VFR BLD AUTO: 37.2 % (ref 37–47)
HEPARIN UNFRACTIONATED: 0.23 U/ML (ref 0.3–0.7)
HEPARIN UNFRACTIONATED: 0.69 U/ML (ref 0.3–0.7)
HGB BLD-MCNC: 10.7 GM/DL (ref 12–16)
HGB BLD-MCNC: 10.9 GM/DL (ref 12–16)
HGB RETIC QN AUTO: 26.2 PG (ref 28.2–35.7)
IMM GRANULOCYTES # BLD AUTO: 0.01 THOU/MM3 (ref 0–0.07)
IMM GRANULOCYTES NFR BLD AUTO: 0.2 %
IMM RETICS NFR: 21.7 % (ref 3–15.9)
INR PPP: 1.21 (ref 0.85–1.13)
LYMPHOCYTES ABSOLUTE: 0.4 THOU/MM3 (ref 1–4.8)
LYMPHOCYTES NFR BLD AUTO: 7.7 %
MCH RBC QN AUTO: 25.4 PG (ref 26–33)
MCH RBC QN AUTO: 25.9 PG (ref 26–33)
MCHC RBC AUTO-ENTMCNC: 29.3 GM/DL (ref 32.2–35.5)
MCHC RBC AUTO-ENTMCNC: 29.3 GM/DL (ref 32.2–35.5)
MCV RBC AUTO: 86.7 FL (ref 81–99)
MCV RBC AUTO: 88.4 FL (ref 81–99)
MONOCYTES ABSOLUTE: 0.1 THOU/MM3 (ref 0.4–1.3)
MONOCYTES NFR BLD AUTO: 1 %
MRSA DNA SPEC QL NAA+PROBE: POSITIVE
NEUTROPHILS ABSOLUTE: 4.7 THOU/MM3 (ref 1.8–7.7)
NEUTROPHILS NFR BLD AUTO: 90.5 %
NRBC BLD AUTO-RTO: 0 /100 WBC
PCO2 BLDA: 58 MMHG (ref 35–45)
PCO2 BLDA: 71 MMHG (ref 35–45)
PEEP SETTING VENT: 5 MMHG
PH BLDA: 7.32 [PH] (ref 7.35–7.45)
PH BLDA: 7.42 [PH] (ref 7.35–7.45)
PIP: 14 CMH2O
PLATELET # BLD AUTO: 307 THOU/MM3 (ref 130–400)
PLATELET # BLD AUTO: 329 THOU/MM3 (ref 130–400)
PMV BLD AUTO: 10.2 FL (ref 9.4–12.4)
PMV BLD AUTO: 9.8 FL (ref 9.4–12.4)
PO2 BLDA: 128 MMHG (ref 71–104)
PO2 BLDA: 75 MMHG (ref 71–104)
POTASSIUM SERPL-SCNC: 4.3 MEQ/L (ref 3.5–5.2)
RBC # BLD AUTO: 4.13 MILL/MM3 (ref 4.2–5.4)
RBC # BLD AUTO: 4.29 MILL/MM3 (ref 4.2–5.4)
RETICS # AUTO: 56 THOU/MM3 (ref 20–115)
RETICS/RBC NFR AUTO: 1.3 % (ref 0.5–2)
SAO2 % BLDA: 95 %
SAO2 % BLDA: 98 %
SODIUM SERPL-SCNC: 139 MEQ/L (ref 135–145)
TROPONIN, HIGH SENSITIVITY: 115 NG/L (ref 0–12)
TROPONIN, HIGH SENSITIVITY: 162 NG/L (ref 0–12)
TROPONIN, HIGH SENSITIVITY: 75 NG/L (ref 0–12)
VENTILATION MODE VENT: ABNORMAL
VENTILATION MODE VENT: ABNORMAL
WBC # BLD AUTO: 3.6 THOU/MM3 (ref 4.8–10.8)
WBC # BLD AUTO: 5.2 THOU/MM3 (ref 4.8–10.8)

## 2024-11-01 PROCEDURE — 93005 ELECTROCARDIOGRAM TRACING: CPT

## 2024-11-01 PROCEDURE — 85025 COMPLETE CBC W/AUTO DIFF WBC: CPT

## 2024-11-01 PROCEDURE — 83550 IRON BINDING TEST: CPT

## 2024-11-01 PROCEDURE — 99233 SBSQ HOSP IP/OBS HIGH 50: CPT | Performed by: INTERNAL MEDICINE

## 2024-11-01 PROCEDURE — 6360000002 HC RX W HCPCS

## 2024-11-01 PROCEDURE — 99291 CRITICAL CARE FIRST HOUR: CPT | Performed by: INTERNAL MEDICINE

## 2024-11-01 PROCEDURE — 80048 BASIC METABOLIC PNL TOTAL CA: CPT

## 2024-11-01 PROCEDURE — 2060000000 HC ICU INTERMEDIATE R&B

## 2024-11-01 PROCEDURE — 93005 ELECTROCARDIOGRAM TRACING: CPT | Performed by: PHYSICIAN ASSISTANT

## 2024-11-01 PROCEDURE — 82948 REAGENT STRIP/BLOOD GLUCOSE: CPT

## 2024-11-01 PROCEDURE — 85027 COMPLETE CBC AUTOMATED: CPT

## 2024-11-01 PROCEDURE — 93010 ELECTROCARDIOGRAM REPORT: CPT | Performed by: INTERNAL MEDICINE

## 2024-11-01 PROCEDURE — 82803 BLOOD GASES ANY COMBINATION: CPT

## 2024-11-01 PROCEDURE — 94660 CPAP INITIATION&MGMT: CPT

## 2024-11-01 PROCEDURE — 94640 AIRWAY INHALATION TREATMENT: CPT

## 2024-11-01 PROCEDURE — 85610 PROTHROMBIN TIME: CPT

## 2024-11-01 PROCEDURE — 2700000000 HC OXYGEN THERAPY PER DAY

## 2024-11-01 PROCEDURE — 84484 ASSAY OF TROPONIN QUANT: CPT

## 2024-11-01 PROCEDURE — 6370000000 HC RX 637 (ALT 250 FOR IP): Performed by: PHYSICIAN ASSISTANT

## 2024-11-01 PROCEDURE — 2580000003 HC RX 258: Performed by: PHYSICIAN ASSISTANT

## 2024-11-01 PROCEDURE — 6360000002 HC RX W HCPCS: Performed by: PHYSICIAN ASSISTANT

## 2024-11-01 PROCEDURE — 5A09457 ASSISTANCE WITH RESPIRATORY VENTILATION, 24-96 CONSECUTIVE HOURS, CONTINUOUS POSITIVE AIRWAY PRESSURE: ICD-10-PCS | Performed by: STUDENT IN AN ORGANIZED HEALTH CARE EDUCATION/TRAINING PROGRAM

## 2024-11-01 PROCEDURE — 36415 COLL VENOUS BLD VENIPUNCTURE: CPT

## 2024-11-01 PROCEDURE — 36600 WITHDRAWAL OF ARTERIAL BLOOD: CPT

## 2024-11-01 PROCEDURE — 82728 ASSAY OF FERRITIN: CPT

## 2024-11-01 PROCEDURE — 6370000000 HC RX 637 (ALT 250 FOR IP)

## 2024-11-01 PROCEDURE — 85730 THROMBOPLASTIN TIME PARTIAL: CPT

## 2024-11-01 PROCEDURE — 85520 HEPARIN ASSAY: CPT

## 2024-11-01 PROCEDURE — 94761 N-INVAS EAR/PLS OXIMETRY MLT: CPT

## 2024-11-01 PROCEDURE — 85046 RETICYTE/HGB CONCENTRATE: CPT

## 2024-11-01 PROCEDURE — 83540 ASSAY OF IRON: CPT

## 2024-11-01 RX ORDER — MEGESTROL ACETATE 40 MG/1
40 TABLET ORAL 2 TIMES DAILY
Status: DISCONTINUED | OUTPATIENT
Start: 2024-11-01 | End: 2024-11-08 | Stop reason: HOSPADM

## 2024-11-01 RX ORDER — METOPROLOL TARTRATE 1 MG/ML
2.5 INJECTION, SOLUTION INTRAVENOUS ONCE
Status: DISCONTINUED | OUTPATIENT
Start: 2024-11-01 | End: 2024-11-01

## 2024-11-01 RX ORDER — MEGESTROL ACETATE 40 MG/1
40 TABLET ORAL 2 TIMES DAILY
Status: ON HOLD | COMMUNITY
End: 2024-11-17

## 2024-11-01 RX ORDER — HEPARIN SODIUM 1000 [USP'U]/ML
60 INJECTION, SOLUTION INTRAVENOUS; SUBCUTANEOUS PRN
Status: DISCONTINUED | OUTPATIENT
Start: 2024-11-01 | End: 2024-11-01 | Stop reason: SDUPTHER

## 2024-11-01 RX ORDER — FAMOTIDINE 20 MG/1
40 TABLET, FILM COATED ORAL EVERY EVENING
Status: DISCONTINUED | OUTPATIENT
Start: 2024-11-01 | End: 2024-11-01

## 2024-11-01 RX ORDER — SODIUM PHOSPHATE,MONO-DIBASIC 19G-7G/197
1 ENEMA (ML) RECTAL DAILY PRN
Status: ON HOLD | COMMUNITY
End: 2024-11-17

## 2024-11-01 RX ORDER — FLUTICASONE PROPIONATE AND SALMETEROL 500; 50 UG/1; UG/1
1 POWDER RESPIRATORY (INHALATION) DAILY
Status: ON HOLD | COMMUNITY
End: 2024-11-17

## 2024-11-01 RX ORDER — AZITHROMYCIN 250 MG/1
500 TABLET, FILM COATED ORAL DAILY
Status: DISCONTINUED | OUTPATIENT
Start: 2024-11-01 | End: 2024-11-02

## 2024-11-01 RX ORDER — ATORVASTATIN CALCIUM 40 MG/1
40 TABLET, FILM COATED ORAL NIGHTLY
Status: DISCONTINUED | OUTPATIENT
Start: 2024-11-01 | End: 2024-11-08 | Stop reason: HOSPADM

## 2024-11-01 RX ORDER — HEPARIN SODIUM 1000 [USP'U]/ML
60 INJECTION, SOLUTION INTRAVENOUS; SUBCUTANEOUS PRN
Status: DISCONTINUED | OUTPATIENT
Start: 2024-11-01 | End: 2024-11-02

## 2024-11-01 RX ORDER — HEPARIN SODIUM 10000 [USP'U]/100ML
5-30 INJECTION, SOLUTION INTRAVENOUS CONTINUOUS
Status: DISCONTINUED | OUTPATIENT
Start: 2024-11-01 | End: 2024-11-01 | Stop reason: SDUPTHER

## 2024-11-01 RX ORDER — HEPARIN SODIUM 1000 [USP'U]/ML
30 INJECTION, SOLUTION INTRAVENOUS; SUBCUTANEOUS PRN
Status: DISCONTINUED | OUTPATIENT
Start: 2024-11-01 | End: 2024-11-02

## 2024-11-01 RX ORDER — HEPARIN SODIUM 1000 [USP'U]/ML
30 INJECTION, SOLUTION INTRAVENOUS; SUBCUTANEOUS PRN
Status: DISCONTINUED | OUTPATIENT
Start: 2024-11-01 | End: 2024-11-01 | Stop reason: SDUPTHER

## 2024-11-01 RX ORDER — HEPARIN SODIUM 10000 [USP'U]/100ML
5-30 INJECTION, SOLUTION INTRAVENOUS CONTINUOUS
Status: DISCONTINUED | OUTPATIENT
Start: 2024-11-01 | End: 2024-11-02

## 2024-11-01 RX ORDER — HEPARIN SODIUM 1000 [USP'U]/ML
60 INJECTION, SOLUTION INTRAVENOUS; SUBCUTANEOUS ONCE
Status: DISCONTINUED | OUTPATIENT
Start: 2024-11-01 | End: 2024-11-01

## 2024-11-01 RX ORDER — PREDNISONE 20 MG/1
40 TABLET ORAL DAILY
Status: DISCONTINUED | OUTPATIENT
Start: 2024-11-01 | End: 2024-11-02

## 2024-11-01 RX ORDER — MEGESTROL ACETATE 40 MG/ML
200 SUSPENSION ORAL 2 TIMES DAILY
Status: DISCONTINUED | OUTPATIENT
Start: 2024-11-01 | End: 2024-11-01

## 2024-11-01 RX ORDER — ALPRAZOLAM 0.5 MG
0.5 TABLET ORAL ONCE
Status: COMPLETED | OUTPATIENT
Start: 2024-11-01 | End: 2024-11-01

## 2024-11-01 RX ADMIN — BUDESONIDE AND FORMOTEROL FUMARATE DIHYDRATE 2 PUFF: 160; 4.5 AEROSOL RESPIRATORY (INHALATION) at 09:18

## 2024-11-01 RX ADMIN — SODIUM CHLORIDE, PRESERVATIVE FREE 10 ML: 5 INJECTION INTRAVENOUS at 09:03

## 2024-11-01 RX ADMIN — SODIUM CHLORIDE, PRESERVATIVE FREE 10 ML: 5 INJECTION INTRAVENOUS at 20:08

## 2024-11-01 RX ADMIN — PANTOPRAZOLE SODIUM 40 MG: 40 TABLET, DELAYED RELEASE ORAL at 05:33

## 2024-11-01 RX ADMIN — ALPRAZOLAM 0.5 MG: 0.5 TABLET ORAL at 20:07

## 2024-11-01 RX ADMIN — SODIUM CHLORIDE, PRESERVATIVE FREE 10 ML: 5 INJECTION INTRAVENOUS at 01:18

## 2024-11-01 RX ADMIN — BUDESONIDE AND FORMOTEROL FUMARATE DIHYDRATE 2 PUFF: 160; 4.5 AEROSOL RESPIRATORY (INHALATION) at 19:49

## 2024-11-01 RX ADMIN — HEPARIN SODIUM 12 UNITS/KG/HR: 10000 INJECTION, SOLUTION INTRAVENOUS at 12:19

## 2024-11-01 RX ADMIN — SENNOSIDES AND DOCUSATE SODIUM 2 TABLET: 50; 8.6 TABLET ORAL at 01:18

## 2024-11-01 RX ADMIN — ATORVASTATIN CALCIUM 10 MG: 10 TABLET, FILM COATED ORAL at 01:18

## 2024-11-01 RX ADMIN — HEPARIN SODIUM 2100 UNITS: 1000 INJECTION INTRAVENOUS; SUBCUTANEOUS at 19:14

## 2024-11-01 RX ADMIN — ENOXAPARIN SODIUM 30 MG: 100 INJECTION SUBCUTANEOUS at 09:04

## 2024-11-01 RX ADMIN — MIRTAZAPINE 15 MG: 15 TABLET, FILM COATED ORAL at 01:18

## 2024-11-01 NOTE — PALLIATIVE CARE
Initial Evaluation        Patient:   Queenie Abreu  YOB: 1945  Age:  79 y.o.  Room:  [facility]/026-  MRN:  [id number]   Acct: [id number]    Date of Admission:  10/31/2024  6:12 PM  Date of Service:  11/1/2024  Completed By:  Franco Martinez RN        Reason for Palliative Care Evaluation:-   Goals of Care     Current Concerns   drowsiness and Patient observed to be drowsy and reported by primary RN     Palliative Performance Scale   40%  Mainly in bed; Extensive disease; Mainly assist; intake normal or reduced; LOC full/confusion     History    Patient admitted from [facility] with chief complaint of hypoxia. Patient has severe COPD as noted from last OP pulmonary visit. Patient is on home oxygen of 2- 4LNC.     Goals of Care Discussions and Plan         Family/Patient Discussion:- Saw patient at bedside, resting did not disturb. Per primary RN patient is experiencing drowsiness and would struggle to participate in conversation. Call placed to daughter Mary TRAVIS. Mary will call palliative care today at a convenient time for her.      Plan/Follow-Up:-   Will await return call from Mary    Treatment Limitations: Did not assess at this time    Code Status:Limited Limited Code details: Intubation/Re-intubation No; Defibrillation/Cardioversion No; Chest Compressions No; Resuscitative Medications No; Other discussed with POA before she left for the night and expressed she does NOT heroic measures and patient would not. Palliative care to assist in details/paperwork    ACP documents on file:  -Power of  for Healthcare  -Living Will    Healthcare Power of /Healthcare Surrogate Decision Makers:  Mary Levin             Electronically signed by Franco Martinez RN on 11/1/2024 at 9:57 AM           Palliative Care Office: [phone]

## 2024-11-01 NOTE — PROCEDURES
PROCEDURE NOTE  Date: 11/1/2024   Name: Queenie Abreu  YOB: 1945    Procedures  EKG completed, given to Chito AARON

## 2024-11-01 NOTE — ED NOTES
Pt resting in bed. Vitals assessed. RR easy and unlabored. Pt states she is unable to urinate at this time. Pt denies needs at this time. Family at bedside.

## 2024-11-01 NOTE — H&P
Hospitalist History & Physical         Patient: Queenie Abreu 79 y.o. female      : 1945  Date of Admission: 10/31/2024  Date of Service: Pt seen/examined on 24 and Admitted to Inpatient with expected LOS greater than two midnights due to medical therapy.         ASSESSMENT AND PLAN    Acute on chronic hypoxemic and hypercapnic respiratory failure 2/2 COPD exacerbation   Baseline O2 of 3- 4L NC, 5L by end of shift but was between 6 - 8L   Wean as tolerated   Nebs q4 while awake   Resume maintenance inhalers   IS, pulmonary hygiene   Zithromax for atypical coverage   Given 125 mg solu-medrol in ED, prednisone daily       Protein calorie malnutrition with sarcopenia and physical deconditioning   Resumed megace with high calorie/high protein snack   PT/OT consult   Multivitamin daily     Elevated troponins  Will trend and monitor on telemetry with assessment for chest pain, cardiac symptoms  Suspect this is related to demand     Sinus tachycardia   Per EKG   Trial small dose IV lopressor    Hx of TIA  Aspirin, statin     HLD  Statin     Code status  Limited no x4- spoke extensively with patient daughter and POA   Palliative care consult as well- DAUGHTER CAN CALL BUT CANNOT MEET DURING DAY TODAY WITH PALLIATIVE CARE       Data reviewed (unless otherwise discussed in assessment/plan)  EKG:  I have reviewed the EKG with the following interpretation: sinus tachycardia   Imaging: I have reviewed CXR with the following interpretation: emphysematous changes  Labs: Reviewed, see chart and plan above.       =======================================================================    SUBJECTIVE    Chief Complaint:  SOB     History Of Present Illness:  Queenie Abreu is a 79 y.o. female who presents to [facility] with SOB.  Patient was brought in by EMS from nursing facility due to hypoxia on increased oxygen from her baseline. Patient does not provide much history but does endorse feeling like

## 2024-11-01 NOTE — ED NOTES
ED to inpatient nurses report      Chief Complaint:  Chief Complaint   Patient presents with    hypoxia     Present to ED from: Assisted living    MOA:     LOC: alert and orientated to name, place, date  Mobility: Requires assistance * 2- pt has not been up since coming in   Oxygen Baseline: 3L NC    Current needs required: 5 L NC     Code Status:   Full Code    What abnormal results were found and what did you give/do to treat them? COPD exacerbation, Hypoxemia  Any procedures or intervention occur? Labs, Increased O2 to 5 L NC. EKG, Urinalysis, Solumedrol,     Mental Status:  Level of Consciousness: Alert (0)    Psych Assessment:        Vitals:  Patient Vitals for the past 24 hrs:   BP Temp Temp src Pulse Resp SpO2 Height Weight   10/31/24 2231 (!) 102/50 -- -- 94 28 (!) 28 % -- --   10/31/24 2203 (!) 95/51 -- -- 96 18 98 % -- --   10/31/24 2102 (!) 106/56 -- -- (!) 108 20 91 % -- --   10/31/24 2017 123/65 -- -- (!) 112 22 94 % -- --   10/31/24 1946 -- -- -- 100 20 97 % -- --   10/31/24 1945 -- -- -- (!) 102 20 -- -- --   10/31/24 1916 (!) 96/56 -- -- 98 24 93 % -- --   10/31/24 1817 115/60 100.2 °F (37.9 °C) Axillary (!) 102 19 (!) 89 % 1.575 m (5' 2\") 34.9 kg (77 lb)        LDAs:   Peripheral IV 10/31/24 Left;Proximal;Anterior Forearm (Active)   Site Assessment Clean, dry & intact 10/31/24 1832   Line Status Normal saline locked 10/31/24 1832   Phlebitis Assessment No symptoms 10/31/24 1832   Infiltration Assessment 0 10/31/24 1832   Alcohol Cap Used Yes 10/31/24 1832   Dressing Status Clean, dry & intact 10/31/24 1832   Dressing Type Transparent 10/31/24 1832       Ambulatory Status:  No data recorded    Diagnosis:  DISPOSITION Admitted 10/31/2024 10:26:51 PM   Final diagnoses:   COPD exacerbation (HCC)   Hypoxemia        Consults:  STR ED TO IP CONSULT     Pain Score:  Pain Assessment  Pain Assessment: None - Denies Pain    C-SSRS:   Risk of Suicide: No Risk    Sepsis Screening:       [place] Fall Risk:

## 2024-11-01 NOTE — FLOWSHEET NOTE
10/31/24 5160   SmartFleet info   Activity In bed   Patient Goal of the Day(Whiteboard)   Patient Daily Goal reviewed with Patient;Family member   Patient's reason for admission COPD Exacerbation   Precautions   Precautions Fall risk   Negative Pressure Room No   Positive Pressure Room No   Safe Environment   Arm Bands On ID   Safety Measures Standard Safety Measures   Mobility   Repositioned Turns self;Lying right side;Pillow support   Patient Turned Turned on Right Side   Head of Bed Elevated  Self regulated   Heels/Feet Heel(s) elevated off bed;Foot of bed elevated   Anti-Embolism   Anti-Embolism Intervention Medication  (Lovenox)         Pt admitted to  [facility] from ED and via cart/stretcher.   Complaints: Shortness of breath.      IV none infusing into antecubital left, condition patent and no redness at rate 0 mls/ hour with about 100 mls in the bag still. IV site free of s/s of infection or infiltration.   Vital signs obtained. Assessment and data collection initiated.   Two nurse skin assessment performed by Fay BANKS RN and Sena AARON.  Oriented to room. Policies and procedures for 4 explained. All questions answered with no further questions at this time.  Fall prevention and safety brochure discussed with patient.  Bed alarm on. Call light in reach.      Swallow Screening done at Bedside, patient was able to complete and pass (See \"Head to Toe\" in \"Flowsheets\" for details)    ***

## 2024-11-01 NOTE — DISCHARGE INSTR - COC
Continuity of Care Form    Patient Name: Queenie Abreu   :  1945  MRN:  [id number]    Admit date:  10/31/2024  Discharge date:  24    Code Status Order: Limited   Advance Directives:   Advance Care Flowsheet Documentation             Admitting Physician:  Checo Lan MD  PCP: Rudy San MD    Discharging Nurse: ERIC Leon RN  Discharging Hospital Unit/Room#: 4K-26/026-A  Discharging Unit Phone Number: [phone]    Emergency Contact:   Extended Emergency Contact Information  Primary Emergency Contact: SHERRI LEVIN (POA)  Home Phone: [phone]  Mobile Phone: [phone]  Relation: Child   needed? No  Secondary Emergency Contact: Mary Levin   [facility]  Home Phone: [phone]  Mobile Phone: [phone]  Relation: Child  Hearing or visual needs: None  Other needs: None  Preferred language: English   needed? No    Past Surgical History:  Past Surgical History:   Procedure Laterality Date    COLONOSCOPY      GASTROSTOMY TUBE PLACEMENT N/A 2022    EGD PEG TUBE PLACEMENT performed by Huy Villanueva MD at [facility]    TONSILLECTOMY      UPPER GASTROINTESTINAL ENDOSCOPY N/A 2022    EGD BIOPSY performed by Huy Villanueva MD at [facility]       Immunization History:   Immunization History   Administered Date(s) Administered    COVID-19, PFIZER Bivalent, DO NOT Dilute, (age 12y+), IM, 30 mcg/0.3 mL 10/14/2022    COVID-19, PFIZER PURPLE top, DILUTE for use, (age 12 y+), 30mcg/0.3mL 2021, 2021, 2021    Influenza Virus Vaccine 10/04/2018    Pneumococcal, PPSV23, PNEUMOVAX 23, (age 2y+), SC/IM, 0.5mL 2020       Active Problems:  Patient Active Problem List   Diagnosis Code    Pneumonia of right upper lobe due to infectious organism J18.9    Bandemia D72.825    Acute on chronic respiratory failure with hypoxia and hypercapnia J96.21, J96.22    Weight loss, non-intentional R63.4    Severe protein-calorie

## 2024-11-01 NOTE — PROCEDURES
PROCEDURE NOTE  Date: 11/1/2024   Name: Queenie Abreu  YOB: 1945    Procedures    12 lead EKG completed. Results handed to Sena AARON

## 2024-11-01 NOTE — ED NOTES
Pt resting in bed. Vitals assessed. RR easy and unlabored. Pt down to 5L NC and maintaining 98%. Family at bedside.

## 2024-11-01 NOTE — DISCHARGE INSTR - DIET

## 2024-11-01 NOTE — PALLIATIVE CARE
Follow Up / Progress Note        Patient:   Queenie Abreu  YOB: 1945  Age:  79 y.o.  Room:  [address] [place] [address]  MRN:  [id number]         Family/Patient Discussion: Call received from Mary (daughter). Mary recalled conversation with admitting provider related to code status and goals of care. Mary is aligned with not wanting cardiopulmonary resuscitation or intubation and mechanical ventilation for her mom. Education provided on general progression of COPD and COPD exacerbation. Education provided on bipap, Mary is ok with bipap use for her mom if needed. Updated that patient has increase of troponin and will be doing a cardiac evaluation including starting a heparin drip. Mary is ok with this treatment at this time. All questions answered. Mary encouraged to call palliative care with any further concerns.      Plan/Follow-Up:  Palliative care will continue to follow, please call for additional needs.          Electronically signed by Franco Martinez RN on 11/1/2024 at 10:47 AM             Palliative Care Office: [phone]

## 2024-11-02 ENCOUNTER — APPOINTMENT (OUTPATIENT)
Dept: CT IMAGING | Age: 79
DRG: 189 | End: 2024-11-02
Payer: MEDICARE

## 2024-11-02 ENCOUNTER — APPOINTMENT (OUTPATIENT)
Age: 79
DRG: 189 | End: 2024-11-02
Payer: MEDICARE

## 2024-11-02 PROBLEM — R65.10 SIRS (SYSTEMIC INFLAMMATORY RESPONSE SYNDROME) (HCC): Status: RESOLVED | Noted: 2018-12-04 | Resolved: 2024-11-02

## 2024-11-02 PROBLEM — N39.0 SEPSIS SECONDARY TO UTI (HCC): Status: RESOLVED | Noted: 2019-11-20 | Resolved: 2024-11-02

## 2024-11-02 PROBLEM — J44.1 COPD EXACERBATION (HCC): Status: RESOLVED | Noted: 2019-11-13 | Resolved: 2024-11-02

## 2024-11-02 PROBLEM — A41.9 SEPSIS SECONDARY TO UTI (HCC): Status: RESOLVED | Noted: 2019-11-20 | Resolved: 2024-11-02

## 2024-11-02 LAB
ANION GAP SERPL CALC-SCNC: 8 MEQ/L (ref 8–16)
APTT PPP: 29.4 SECONDS (ref 22–38)
APTT PPP: 34.8 SECONDS (ref 22–38)
APTT PPP: 73.3 SECONDS (ref 22–38)
APTT PPP: 99.5 SECONDS (ref 22–38)
ARTERIAL PATENCY WRIST A: POSITIVE
BASE EXCESS BLDA CALC-SCNC: 13.6 MMOL/L (ref -2.5–2.5)
BDY SITE: ABNORMAL
BUN SERPL-MCNC: 18 MG/DL (ref 7–22)
CALCIUM SERPL-MCNC: 8.6 MG/DL (ref 8.5–10.5)
CHLORIDE SERPL-SCNC: 96 MEQ/L (ref 98–111)
CO2 SERPL-SCNC: 36 MEQ/L (ref 23–33)
COLLECTED BY:: ABNORMAL
CREAT SERPL-MCNC: 0.5 MG/DL (ref 0.4–1.2)
DEPRECATED RDW RBC AUTO: 56.1 FL (ref 35–45)
DEVICE: ABNORMAL
ECHO AV CUSP MM: 1.5 CM
ECHO AV PEAK GRADIENT: 6 MMHG
ECHO AV PEAK VELOCITY: 1.3 M/S
ECHO AV VELOCITY RATIO: 0.69
ECHO BSA: 1.24 M2
ECHO EST RA PRESSURE: 5 MMHG
ECHO LA AREA 4C: 9.3 CM2
ECHO LA DIAMETER INDEX: 2.28 CM/M2
ECHO LA DIAMETER: 2.9 CM
ECHO LA MAJOR AXIS: 3.7 CM
ECHO LA VOL MOD A4C: 18 ML (ref 22–52)
ECHO LA VOLUME INDEX MOD A4C: 14 ML/M2 (ref 16–34)
ECHO LV E' LATERAL VELOCITY: 10.1 CM/S
ECHO LV E' SEPTAL VELOCITY: 6.9 CM/S
ECHO LV EF PHYSICIAN: 60 %
ECHO LV FRACTIONAL SHORTENING: 27 % (ref 28–44)
ECHO LV INTERNAL DIMENSION DIASTOLE INDEX: 2.36 CM/M2
ECHO LV INTERNAL DIMENSION DIASTOLIC: 3 CM (ref 3.9–5.3)
ECHO LV INTERNAL DIMENSION SYSTOLIC INDEX: 1.73 CM/M2
ECHO LV INTERNAL DIMENSION SYSTOLIC: 2.2 CM
ECHO LV IVSD: 1.3 CM (ref 0.6–0.9)
ECHO LV MASS 2D: 109.1 G (ref 67–162)
ECHO LV MASS INDEX 2D: 85.9 G/M2 (ref 43–95)
ECHO LV POSTERIOR WALL DIASTOLIC: 1.1 CM (ref 0.6–0.9)
ECHO LV RELATIVE WALL THICKNESS RATIO: 0.73
ECHO LVOT PEAK GRADIENT: 3 MMHG
ECHO LVOT PEAK VELOCITY: 0.9 M/S
ECHO MV A VELOCITY: 0.91 M/S
ECHO MV E DECELERATION TIME (DT): 229 MS
ECHO MV E VELOCITY: 0.65 M/S
ECHO MV E/A RATIO: 0.71
ECHO MV E/E' LATERAL: 6.44
ECHO MV E/E' RATIO (AVERAGED): 7.93
ECHO MV E/E' SEPTAL: 9.42
ECHO MV REGURGITANT PEAK GRADIENT: 46 MMHG
ECHO MV REGURGITANT PEAK VELOCITY: 3.4 M/S
ECHO PV MAX VELOCITY: 0.9 M/S
ECHO PV PEAK GRADIENT: 3 MMHG
ECHO RIGHT VENTRICULAR SYSTOLIC PRESSURE (RVSP): 35 MMHG
ECHO RV INTERNAL DIMENSION: 2.7 CM
ECHO RV TAPSE: 1.6 CM (ref 1.7–?)
ECHO TV E WAVE: 0.4 M/S
ECHO TV REGURGITANT MAX VELOCITY: 2.76 M/S
ECHO TV REGURGITANT PEAK GRADIENT: 30 MMHG
ERYTHROCYTE [DISTWIDTH] IN BLOOD BY AUTOMATED COUNT: 18.2 % (ref 11.5–14.5)
FERRITIN SERPL IA-MCNC: 18 NG/ML (ref 10–291)
FIO2 ON VENT O2 ANALYZER: 30 %
FOLATE SERPL-MCNC: 9.2 NG/ML (ref 4.8–24.2)
GFR SERPL CREATININE-BSD FRML MDRD: > 90 ML/MIN/1.73M2
GLUCOSE SERPL-MCNC: 80 MG/DL (ref 70–108)
HCO3 BLDA-SCNC: 39 MMOL/L (ref 23–28)
HCT VFR BLD AUTO: 33.2 % (ref 37–47)
HEPARIN UNFRACTIONATED: 0.47 U/ML (ref 0.3–0.7)
HGB BLD-MCNC: 10 GM/DL (ref 12–16)
IRON SATN MFR SERPL: 7 % (ref 20–50)
IRON SERPL-MCNC: 17 UG/DL (ref 50–170)
MCH RBC QN AUTO: 25.6 PG (ref 26–33)
MCHC RBC AUTO-ENTMCNC: 30.1 GM/DL (ref 32.2–35.5)
MCV RBC AUTO: 85.1 FL (ref 81–99)
PCO2 BLDA: 50 MMHG (ref 35–45)
PEEP SETTING VENT: 5 MMHG
PH BLDA: 7.5 [PH] (ref 7.35–7.45)
PLATELET # BLD AUTO: 304 THOU/MM3 (ref 130–400)
PMV BLD AUTO: 10.6 FL (ref 9.4–12.4)
PO2 BLDA: 63 MMHG (ref 71–104)
POTASSIUM SERPL-SCNC: 4.4 MEQ/L (ref 3.5–5.2)
PRESSURE SUPPORT SETTING VENT: 9 CMH2O
RBC # BLD AUTO: 3.9 MILL/MM3 (ref 4.2–5.4)
SAO2 % BLDA: 93 %
SODIUM SERPL-SCNC: 140 MEQ/L (ref 135–145)
TIBC SERPL-MCNC: 237 UG/DL (ref 171–450)
TROPONIN, HIGH SENSITIVITY: 159 NG/L (ref 0–12)
VENTILATION MODE VENT: ABNORMAL
VIT B12 SERPL-MCNC: 612 PG/ML (ref 211–911)
WBC # BLD AUTO: 7.9 THOU/MM3 (ref 4.8–10.8)

## 2024-11-02 PROCEDURE — 94660 CPAP INITIATION&MGMT: CPT

## 2024-11-02 PROCEDURE — 82746 ASSAY OF FOLIC ACID SERUM: CPT

## 2024-11-02 PROCEDURE — 6370000000 HC RX 637 (ALT 250 FOR IP): Performed by: PHYSICIAN ASSISTANT

## 2024-11-02 PROCEDURE — 6360000004 HC RX CONTRAST MEDICATION

## 2024-11-02 PROCEDURE — 36415 COLL VENOUS BLD VENIPUNCTURE: CPT

## 2024-11-02 PROCEDURE — 36600 WITHDRAWAL OF ARTERIAL BLOOD: CPT

## 2024-11-02 PROCEDURE — 94640 AIRWAY INHALATION TREATMENT: CPT

## 2024-11-02 PROCEDURE — 85730 THROMBOPLASTIN TIME PARTIAL: CPT

## 2024-11-02 PROCEDURE — 2060000000 HC ICU INTERMEDIATE R&B

## 2024-11-02 PROCEDURE — 71275 CT ANGIOGRAPHY CHEST: CPT

## 2024-11-02 PROCEDURE — 93306 TTE W/DOPPLER COMPLETE: CPT | Performed by: INTERNAL MEDICINE

## 2024-11-02 PROCEDURE — 93306 TTE W/DOPPLER COMPLETE: CPT

## 2024-11-02 PROCEDURE — 85027 COMPLETE CBC AUTOMATED: CPT

## 2024-11-02 PROCEDURE — 94761 N-INVAS EAR/PLS OXIMETRY MLT: CPT

## 2024-11-02 PROCEDURE — 6360000002 HC RX W HCPCS

## 2024-11-02 PROCEDURE — 84484 ASSAY OF TROPONIN QUANT: CPT

## 2024-11-02 PROCEDURE — 2580000003 HC RX 258: Performed by: PHYSICIAN ASSISTANT

## 2024-11-02 PROCEDURE — 99232 SBSQ HOSP IP/OBS MODERATE 35: CPT | Performed by: INTERNAL MEDICINE

## 2024-11-02 PROCEDURE — 85520 HEPARIN ASSAY: CPT

## 2024-11-02 PROCEDURE — 80048 BASIC METABOLIC PNL TOTAL CA: CPT

## 2024-11-02 PROCEDURE — 82803 BLOOD GASES ANY COMBINATION: CPT

## 2024-11-02 PROCEDURE — 82607 VITAMIN B-12: CPT

## 2024-11-02 PROCEDURE — 6370000000 HC RX 637 (ALT 250 FOR IP)

## 2024-11-02 PROCEDURE — 2700000000 HC OXYGEN THERAPY PER DAY

## 2024-11-02 RX ORDER — ENOXAPARIN SODIUM 100 MG/ML
30 INJECTION SUBCUTANEOUS EVERY 24 HOURS
Status: DISCONTINUED | OUTPATIENT
Start: 2024-11-02 | End: 2024-11-08 | Stop reason: HOSPADM

## 2024-11-02 RX ORDER — LORAZEPAM 2 MG/ML
0.5 INJECTION INTRAMUSCULAR ONCE
Status: COMPLETED | OUTPATIENT
Start: 2024-11-03 | End: 2024-11-02

## 2024-11-02 RX ORDER — IOPAMIDOL 755 MG/ML
80 INJECTION, SOLUTION INTRAVASCULAR
Status: COMPLETED | OUTPATIENT
Start: 2024-11-02 | End: 2024-11-02

## 2024-11-02 RX ADMIN — HEPARIN SODIUM 2100 UNITS: 1000 INJECTION INTRAVENOUS; SUBCUTANEOUS at 02:05

## 2024-11-02 RX ADMIN — BUDESONIDE AND FORMOTEROL FUMARATE DIHYDRATE 2 PUFF: 160; 4.5 AEROSOL RESPIRATORY (INHALATION) at 19:43

## 2024-11-02 RX ADMIN — Medication 1 TABLET: at 09:40

## 2024-11-02 RX ADMIN — LORAZEPAM 0.5 MG: 2 INJECTION INTRAMUSCULAR; INTRAVENOUS at 23:53

## 2024-11-02 RX ADMIN — SENNOSIDES AND DOCUSATE SODIUM 2 TABLET: 50; 8.6 TABLET ORAL at 09:39

## 2024-11-02 RX ADMIN — SODIUM CHLORIDE, PRESERVATIVE FREE 10 ML: 5 INJECTION INTRAVENOUS at 09:40

## 2024-11-02 RX ADMIN — ATORVASTATIN CALCIUM 40 MG: 40 TABLET, FILM COATED ORAL at 19:43

## 2024-11-02 RX ADMIN — BUDESONIDE AND FORMOTEROL FUMARATE DIHYDRATE 2 PUFF: 160; 4.5 AEROSOL RESPIRATORY (INHALATION) at 08:48

## 2024-11-02 RX ADMIN — IOPAMIDOL 80 ML: 755 INJECTION, SOLUTION INTRAVENOUS at 11:00

## 2024-11-02 RX ADMIN — ASPIRIN 81 MG 81 MG: 81 TABLET ORAL at 09:39

## 2024-11-02 RX ADMIN — SENNOSIDES AND DOCUSATE SODIUM 2 TABLET: 50; 8.6 TABLET ORAL at 19:43

## 2024-11-02 RX ADMIN — ENOXAPARIN SODIUM 30 MG: 100 INJECTION SUBCUTANEOUS at 17:34

## 2024-11-02 RX ADMIN — SODIUM CHLORIDE, PRESERVATIVE FREE 10 ML: 5 INJECTION INTRAVENOUS at 19:51

## 2024-11-02 RX ADMIN — Medication 2000 UNITS: at 09:40

## 2024-11-02 RX ADMIN — PANTOPRAZOLE SODIUM 40 MG: 40 TABLET, DELAYED RELEASE ORAL at 09:39

## 2024-11-03 LAB
ALBUMIN SERPL BCG-MCNC: 3.3 G/DL (ref 3.5–5.1)
ALP SERPL-CCNC: 44 U/L (ref 38–126)
ALT SERPL W/O P-5'-P-CCNC: 12 U/L (ref 11–66)
ANION GAP SERPL CALC-SCNC: 11 MEQ/L (ref 8–16)
APTT PPP: 25.8 SECONDS (ref 22–38)
APTT PPP: 26.7 SECONDS (ref 22–38)
AST SERPL-CCNC: 22 U/L (ref 5–40)
BILIRUB CONJ SERPL-MCNC: 0.2 MG/DL (ref 0.1–13.8)
BILIRUB SERPL-MCNC: 0.4 MG/DL (ref 0.3–1.2)
BUN SERPL-MCNC: 15 MG/DL (ref 7–22)
CALCIUM SERPL-MCNC: 9 MG/DL (ref 8.5–10.5)
CHLORIDE SERPL-SCNC: 100 MEQ/L (ref 98–111)
CO2 SERPL-SCNC: 31 MEQ/L (ref 23–33)
CREAT SERPL-MCNC: 0.5 MG/DL (ref 0.4–1.2)
DEPRECATED RDW RBC AUTO: 54.1 FL (ref 35–45)
ERYTHROCYTE [DISTWIDTH] IN BLOOD BY AUTOMATED COUNT: 18 % (ref 11.5–14.5)
GFR SERPL CREATININE-BSD FRML MDRD: > 90 ML/MIN/1.73M2
GLUCOSE BLD STRIP.AUTO-MCNC: 103 MG/DL (ref 70–108)
GLUCOSE SERPL-MCNC: 77 MG/DL (ref 70–108)
HCT VFR BLD AUTO: 33.5 % (ref 37–47)
HGB BLD-MCNC: 10.1 GM/DL (ref 12–16)
MCH RBC QN AUTO: 25.4 PG (ref 26–33)
MCHC RBC AUTO-ENTMCNC: 30.1 GM/DL (ref 32.2–35.5)
MCV RBC AUTO: 84.4 FL (ref 81–99)
PLATELET # BLD AUTO: 297 THOU/MM3 (ref 130–400)
PMV BLD AUTO: 10 FL (ref 9.4–12.4)
POTASSIUM SERPL-SCNC: 3.8 MEQ/L (ref 3.5–5.2)
PROT SERPL-MCNC: 5.8 G/DL (ref 6.1–8)
RBC # BLD AUTO: 3.97 MILL/MM3 (ref 4.2–5.4)
SODIUM SERPL-SCNC: 142 MEQ/L (ref 135–145)
WBC # BLD AUTO: 7 THOU/MM3 (ref 4.8–10.8)

## 2024-11-03 PROCEDURE — 97110 THERAPEUTIC EXERCISES: CPT

## 2024-11-03 PROCEDURE — 97166 OT EVAL MOD COMPLEX 45 MIN: CPT

## 2024-11-03 PROCEDURE — 6360000002 HC RX W HCPCS

## 2024-11-03 PROCEDURE — 2580000003 HC RX 258: Performed by: PHYSICIAN ASSISTANT

## 2024-11-03 PROCEDURE — 6370000000 HC RX 637 (ALT 250 FOR IP): Performed by: PHYSICIAN ASSISTANT

## 2024-11-03 PROCEDURE — 99232 SBSQ HOSP IP/OBS MODERATE 35: CPT | Performed by: INTERNAL MEDICINE

## 2024-11-03 PROCEDURE — 85730 THROMBOPLASTIN TIME PARTIAL: CPT

## 2024-11-03 PROCEDURE — 85027 COMPLETE CBC AUTOMATED: CPT

## 2024-11-03 PROCEDURE — 2700000000 HC OXYGEN THERAPY PER DAY

## 2024-11-03 PROCEDURE — 82248 BILIRUBIN DIRECT: CPT

## 2024-11-03 PROCEDURE — 94640 AIRWAY INHALATION TREATMENT: CPT

## 2024-11-03 PROCEDURE — 2060000000 HC ICU INTERMEDIATE R&B

## 2024-11-03 PROCEDURE — 6370000000 HC RX 637 (ALT 250 FOR IP)

## 2024-11-03 PROCEDURE — 94761 N-INVAS EAR/PLS OXIMETRY MLT: CPT

## 2024-11-03 PROCEDURE — 36415 COLL VENOUS BLD VENIPUNCTURE: CPT

## 2024-11-03 PROCEDURE — 80053 COMPREHEN METABOLIC PANEL: CPT

## 2024-11-03 PROCEDURE — 94660 CPAP INITIATION&MGMT: CPT

## 2024-11-03 PROCEDURE — 82948 REAGENT STRIP/BLOOD GLUCOSE: CPT

## 2024-11-03 RX ADMIN — SENNOSIDES AND DOCUSATE SODIUM 2 TABLET: 50; 8.6 TABLET ORAL at 08:55

## 2024-11-03 RX ADMIN — ASPIRIN 81 MG 81 MG: 81 TABLET ORAL at 08:55

## 2024-11-03 RX ADMIN — BUDESONIDE AND FORMOTEROL FUMARATE DIHYDRATE 2 PUFF: 160; 4.5 AEROSOL RESPIRATORY (INHALATION) at 09:24

## 2024-11-03 RX ADMIN — BUDESONIDE AND FORMOTEROL FUMARATE DIHYDRATE 2 PUFF: 160; 4.5 AEROSOL RESPIRATORY (INHALATION) at 19:47

## 2024-11-03 RX ADMIN — PANTOPRAZOLE SODIUM 40 MG: 40 TABLET, DELAYED RELEASE ORAL at 08:55

## 2024-11-03 RX ADMIN — SODIUM CHLORIDE, PRESERVATIVE FREE 10 ML: 5 INJECTION INTRAVENOUS at 20:58

## 2024-11-03 RX ADMIN — SODIUM CHLORIDE, PRESERVATIVE FREE 10 ML: 5 INJECTION INTRAVENOUS at 08:55

## 2024-11-03 RX ADMIN — ENOXAPARIN SODIUM 30 MG: 100 INJECTION SUBCUTANEOUS at 16:28

## 2024-11-03 RX ADMIN — Medication 1 TABLET: at 08:55

## 2024-11-03 RX ADMIN — ATORVASTATIN CALCIUM 40 MG: 40 TABLET, FILM COATED ORAL at 20:58

## 2024-11-03 RX ADMIN — Medication 2000 UNITS: at 08:55

## 2024-11-04 ENCOUNTER — APPOINTMENT (OUTPATIENT)
Dept: GENERAL RADIOLOGY | Age: 79
DRG: 189 | End: 2024-11-04
Payer: MEDICARE

## 2024-11-04 LAB
ANION GAP SERPL CALC-SCNC: 8 MEQ/L (ref 8–16)
BASE EXCESS BLDA CALC-SCNC: 8.4 MMOL/L (ref -2–3)
BUN SERPL-MCNC: 14 MG/DL (ref 7–22)
CALCIUM SERPL-MCNC: 8.8 MG/DL (ref 8.5–10.5)
CHLORIDE SERPL-SCNC: 102 MEQ/L (ref 98–111)
CO2 SERPL-SCNC: 31 MEQ/L (ref 23–33)
COLLECTED BY:: ABNORMAL
CREAT SERPL-MCNC: 0.5 MG/DL (ref 0.4–1.2)
DEPRECATED RDW RBC AUTO: 55.1 FL (ref 35–45)
DEVICE: ABNORMAL
ERYTHROCYTE [DISTWIDTH] IN BLOOD BY AUTOMATED COUNT: 18 % (ref 11.5–14.5)
GFR SERPL CREATININE-BSD FRML MDRD: > 90 ML/MIN/1.73M2
GLUCOSE SERPL-MCNC: 83 MG/DL (ref 70–108)
HCO3 BLDA-SCNC: 33 MMOL/L (ref 23–28)
HCT VFR BLD AUTO: 32.8 % (ref 37–47)
HGB BLD-MCNC: 10.3 GM/DL (ref 12–16)
MCH RBC QN AUTO: 26.4 PG (ref 26–33)
MCHC RBC AUTO-ENTMCNC: 31.4 GM/DL (ref 32.2–35.5)
MCV RBC AUTO: 84.1 FL (ref 81–99)
PCO2 TEMP ADJ BLDMV: 45 MMHG (ref 41–51)
PH BLDMV: 7.47 [PH] (ref 7.31–7.41)
PLATELET # BLD AUTO: 330 THOU/MM3 (ref 130–400)
PMV BLD AUTO: 10.4 FL (ref 9.4–12.4)
PO2 BLDMV: 34 MMHG (ref 25–40)
POTASSIUM SERPL-SCNC: 4 MEQ/L (ref 3.5–5.2)
RBC # BLD AUTO: 3.9 MILL/MM3 (ref 4.2–5.4)
SAO2 % BLDMV: 69 %
SITE: ABNORMAL
SODIUM SERPL-SCNC: 141 MEQ/L (ref 135–145)
VENTILATION MODE VENT: ABNORMAL
WBC # BLD AUTO: 6.5 THOU/MM3 (ref 4.8–10.8)

## 2024-11-04 PROCEDURE — 6370000000 HC RX 637 (ALT 250 FOR IP): Performed by: PHYSICIAN ASSISTANT

## 2024-11-04 PROCEDURE — 94660 CPAP INITIATION&MGMT: CPT

## 2024-11-04 PROCEDURE — 94761 N-INVAS EAR/PLS OXIMETRY MLT: CPT

## 2024-11-04 PROCEDURE — 6370000000 HC RX 637 (ALT 250 FOR IP)

## 2024-11-04 PROCEDURE — 94640 AIRWAY INHALATION TREATMENT: CPT

## 2024-11-04 PROCEDURE — 97162 PT EVAL MOD COMPLEX 30 MIN: CPT

## 2024-11-04 PROCEDURE — 97530 THERAPEUTIC ACTIVITIES: CPT

## 2024-11-04 PROCEDURE — 80048 BASIC METABOLIC PNL TOTAL CA: CPT

## 2024-11-04 PROCEDURE — 82803 BLOOD GASES ANY COMBINATION: CPT

## 2024-11-04 PROCEDURE — 36415 COLL VENOUS BLD VENIPUNCTURE: CPT

## 2024-11-04 PROCEDURE — 99232 SBSQ HOSP IP/OBS MODERATE 35: CPT | Performed by: INTERNAL MEDICINE

## 2024-11-04 PROCEDURE — 2580000003 HC RX 258: Performed by: PHYSICIAN ASSISTANT

## 2024-11-04 PROCEDURE — 71045 X-RAY EXAM CHEST 1 VIEW: CPT

## 2024-11-04 PROCEDURE — 2700000000 HC OXYGEN THERAPY PER DAY

## 2024-11-04 PROCEDURE — 85027 COMPLETE CBC AUTOMATED: CPT

## 2024-11-04 PROCEDURE — 6360000002 HC RX W HCPCS

## 2024-11-04 PROCEDURE — 2060000000 HC ICU INTERMEDIATE R&B

## 2024-11-04 RX ADMIN — MIDODRINE HYDROCHLORIDE 10 MG: 10 TABLET ORAL at 08:43

## 2024-11-04 RX ADMIN — PANTOPRAZOLE SODIUM 40 MG: 40 TABLET, DELAYED RELEASE ORAL at 08:43

## 2024-11-04 RX ADMIN — ATORVASTATIN CALCIUM 40 MG: 40 TABLET, FILM COATED ORAL at 21:30

## 2024-11-04 RX ADMIN — Medication 1 TABLET: at 08:42

## 2024-11-04 RX ADMIN — SODIUM CHLORIDE, PRESERVATIVE FREE 10 ML: 5 INJECTION INTRAVENOUS at 21:30

## 2024-11-04 RX ADMIN — BUDESONIDE AND FORMOTEROL FUMARATE DIHYDRATE 2 PUFF: 160; 4.5 AEROSOL RESPIRATORY (INHALATION) at 08:00

## 2024-11-04 RX ADMIN — Medication 2000 UNITS: at 08:42

## 2024-11-04 RX ADMIN — SODIUM CHLORIDE, PRESERVATIVE FREE 10 ML: 5 INJECTION INTRAVENOUS at 08:44

## 2024-11-04 RX ADMIN — BUDESONIDE AND FORMOTEROL FUMARATE DIHYDRATE 2 PUFF: 160; 4.5 AEROSOL RESPIRATORY (INHALATION) at 20:43

## 2024-11-04 RX ADMIN — ASPIRIN 81 MG 81 MG: 81 TABLET ORAL at 08:42

## 2024-11-04 RX ADMIN — ENOXAPARIN SODIUM 30 MG: 100 INJECTION SUBCUTANEOUS at 16:37

## 2024-11-04 RX ADMIN — SENNOSIDES AND DOCUSATE SODIUM 2 TABLET: 50; 8.6 TABLET ORAL at 21:30

## 2024-11-04 NOTE — PALLIATIVE CARE
Follow Up / Progress Note        Patient:   Queenie Abreu  YOB: 1945  Age:  79 y.o.  Room:  [address] [place] [address]  MRN:  [id number]         Family/Patient Discussion:  Received call from daughter Mary (POA). Mary concerned patient isn't recovering from this exacerbation as quickly as she anticipated. Education provided on COPD and chronic disease management. All questions answered.      Plan/Follow-Up:  Palliative care will continue to be available as needed, please call for additional needs.          Electronically signed by Franco Martinez RN on 11/4/2024 at 1:39 PM             Palliative Care Office: [phone]

## 2024-11-05 LAB
ANION GAP SERPL CALC-SCNC: 10 MEQ/L (ref 8–16)
BUN SERPL-MCNC: 16 MG/DL (ref 7–22)
CALCIUM SERPL-MCNC: 8.9 MG/DL (ref 8.5–10.5)
CHLORIDE SERPL-SCNC: 101 MEQ/L (ref 98–111)
CO2 SERPL-SCNC: 26 MEQ/L (ref 23–33)
CREAT SERPL-MCNC: 0.6 MG/DL (ref 0.4–1.2)
DEPRECATED RDW RBC AUTO: 56.2 FL (ref 35–45)
ERYTHROCYTE [DISTWIDTH] IN BLOOD BY AUTOMATED COUNT: 18.5 % (ref 11.5–14.5)
GFR SERPL CREATININE-BSD FRML MDRD: > 90 ML/MIN/1.73M2
GLUCOSE SERPL-MCNC: 85 MG/DL (ref 70–108)
HCT VFR BLD AUTO: 33.2 % (ref 37–47)
HGB BLD-MCNC: 10 GM/DL (ref 12–16)
MCH RBC QN AUTO: 25.4 PG (ref 26–33)
MCHC RBC AUTO-ENTMCNC: 30.1 GM/DL (ref 32.2–35.5)
MCV RBC AUTO: 84.3 FL (ref 81–99)
PLATELET # BLD AUTO: 252 THOU/MM3 (ref 130–400)
PMV BLD AUTO: 10.6 FL (ref 9.4–12.4)
POTASSIUM SERPL-SCNC: 4.7 MEQ/L (ref 3.5–5.2)
RBC # BLD AUTO: 3.94 MILL/MM3 (ref 4.2–5.4)
SODIUM SERPL-SCNC: 137 MEQ/L (ref 135–145)
WBC # BLD AUTO: 6 THOU/MM3 (ref 4.8–10.8)

## 2024-11-05 PROCEDURE — 2580000003 HC RX 258: Performed by: PHYSICIAN ASSISTANT

## 2024-11-05 PROCEDURE — 97116 GAIT TRAINING THERAPY: CPT

## 2024-11-05 PROCEDURE — 6370000000 HC RX 637 (ALT 250 FOR IP): Performed by: PHYSICIAN ASSISTANT

## 2024-11-05 PROCEDURE — 2060000000 HC ICU INTERMEDIATE R&B

## 2024-11-05 PROCEDURE — 97530 THERAPEUTIC ACTIVITIES: CPT

## 2024-11-05 PROCEDURE — 2700000000 HC OXYGEN THERAPY PER DAY

## 2024-11-05 PROCEDURE — 6370000000 HC RX 637 (ALT 250 FOR IP)

## 2024-11-05 PROCEDURE — 36415 COLL VENOUS BLD VENIPUNCTURE: CPT

## 2024-11-05 PROCEDURE — 80048 BASIC METABOLIC PNL TOTAL CA: CPT

## 2024-11-05 PROCEDURE — 99232 SBSQ HOSP IP/OBS MODERATE 35: CPT | Performed by: INTERNAL MEDICINE

## 2024-11-05 PROCEDURE — 94640 AIRWAY INHALATION TREATMENT: CPT

## 2024-11-05 PROCEDURE — 94760 N-INVAS EAR/PLS OXIMETRY 1: CPT

## 2024-11-05 PROCEDURE — 6360000002 HC RX W HCPCS

## 2024-11-05 PROCEDURE — 85027 COMPLETE CBC AUTOMATED: CPT

## 2024-11-05 RX ADMIN — BUDESONIDE AND FORMOTEROL FUMARATE DIHYDRATE 2 PUFF: 160; 4.5 AEROSOL RESPIRATORY (INHALATION) at 21:42

## 2024-11-05 RX ADMIN — Medication 2000 UNITS: at 07:40

## 2024-11-05 RX ADMIN — Medication 1 TABLET: at 07:40

## 2024-11-05 RX ADMIN — ASPIRIN 81 MG 81 MG: 81 TABLET ORAL at 07:40

## 2024-11-05 RX ADMIN — ATORVASTATIN CALCIUM 40 MG: 40 TABLET, FILM COATED ORAL at 21:25

## 2024-11-05 RX ADMIN — SODIUM CHLORIDE, PRESERVATIVE FREE 10 ML: 5 INJECTION INTRAVENOUS at 07:41

## 2024-11-05 RX ADMIN — PANTOPRAZOLE SODIUM 40 MG: 40 TABLET, DELAYED RELEASE ORAL at 07:40

## 2024-11-05 RX ADMIN — ENOXAPARIN SODIUM 30 MG: 100 INJECTION SUBCUTANEOUS at 16:12

## 2024-11-05 RX ADMIN — MEGESTROL ACETATE 40 MG: 40 TABLET ORAL at 11:32

## 2024-11-05 RX ADMIN — SODIUM CHLORIDE, PRESERVATIVE FREE 10 ML: 5 INJECTION INTRAVENOUS at 21:25

## 2024-11-05 RX ADMIN — MEGESTROL ACETATE 40 MG: 40 TABLET ORAL at 21:25

## 2024-11-05 RX ADMIN — BUDESONIDE AND FORMOTEROL FUMARATE DIHYDRATE 2 PUFF: 160; 4.5 AEROSOL RESPIRATORY (INHALATION) at 09:04

## 2024-11-05 NOTE — PALLIATIVE CARE
Follow Up / Progress Note        Patient:   Queenie Abreu  YOB: 1945  Age:  79 y.o.  Room:  [address] [place] [address]  MRN:  [id number]         Family/Patient Discussion:  Met with daughter Mary at bedside. Mary had questions about HPOA and Living Will. Reviewed with Mary the documents in the chart are valid and unless her mom wishes to change something there is no need to fill out new ones. Discussed [place] portable DNR form. Mary would like one completed and sent to [facility] on discharge. All other questions answered.       Plan/Follow-Up:  Will work to complete [place] portable DNR form. Palliative care remains available if needed, please call for additional needs.         Electronically signed by Franco Martinez RN on 11/5/2024 at 2:44 PM             Palliative Care Office: [phone]

## 2024-11-06 LAB
ANION GAP SERPL CALC-SCNC: 10 MEQ/L (ref 8–16)
BUN SERPL-MCNC: 13 MG/DL (ref 7–22)
CALCIUM SERPL-MCNC: 8.9 MG/DL (ref 8.5–10.5)
CHLORIDE SERPL-SCNC: 101 MEQ/L (ref 98–111)
CO2 SERPL-SCNC: 26 MEQ/L (ref 23–33)
CREAT SERPL-MCNC: 0.4 MG/DL (ref 0.4–1.2)
DEPRECATED RDW RBC AUTO: 52.4 FL (ref 35–45)
ERYTHROCYTE [DISTWIDTH] IN BLOOD BY AUTOMATED COUNT: 18.3 % (ref 11.5–14.5)
GFR SERPL CREATININE-BSD FRML MDRD: > 90 ML/MIN/1.73M2
GLUCOSE SERPL-MCNC: 91 MG/DL (ref 70–108)
HCT VFR BLD AUTO: 33.6 % (ref 37–47)
HGB BLD-MCNC: 10.5 GM/DL (ref 12–16)
MCH RBC QN AUTO: 25.5 PG (ref 26–33)
MCHC RBC AUTO-ENTMCNC: 31.3 GM/DL (ref 32.2–35.5)
MCV RBC AUTO: 81.6 FL (ref 81–99)
PLATELET # BLD AUTO: 319 THOU/MM3 (ref 130–400)
PMV BLD AUTO: 10.6 FL (ref 9.4–12.4)
POTASSIUM SERPL-SCNC: 4.1 MEQ/L (ref 3.5–5.2)
RBC # BLD AUTO: 4.12 MILL/MM3 (ref 4.2–5.4)
SODIUM SERPL-SCNC: 137 MEQ/L (ref 135–145)
WBC # BLD AUTO: 6.6 THOU/MM3 (ref 4.8–10.8)

## 2024-11-06 PROCEDURE — 6370000000 HC RX 637 (ALT 250 FOR IP)

## 2024-11-06 PROCEDURE — 36415 COLL VENOUS BLD VENIPUNCTURE: CPT

## 2024-11-06 PROCEDURE — 97530 THERAPEUTIC ACTIVITIES: CPT

## 2024-11-06 PROCEDURE — 2700000000 HC OXYGEN THERAPY PER DAY

## 2024-11-06 PROCEDURE — 99233 SBSQ HOSP IP/OBS HIGH 50: CPT | Performed by: STUDENT IN AN ORGANIZED HEALTH CARE EDUCATION/TRAINING PROGRAM

## 2024-11-06 PROCEDURE — 80048 BASIC METABOLIC PNL TOTAL CA: CPT

## 2024-11-06 PROCEDURE — 6360000002 HC RX W HCPCS

## 2024-11-06 PROCEDURE — 97110 THERAPEUTIC EXERCISES: CPT

## 2024-11-06 PROCEDURE — 2580000003 HC RX 258: Performed by: PHYSICIAN ASSISTANT

## 2024-11-06 PROCEDURE — 2060000000 HC ICU INTERMEDIATE R&B

## 2024-11-06 PROCEDURE — 6370000000 HC RX 637 (ALT 250 FOR IP): Performed by: PHYSICIAN ASSISTANT

## 2024-11-06 PROCEDURE — 85027 COMPLETE CBC AUTOMATED: CPT

## 2024-11-06 PROCEDURE — 94640 AIRWAY INHALATION TREATMENT: CPT

## 2024-11-06 PROCEDURE — 94761 N-INVAS EAR/PLS OXIMETRY MLT: CPT

## 2024-11-06 RX ADMIN — Medication 2000 UNITS: at 08:15

## 2024-11-06 RX ADMIN — Medication 1 TABLET: at 08:15

## 2024-11-06 RX ADMIN — BUDESONIDE AND FORMOTEROL FUMARATE DIHYDRATE 2 PUFF: 160; 4.5 AEROSOL RESPIRATORY (INHALATION) at 10:06

## 2024-11-06 RX ADMIN — SODIUM CHLORIDE, PRESERVATIVE FREE 10 ML: 5 INJECTION INTRAVENOUS at 21:00

## 2024-11-06 RX ADMIN — BUDESONIDE AND FORMOTEROL FUMARATE DIHYDRATE 2 PUFF: 160; 4.5 AEROSOL RESPIRATORY (INHALATION) at 20:40

## 2024-11-06 RX ADMIN — MEGESTROL ACETATE 40 MG: 40 TABLET ORAL at 20:59

## 2024-11-06 RX ADMIN — ASPIRIN 81 MG 81 MG: 81 TABLET ORAL at 08:15

## 2024-11-06 RX ADMIN — SENNOSIDES AND DOCUSATE SODIUM 2 TABLET: 50; 8.6 TABLET ORAL at 08:15

## 2024-11-06 RX ADMIN — SODIUM CHLORIDE, PRESERVATIVE FREE 10 ML: 5 INJECTION INTRAVENOUS at 08:15

## 2024-11-06 RX ADMIN — MEGESTROL ACETATE 40 MG: 40 TABLET ORAL at 08:15

## 2024-11-06 RX ADMIN — PANTOPRAZOLE SODIUM 40 MG: 40 TABLET, DELAYED RELEASE ORAL at 05:48

## 2024-11-06 RX ADMIN — SENNOSIDES AND DOCUSATE SODIUM 2 TABLET: 50; 8.6 TABLET ORAL at 20:59

## 2024-11-06 RX ADMIN — ATORVASTATIN CALCIUM 40 MG: 40 TABLET, FILM COATED ORAL at 20:59

## 2024-11-06 RX ADMIN — ENOXAPARIN SODIUM 30 MG: 100 INJECTION SUBCUTANEOUS at 16:45

## 2024-11-06 ASSESSMENT — PAIN SCALES - GENERAL
PAINLEVEL_OUTOF10: 0

## 2024-11-06 NOTE — PALLIATIVE CARE
Follow Up / Progress Note        Patient:   Queenie Abreu  YOB: 1945  Age:  79 y.o.  Room:  [address] [place] [address]  MRN:  [id number]           [place] DNR form and DNI documents faxed to medical records. Copies made to be sent with patient to [facility] on discharge, placed in patient's paper chart.       Electronically signed by Melba Wilson RN on 11/6/2024 at 10:29 AM             Palliative Care Office: [phone]

## 2024-11-07 LAB
ANION GAP SERPL CALC-SCNC: 10 MEQ/L (ref 8–16)
BUN SERPL-MCNC: 15 MG/DL (ref 7–22)
CALCIUM SERPL-MCNC: 8.8 MG/DL (ref 8.5–10.5)
CHLORIDE SERPL-SCNC: 100 MEQ/L (ref 98–111)
CO2 SERPL-SCNC: 27 MEQ/L (ref 23–33)
CREAT SERPL-MCNC: 0.5 MG/DL (ref 0.4–1.2)
DEPRECATED RDW RBC AUTO: 55.1 FL (ref 35–45)
ERYTHROCYTE [DISTWIDTH] IN BLOOD BY AUTOMATED COUNT: 18.4 % (ref 11.5–14.5)
GFR SERPL CREATININE-BSD FRML MDRD: > 90 ML/MIN/1.73M2
GLUCOSE SERPL-MCNC: 84 MG/DL (ref 70–108)
HCT VFR BLD AUTO: 33.6 % (ref 37–47)
HGB BLD-MCNC: 10.4 GM/DL (ref 12–16)
MCH RBC QN AUTO: 25.8 PG (ref 26–33)
MCHC RBC AUTO-ENTMCNC: 31 GM/DL (ref 32.2–35.5)
MCV RBC AUTO: 83.4 FL (ref 81–99)
PLATELET # BLD AUTO: 331 THOU/MM3 (ref 130–400)
PMV BLD AUTO: 10.6 FL (ref 9.4–12.4)
POTASSIUM SERPL-SCNC: 4.2 MEQ/L (ref 3.5–5.2)
RBC # BLD AUTO: 4.03 MILL/MM3 (ref 4.2–5.4)
SODIUM SERPL-SCNC: 137 MEQ/L (ref 135–145)
WBC # BLD AUTO: 7.3 THOU/MM3 (ref 4.8–10.8)

## 2024-11-07 PROCEDURE — 85027 COMPLETE CBC AUTOMATED: CPT

## 2024-11-07 PROCEDURE — 2060000000 HC ICU INTERMEDIATE R&B

## 2024-11-07 PROCEDURE — 2580000003 HC RX 258: Performed by: PHYSICIAN ASSISTANT

## 2024-11-07 PROCEDURE — 6360000002 HC RX W HCPCS

## 2024-11-07 PROCEDURE — 97116 GAIT TRAINING THERAPY: CPT

## 2024-11-07 PROCEDURE — 6370000000 HC RX 637 (ALT 250 FOR IP): Performed by: PHYSICIAN ASSISTANT

## 2024-11-07 PROCEDURE — 94761 N-INVAS EAR/PLS OXIMETRY MLT: CPT

## 2024-11-07 PROCEDURE — 36415 COLL VENOUS BLD VENIPUNCTURE: CPT

## 2024-11-07 PROCEDURE — 80048 BASIC METABOLIC PNL TOTAL CA: CPT

## 2024-11-07 PROCEDURE — 6370000000 HC RX 637 (ALT 250 FOR IP)

## 2024-11-07 PROCEDURE — 94640 AIRWAY INHALATION TREATMENT: CPT

## 2024-11-07 PROCEDURE — 97530 THERAPEUTIC ACTIVITIES: CPT

## 2024-11-07 PROCEDURE — 99232 SBSQ HOSP IP/OBS MODERATE 35: CPT | Performed by: STUDENT IN AN ORGANIZED HEALTH CARE EDUCATION/TRAINING PROGRAM

## 2024-11-07 PROCEDURE — 2700000000 HC OXYGEN THERAPY PER DAY

## 2024-11-07 RX ADMIN — ENOXAPARIN SODIUM 30 MG: 100 INJECTION SUBCUTANEOUS at 16:37

## 2024-11-07 RX ADMIN — BUDESONIDE AND FORMOTEROL FUMARATE DIHYDRATE 2 PUFF: 160; 4.5 AEROSOL RESPIRATORY (INHALATION) at 21:42

## 2024-11-07 RX ADMIN — SODIUM CHLORIDE, PRESERVATIVE FREE 10 ML: 5 INJECTION INTRAVENOUS at 20:24

## 2024-11-07 RX ADMIN — ATORVASTATIN CALCIUM 40 MG: 40 TABLET, FILM COATED ORAL at 20:24

## 2024-11-07 RX ADMIN — ASPIRIN 81 MG 81 MG: 81 TABLET ORAL at 07:55

## 2024-11-07 RX ADMIN — MEGESTROL ACETATE 40 MG: 40 TABLET ORAL at 20:24

## 2024-11-07 RX ADMIN — PANTOPRAZOLE SODIUM 40 MG: 40 TABLET, DELAYED RELEASE ORAL at 06:30

## 2024-11-07 RX ADMIN — BUDESONIDE AND FORMOTEROL FUMARATE DIHYDRATE 2 PUFF: 160; 4.5 AEROSOL RESPIRATORY (INHALATION) at 09:37

## 2024-11-07 RX ADMIN — Medication 2000 UNITS: at 07:55

## 2024-11-07 RX ADMIN — SODIUM CHLORIDE, PRESERVATIVE FREE 10 ML: 5 INJECTION INTRAVENOUS at 07:55

## 2024-11-07 RX ADMIN — SENNOSIDES AND DOCUSATE SODIUM 2 TABLET: 50; 8.6 TABLET ORAL at 07:55

## 2024-11-07 RX ADMIN — Medication 1 TABLET: at 07:55

## 2024-11-07 RX ADMIN — MEGESTROL ACETATE 40 MG: 40 TABLET ORAL at 07:55

## 2024-11-07 ASSESSMENT — PAIN SCALES - GENERAL: PAINLEVEL_OUTOF10: 0

## 2024-11-08 VITALS
RESPIRATION RATE: 20 BRPM | BODY MASS INDEX: 12.51 KG/M2 | HEIGHT: 62 IN | TEMPERATURE: 97.5 F | DIASTOLIC BLOOD PRESSURE: 59 MMHG | HEART RATE: 84 BPM | OXYGEN SATURATION: 98 % | SYSTOLIC BLOOD PRESSURE: 103 MMHG | WEIGHT: 68 LBS

## 2024-11-08 LAB
ANION GAP SERPL CALC-SCNC: 12 MEQ/L (ref 8–16)
BUN SERPL-MCNC: 15 MG/DL (ref 7–22)
CALCIUM SERPL-MCNC: 8.9 MG/DL (ref 8.5–10.5)
CHLORIDE SERPL-SCNC: 97 MEQ/L (ref 98–111)
CO2 SERPL-SCNC: 25 MEQ/L (ref 23–33)
CREAT SERPL-MCNC: 0.4 MG/DL (ref 0.4–1.2)
DEPRECATED RDW RBC AUTO: 55.8 FL (ref 35–45)
ERYTHROCYTE [DISTWIDTH] IN BLOOD BY AUTOMATED COUNT: 18.6 % (ref 11.5–14.5)
GFR SERPL CREATININE-BSD FRML MDRD: > 90 ML/MIN/1.73M2
GLUCOSE SERPL-MCNC: 90 MG/DL (ref 70–108)
HCT VFR BLD AUTO: 37 % (ref 37–47)
HGB BLD-MCNC: 11.3 GM/DL (ref 12–16)
MCH RBC QN AUTO: 25.7 PG (ref 26–33)
MCHC RBC AUTO-ENTMCNC: 30.5 GM/DL (ref 32.2–35.5)
MCV RBC AUTO: 84.1 FL (ref 81–99)
PLATELET # BLD AUTO: 348 THOU/MM3 (ref 130–400)
PMV BLD AUTO: 10.8 FL (ref 9.4–12.4)
POTASSIUM SERPL-SCNC: 4.6 MEQ/L (ref 3.5–5.2)
RBC # BLD AUTO: 4.4 MILL/MM3 (ref 4.2–5.4)
SODIUM SERPL-SCNC: 134 MEQ/L (ref 135–145)
WBC # BLD AUTO: 7.8 THOU/MM3 (ref 4.8–10.8)

## 2024-11-08 PROCEDURE — 99239 HOSP IP/OBS DSCHRG MGMT >30: CPT | Performed by: STUDENT IN AN ORGANIZED HEALTH CARE EDUCATION/TRAINING PROGRAM

## 2024-11-08 PROCEDURE — 85027 COMPLETE CBC AUTOMATED: CPT

## 2024-11-08 PROCEDURE — 6370000000 HC RX 637 (ALT 250 FOR IP)

## 2024-11-08 PROCEDURE — 80048 BASIC METABOLIC PNL TOTAL CA: CPT

## 2024-11-08 PROCEDURE — 36415 COLL VENOUS BLD VENIPUNCTURE: CPT

## 2024-11-08 PROCEDURE — 6360000002 HC RX W HCPCS: Performed by: PHYSICIAN ASSISTANT

## 2024-11-08 PROCEDURE — 94640 AIRWAY INHALATION TREATMENT: CPT

## 2024-11-08 PROCEDURE — 94761 N-INVAS EAR/PLS OXIMETRY MLT: CPT

## 2024-11-08 PROCEDURE — 2700000000 HC OXYGEN THERAPY PER DAY

## 2024-11-08 PROCEDURE — 6370000000 HC RX 637 (ALT 250 FOR IP): Performed by: PHYSICIAN ASSISTANT

## 2024-11-08 RX ORDER — LANOLIN ALCOHOL/MO/W.PET/CERES
3 CREAM (GRAM) TOPICAL NIGHTLY PRN
Status: DISCONTINUED | OUTPATIENT
Start: 2024-11-08 | End: 2024-11-08 | Stop reason: HOSPADM

## 2024-11-08 RX ORDER — ALBUTEROL SULFATE 90 UG/1
2 INHALANT RESPIRATORY (INHALATION) 4 TIMES DAILY
Qty: 4 EACH | Refills: 0 | Status: ON HOLD | OUTPATIENT
Start: 2024-11-08 | End: 2024-11-17

## 2024-11-08 RX ORDER — LANOLIN ALCOHOL/MO/W.PET/CERES
3 CREAM (GRAM) TOPICAL NIGHTLY PRN
Qty: 30 TABLET | Refills: 0 | Status: ON HOLD | OUTPATIENT
Start: 2024-11-08 | End: 2024-11-17 | Stop reason: HOSPADM

## 2024-11-08 RX ORDER — ATORVASTATIN CALCIUM 40 MG/1
40 TABLET, FILM COATED ORAL NIGHTLY
Qty: 30 TABLET | Refills: 3 | Status: ON HOLD | OUTPATIENT
Start: 2024-11-08 | End: 2024-11-17

## 2024-11-08 RX ADMIN — ONDANSETRON 4 MG: 2 INJECTION INTRAMUSCULAR; INTRAVENOUS at 09:37

## 2024-11-08 RX ADMIN — Medication 2000 UNITS: at 07:56

## 2024-11-08 RX ADMIN — ASPIRIN 81 MG 81 MG: 81 TABLET ORAL at 07:55

## 2024-11-08 RX ADMIN — BUDESONIDE AND FORMOTEROL FUMARATE DIHYDRATE 2 PUFF: 160; 4.5 AEROSOL RESPIRATORY (INHALATION) at 08:09

## 2024-11-08 RX ADMIN — Medication 1 TABLET: at 07:56

## 2024-11-08 RX ADMIN — MEGESTROL ACETATE 40 MG: 40 TABLET ORAL at 07:55

## 2024-11-08 RX ADMIN — PANTOPRAZOLE SODIUM 40 MG: 40 TABLET, DELAYED RELEASE ORAL at 06:39

## 2024-11-08 NOTE — CARE COORDINATION
11/4/24, 9:59 AM EST    DISCHARGE PLANNING EVALUATION    This SW did speak with Rosalina from [facility], reports that patient will need to be a precert to return to facility. States that it will need to be approved before patient is able to return.   
11/5/24, 1:56 PM EST    DISCHARGE PLANNING EVALUATION    This SW left a message for Rosalina at [facility] earlier in afternoon asking to start precert today. Faxed bipap orders to [phone].  
11/5/24, 8:14 AM EST    DISCHARGE ON GOING EVALUATION    [facility]  [facility]- Reason for admit: Hypoxemia [R09.02]  COPD exacerbation (HCC) [J44.1]     Procedures:   11/2 Echo: EF 60-65%.      Imaging since last note:   11/2 CTA chest:   1.  No pulmonary emboli are seen. Findings suggestive of concentric left  ventricular hypertrophy.  2. Mild atelectatic/fibrotic stranding scattered in both lungs.  11/4 PCXR: There is hyperinflation noted. No other acute cardiopulmonary findings are otherwise seen.    Barriers to Discharge: Hospitalist following. O2 weaned to 4L, sats 93%. BP 93/56. Symbicort. Palliative Care. PT/OT.     PCP: Rudy San MD  Readmission Risk Score: 16.4    Patient Goals/Plan/Treatment Preferences: From [facility]. SW following.     
11/6/24, 8:08 AM EST    DISCHARGE ON GOING EVALUATION    [facility] day: 6  Location: [facility] Reason for admit: Hypoxemia [R09.02]  COPD exacerbation (HCC) [J44.1]     Procedures:   11/2 Echo: EF 60-65%.      Imaging since last note: None    Barriers to Discharge: Hospitalist following. Palliative Care. O2 at 4L/nc, sats 97%. Limited code x4. PT/OT. Dietitian consulted.     PCP: Rudy San MD  Readmission Risk Score: 16.4    Patient Goals/Plan/Treatment Preferences: From [facility] [place] [facility]. SW following.     
11/7/24, 2:24 PM EST    DISCHARGE ON GOING EVALUATION    [facility] day: 7  Location: [facility] Reason for admit: Hypoxemia [R09.02]  COPD exacerbation (HCC) [J44.1]     Procedures:   11/2 Echo: EF 60-65%.      Imaging since last note: None    Barriers to Discharge: Hospitalist following. Palliative Care. O2 at 4L/nc, sats 97%. Limited code x4. PT/OT. Dietitian following. Precert.     PCP: Rudy San MD  Readmission Risk Score: 16.8    Patient Goals/Plan/Treatment Preferences: From [facility]. SW following. Precert started 11/5    
11/8/24, 11:45 AM EST    Patient goals/plan/ treatment preferences discussed by  and .  Patient goals/plan/ treatment preferences reviewed with patient/ family.  Patient/ family verbalize understanding of discharge plan and are in agreement with goal/plan/treatment preferences.  Understanding was demonstrated using the teach back method.  AVS provided by RN at time of discharge, which includes all necessary medical information pertaining to the patients current course of illness, treatment, post-discharge goals of care, and treatment preferences.     Services At/After Discharge: Long Term Care, Aide services, In ambulette, and Nursing service    Pt is being discharged today back to [facility], under her medicaid benefit.    SW was informed that pts insurance [id number] was denied.       DANIELA faxed AVS.  Notified [facility] of transport at 2 pm.  RN is updated.     
11/8/24, 8:11 AM EST    DISCHARGE PLANNING EVALUATION    DANIELA spoke with Rosalina with [facility].  Advised that insurance called with a [id number].    Rosalina stated pt can return under her medicaid.  Rosalina confirmed that they have her bi-pap settings ready for admission. CM updated.     [facility] transport set up for 2 pm. Await insurance decision.  
Case Management Assessment Initial Evaluation    Date/Time of Evaluation: 11/1/2024 3:06 PM  Assessment Completed by: Wandy Salcido RN    If patient is discharged prior to next notation, then this note serves as note for discharge by case management.    Patient Name: Queenie Abreu                   YOB: 1945  Diagnosis: Hypoxemia [R09.02]  COPD exacerbation (HCC) [J44.1]                   Date / Time: 10/31/2024  6:12 PM  Location: [address] [place] [address]     Patient Admission Status: Inpatient   Readmission Risk Low 0-14, Mod 15-19), High > 20: Readmission Risk Score: 15.1    Current PCP: Rudy San MD  Health Care Decision Makers:   Primary Decision Maker: SHERRI LEVIN (LILAA) - Child - [phone]    Secondary Decision Maker: Mary Levin - Child - [phone]    Secondary Decision Maker: Lian Fu - Child - [phone]    Additional Case Management Notes: Patient presented to the ED with SOB, found to be hypoxic. Baseline O2 2-4L. Was requiring 6L in ED, then weaned to 2L. Now on continuous BiPAP with FiO2 of 30%. PO medications on hold due to lethargy. Heparin gtt. ABG's today 7.42/58/75/37 (7.32/71/128/36). Trops 52, 54, 75, 115. EKG shows sinus tachy. Echo ordered.     Procedures:   11/1 Continuous BiPAP.  11/1 Echo ordered.     Imaging:   10/31 CXR: Stable emphysematous lungs without acute findings.   11/1 CTA Chest: Stable emphysematous lungs without acute findings.     Patient Goals/Plan/Treatment Preferences: Queenie is from [facility] and plans to return. Follow SW notes for updates.           
Medications No   Type of Home Care Services None   Patient expects to be discharged to: Skilled nursing facility   Services At/After Discharge   Services At/After Discharge PT;OT;Nursing services;Transport;Skilled Nursing Facility (SNF)    Resource Information Provided? No   Mode of Transport at Discharge Other (see comment)  (Undecided)   Condition of Participation: Discharge Planning   The Plan for Transition of Care is related to the following treatment goals: Wake up more and feel better   Freedom of Choice list was provided with basic dialogue that supports the patient's individualized plan of care/goals, treatment preferences, and shares the quality data associated with the providers?  No  (Return to [facility])

## 2024-11-08 NOTE — DISCHARGE SUMMARY
Resident Discharge Summary (Hospitalist)      Patient: Queenie Abreu 79 y.o. female  : 1945  MRN: [id number]   Account: [id number]   Patient's PCP: Rudy San MD    Admit Date: 10/31/2024   Discharge Date: 2024      Admitting Physician: Checo Lan MD  Discharge Physician: Bj Haley DO       Discharge Diagnoses:  Acute on chronic hypoxic hypercapnic RF: BL O2 3-4 L NC.  Continue to use oxygen at night, saturating mid to-high 90s on home oxygen.  Did require escalation to BiPAP, and up to 6L via NC at times.  Intermittent BiPAP use at night.  Patient encouraged to continue BiPAP use on discharge, as she was fairly inconsistent with a during hospital stay.  She was weaned to baseline oxygen prior to discharge.  Continue to Symbicort home dose, and encourage pulmonary hygiene.  A/OX4 at discharge, no other complaints.    Elevated troponin, likely type II NSTEMI: EKG unremarkable.  Suspected in setting of demand ischemia.  Troponin peaked at 162, down trended to 159 afterwards.    Chronic:  COPD: FVC, FEV1, FEV1/FVC ratio reduced, indicative of obstructive airway disease.  TLC normal limits, RV increased.  Significant response to bronchodilator.  Reduced diffusion capacity.  Continue Symbicort.  Continue DuoNebs as needed.  Chronic normocytic anemia: Hemoglobin at baseline, around 10.  History of TIA: Takes aspirin and statin at home, continue.  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  Patient followed by diet.  Encourage proper nutritional management at care facility.    Hospital Course:   Queenie Abreu is a 79 y.o. female with PMHx COPD on home O2 3-4 L NC, current normocytic anemia, HLD admitted to [facility] on 10/31/2024 for acute on chronic hypoxic hypercapnic respiratory failure.  Patient was brought in by EMS from nursing facility due to hypoxia on increased oxygen from her baseline, she did not

## 2024-11-08 NOTE — PLAN OF CARE
Problem: Respiratory - Adult  Goal: Clear lung sounds  11/1/2024 2006 by Wandy Hilton RCP  Outcome: Progressing      Patient mutually agreed on goals.    
  Problem: Chronic Conditions and Co-morbidities  Goal: Patient's chronic conditions and co-morbidity symptoms are monitored and maintained or improved  11/5/2024 0300 by Nancy Rodriguez RN  Outcome: Progressing     Problem: Discharge Planning  Goal: Discharge to home or other facility with appropriate resources  11/5/2024 0300 by Nancy Rodriguez RN  Outcome: Progressing     Problem: Safety - Adult  Goal: Free from fall injury  11/5/2024 0300 by Nancy Rodriguez RN  Outcome: Progressing     Problem: ABCDS Injury Assessment  Goal: Absence of physical injury  11/5/2024 0300 by Nancy Rodriguez RN  Outcome: Progressing     Problem: Respiratory - Adult  Goal: Clear lung sounds  11/5/2024 0300 by Nancy Rodriguez RN  Outcome: Progressing     Problem: Skin/Tissue Integrity  Goal: Absence of new skin breakdown  Description: 1.  Monitor for areas of redness and/or skin breakdown  2.  Assess vascular access sites hourly  3.  Every 4-6 hours minimum:  Change oxygen saturation probe site  4.  Every 4-6 hours:  If on nasal continuous positive airway pressure, respiratory therapy assess nares and determine need for appliance change or resting period.  11/5/2024 0300 by Nancy Rodriguez RN  Outcome: Progressing     Problem: Nutrition Deficit:  Goal: Optimize nutritional status  11/5/2024 0300 by Nancy Rodriguez RN  Outcome: Progressing   Care plan reviewed with patient.  Patient verbalize understanding of the plan of care and contribute to goal setting.     
  Problem: Chronic Conditions and Co-morbidities  Goal: Patient's chronic conditions and co-morbidity symptoms are monitored and maintained or improved  11/6/2024 1007 by Fay Davila RN  Outcome: Progressing  Flowsheets (Taken 11/6/2024 0815)  Care Plan - Patient's Chronic Conditions and Co-Morbidity Symptoms are Monitored and Maintained or Improved:   Monitor and assess patient's chronic conditions and comorbid symptoms for stability, deterioration, or improvement   Collaborate with multidisciplinary team to address chronic and comorbid conditions and prevent exacerbation or deterioration   Update acute care plan with appropriate goals if chronic or comorbid symptoms are exacerbated and prevent overall improvement and discharge     Problem: Discharge Planning  Goal: Discharge to home or other facility with appropriate resources  11/6/2024 1007 by Fay Davila RN  Outcome: Progressing  Flowsheets (Taken 11/6/2024 0815)  Discharge to home or other facility with appropriate resources:   Identify barriers to discharge with patient and caregiver   Arrange for needed discharge resources and transportation as appropriate   Identify discharge learning needs (meds, wound care, etc)     Problem: Safety - Adult  Goal: Free from fall injury  11/6/2024 1007 by Fay Davila RN  Outcome: Progressing     Problem: ABCDS Injury Assessment  Goal: Absence of physical injury  11/6/2024 1007 by Fay Davila RN  Outcome: Progressing     Problem: Respiratory - Adult  Goal: Clear lung sounds  11/6/2024 1007 by Fay Davila RN  Outcome: Progressing     Problem: Respiratory - Adult  Goal: Achieves optimal ventilation and oxygenation  Outcome: Progressing  Flowsheets (Taken 11/6/2024 0815)  Achieves optimal ventilation and oxygenation:   Assess for changes in respiratory status   Assess for changes in mentation and behavior   Position to facilitate oxygenation and minimize respiratory effort   
  Problem: Chronic Conditions and Co-morbidities  Goal: Patient's chronic conditions and co-morbidity symptoms are monitored and maintained or improved  11/6/2024 2243 by Little Burton, RN  Outcome: Progressing  Flowsheets (Taken 11/6/2024 0815 by Fay Davila, RN)  Care Plan - Patient's Chronic Conditions and Co-Morbidity Symptoms are Monitored and Maintained or Improved:   Monitor and assess patient's chronic conditions and comorbid symptoms for stability, deterioration, or improvement   Collaborate with multidisciplinary team to address chronic and comorbid conditions and prevent exacerbation or deterioration   Update acute care plan with appropriate goals if chronic or comorbid symptoms are exacerbated and prevent overall improvement and discharge  11/6/2024 1007 by Fay Davila RN  Outcome: Progressing  Flowsheets (Taken 11/6/2024 0815)  Care Plan - Patient's Chronic Conditions and Co-Morbidity Symptoms are Monitored and Maintained or Improved:   Monitor and assess patient's chronic conditions and comorbid symptoms for stability, deterioration, or improvement   Collaborate with multidisciplinary team to address chronic and comorbid conditions and prevent exacerbation or deterioration   Update acute care plan with appropriate goals if chronic or comorbid symptoms are exacerbated and prevent overall improvement and discharge     Problem: Discharge Planning  Goal: Discharge to home or other facility with appropriate resources  11/6/2024 2243 by Little Burton, RN  Outcome: Progressing  Flowsheets (Taken 11/6/2024 0815 by Fay Davila, RN)  Discharge to home or other facility with appropriate resources:   Identify barriers to discharge with patient and caregiver   Arrange for needed discharge resources and transportation as appropriate   Identify discharge learning needs (meds, wound care, etc)  11/6/2024 1007 by Fay Davila, RN  Outcome: Progressing  Flowsheets (Taken 
  Problem: Chronic Conditions and Co-morbidities  Goal: Patient's chronic conditions and co-morbidity symptoms are monitored and maintained or improved  Outcome: Progressing     Problem: Discharge Planning  Goal: Discharge to home or other facility with appropriate resources  Outcome: Progressing     Problem: Safety - Adult  Goal: Free from fall injury  Outcome: Progressing     Problem: ABCDS Injury Assessment  Goal: Absence of physical injury  Outcome: Progressing     Problem: Respiratory - Adult  Goal: Clear lung sounds  Outcome: Progressing     Problem: Skin/Tissue Integrity  Goal: Absence of new skin breakdown  Description: 1.  Monitor for areas of redness and/or skin breakdown  2.  Assess vascular access sites hourly  3.  Every 4-6 hours minimum:  Change oxygen saturation probe site  4.  Every 4-6 hours:  If on nasal continuous positive airway pressure, respiratory therapy assess nares and determine need for appliance change or resting period.  Outcome: Progressing     Problem: Nutrition Deficit:  Goal: Optimize nutritional status  Outcome: Progressing   Care plan reviewed with patient.  Patient verbalizes understanding of the care plan and contributed to goal setting.    
  Problem: Chronic Conditions and Co-morbidities  Goal: Patient's chronic conditions and co-morbidity symptoms are monitored and maintained or improved  Outcome: Progressing  Flowsheets (Taken 11/2/2024 0848 by Letha Louis, RN)  Care Plan - Patient's Chronic Conditions and Co-Morbidity Symptoms are Monitored and Maintained or Improved: Monitor and assess patient's chronic conditions and comorbid symptoms for stability, deterioration, or improvement     Problem: Discharge Planning  Goal: Discharge to home or other facility with appropriate resources  Outcome: Progressing  Flowsheets (Taken 11/2/2024 0848 by Letha Louis, RN)  Discharge to home or other facility with appropriate resources: Identify barriers to discharge with patient and caregiver     Problem: Safety - Adult  Goal: Free from fall injury  Outcome: Progressing  Flowsheets (Taken 11/4/2024 0904)  Free From Fall Injury: Instruct family/caregiver on patient safety     Problem: ABCDS Injury Assessment  Goal: Absence of physical injury  Outcome: Progressing  Flowsheets (Taken 11/4/2024 0904)  Absence of Physical Injury: Implement safety measures based on patient assessment     Problem: Respiratory - Adult  Goal: Clear lung sounds  11/4/2024 0904 by Porsha Davis, RN  Outcome: Progressing  11/4/2024 0811 by Eleni Ko RCP  Outcome: Progressing  11/3/2024 1952 by Wandy Hilton RCP  Outcome: Progressing     Problem: Skin/Tissue Integrity  Goal: Absence of new skin breakdown  Description: 1.  Monitor for areas of redness and/or skin breakdown  2.  Assess vascular access sites hourly  3.  Every 4-6 hours minimum:  Change oxygen saturation probe site  4.  Every 4-6 hours:  If on nasal continuous positive airway pressure, respiratory therapy assess nares and determine need for appliance change or resting period.  Outcome: Progressing     Problem: Nutrition Deficit:  Goal: Optimize nutritional status  Outcome: Progressing  Flowsheets (Taken 11/4/2024 
  Problem: Chronic Conditions and Co-morbidities  Goal: Patient's chronic conditions and co-morbidity symptoms are monitored and maintained or improved  Outcome: Progressing  Flowsheets (Taken 11/2/2024 0848)  Care Plan - Patient's Chronic Conditions and Co-Morbidity Symptoms are Monitored and Maintained or Improved: Monitor and assess patient's chronic conditions and comorbid symptoms for stability, deterioration, or improvement     Problem: Discharge Planning  Goal: Discharge to home or other facility with appropriate resources  Outcome: Progressing  Flowsheets (Taken 11/2/2024 0848)  Discharge to home or other facility with appropriate resources: Identify barriers to discharge with patient and caregiver     Problem: Safety - Adult  Goal: Free from fall injury  Outcome: Progressing     Problem: ABCDS Injury Assessment  Goal: Absence of physical injury  Outcome: Progressing     Problem: Respiratory - Adult  Goal: Clear lung sounds  11/2/2024 1209 by Letha Louis RN  Outcome: Progressing  11/2/2024 0858 by Kanika Magallanes RCP  Outcome: Progressing     Problem: Skin/Tissue Integrity  Goal: Absence of new skin breakdown  Description: 1.  Monitor for areas of redness and/or skin breakdown  2.  Assess vascular access sites hourly  3.  Every 4-6 hours minimum:  Change oxygen saturation probe site  4.  Every 4-6 hours:  If on nasal continuous positive airway pressure, respiratory therapy assess nares and determine need for appliance change or resting period.  Outcome: Progressing   Care plan reviewed with patient.  Patient verbalizes understanding of the care plan and contributed to goal setting.    
  Problem: Chronic Conditions and Co-morbidities  Goal: Patient's chronic conditions and co-morbidity symptoms are monitored and maintained or improved  Outcome: Progressing  Flowsheets (Taken 11/5/2024 2100)  Care Plan - Patient's Chronic Conditions and Co-Morbidity Symptoms are Monitored and Maintained or Improved: Monitor and assess patient's chronic conditions and comorbid symptoms for stability, deterioration, or improvement     Problem: Discharge Planning  Goal: Discharge to home or other facility with appropriate resources  Outcome: Progressing  Flowsheets (Taken 11/5/2024 2100)  Discharge to home or other facility with appropriate resources: Identify barriers to discharge with patient and caregiver     Problem: Safety - Adult  Goal: Free from fall injury  Outcome: Progressing     Problem: ABCDS Injury Assessment  Goal: Absence of physical injury  Outcome: Progressing     Problem: Respiratory - Adult  Goal: Clear lung sounds  11/5/2024 2320 by Nancy Rodriguez RN  Outcome: Progressing     Problem: Skin/Tissue Integrity  Goal: Absence of new skin breakdown  Description: 1.  Monitor for areas of redness and/or skin breakdown  2.  Assess vascular access sites hourly  3.  Every 4-6 hours minimum:  Change oxygen saturation probe site  4.  Every 4-6 hours:  If on nasal continuous positive airway pressure, respiratory therapy assess nares and determine need for appliance change or resting period.  Outcome: Progressing     Problem: Nutrition Deficit:  Goal: Optimize nutritional status  11/5/2024 2320 by Nancy Rodriguez RN  Outcome: Progressing   Care plan reviewed with patient.  Patient verbalize understanding of the plan of care and contribute to goal setting.        
  Problem: Discharge Planning  Goal: Discharge to home or other facility with appropriate resources  Outcome: Progressing  Flowsheets (Taken 11/1/2024 0006 by Fay Medina, RN)  Discharge to home or other facility with appropriate resources: Identify barriers to discharge with patient and caregiver       Consult received. Please see SW note dated 11/01.   
  Problem: Respiratory - Adult  Goal: Clear lung sounds  11/2/2024 0858 by Kanika Magallanes RCP  Outcome: Progressing  Continue inhaler to improve aeration of lungs. Patient mutually agreed on goals.    
  Problem: Respiratory - Adult  Goal: Clear lung sounds  11/3/2024 1952 by Wandy Hilton RCP  Outcome: Progressing   Patient mutually agreed on goals.    
  Problem: Respiratory - Adult  Goal: Clear lung sounds  11/4/2024 0811 by Eleni Ko, RCKATEY  Outcome: Progressing    Continue tx's to improve breath sounds, increase aeration and decrease WOB.  
  Problem: Respiratory - Adult  Goal: Clear lung sounds  11/4/2024 2047 by Summer Atkins RCP  Outcome: Progressing     Patient mutually agreed on goals.  
  Problem: Respiratory - Adult  Goal: Clear lung sounds  11/5/2024 0907 by Loraine Phillip RCP  Outcome: Progressing  11/5/2024 0300 by Nancy Rodriguez RN  Outcome: Progressing  11/5/2024 0300 by Nancy Rodriguez RN  Outcome: Progressing  11/4/2024 2047 by Summer Atkins RCP  Outcome: Progressing   Patient lung sounds are considered normal for their current lung condition. No signs of distress noted. Current treatment regimen appropriate   
  Problem: Respiratory - Adult  Goal: Clear lung sounds  11/5/2024 2145 by Sara Bunn RCP  Outcome: Progressing       
  Problem: Respiratory - Adult  Goal: Clear lung sounds  11/6/2024 1009 by Emili Huang, HENRY  Outcome: Progressing   Symbicort to improve breath sounds. Patient mutually agreed on goals.      
  Problem: Respiratory - Adult  Goal: Clear lung sounds  11/7/2024 2145 by Summer Atkins RCP  Outcome: Progressing     Problem: Respiratory - Adult  Goal: Achieves optimal ventilation and oxygenation  11/7/2024 2145 by Summer Atkins RCP  Outcome: Progressing  Flowsheets (Taken 11/7/2024 0937 by Antony Butler RCP)  Achieves optimal ventilation and oxygenation: Assess for changes in respiratory status     Patient mutually agreed on goals.  
  Problem: Respiratory - Adult  Goal: Clear lung sounds  Outcome: Progressing  Note: Mdi to maintain open airways. Patient mutually agreed on goals.       
Care plan reviewed with patient and patient verbalize understanding of the plan of care and contribute to goal setting.     Problem: Chronic Conditions and Co-morbidities  Goal: Patient's chronic conditions and co-morbidity symptoms are monitored and maintained or improved  Outcome: Not Progressing     Problem: Discharge Planning  Goal: Discharge to home or other facility with appropriate resources  11/1/2024 1357 by Cong Goldberg, RN  Outcome: Not Progressing  11/1/2024 1049 by Lay Zheng LSW  Outcome: Progressing  Flowsheets (Taken 11/1/2024 0006 by Fay Medina, RN)  Discharge to home or other facility with appropriate resources: Identify barriers to discharge with patient and caregiver     
response     Problem: Neurosensory - Adult  Goal: Achieves stable or improved neurological status  11/7/2024 2152 by Amelie Wilson RN  Outcome: Progressing  Flowsheets (Taken 11/7/2024 2152)  Achieves stable or improved neurological status: Assess for and report changes in neurological status  11/7/2024 0930 by Fay Davila RN  Outcome: Progressing  Flowsheets (Taken 11/7/2024 0745)  Achieves stable or improved neurological status:   Assess for and report changes in neurological status   Initiate measures to prevent increased intracranial pressure   Maintain blood pressure and fluid volume within ordered parameters to optimize cerebral perfusion and minimize risk of hemorrhage   Monitor temperature, glucose, and sodium. Initiate appropriate interventions as ordered  Goal: Achieves maximal functionality and self care  11/7/2024 2152 by Amelie Wilson RN  Outcome: Progressing  Flowsheets (Taken 11/7/2024 2152)  Achieves maximal functionality and self care: Monitor swallowing and airway patency with patient fatigue and changes in neurological status  11/7/2024 0930 by Fay Davila RN  Outcome: Progressing  Flowsheets (Taken 11/7/2024 0745)  Achieves maximal functionality and self care: Monitor swallowing and airway patency with patient fatigue and changes in neurological status     Problem: Cardiovascular - Adult  Goal: Maintains optimal cardiac output and hemodynamic stability  11/7/2024 2152 by Amelie Wilson RN  Outcome: Progressing  Flowsheets (Taken 11/7/2024 2152)  Maintains optimal cardiac output and hemodynamic stability:   Monitor blood pressure and heart rate   Assess for signs of decreased cardiac output  11/7/2024 0930 by Fay Davila RN  Outcome: Progressing  Flowsheets (Taken 11/7/2024 0745)  Maintains optimal cardiac output and hemodynamic stability:   Monitor blood pressure and heart rate   Monitor urine output and notify Licensed Independent Practitioner 
precautions, as indicated  3. Provide frequent short contacts to provide reality reorientation, refocusing and direction  4. Decrease environmental stimuli, including noise as appropriate  5. Monitor and intervene to maintain adequate nutrition, hydration, elimination, sleep and activity  6. If unable to ensure safety without constant attention obtain sitter and review sitter guidelines with assigned personnel  7. Initiate Psychosocial CNS and Spiritual Care consult, as indicated  Outcome: Progressing  Flowsheets (Taken 11/7/2024 3987)  Effect of thought disturbance (confusion, delirium, dementia, or psychosis) are managed with adequate functional status:   Assess for contributors to thought disturbance, including medications, impaired vision or hearing, underlying metabolic abnormalities, dehydration, psychiatric diagnoses, notify [facility]   [place] high risk fall precautions, as indicated   Provide frequent short contacts to provide reality reorientation, refocusing and direction   Decrease environmental stimuli, including noise as appropriate   Monitor and intervene to maintain adequate nutrition, hydration, elimination, sleep and activity   If unable to ensure safety without constant attention obtain sitter and review sitter guidelines with assigned personnel    Care plan reviewed with patient and family.  Patient and family verbalize understanding of the plan of care and contribute to goal setting.

## 2024-11-08 NOTE — PROGRESS NOTES
Hospitalist Progress Note  Internal Medicine Resident      Patient: Queenie Abreu 79 y.o. female      Unit/Bed: -26/026-A    Admit Date: 10/31/2024      ASSESSMENT AND PLAN  Active Problems  Acute on chronic hypoxic hypercapnic respiratory failure, improving: Baseline supplemental oxygen 3 to 4 L nasal cannula.  Patient initially requiring 6 to 8 L on admission.  Patient weaned down to baseline oxygen.  Patient was initially treated for COPD exacerbation, however I have low suspicion given lack of cough and sputum production.  Continue nebs every 4 hours.  Zithromax and prednisone discontinued.  Morning ABG noting CO2 71 and trending upward from admission.  Echo demonstrates EF 60 to 65%.  CTA chest negative for PE.  11/2 ABG demonstrated improved CO2 at 50, trialing off BiPAP on nasal cannula  Continue pulmonary hygiene  Continue Symbicort  Elevated troponin, likely type II NSTEMI: EKG negative for ischemic findings.  Patient denies chest pain.  Likely type II in the setting of demand ischemia.  Trope trending from 52 up to 162.  Patient already on aspirin.  Takes atorvastatin 10 mg at home, increased to 40 mg.  Echo negative for any wall motion abnormalities.  EF 60-65%.     Resolved Problems    Chronic Conditions (reviewed and stable unless otherwise stated)  COPD: FVC, FEV1, FEV1/FVC ratio reduced, indicative of obstructive airway disease.  TLC normal limits, RV increased.  Significant response to bronchodilator.  Reduced diffusion capacity.  Continue Symbicort.  Continue DuoNebs as needed.  Chronic normocytic anemia: Hemoglobin at baseline, around 10.  Will order anemia panel.  History of TIA: Takes aspirin and statin at home, continue.  HLD: On atorvastatin 10 mg at home, increased to 40 mg.      LDA: []CVC / []PICC / []Midline / []Bethea / []Drains / []Mediport / [x]None  Antibiotics: No  Steroids: No  Labs (still needed?): [x]Yes / []No  IVF (still needed?): []Yes / [x]No    Level of care: [x]Step 
    Hospitalist Progress Note  Internal Medicine Resident      Patient: Queenie Abreu 79 y.o. female      Unit/Bed: -26/026-A    Admit Date: 10/31/2024      ASSESSMENT AND PLAN  Active Problems  Acute on chronic hypoxic hypercapnic respiratory failure, improving: Baseline supplemental oxygen 3 to 4 L nasal cannula.  Patient initially requiring 6 to 8 L on admission.  Patient weaned down to baseline oxygen.  Patient was initially treated for COPD exacerbation, however I have low suspicion given lack of cough and sputum production.  Continue nebs every 4 hours.  Zithromax and prednisone discontinued.  Morning ABG noting CO2 71 and trending upward from admission.  Echo demonstrates EF 60 to 65%.  CTA chest negative for PE. 11/2 ABG demonstrated improved CO2 at 50, trialing off BiPAP on nasal cannula.  Patient did have abrupt drop in blood pressure, with low saturations overnight and 11/4.  Chest x-ray pursued with blood gas.  CXR shows no acute processes, with chronic hyperinflation of lungs.  VBG shows 7.4 7/45/34/33-alkalotic picture with likely chronic metabolic compensation.  No acute intervention indicated at this time, will watch overnight  Continue NC, patient did have slight bump to 6L, but eventually weaned to 5L late afternoon 11/4.  Goal to wean to home oxygen.  Continue pulmonary hygiene  Continue Symbicort  Elevated troponin, likely type II NSTEMI: EKG negative for ischemic findings.  Patient denies chest pain.  Likely type II in the setting of demand ischemia.  Trope trending from 52 up to 162.  Patient already on aspirin.  Takes atorvastatin 10 mg at home, increased to 40 mg.  Echo negative for any wall motion abnormalities.  EF 60-65%.     Resolved Problems    Chronic Conditions (reviewed and stable unless otherwise stated)  COPD: FVC, FEV1, FEV1/FVC ratio reduced, indicative of obstructive airway disease.  TLC normal limits, RV increased.  Significant response to bronchodilator.  Reduced diffusion 
    Hospitalist Progress Note  Internal Medicine Resident      Patient: Queenie Abreu 79 y.o. female      Unit/Bed: K-26/026-A    Admit Date: 10/31/2024      ASSESSMENT AND PLAN  Active Problems  Acute on chronic hypoxic hypercapnic respiratory failure, improving: Baseline supplemental oxygen 3 to 4 L nasal cannula.  Patient initially requiring 6 to 8 L on admission.  Patient weaned down to baseline oxygen.  Patient was initially treated for COPD exacerbation, however I have low suspicion given lack of cough and sputum production.  Continue nebs every 4 hours.  Zithromax and prednisone discontinued.  Morning ABG noting CO2 71 and trending upward from admission.  Echo demonstrates EF 60 to 65%.  CTA chest negative for PE. 11/2 ABG demonstrated improved CO2 at 50, trialing off BiPAP on nasal cannula.  Continue nasal cannula, goal to wean to home O2.  Home O2 eval ordered for 11/4.  Continue pulmonary hygiene  Continue Symbicort  Elevated troponin, likely type II NSTEMI: EKG negative for ischemic findings.  Patient denies chest pain.  Likely type II in the setting of demand ischemia.  Trope trending from 52 up to 162.  Patient already on aspirin.  Takes atorvastatin 10 mg at home, increased to 40 mg.  Echo negative for any wall motion abnormalities.  EF 60-65%.     Resolved Problems    Chronic Conditions (reviewed and stable unless otherwise stated)  COPD: FVC, FEV1, FEV1/FVC ratio reduced, indicative of obstructive airway disease.  TLC normal limits, RV increased.  Significant response to bronchodilator.  Reduced diffusion capacity.  Continue Symbicort.  Continue DuoNebs as needed.  Chronic normocytic anemia: Hemoglobin at baseline, around 10.  Will order anemia panel.  History of TIA: Takes aspirin and statin at home, continue.  HLD: Atorvastatin 40 Mg. Hepatic function panel mostly WNL, low albumin 3.3, low protein 5.8.      LDA: []CVC / []PICC / []Midline / []Bethea / []Drains / []Mediport / [x]None  Antibiotics: 
    Hospitalist Progress Note  Internal Medicine Resident      Patient: Queenie Abreu 79 y.o. female      Unit/Bed: K-26/026-A    Admit Date: 10/31/2024      ASSESSMENT AND PLAN  Active Problems  Acute on chronic hypoxic hypercapnic respiratory failure, improving: Baseline supplemental oxygen 3 to 4 L nasal cannula.  Patient initially requiring 6 to 8 L on admission.  Patient weaned down to baseline oxygen.  Patient was initially treated for COPD exacerbation, however I have low suspicion given lack of cough and sputum production.  Continue nebs every 4 hours.  Zithromax and prednisone discontinued.  Morning ABG noting CO2 71 and trending upward from admission.  Echo demonstrates EF 60 to 65%.  CTA chest negative for PE. Patient did have abrupt drop in blood pressure, with low saturations overnight and AM 11/4.  Chest x-ray pursued with blood gas.  CXR showed no acute processes, chronic hyperinflation of lungs.  VBG showed 7.47/45/30/36.  Patient was stable.  Patient again refused BiPAP overnight, but saturations remained at high 90s with NC weaned to home O2.   Continue wearing NC, wean to home oxygen   Continue pulmonary hygiene  Continue Symbicort  Elevated troponin, likely type II NSTEMI: EKG negative for ischemic findings.  Patient denies chest pain.  Likely type II in the setting of demand ischemia.  Trope trending from 52 up to 162.  Patient already on aspirin.  Takes atorvastatin 10 mg at home, increased to 40 mg.  Echo negative for any wall motion abnormalities.  EF 60-65%.     Resolved Problems    Chronic Conditions (reviewed and stable unless otherwise stated)  COPD: FVC, FEV1, FEV1/FVC ratio reduced, indicative of obstructive airway disease.  TLC normal limits, RV increased.  Significant response to bronchodilator.  Reduced diffusion capacity.  Continue Symbicort.  Continue DuoNebs as needed.  Chronic normocytic anemia: Hemoglobin at baseline, around 10.  Will order anemia panel.  History of TIA: 
    Hospitalist Progress Note  Internal Medicine Resident      Patient: Queenie Abreu 79 y.o. female      Unit/Bed: K-26/026-A    Admit Date: 10/31/2024      ASSESSMENT AND PLAN  Active Problems  Acute on chronic hypoxic hypercapnic respiratory failure, improving: Baseline supplemental oxygen 3 to 4 L nasal cannula.  Patient initially requiring 6 to 8 L on admission.  Patient weaned down to baseline oxygen.  Patient was initially treated for COPD exacerbation, however I have low suspicion given lack of cough and sputum production.  Continue nebs every 4 hours.  Zithromax and prednisone discontinued.  Morning ABG noting CO2 71 and trending upward from admission.  Echo demonstrates EF 60 to 65%.  CTA chest negative for PE. Patient did have abrupt drop in blood pressure, with low saturations overnight and AM 11/4.  Chest x-ray pursued with blood gas.  CXR showed no acute processes, chronic hyperinflation of lungs.  VBG showed 7.47/45/30/36.  Patient was stable.  Pre-CERT still pending.  11/6 evening phone call with daughter discussing her concerns for patient's weight loss since admission.  Informed her dietitian will be consulted for further nutritional management, but at this time patient was eating her dinner without concerns.  Patient encouraged to wear overnight BiPAP.    Continue wearing NC, wean to home oxygen   Continue pulmonary hygiene  Continue Symbicort  Elevated troponin, likely type II NSTEMI: EKG negative for ischemic findings.  Patient denies chest pain.  Likely type II in the setting of demand ischemia.  Trope trending from 52 up to 162.  Patient already on aspirin.  Takes atorvastatin 10 mg at home, increased to 40 mg.  Echo negative for any wall motion abnormalities.  EF 60-65%.     Resolved Problems    Chronic Conditions (reviewed and stable unless otherwise stated)  COPD: FVC, FEV1, FEV1/FVC ratio reduced, indicative of obstructive airway disease.  TLC normal limits, RV increased.  Significant 
    Hospitalist Progress Note  Internal Medicine Resident      Patient: Queenie Abreu 79 y.o. female      Unit/Bed: K-26/026-A    Admit Date: 10/31/2024      ASSESSMENT AND PLAN  Active Problems  Acute on chronic hypoxic hypercapnic respiratory failure, improving: Baseline supplemental oxygen 3 to 4 L nasal cannula.  Patient initially requiring 6 to 8 L on admission.  Patient weaned down to baseline oxygen.  Patient was initially treated for COPD exacerbation, however I have low suspicion given lack of cough and sputum production.  Continue nebs every 4 hours.  Zithromax and prednisone discontinued.  Morning ABG noting CO2 71 and trending upward from admission.  Echo demonstrates EF 60 to 65%.  CTA chest negative for PE. Patient did have abrupt drop in blood pressure, with low saturations overnight and AM 11/4.  Chest x-ray pursued with blood gas.  CXR showed no acute processes, chronic hyperinflation of lungs.  VBG showed 7.47/45/30/36.  Patient was stable.  Pre-CERT still pending.  Patient encouraged to wear overnight BiPAP.    Continue wearing NC, wean to home oxygen   Continue pulmonary hygiene  Continue Symbicort  Elevated troponin, likely type II NSTEMI: EKG negative for ischemic findings.  Patient denies chest pain.  Likely type II in the setting of demand ischemia.  Trope trending from 52 up to 162.  Patient already on aspirin.  Takes atorvastatin 10 mg at home, increased to 40 mg.  Echo negative for any wall motion abnormalities.  EF 60-65%.     Resolved Problems    Chronic Conditions (reviewed and stable unless otherwise stated)  COPD: FVC, FEV1, FEV1/FVC ratio reduced, indicative of obstructive airway disease.  TLC normal limits, RV increased.  Significant response to bronchodilator.  Reduced diffusion capacity.  Continue Symbicort.  Continue DuoNebs as needed.  Chronic normocytic anemia: Hemoglobin at baseline, around 10.  Will order anemia panel.  History of TIA: Takes aspirin and statin at home, 
 [facility]  INPATIENT PHYSICAL THERAPY  DAILY NOTE  STRZ ICU STEPDOWN TELEMETRY 4K - 4K-26/026-A      Discharge Recommendations: Subacte/Skilled Nursing Facility  Equipment Recommendations:    Defer to next facility            Time In: 942  Time Out: 1008  Timed Code Treatment Minutes: 26 Minutes  Minutes: 26          Date: 2024  Patient Name: Queenie Abreu,  Gender:  female        MRN: [id number]  : 1945  (79 y.o.)     Referring Practitioner: Jon Martin PA  Diagnosis: Acute on chronic hypoxic respiratory failure  Additional Pertinent Hx: Per H & P: 79 y.o. female who presents to [facility] with SOB.  Patient was brought in by EMS from nursing facility due to hypoxia on increased oxygen from her baseline. Patient does not provide much history but does endorse feeling like her breathing is worse than normal. I spoke primarily with patient's daughter discussing patient's condition and answering questions.     Prior Level of Function:  Type of Home: Facility ([place])  Home Layout: One level  Home Access: Level entry  Home Equipment: Oxygen, Rollator        ADL Assistance: Independent  Homemaking Responsibilities: No  Ambulation Assistance: Independent  Transfer Assistance: Independent  Additional Comments: Per OT: Pt reports using RW PLOF without A & ADLs without A, was on O2. No family present to verify info.    Restrictions/Precautions:  Restrictions/Precautions: General Precautions  Position Activity Restriction  Other position/activity restrictions: monitor O2 saturation     SUBJECTIVE: RN approved session, pt agreed for treatment.      PAIN: 0/10: denies    Vitals: Oxygen: 96% on 4L O2  Heart Rate: 79 bpm  BP:102/71 with seated rest with c/o dizziness, improved with rest     OBJECTIVE:  Bed Mobility:  Supine to Sit: Stand By Assistance, with head of bed raised, with rail, with increased time for completion  B LE noted to already be off of the edge of the bed at onset 
 [facility]  INPATIENT PHYSICAL THERAPY  DAILY NOTE  STRZ ICU STEPDOWN TELEMETRY 4K - 4K-26/026-A      Discharge Recommendations: Subacte/Skilled Nursing Facility  Equipment Recommendations:    Defer to next facility          Time In: 1359  Time Out: 1422  Timed Code Treatment Minutes: 23 Minutes  Minutes: 23          Date: 2024  Patient Name: Queenie Abreu,  Gender:  female        MRN: [id number]  : 1945  (79 y.o.)     Referring Practitioner: Jon Martin PA  Diagnosis: Acute on chronic hypoxic respiratory failure  Additional Pertinent Hx: Per H & P: 79 y.o. female who presents to [facility] with SOB.  Patient was brought in by EMS from nursing facility due to hypoxia on increased oxygen from her baseline. Patient does not provide much history but does endorse feeling like her breathing is worse than normal. I spoke primarily with patient's daughter discussing patient's condition and answering questions.     Prior Level of Function:  Type of Home: Facility ([place])  Home Layout: One level  Home Access: Level entry  Home Equipment: Oxygen, Rollator        ADL Assistance: Independent  Homemaking Responsibilities: No  Ambulation Assistance: Independent  Transfer Assistance: Independent  Additional Comments: Per OT: Pt reports using RW PLOF without A & ADLs without A, was on O2. No family present to verify info.    Restrictions/Precautions:  Restrictions/Precautions: General Precautions  Position Activity Restriction  Other position/activity restrictions: monitor O2 saturation     SUBJECTIVE: OK to see pt per nursing. Pt in bed when PT arrived, agreeable to PT session, nursing requesting to get a standing weight. Pt declined to sit in chair. Pt reports depends felt soiled, assisted with hygiene care.     PAIN: denies    Vitals: Oxygen: 4 L of O2, accessory breathing noted at rest and with mobility, 87-94% during session    OBJECTIVE:  Bed Mobility:  Supine to Sit: Stand By 
 [facility]  INPATIENT PHYSICAL THERAPY  DAILY NOTE  STRZ ICU STEPDOWN TELEMETRY 4K - 4K-26/026-A      Discharge Recommendations: Subacte/Skilled Nursing Facility  Equipment Recommendations:    Defer to next facility            Time In: 1413  Time Out: 1437  Timed Code Treatment Minutes: 24 Minutes  Minutes: 24          Date: 2024  Patient Name: Queenie Abreu,  Gender:  female        MRN: [id number]  : 1945  (79 y.o.)     Referring Practitioner: Jon Martin PA  Diagnosis: Acute on chronic hypoxic respiratory failure  Additional Pertinent Hx: Per H & P: 79 y.o. female who presents to [facility] with SOB.  Patient was brought in by EMS from nursing facility due to hypoxia on increased oxygen from her baseline. Patient does not provide much history but does endorse feeling like her breathing is worse than normal. I spoke primarily with patient's daughter discussing patient's condition and answering questions.     Prior Level of Function:  Type of Home: Facility ([place])  Home Layout: One level  Home Access: Level entry  Home Equipment: Oxygen, Rollator        ADL Assistance: Independent  Homemaking Responsibilities: No  Ambulation Assistance: Independent  Transfer Assistance: Independent  Additional Comments: Per OT: Pt reports using RW PLOF without A & ADLs without A, was on O2. No family present to verify info.    Restrictions/Precautions:  Restrictions/Precautions: General Precautions  Position Activity Restriction  Other position/activity restrictions: monitor O2 saturation     SUBJECTIVE: RN okays therapy session. Patient pleasant and agreeable to therapy session. Fatigues easily secondary to SOB, cues required for pursed lip breathing     PAIN: Denies, Just reports that her stomach has been bothering her     Vitals: Oxygen: Remains >90% throughout treatment on 2 liters of O2     OBJECTIVE:  Bed Mobility:  Rolling to Right: Stand By Assistance, with head of bed flat, 
Advanced Directives Consult: Two attempts but pt was in deep sleep for hours.    11/01/24 1992   Encounter Summary   Service Provided For Patient   Referral/Consult From Nurse   Last Encounter  11/01/24   Complexity of Encounter Low   Spiritual/Emotional needs   Type Spiritual Support   Assessment/Intervention/Outcome   Assessment Other (Comment)       
[facility]  PHYSICAL THERAPY MISSED TREATMENT NOTE  STRZ ICU STEPDOWN TELEMETRY 4K    Date: 2024  Patient Name: Queenie Abreu        MRN: [id number]   : 1945  (79 y.o.)  Gender: female                REASON FOR MISSED TREATMENT:  Missed Treat. Internal Medicine Resident Doctor recommended to hold and evaluate next available date due to pt's increased lethargy. Will check back next available date as able and pt medically appropriate.     Marisela Britton PT, DPT      
[facility]  STRZ ICU STEPDOWN TELEMETRY 4K  Occupational Therapy  Daily Note    Discharge Recommendations: Subacute/skilled nursing facility  Equipment Recommendations:   monitor pending progress      Time In: 1104  Time Out: 1128  Timed Code Treatment Minutes: 24 Minutes  Minutes: 24          Date: 2024  Patient Name: Queenie Abreu,   Gender: female      Room: [address] [place] [address]  MRN: [id number]  : 1945  (79 y.o.)  Referring Practitioner: Jon Martin PA  Diagnosis: Acute on chronic hypoxic respiratory failure  Additional Pertinent Hx: Per H & P: 79 y.o. female who presents to [facility] with SOB.  Patient was brought in by EMS from nursing facility due to hypoxia on increased oxygen from her baseline. Patient does not provide much history but does endorse feeling like her breathing is worse than normal. I spoke primarily with patient's daughter discussing patient's condition and answering questions.    Restrictions/Precautions:  Restrictions/Precautions: Fall Risk  Position Activity Restriction  Other position/activity restrictions: monitor O2 saturation     Social/Functional History:  Type of Home: Facility ([place])  Home Layout: One level  Home Access: Level entry           ADL Assistance: Independent  Homemaking Responsibilities: No  Ambulation Assistance: Independent  Transfer Assistance: Independent          Additional Comments: Pt reports using RW PLOF without A & ADLs without A, was on O2. No family present to verify info.    SUBJECTIVE: Patient resting in bed upon arrival, agreeable to OT session. RN okayed therapy session stating patient's BP has stabilzed since this morning.    PAIN: None    Vitals: Blood Pressure: 114/52 start of session; 120/90 seated EOB; 111/73 after transfer to chair  Oxygen: 92% on 6L O2 upon EOB sitting; dropping to 81% after short distance of mobility and able to recover to 93% within 2 minutes of PLB.  Heart Rate: 77 bpm following 
CLINICAL PHARMACY: DISCHARGE MED RECONCILIATION/REVIEW    [facility] Patient?: No  Total # of Interventions Recommended: 0   -   Total # Interventions Accepted: 0  Intervention Severity:   - Level 1 Intervention Present?: No   - Level 2 #: 0   - Level 3 #: 0   Time Spent (min): 15    Wandy Wolfe, PharmD 11/8/2024 12:19 PM      
Comprehensive Nutrition Assessment    Type and Reason for Visit:  Initial, Positive nutrition screen (poor oral intake, unplanned weight loss)    Nutrition Recommendations/Plan:   NPO since admit x 2 days, initially due to lethargy and respiratory status which have improved, now awaiting cardiology plans.   Initiate diet when able, previously on Dysphagia: minced and moist.  Plan to provide ONS when diet full liquid or greater: Ensure Plus (prefers strawberry) and Magic Cup TID (prefers berry).  Continue MVI.  Note megace and remeron (to help with appetite stimulation currently on hold).     Malnutrition Assessment:  Malnutrition Status:  Severe malnutrition (11/02/24 5493)    Context:  Chronic Illness     Findings of the 6 clinical characteristics of malnutrition:  Energy Intake:  75% or less estimated energy requirements for 1 month or longer  Weight Loss:   (7.3% loss in the last 3 months)     Body Fat Loss:  Severe body fat loss Orbital, Triceps, Fat Overlying Ribs, Buccal region   Muscle Mass Loss:  Severe muscle mass loss Temples (temporalis), Clavicles (pectoralis & deltoids), Thigh (quadriceps), Calf (gastrocnemius), Hand (interosseous)  Fluid Accumulation:  No fluid accumulation     Strength:  Not Performed    Nutrition Assessment:    Pt. severely malnourished AEB criteria listed above.  At risk for further nutritional compromise r/t admit with acute on chronic respiratory failure, catabolic illness-COPD, elevated troponin-likely II STEMI per Provider notes,  and underlying medical condition (hx:depression, pneumonia, COPD, lung nodules, TIA, dysarthria, HLD, severe malnutrition, hypothyroidism, GERD, DM, rectal prolapse, PEG 9/21/22).       Nutrition Related Findings:    Pt. Report/Treatments/Miscellaneous: Patient seen, daughter present.  Patient alert, currently on 3 liters O2.  Per RN patient previously on bipap, lethargic thus NPO.  Now awaiting cardiology consult to see if any further testing 
Echo done bedside Dr. Meadows to read  
[facility]  INPATIENT OCCUPATIONAL THERAPY  STRZ ICU STEPDOWN TELEMETRY 4K   EVALUATION      Discharge Recommendations: Subacute/Skilled Nursing Facility  Equipment Recommendations:   monitor pending progress       Time In: 1038  Time Out: 1056  Timed Code Treatment Minutes: 8 Minutes  Minutes: 18          Date: 11/3/2024  Patient Name: Queenie Abreu,   Gender: female      MRN: [id number]  : 1945  (79 y.o.)  Referring Practitioner: Jon Martin PA  Diagnosis: Acute on chronic hypoxic respiratory failure  Additional Pertinent Hx: Per H & P: 79 y.o. female who presents to [facility] with SOB.  Patient was brought in by EMS from nursing facility due to hypoxia on increased oxygen from her baseline. Patient does not provide much history but does endorse feeling like her breathing is worse than normal. I spoke primarily with patient's daughter discussing patient's condition and answering questions.    Restrictions/Precautions:  Restrictions/Precautions: Fall Risk  Position Activity Restriction  Other position/activity restrictions: monitor O2 saturation    Subjective  Chart Reviewed: Yes, Orders, Progress Notes, History and Physical  Patient assessed for rehabilitation services?: Yes  Family / Caregiver Present: No    Subjective: drowsy in bed upon arrival of therapist    Pain: 0/10: no c/o pain during session    Vitals: Oxygen: 94% on 3 L O2   Heart Rate: 94    Social/Functional History:  Type of Home: Facility ([facility])  Home Layout: One level  Home Access: Level entry           ADL Assistance: Independent  Homemaking Responsibilities: No  Ambulation Assistance: Independent  Transfer Assistance: Independent          Additional Comments: Pt reports using RW PLOF without A & ADLs without A, was on O2. No family present to verify info.    VISION:WFL    HEARING:  WFL    COGNITION: Slow Processing, Impaired Memory, and Decreased Safety Awareness    RANGE OF MOTION:  B shoulder flexion 
[facility]  INPATIENT PHYSICAL THERAPY  EVALUATION  STRZ ICU STEPDOWN TELEMETRY 4K - 4K-26/026-A    Discharge Recommendations: Subacute/Skilled Nursing Facility  Equipment Recommendations:    Defer to next facility            Time In: 1414  Time Out: 1437  Timed Code Treatment Minutes: 15 Minutes  Minutes: 23          Date: 2024  Patient Name: Queenie Abreu,  Gender:  female        MRN: [id number]  : 1945  (79 y.o.)      Referring Practitioner: Jon Martin PA  Diagnosis: Acute on chronic hypoxic respiratory failure  Additional Pertinent Hx: Per H & P: 79 y.o. female who presents to [facility] with SOB.  Patient was brought in by EMS from nursing facility due to hypoxia on increased oxygen from her baseline. Patient does not provide much history but does endorse feeling like her breathing is worse than normal. I spoke primarily with patient's daughter discussing patient's condition and answering questions.     Restrictions/Precautions:  Restrictions/Precautions: General Precautions  Position Activity Restriction  Other position/activity restrictions: monitor O2 saturation    Subjective:  Chart Reviewed: Yes  Patient assessed for rehabilitation services?: Yes  Family / Caregiver Present: No  Subjective: Clearance from RN to see pt this date. Pt was supine in bed when PT arrived. Pt agreeable to PT evaluation.    General:  Overall Orientation Status: Impaired  Orientation Level: Oriented to person  Overall Cognitive Status: Exceptions  Vision: Within Functional Limits  Hearing: Within functional limits       Pain: -/10: Pt denies pain this date    Vitals: Vitals not assessed per clinical judgement, see nursing flowsheet    Social/Functional History:    Type of Home: Facility ([place])  Home Layout: One level  Home Access: Level entry  Home Equipment: Oxygen, Rollator             ADL Assistance: Independent     Homemaking Responsibilities: No  Ambulation Assistance: 
Pharmacy Medication History Note    List of current medications patient is taking is complete.    Source of information: Bindu zee Rowena HOFFMAN    Changes made to medication list:  Medications removed (include reason, ex. therapy complete or physician discontinued):  Docusate sodium 100 mg capsule  Hydroxyzine 10 mg tablet  Ipratropium 0.5 mg-albuterol 2.5 mg  Omeprazole 20 mg capsule  Tizanidine 2 mg tablet   Zinc 50 mg tablet  Famotidine 40 mg tablet  Fluticasone-salmeterol 500-50 mcg/act  Sodium phosphate   Megestrol 40 mg/mL suspension    Medications added/doses adjusted:  Adjusted acetaminophen 325 mg tablet to 2 tablets by mouth every 6 hours as needed (discomfort)   Adjusted albuterol sulfate HFA to 2 puffs every 6 hours as needed  Adjusted bisacodyl 10 mg suppository to 1 suppository rectally daily as needed for constipation  Adjusted vitamin D to 25 mcg 2 tablets by mouth daily  Added fluticasone-salmeterol 500-50 mcg/act 1 puff into the lungs daily  Adjusted magnesium hydroxide 400 mg/5mL suspension to 30 mLs by mouth daily as needed for constipation  Adjusted mirtazapine to 15 mg disintegrating tablet 1 tablet by mouth daily  Adjusted polyethylene glycol to 17 g by mouth daily  Adjust psyllium 28.3% powder to 3.4 grams by mouth daily  Adjusted simethicone from the chewable tablet to simethicone 80 mg tablets 80 mg by mouth every 8 hours as needed for flatulence  Added sodium phosphate 7-19 gm/197 mL enema 1 application rectally daily as needed (constipation)  Added megestrol 40 mg 1 tablet by mouth 2 times daily    Other notes (ex. Recent course of antibiotics, Coumadin dosing):  Denies use of other OTC or herbal medications.    Allergies reviewed    Electronically signed by Lokesh Ovalle on 11/1/2024 at 10:31 AM       
Pt refused to wear bipap HS, currently on 3L NC with no WOB noted, advised patient on the importance of wearing mask and if breathing becomes increased she will need to wear mask. Pt understood.  
Pt. Is refusing bipap at this time, explained the benefits of bipap and she stated she understands to take the machine out of room. biPAP was in standby >24 hours.    
Spoke to Tasneem HARRIS from [place] for update. Patient identification verified. Tasneem Marcos .  
  Severe malnutrition, in context of chronic illness related to catabolic illness, inadequate protein-energy intake as evidenced by criteria as identified in malnutrition assessment    Nutrition Interventions:   Food and/or Nutrient Delivery: Continue Current Diet, Modify Oral Nutrition Supplement  Nutrition Education/Counseling: Education/Counseling initiated (Encouraged good oral intake & ONS intake best effort. May order extra foods on trays for between meal snacks)  Coordination of Nutrition Care: Continue to monitor while inpatient, Interdisciplinary Rounds, Speech Therapy, Swallow Evaluation       Goals:  Goals: PO intake 75% or greater, by next RD assessment  Type of Goal: Continue current goal  Previous Goal Met: No Progress toward Goal(s)    Nutrition Monitoring and Evaluation:      Food/Nutrient Intake Outcomes: Diet Advancement/Tolerance, Food and Nutrient Intake, Supplement Intake, Vitamin/Mineral Intake  Physical Signs/Symptoms Outcomes: Biochemical Data, Chewing or Swallowing, GI Status, Fluid Status or Edema, Nutrition Focused Physical Findings, Weight    Discharge Planning:    Too soon to determine     Yumiko Tee RD, LD  Contact: [phone]     
signed by:  Deny Bermudez DO 11/4/2024 11:29 AM          
PM  Case was discussed with Attending, Dr. Bermudez.

## 2024-11-08 NOTE — PALLIATIVE CARE
Follow Up / Progress Note        Patient:   Queenie Abreu  YOB: 1945  Age:  79 y.o.  Room:  [address] [place] [address]  MRN:  [id number]         Family/Patient Discussion:  Met with patient at bedside. Patient sitting up in bed attempting to eat lunch, some shortness of breath noted with exertion. Patient denies needs at this time.       Plan/Follow-Up:  Palliative care will follow as needed, please call for additional needs         Electronically signed by Franco Martinez RN on 11/8/2024 at 12:58 PM             Palliative Care Office: [phone]

## 2024-11-15 ENCOUNTER — HOSPITAL ENCOUNTER (INPATIENT)
Age: 79
LOS: 1 days | Discharge: NURSING, REHAB OR LONG-TERM CARE | DRG: 190 | End: 2024-11-18
Attending: EMERGENCY MEDICINE | Admitting: NURSE PRACTITIONER
Payer: MEDICARE

## 2024-11-15 ENCOUNTER — APPOINTMENT (OUTPATIENT)
Dept: GENERAL RADIOLOGY | Age: 79
DRG: 190 | End: 2024-11-15
Payer: MEDICARE

## 2024-11-15 DIAGNOSIS — N30.00 ACUTE CYSTITIS WITHOUT HEMATURIA: ICD-10-CM

## 2024-11-15 DIAGNOSIS — J44.9 COPD, SEVERE (HCC): ICD-10-CM

## 2024-11-15 DIAGNOSIS — A41.9 SEPTICEMIA (HCC): Primary | ICD-10-CM

## 2024-11-15 PROBLEM — N39.0 UTI (URINARY TRACT INFECTION): Status: ACTIVE | Noted: 2024-11-15

## 2024-11-15 LAB
ANION GAP SERPL CALC-SCNC: 14 MEQ/L (ref 8–16)
BACTERIA URNS QL MICRO: ABNORMAL /HPF
BASE EXCESS BLDA CALC-SCNC: 5.4 MMOL/L (ref -2–3)
BASOPHILS ABSOLUTE: 0 THOU/MM3 (ref 0–0.1)
BASOPHILS NFR BLD AUTO: 0.2 %
BILIRUB UR QL STRIP.AUTO: NEGATIVE
BUN SERPL-MCNC: 13 MG/DL (ref 7–22)
CALCIUM SERPL-MCNC: 9.5 MG/DL (ref 8.5–10.5)
CASTS #/AREA URNS LPF: ABNORMAL /LPF
CASTS 2: ABNORMAL /LPF
CHARACTER UR: CLEAR
CHLORIDE SERPL-SCNC: 100 MEQ/L (ref 98–111)
CO2 SERPL-SCNC: 25 MEQ/L (ref 23–33)
COLLECTED BY:: ABNORMAL
COLOR, UA: ABNORMAL
CREAT SERPL-MCNC: 0.4 MG/DL (ref 0.4–1.2)
CRYSTALS URNS MICRO: ABNORMAL
DEPRECATED RDW RBC AUTO: 57.9 FL (ref 35–45)
DEVICE: ABNORMAL
EKG ATRIAL RATE: 120 BPM
EKG P AXIS: 80 DEGREES
EKG P-R INTERVAL: 130 MS
EKG Q-T INTERVAL: 278 MS
EKG QRS DURATION: 68 MS
EKG QTC CALCULATION (BAZETT): 392 MS
EKG R AXIS: 61 DEGREES
EKG T AXIS: 68 DEGREES
EKG VENTRICULAR RATE: 120 BPM
EOSINOPHIL NFR BLD AUTO: 0.3 %
EOSINOPHILS ABSOLUTE: 0 THOU/MM3 (ref 0–0.4)
EPITHELIAL CELLS, UA: ABNORMAL /HPF
ERYTHROCYTE [DISTWIDTH] IN BLOOD BY AUTOMATED COUNT: 19.3 % (ref 11.5–14.5)
GFR SERPL CREATININE-BSD FRML MDRD: > 90 ML/MIN/1.73M2
GLUCOSE SERPL-MCNC: 96 MG/DL (ref 70–108)
GLUCOSE UR QL STRIP.AUTO: NEGATIVE MG/DL
HCO3 BLDA-SCNC: 31 MMOL/L (ref 23–28)
HCT VFR BLD AUTO: 36.4 % (ref 37–47)
HGB BLD-MCNC: 11.3 GM/DL (ref 12–16)
HGB UR QL STRIP.AUTO: NEGATIVE
IMM GRANULOCYTES # BLD AUTO: 0.06 THOU/MM3 (ref 0–0.07)
IMM GRANULOCYTES NFR BLD AUTO: 0.4 %
KETONES UR QL STRIP.AUTO: 15
LACTIC ACID, SEPSIS: 1 MMOL/L (ref 0.5–1.9)
LACTIC ACID, SEPSIS: 1.2 MMOL/L (ref 0.5–1.9)
LYMPHOCYTES ABSOLUTE: 0.9 THOU/MM3 (ref 1–4.8)
LYMPHOCYTES NFR BLD AUTO: 6.8 %
MCH RBC QN AUTO: 26 PG (ref 26–33)
MCHC RBC AUTO-ENTMCNC: 31 GM/DL (ref 32.2–35.5)
MCV RBC AUTO: 83.7 FL (ref 81–99)
MISCELLANEOUS 2: ABNORMAL
MONOCYTES ABSOLUTE: 1.2 THOU/MM3 (ref 0.4–1.3)
MONOCYTES NFR BLD AUTO: 9.1 %
MUCOUS THREADS URNS QL MICRO: ABNORMAL
NEUTROPHILS ABSOLUTE: 11.1 THOU/MM3 (ref 1.8–7.7)
NEUTROPHILS NFR BLD AUTO: 83.2 %
NITRITE UR QL STRIP: POSITIVE
NRBC BLD AUTO-RTO: 0 /100 WBC
NT-PROBNP SERPL IA-MCNC: 524 PG/ML (ref 0–449)
OSMOLALITY SERPL CALC.SUM OF ELEC: 277.5 MOSMOL/KG (ref 275–300)
PCO2 TEMP ADJ BLDMV: 47 MMHG (ref 41–51)
PH BLDMV: 7.43 [PH] (ref 7.31–7.41)
PH UR STRIP.AUTO: 5 [PH] (ref 5–9)
PLATELET # BLD AUTO: 332 THOU/MM3 (ref 130–400)
PMV BLD AUTO: 10.5 FL (ref 9.4–12.4)
PO2 BLDMV: 26 MMHG (ref 25–40)
POTASSIUM SERPL-SCNC: 3.9 MEQ/L (ref 3.5–5.2)
PROT UR STRIP.AUTO-MCNC: 30 MG/DL
RBC # BLD AUTO: 4.35 MILL/MM3 (ref 4.2–5.4)
RBC URINE: ABNORMAL /HPF
RENAL EPI CELLS #/AREA URNS HPF: ABNORMAL /[HPF]
SAO2 % BLDMV: 47 %
SITE: ABNORMAL
SODIUM SERPL-SCNC: 139 MEQ/L (ref 135–145)
SP GR UR REFRACT.AUTO: 1.02 (ref 1–1.03)
TROPONIN, HIGH SENSITIVITY: 55 NG/L (ref 0–12)
UROBILINOGEN, URINE: 1 EU/DL (ref 0–1)
VENTILATION MODE VENT: ABNORMAL
WBC # BLD AUTO: 13.3 THOU/MM3 (ref 4.8–10.8)
WBC #/AREA URNS HPF: ABNORMAL /HPF
WBC #/AREA URNS HPF: ABNORMAL /[HPF]
YEAST LIKE FUNGI URNS QL MICRO: ABNORMAL

## 2024-11-15 PROCEDURE — 71045 X-RAY EXAM CHEST 1 VIEW: CPT

## 2024-11-15 PROCEDURE — 87086 URINE CULTURE/COLONY COUNT: CPT

## 2024-11-15 PROCEDURE — G0378 HOSPITAL OBSERVATION PER HR: HCPCS

## 2024-11-15 PROCEDURE — 96375 TX/PRO/DX INJ NEW DRUG ADDON: CPT

## 2024-11-15 PROCEDURE — 6370000000 HC RX 637 (ALT 250 FOR IP): Performed by: EMERGENCY MEDICINE

## 2024-11-15 PROCEDURE — 2500000003 HC RX 250 WO HCPCS

## 2024-11-15 PROCEDURE — 85025 COMPLETE CBC W/AUTO DIFF WBC: CPT

## 2024-11-15 PROCEDURE — 93005 ELECTROCARDIOGRAM TRACING: CPT | Performed by: EMERGENCY MEDICINE

## 2024-11-15 PROCEDURE — 6360000002 HC RX W HCPCS: Performed by: EMERGENCY MEDICINE

## 2024-11-15 PROCEDURE — 82803 BLOOD GASES ANY COMBINATION: CPT

## 2024-11-15 PROCEDURE — 94640 AIRWAY INHALATION TREATMENT: CPT

## 2024-11-15 PROCEDURE — 6370000000 HC RX 637 (ALT 250 FOR IP)

## 2024-11-15 PROCEDURE — 36415 COLL VENOUS BLD VENIPUNCTURE: CPT

## 2024-11-15 PROCEDURE — 84484 ASSAY OF TROPONIN QUANT: CPT

## 2024-11-15 PROCEDURE — 6360000002 HC RX W HCPCS

## 2024-11-15 PROCEDURE — 99223 1ST HOSP IP/OBS HIGH 75: CPT

## 2024-11-15 PROCEDURE — 2700000000 HC OXYGEN THERAPY PER DAY

## 2024-11-15 PROCEDURE — 93010 ELECTROCARDIOGRAM REPORT: CPT | Performed by: INTERNAL MEDICINE

## 2024-11-15 PROCEDURE — 2580000003 HC RX 258

## 2024-11-15 PROCEDURE — 99285 EMERGENCY DEPT VISIT HI MDM: CPT

## 2024-11-15 PROCEDURE — 87040 BLOOD CULTURE FOR BACTERIA: CPT

## 2024-11-15 PROCEDURE — 83605 ASSAY OF LACTIC ACID: CPT

## 2024-11-15 PROCEDURE — 96372 THER/PROPH/DIAG INJ SC/IM: CPT

## 2024-11-15 PROCEDURE — 94669 MECHANICAL CHEST WALL OSCILL: CPT

## 2024-11-15 PROCEDURE — 2580000003 HC RX 258: Performed by: EMERGENCY MEDICINE

## 2024-11-15 PROCEDURE — 81001 URINALYSIS AUTO W/SCOPE: CPT

## 2024-11-15 PROCEDURE — 96374 THER/PROPH/DIAG INJ IV PUSH: CPT

## 2024-11-15 PROCEDURE — 80048 BASIC METABOLIC PNL TOTAL CA: CPT

## 2024-11-15 PROCEDURE — 83880 ASSAY OF NATRIURETIC PEPTIDE: CPT

## 2024-11-15 RX ORDER — PREDNISONE 20 MG/1
40 TABLET ORAL DAILY
Status: DISCONTINUED | OUTPATIENT
Start: 2024-11-15 | End: 2024-11-18 | Stop reason: HOSPADM

## 2024-11-15 RX ORDER — LIDOCAINE 4 G/G
1 PATCH TOPICAL DAILY
Status: DISCONTINUED | OUTPATIENT
Start: 2024-11-15 | End: 2024-11-18 | Stop reason: HOSPADM

## 2024-11-15 RX ORDER — MEGESTROL ACETATE 40 MG/1
40 TABLET ORAL 2 TIMES DAILY
Status: DISCONTINUED | OUTPATIENT
Start: 2024-11-15 | End: 2024-11-18 | Stop reason: HOSPADM

## 2024-11-15 RX ORDER — ACETAMINOPHEN 650 MG/1
650 SUPPOSITORY RECTAL EVERY 6 HOURS PRN
Status: DISCONTINUED | OUTPATIENT
Start: 2024-11-15 | End: 2024-11-18 | Stop reason: HOSPADM

## 2024-11-15 RX ORDER — IPRATROPIUM BROMIDE AND ALBUTEROL SULFATE 2.5; .5 MG/3ML; MG/3ML
1 SOLUTION RESPIRATORY (INHALATION)
Status: DISCONTINUED | OUTPATIENT
Start: 2024-11-15 | End: 2024-11-16

## 2024-11-15 RX ORDER — ENOXAPARIN SODIUM 100 MG/ML
30 INJECTION SUBCUTANEOUS DAILY
Status: DISCONTINUED | OUTPATIENT
Start: 2024-11-15 | End: 2024-11-18 | Stop reason: HOSPADM

## 2024-11-15 RX ORDER — IPRATROPIUM BROMIDE AND ALBUTEROL SULFATE 2.5; .5 MG/3ML; MG/3ML
1 SOLUTION RESPIRATORY (INHALATION) ONCE
Status: COMPLETED | OUTPATIENT
Start: 2024-11-15 | End: 2024-11-15

## 2024-11-15 RX ORDER — ONDANSETRON 2 MG/ML
4 INJECTION INTRAMUSCULAR; INTRAVENOUS EVERY 6 HOURS PRN
Status: DISCONTINUED | OUTPATIENT
Start: 2024-11-15 | End: 2024-11-18 | Stop reason: HOSPADM

## 2024-11-15 RX ORDER — POLYETHYLENE GLYCOL 3350 17 G/17G
17 POWDER, FOR SOLUTION ORAL DAILY PRN
Status: DISCONTINUED | OUTPATIENT
Start: 2024-11-15 | End: 2024-11-18 | Stop reason: HOSPADM

## 2024-11-15 RX ORDER — MAGNESIUM SULFATE IN WATER 40 MG/ML
2000 INJECTION, SOLUTION INTRAVENOUS PRN
Status: DISCONTINUED | OUTPATIENT
Start: 2024-11-15 | End: 2024-11-18 | Stop reason: HOSPADM

## 2024-11-15 RX ORDER — ONDANSETRON 4 MG/1
4 TABLET, ORALLY DISINTEGRATING ORAL EVERY 8 HOURS PRN
Status: DISCONTINUED | OUTPATIENT
Start: 2024-11-15 | End: 2024-11-18 | Stop reason: HOSPADM

## 2024-11-15 RX ORDER — ACETAMINOPHEN 325 MG/1
650 TABLET ORAL EVERY 6 HOURS PRN
Status: DISCONTINUED | OUTPATIENT
Start: 2024-11-15 | End: 2024-11-18 | Stop reason: HOSPADM

## 2024-11-15 RX ORDER — POTASSIUM CHLORIDE 1500 MG/1
40 TABLET, EXTENDED RELEASE ORAL PRN
Status: DISCONTINUED | OUTPATIENT
Start: 2024-11-15 | End: 2024-11-18 | Stop reason: HOSPADM

## 2024-11-15 RX ORDER — ASPIRIN 81 MG/1
81 TABLET ORAL DAILY
Status: DISCONTINUED | OUTPATIENT
Start: 2024-11-15 | End: 2024-11-18 | Stop reason: HOSPADM

## 2024-11-15 RX ORDER — AZITHROMYCIN 250 MG/1
500 TABLET, FILM COATED ORAL DAILY
Status: COMPLETED | OUTPATIENT
Start: 2024-11-15 | End: 2024-11-17

## 2024-11-15 RX ORDER — METOPROLOL TARTRATE 1 MG/ML
5 INJECTION, SOLUTION INTRAVENOUS EVERY 6 HOURS PRN
Status: DISCONTINUED | OUTPATIENT
Start: 2024-11-15 | End: 2024-11-18 | Stop reason: HOSPADM

## 2024-11-15 RX ORDER — BUDESONIDE AND FORMOTEROL FUMARATE DIHYDRATE 160; 4.5 UG/1; UG/1
2 AEROSOL RESPIRATORY (INHALATION)
Status: DISCONTINUED | OUTPATIENT
Start: 2024-11-15 | End: 2024-11-18 | Stop reason: HOSPADM

## 2024-11-15 RX ORDER — MIRTAZAPINE 15 MG/1
15 TABLET, FILM COATED ORAL NIGHTLY
Status: DISCONTINUED | OUTPATIENT
Start: 2024-11-15 | End: 2024-11-18 | Stop reason: HOSPADM

## 2024-11-15 RX ORDER — SODIUM CHLORIDE 0.9 % (FLUSH) 0.9 %
5-40 SYRINGE (ML) INJECTION EVERY 12 HOURS SCHEDULED
Status: DISCONTINUED | OUTPATIENT
Start: 2024-11-15 | End: 2024-11-18 | Stop reason: HOSPADM

## 2024-11-15 RX ORDER — DOXYCYCLINE HYCLATE 100 MG
100 TABLET ORAL ONCE
Status: COMPLETED | OUTPATIENT
Start: 2024-11-15 | End: 2024-11-15

## 2024-11-15 RX ORDER — PANTOPRAZOLE SODIUM 40 MG/1
40 TABLET, DELAYED RELEASE ORAL
Status: DISCONTINUED | OUTPATIENT
Start: 2024-11-16 | End: 2024-11-18 | Stop reason: HOSPADM

## 2024-11-15 RX ORDER — SODIUM CHLORIDE 9 MG/ML
INJECTION, SOLUTION INTRAVENOUS PRN
Status: DISCONTINUED | OUTPATIENT
Start: 2024-11-15 | End: 2024-11-18 | Stop reason: HOSPADM

## 2024-11-15 RX ORDER — ATORVASTATIN CALCIUM 40 MG/1
40 TABLET, FILM COATED ORAL NIGHTLY
Status: DISCONTINUED | OUTPATIENT
Start: 2024-11-15 | End: 2024-11-18 | Stop reason: HOSPADM

## 2024-11-15 RX ORDER — SODIUM CHLORIDE 0.9 % (FLUSH) 0.9 %
5-40 SYRINGE (ML) INJECTION PRN
Status: DISCONTINUED | OUTPATIENT
Start: 2024-11-15 | End: 2024-11-18 | Stop reason: HOSPADM

## 2024-11-15 RX ORDER — POTASSIUM CHLORIDE 7.45 MG/ML
10 INJECTION INTRAVENOUS PRN
Status: DISCONTINUED | OUTPATIENT
Start: 2024-11-15 | End: 2024-11-15 | Stop reason: RX

## 2024-11-15 RX ADMIN — IPRATROPIUM BROMIDE AND ALBUTEROL SULFATE 1 DOSE: .5; 3 SOLUTION RESPIRATORY (INHALATION) at 08:04

## 2024-11-15 RX ADMIN — MEGESTROL ACETATE 40 MG: 40 TABLET ORAL at 20:18

## 2024-11-15 RX ADMIN — ASPIRIN 81 MG: 81 TABLET, COATED ORAL at 18:52

## 2024-11-15 RX ADMIN — BUDESONIDE AND FORMOTEROL FUMARATE DIHYDRATE 2 PUFF: 160; 4.5 AEROSOL RESPIRATORY (INHALATION) at 19:50

## 2024-11-15 RX ADMIN — MIRTAZAPINE 15 MG: 15 TABLET, FILM COATED ORAL at 20:18

## 2024-11-15 RX ADMIN — IPRATROPIUM BROMIDE AND ALBUTEROL SULFATE 1 DOSE: .5; 3 SOLUTION RESPIRATORY (INHALATION) at 19:50

## 2024-11-15 RX ADMIN — IPRATROPIUM BROMIDE AND ALBUTEROL SULFATE 1 DOSE: .5; 3 SOLUTION RESPIRATORY (INHALATION) at 16:56

## 2024-11-15 RX ADMIN — DOXYCYCLINE HYCLATE 100 MG: 100 TABLET, COATED ORAL at 08:25

## 2024-11-15 RX ADMIN — SODIUM CHLORIDE, PRESERVATIVE FREE 10 ML: 5 INJECTION INTRAVENOUS at 20:19

## 2024-11-15 RX ADMIN — Medication 6 MG: at 22:44

## 2024-11-15 RX ADMIN — ENOXAPARIN SODIUM 30 MG: 100 INJECTION SUBCUTANEOUS at 12:09

## 2024-11-15 RX ADMIN — ATORVASTATIN CALCIUM 40 MG: 40 TABLET, FILM COATED ORAL at 20:18

## 2024-11-15 RX ADMIN — METOPROLOL TARTRATE 5 MG: 5 INJECTION INTRAVENOUS at 22:41

## 2024-11-15 RX ADMIN — PREDNISONE 40 MG: 20 TABLET ORAL at 12:10

## 2024-11-15 RX ADMIN — AZITHROMYCIN DIHYDRATE 500 MG: 250 TABLET ORAL at 12:10

## 2024-11-15 RX ADMIN — WATER 1000 MG: 1 INJECTION INTRAMUSCULAR; INTRAVENOUS; SUBCUTANEOUS at 08:24

## 2024-11-15 ASSESSMENT — PAIN - FUNCTIONAL ASSESSMENT
PAIN_FUNCTIONAL_ASSESSMENT: NONE - DENIES PAIN

## 2024-11-15 ASSESSMENT — LIFESTYLE VARIABLES
HOW MANY STANDARD DRINKS CONTAINING ALCOHOL DO YOU HAVE ON A TYPICAL DAY: PATIENT DOES NOT DRINK
HOW OFTEN DO YOU HAVE A DRINK CONTAINING ALCOHOL: NEVER

## 2024-11-15 ASSESSMENT — PAIN SCALES - GENERAL: PAINLEVEL_OUTOF10: 0

## 2024-11-15 NOTE — PALLIATIVE CARE
comfortable for the time she had left if it would come to the point she needed a ventilator. Again patient verbalized understanding and aligned with LIMITED noX4 code status. Palliative care contact information left with patient and family.    Treatment Limitations: They would not want cardiopulmonary resuscitation in the event of cardiac arrest nor would they want to be intubated and mechanically ventilated. They would want to attempt to sustain life by all other medically effective means. This includes providing appropriate medical and surgical treatments as indicated to attempt to prolong life, including intensive care. This is consistent with a code status of Do Not Resuscitate/Do Not Intubate (Limited x4).    Resuscitation Status: Limited Limited Code details: Intubation/Re-intubation No; Defibrillation/Cardioversion No; Chest Compressions No; Resuscitative Medications No; Other No intubation at any time    Documentation Completed:  -No new documents completed.    Plan/Follow-Up:  Palliative care will continue to follow, please call for additional needs.     I spent 30 minutes with the patient and/or surrogate decision maker discussing the patient's wishes and goals.      Franco Martinez RN              Electronically signed by Franco Martinez RN on 11/15/2024 at 2:22 PM           Palliative Care Office: [phone]

## 2024-11-15 NOTE — RT PROTOCOL NOTE
RT Inhaler-Nebulizer Bronchodilator Protocol Note    There is a bronchodilator order in the chart from a provider indicating to follow the RT Bronchodilator Protocol and there is an “Initiate RT Inhaler-Nebulizer Bronchodilator Protocol” order as well (see protocol at bottom of note).    CXR Findings:  XR CHEST PORTABLE    Result Date: 11/15/2024  1. No acute cardiopulmonary finding.. **This report has been created using voice recognition software.  It may contain minor errors which are inherent in voice recognition technology.** Electronically signed by Dr. Kiki Manning      The findings from the last RT Protocol Assessment were as follows:   History Pulmonary Disease: Chronic pulmonary disease  Respiratory Pattern: Dyspnea on exertion or RR 21-25 bpm  Breath Sounds: Slightly diminished and/or crackles  Cough: Strong, spontaneous, non-productive  Indication for Bronchodilator Therapy: Decreased or absent breath sounds, On home bronchodilators  Bronchodilator Assessment Score: 6    Aerosolized bronchodilator medication orders have been revised according to the RT Inhaler-Nebulizer Bronchodilator Protocol below.    Respiratory Therapist to perform RT Therapy Protocol Assessment initially then follow the protocol.  Repeat RT Therapy Protocol Assessment PRN for score 0-3 or on second treatment, BID, and PRN for scores above 3.    No Indications - adjust the frequency to every 6 hours PRN wheezing or bronchospasm, if no treatments needed after 48 hours then discontinue using Per Protocol order mode.     If indication present, adjust the RT bronchodilator orders based on the Bronchodilator Assessment Score as indicated below.  Use Inhaler orders unless patient has one or more of the following: on home nebulizer, not able to hold breath for 10 seconds, is not alert and oriented, cannot activate and use MDI correctly, or respiratory rate 25 breaths per minute or more, then use the equivalent nebulizer order(s) with same

## 2024-11-15 NOTE — ED PROVIDER NOTES
[facility] EMERGENCY DEPT      EMERGENCY MEDICINE     Pt Name: Queenie Abreu  MRN: [id number]  Birthdate 1945  Date of evaluation: 11/15/2024  Provider: Jarad Lofton DO  Supervising Physician: Kael Kong DO    CHIEF COMPLAINT       Chief Complaint   Patient presents with    Shortness of Breath     HISTORY OF PRESENT ILLNESS   Queenie Abreu is a 79 y.o. female with a h/o COPD on 4L, TIA, DM who presents to the emergency department from [facility] via EMS for evaluation of shortness of breath.  Patient developed a cough last evening and developed shortness of breath this morning.  EMS reports she was on 5 L, she is on 4 L at baseline.  Patient states this does not feel like her typical COPD exacerbation.  Patient denies any fever, chest pain, productive cough, recent travel, recent surgery, history of blood clots.    PASTMEDICAL HISTORY     Past Medical History:   Diagnosis Date    Chronic respiratory failure with hypoxia and hypercapnia     COPD (chronic obstructive pulmonary disease) (HCC)     Depression     Dysarthria     Gastro-esophageal reflux disease without esophagitis 05/28/2021    Hyperlipidemia     Hypothyroidism, unspecified 05/28/2021    Lung nodules     Pneumonia     Pnumonia 2 months ago    Rectal prolapse 03/2024    Severe malnutrition (HCC)     TIA (transient ischemic attack)     Type 2 diabetes mellitus with hyperglycemia ([facility]) 05/28/2021       Patient Active Problem List   Diagnosis Code    Pneumonia of right upper lobe due to infectious organism J18.9    Bandemia D72.825    Acute on chronic respiratory failure with hypoxia and hypercapnia J96.21, J96.22    Weight loss, non-intentional R63.4    Severe protein-calorie malnutrition ([place]: less than 60% of standard weight) ([facility]) E43    History of head lice B85.0    Hypoxia R09.02    Tobacco use disorder F17.200    Leukocytosis D72.829    Lactic acidosis E87.20    Patient underweight R63.6    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

## 2024-11-15 NOTE — PLAN OF CARE
Problem: Skin/Tissue Integrity  Goal: Absence of new skin breakdown  Description: 1.  Monitor for areas of redness and/or skin breakdown  2.  Assess vascular access sites hourly  3.  Every 4-6 hours minimum:  Change oxygen saturation probe site  4.  Every 4-6 hours:  If on nasal continuous positive airway pressure, respiratory therapy assess nares and determine need for appliance change or resting period.  Outcome: Progressing     Problem: Chronic Conditions and Co-morbidities  Goal: Patient's chronic conditions and co-morbidity symptoms are monitored and maintained or improved  Outcome: Progressing  Flowsheets (Taken 11/15/2024 1556)  Care Plan - Patient's Chronic Conditions and Co-Morbidity Symptoms are Monitored and Maintained or Improved: Monitor and assess patient's chronic conditions and comorbid symptoms for stability, deterioration, or improvement     Problem: Discharge Planning  Goal: Discharge to home or other facility with appropriate resources  Outcome: Progressing  Flowsheets  Taken 11/15/2024 1556 by Gisella Bray RN  Discharge to home or other facility with appropriate resources:   Identify barriers to discharge with patient and caregiver   Refer to discharge planning if patient needs post-hospital services based on physician order or complex needs related to functional status, cognitive ability or social support system   Identify discharge learning needs (meds, wound care, etc)   Arrange for needed discharge resources and transportation as appropriate   Arrange for interpreters to assist at discharge as needed  Taken 11/15/2024 1433 by Tasha Davis RN  Discharge to home or other facility with appropriate resources:   Identify barriers to discharge with patient and caregiver   Identify discharge learning needs (meds, wound care, etc)   Refer to discharge planning if patient needs post-hospital services based on physician order or complex needs related to functional status, cognitive ability or

## 2024-11-15 NOTE — ED NOTES
Notified 8B of transport to room 8B36. Pt in stable condition for transport.  
Pt medicated per MAR. Tolerated well. Vtials stable.   
Pt resting on cot. Vitals stable. Family at bedside.   
Pt resting on cot. Vitals stable. Resident at bedside updating pt and family on POC  
hypoxia and hypercapnia     COPD (chronic obstructive pulmonary disease) (HCC)     Depression     Dysarthria     Gastro-esophageal reflux disease without esophagitis 05/28/2021    Hyperlipidemia     Hypothyroidism, unspecified 05/28/2021    Lung nodules     Pneumonia     Pnumonia 2 months ago    Rectal prolapse 03/2024    Severe malnutrition (HCC)     TIA (transient ischemic attack)     Type 2 diabetes mellitus with hyperglycemia ([facility]) 05/28/2021           Electronically signed by Bina Bueno RN on 11/15/2024 at 10:47 AM

## 2024-11-15 NOTE — ED TRIAGE NOTES
Pt presents to the ED by EMS from [facility] with c/c SOB. Pt states she became SOB through the evening. Pt has hx COPD. Pt denies pain. Pt wears 4L at baseline, noted to be 89-94%. Dr. Lofton at bedside for initial assessment. EKG completed on arrival

## 2024-11-15 NOTE — PLAN OF CARE
Problem: Respiratory - Adult  Goal: Clear lung sounds  Outcome: Progressing   Pt starting on aerosols and acapella for maintenance of COPD, to help clear lung sounds/improve aeration, help clear secretions and pt does MDI's at NH. Patient mutually agreed on goals.

## 2024-11-15 NOTE — H&P
History & Physical  Internal Medicine Resident         Patient: Queenie Abreu 79 y.o. female      : 1945  Date of Admission: 11/15/2024  Date of Service: Pt seen/examined on 11/15/24 and Admitted to Observation with expected LOS less than two midnights due to medical therapy.       ASSESSMENT AND PLAN  Acute COPD exacerbation  Patient reports shortness of breath that worsened today.  CXR shows no acute cardiopulmonary findings. On 4 L supplemental oxygen, baseline.  Received DuoNeb x 1 in ED.  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  Patient previously had been stable at 74 pounds, weight on admission 68 pounds.  Daughter states that patient lost weight during previous hospitalization approximately 1 week ago.  -Continue Megace  -Dietitian consulted    Asymptomatic bacteriuria  UA shows positive nitrates, small leukocyte esterase, moderate bacteria, WBC 15-25.  Patient denies any dysuria or any other urinary symptoms.  No UTI present due to no symptoms.  -Monitor for symptoms of UTI    Anxiety/depression  Patient at home on Remeron 15 mg.  -Continue home medications     Hyperlipidemia  -Continue statin     GERD  -Continue PPI       Data reviewed (unless otherwise discussed in assessment/plan)  EKG:  I have reviewed the EKG with the following interpretation: Tachycardia  Imaging: I have reviewed CXR with the following interpretation: No acute cardiopulmonary findings  Labs: Reviewed, see chart and plan above.       =======================================================================    SUBJECTIVE    Chief Complaint: Shortness of breath    History Of Present Illness:  Queenie Abreu is a 79 y.o. female

## 2024-11-16 LAB
ANION GAP SERPL CALC-SCNC: 7 MEQ/L (ref 8–16)
BASOPHILS ABSOLUTE: 0 THOU/MM3 (ref 0–0.1)
BASOPHILS NFR BLD AUTO: 0.1 %
BUN SERPL-MCNC: 15 MG/DL (ref 7–22)
CALCIUM SERPL-MCNC: 9 MG/DL (ref 8.5–10.5)
CHLORIDE SERPL-SCNC: 104 MEQ/L (ref 98–111)
CO2 SERPL-SCNC: 31 MEQ/L (ref 23–33)
CREAT SERPL-MCNC: 0.4 MG/DL (ref 0.4–1.2)
DEPRECATED RDW RBC AUTO: 59.1 FL (ref 35–45)
EOSINOPHIL NFR BLD AUTO: 0.1 %
EOSINOPHILS ABSOLUTE: 0 THOU/MM3 (ref 0–0.4)
ERYTHROCYTE [DISTWIDTH] IN BLOOD BY AUTOMATED COUNT: 19.2 % (ref 11.5–14.5)
GFR SERPL CREATININE-BSD FRML MDRD: > 90 ML/MIN/1.73M2
GLUCOSE BLD STRIP.AUTO-MCNC: 93 MG/DL (ref 70–108)
GLUCOSE SERPL-MCNC: 98 MG/DL (ref 70–108)
HCT VFR BLD AUTO: 32 % (ref 37–47)
HGB BLD-MCNC: 9.8 GM/DL (ref 12–16)
IMM GRANULOCYTES # BLD AUTO: 0.05 THOU/MM3 (ref 0–0.07)
IMM GRANULOCYTES NFR BLD AUTO: 0.4 %
LYMPHOCYTES ABSOLUTE: 1 THOU/MM3 (ref 1–4.8)
LYMPHOCYTES NFR BLD AUTO: 8.7 %
MCH RBC QN AUTO: 25.7 PG (ref 26–33)
MCHC RBC AUTO-ENTMCNC: 30.6 GM/DL (ref 32.2–35.5)
MCV RBC AUTO: 84 FL (ref 81–99)
MONOCYTES ABSOLUTE: 0.9 THOU/MM3 (ref 0.4–1.3)
MONOCYTES NFR BLD AUTO: 7.5 %
NEUTROPHILS ABSOLUTE: 9.7 THOU/MM3 (ref 1.8–7.7)
NEUTROPHILS NFR BLD AUTO: 83.2 %
NRBC BLD AUTO-RTO: 0 /100 WBC
PLATELET # BLD AUTO: 327 THOU/MM3 (ref 130–400)
PMV BLD AUTO: 10.7 FL (ref 9.4–12.4)
POTASSIUM SERPL-SCNC: 4.3 MEQ/L (ref 3.5–5.2)
RBC # BLD AUTO: 3.81 MILL/MM3 (ref 4.2–5.4)
SODIUM SERPL-SCNC: 142 MEQ/L (ref 135–145)
WBC # BLD AUTO: 11.6 THOU/MM3 (ref 4.8–10.8)

## 2024-11-16 PROCEDURE — 6370000000 HC RX 637 (ALT 250 FOR IP)

## 2024-11-16 PROCEDURE — 94660 CPAP INITIATION&MGMT: CPT

## 2024-11-16 PROCEDURE — 85025 COMPLETE CBC W/AUTO DIFF WBC: CPT

## 2024-11-16 PROCEDURE — 96376 TX/PRO/DX INJ SAME DRUG ADON: CPT

## 2024-11-16 PROCEDURE — 2700000000 HC OXYGEN THERAPY PER DAY

## 2024-11-16 PROCEDURE — 94640 AIRWAY INHALATION TREATMENT: CPT

## 2024-11-16 PROCEDURE — G0378 HOSPITAL OBSERVATION PER HR: HCPCS

## 2024-11-16 PROCEDURE — 94761 N-INVAS EAR/PLS OXIMETRY MLT: CPT

## 2024-11-16 PROCEDURE — 2580000003 HC RX 258

## 2024-11-16 PROCEDURE — 36415 COLL VENOUS BLD VENIPUNCTURE: CPT

## 2024-11-16 PROCEDURE — 2580000003 HC RX 258: Performed by: NURSE PRACTITIONER

## 2024-11-16 PROCEDURE — 99233 SBSQ HOSP IP/OBS HIGH 50: CPT | Performed by: NURSE PRACTITIONER

## 2024-11-16 PROCEDURE — 80048 BASIC METABOLIC PNL TOTAL CA: CPT

## 2024-11-16 PROCEDURE — 82948 REAGENT STRIP/BLOOD GLUCOSE: CPT

## 2024-11-16 PROCEDURE — 94669 MECHANICAL CHEST WALL OSCILL: CPT

## 2024-11-16 PROCEDURE — 6360000002 HC RX W HCPCS

## 2024-11-16 PROCEDURE — 96372 THER/PROPH/DIAG INJ SC/IM: CPT

## 2024-11-16 PROCEDURE — 6360000002 HC RX W HCPCS: Performed by: NURSE PRACTITIONER

## 2024-11-16 RX ORDER — IPRATROPIUM BROMIDE AND ALBUTEROL SULFATE 2.5; .5 MG/3ML; MG/3ML
1 SOLUTION RESPIRATORY (INHALATION)
Status: DISCONTINUED | OUTPATIENT
Start: 2024-11-16 | End: 2024-11-18 | Stop reason: HOSPADM

## 2024-11-16 RX ORDER — ALBUTEROL SULFATE 0.83 MG/ML
2.5 SOLUTION RESPIRATORY (INHALATION) EVERY 4 HOURS PRN
Status: DISCONTINUED | OUTPATIENT
Start: 2024-11-16 | End: 2024-11-18 | Stop reason: HOSPADM

## 2024-11-16 RX ADMIN — SODIUM CHLORIDE, PRESERVATIVE FREE 10 ML: 5 INJECTION INTRAVENOUS at 19:47

## 2024-11-16 RX ADMIN — SODIUM CHLORIDE, PRESERVATIVE FREE 10 ML: 5 INJECTION INTRAVENOUS at 08:45

## 2024-11-16 RX ADMIN — AZITHROMYCIN DIHYDRATE 500 MG: 250 TABLET ORAL at 08:42

## 2024-11-16 RX ADMIN — PANTOPRAZOLE SODIUM 40 MG: 40 TABLET, DELAYED RELEASE ORAL at 05:31

## 2024-11-16 RX ADMIN — MEGESTROL ACETATE 40 MG: 40 TABLET ORAL at 19:46

## 2024-11-16 RX ADMIN — WATER 1000 MG: 1 INJECTION INTRAMUSCULAR; INTRAVENOUS; SUBCUTANEOUS at 13:44

## 2024-11-16 RX ADMIN — Medication 6 MG: at 20:32

## 2024-11-16 RX ADMIN — ENOXAPARIN SODIUM 30 MG: 100 INJECTION SUBCUTANEOUS at 08:41

## 2024-11-16 RX ADMIN — MIRTAZAPINE 15 MG: 15 TABLET, FILM COATED ORAL at 19:46

## 2024-11-16 RX ADMIN — MEGESTROL ACETATE 40 MG: 40 TABLET ORAL at 08:42

## 2024-11-16 RX ADMIN — PREDNISONE 40 MG: 20 TABLET ORAL at 08:43

## 2024-11-16 RX ADMIN — IPRATROPIUM BROMIDE AND ALBUTEROL SULFATE 1 DOSE: .5; 3 SOLUTION RESPIRATORY (INHALATION) at 09:04

## 2024-11-16 RX ADMIN — BUDESONIDE AND FORMOTEROL FUMARATE DIHYDRATE 2 PUFF: 160; 4.5 AEROSOL RESPIRATORY (INHALATION) at 18:12

## 2024-11-16 RX ADMIN — ATORVASTATIN CALCIUM 40 MG: 40 TABLET, FILM COATED ORAL at 19:46

## 2024-11-16 RX ADMIN — ASPIRIN 81 MG: 81 TABLET, COATED ORAL at 08:41

## 2024-11-16 NOTE — PLAN OF CARE
Problem: Skin/Tissue Integrity  Goal: Absence of new skin breakdown  Description: 1.  Monitor for areas of redness and/or skin breakdown  2.  Assess vascular access sites hourly  3.  Every 4-6 hours minimum:  Change oxygen saturation probe site  4.  Every 4-6 hours:  If on nasal continuous positive airway pressure, respiratory therapy assess nares and determine need for appliance change or resting period.  11/16/2024 0954 by Gisella Bray RN  Outcome: Progressing  11/15/2024 2344 by Isabela Trotter RN  Outcome: Progressing     Problem: Chronic Conditions and Co-morbidities  Goal: Patient's chronic conditions and co-morbidity symptoms are monitored and maintained or improved  11/16/2024 0954 by Gisella Bray RN  Outcome: Progressing  11/15/2024 2344 by Isabela Trotter RN  Outcome: Progressing  Flowsheets (Taken 11/15/2024 2000)  Care Plan - Patient's Chronic Conditions and Co-Morbidity Symptoms are Monitored and Maintained or Improved: Monitor and assess patient's chronic conditions and comorbid symptoms for stability, deterioration, or improvement     Problem: Discharge Planning  Goal: Discharge to home or other facility with appropriate resources  11/16/2024 0954 by Gisella Bray RN  Outcome: Progressing  11/15/2024 2344 by Isabela Trotter RN  Outcome: Progressing  Flowsheets (Taken 11/15/2024 2000)  Discharge to home or other facility with appropriate resources: Identify barriers to discharge with patient and caregiver     Problem: Safety - Adult  Goal: Free from fall injury  11/16/2024 0954 by Gisella Bray RN  Outcome: Progressing  11/15/2024 2344 by Isabela Trotter RN  Outcome: Progressing     Problem: ABCDS Injury Assessment  Goal: Absence of physical injury  11/16/2024 0954 by Gisella Bray RN  Outcome: Progressing  11/15/2024 2344 by Isabela Trotter RN  Outcome: Progressing     Problem: Nutrition Deficit:  Goal: Optimize nutritional status  11/16/2024 0954 by Gisella Bray

## 2024-11-16 NOTE — PLAN OF CARE
Problem: Skin/Tissue Integrity  Goal: Absence of new skin breakdown  Description: 1.  Monitor for areas of redness and/or skin breakdown  2.  Assess vascular access sites hourly  3.  Every 4-6 hours minimum:  Change oxygen saturation probe site  4.  Every 4-6 hours:  If on nasal continuous positive airway pressure, respiratory therapy assess nares and determine need for appliance change or resting period.  11/15/2024 2344 by Isabela Trotter RN  Outcome: Progressing  11/15/2024 1629 by Gisella Bray RN  Outcome: Progressing     Problem: Chronic Conditions and Co-morbidities  Goal: Patient's chronic conditions and co-morbidity symptoms are monitored and maintained or improved  11/15/2024 2344 by Isabela Trotter RN  Outcome: Progressing  Flowsheets (Taken 11/15/2024 2000)  Care Plan - Patient's Chronic Conditions and Co-Morbidity Symptoms are Monitored and Maintained or Improved: Monitor and assess patient's chronic conditions and comorbid symptoms for stability, deterioration, or improvement  11/15/2024 1629 by Gisella Bray RN  Outcome: Progressing  Flowsheets (Taken 11/15/2024 1556)  Care Plan - Patient's Chronic Conditions and Co-Morbidity Symptoms are Monitored and Maintained or Improved: Monitor and assess patient's chronic conditions and comorbid symptoms for stability, deterioration, or improvement     Problem: Discharge Planning  Goal: Discharge to home or other facility with appropriate resources  11/15/2024 2344 by Isabela Trotter RN  Outcome: Progressing  Flowsheets (Taken 11/15/2024 2000)  Discharge to home or other facility with appropriate resources: Identify barriers to discharge with patient and caregiver  11/15/2024 1629 by Gisella Bray RN  Outcome: Progressing  Flowsheets  Taken 11/15/2024 1556 by Gisella Bray RN  Discharge to home or other facility with appropriate resources:   Identify barriers to discharge with patient and caregiver   Refer to discharge planning if patient

## 2024-11-16 NOTE — PLAN OF CARE
Problem: Respiratory - Adult  Goal: Clear lung sounds  Outcome: Progressing   Continue inhaled medications to improve breath sounds.  Pt agrees with plan of care

## 2024-11-16 NOTE — RT PROTOCOL NOTE
RT Nebulizer Bronchodilator Protocol Note    There is a bronchodilator order in the chart from a provider indicating to follow the RT Bronchodilator Protocol and there is an “Initiate RT Bronchodilator Protocol” order as well (see protocol at bottom of note).    CXR Findings:  XR CHEST PORTABLE    Result Date: 11/15/2024  1. No acute cardiopulmonary finding.. **This report has been created using voice recognition software.  It may contain minor errors which are inherent in voice recognition technology.** Electronically signed by Dr. Kiki Manning      The findings from the last RT Protocol Assessment were as follows:  Smoking: Chronic pulmonary disease  Respiratory Pattern: Dyspnea on exertion or RR 21-25 bpm  Breath Sounds: Slightly diminished and/or crackles  Cough: Strong, spontaneous, non-productive  Indication for Bronchodilator Therapy: Decreased or absent breath sounds, On home bronchodilators  Bronchodilator Assessment Score: 6    Aerosolized bronchodilator medication orders have been revised according to the RT Nebulizer Bronchodilator Protocol below.    Respiratory Therapist to perform RT Therapy Protocol Assessment initially then follow the protocol.  Repeat RT Therapy Protocol Assessment PRN for score 0-3 or on second treatment, BID, and PRN for scores above 3.    No Indications - adjust the frequency to every 6 hours PRN wheezing or bronchospasm, if no treatments needed after 48 hours then discontinue using Per Protocol order mode.     If indication present, adjust the RT bronchodilator orders based on the Bronchodilator Assessment Score as indicated below.  If a patient is on this medication at home then do not decrease Frequency below that used at home.    0-3 - enter or revise RT bronchodilator order(s) to equivalent RT Bronchodilator order with Frequency of every 4 hours PRN for wheezing or increased work of breathing using Per Protocol order mode.       4-6 - enter or revise RT Bronchodilator

## 2024-11-17 ENCOUNTER — APPOINTMENT (OUTPATIENT)
Dept: CT IMAGING | Age: 79
DRG: 190 | End: 2024-11-17
Payer: MEDICARE

## 2024-11-17 PROBLEM — J44.1 COPD WITH ACUTE EXACERBATION (HCC): Status: ACTIVE | Noted: 2024-11-17

## 2024-11-17 LAB
ANION GAP SERPL CALC-SCNC: 13 MEQ/L (ref 8–16)
BACTERIA UR CULT: NORMAL
BUN SERPL-MCNC: 16 MG/DL (ref 7–22)
CALCIUM SERPL-MCNC: 9.1 MG/DL (ref 8.5–10.5)
CHLORIDE SERPL-SCNC: 102 MEQ/L (ref 98–111)
CO2 SERPL-SCNC: 24 MEQ/L (ref 23–33)
CREAT SERPL-MCNC: 0.5 MG/DL (ref 0.4–1.2)
DEPRECATED RDW RBC AUTO: 59.7 FL (ref 35–45)
EKG ATRIAL RATE: 112 BPM
EKG P AXIS: 78 DEGREES
EKG P-R INTERVAL: 100 MS
EKG Q-T INTERVAL: 314 MS
EKG QRS DURATION: 82 MS
EKG QTC CALCULATION (BAZETT): 428 MS
EKG R AXIS: 55 DEGREES
EKG T AXIS: 49 DEGREES
EKG VENTRICULAR RATE: 112 BPM
ERYTHROCYTE [DISTWIDTH] IN BLOOD BY AUTOMATED COUNT: 19 % (ref 11.5–14.5)
GFR SERPL CREATININE-BSD FRML MDRD: > 90 ML/MIN/1.73M2
GLUCOSE SERPL-MCNC: 272 MG/DL (ref 70–108)
HCT VFR BLD AUTO: 30.7 % (ref 37–47)
HGB BLD-MCNC: 9.3 GM/DL (ref 12–16)
MAGNESIUM SERPL-MCNC: 1.9 MG/DL (ref 1.6–2.4)
MCH RBC QN AUTO: 26 PG (ref 26–33)
MCHC RBC AUTO-ENTMCNC: 30.3 GM/DL (ref 32.2–35.5)
MCV RBC AUTO: 85.8 FL (ref 81–99)
PLATELET # BLD AUTO: 314 THOU/MM3 (ref 130–400)
PMV BLD AUTO: 10.4 FL (ref 9.4–12.4)
POTASSIUM SERPL-SCNC: 3.9 MEQ/L (ref 3.5–5.2)
RBC # BLD AUTO: 3.58 MILL/MM3 (ref 4.2–5.4)
SODIUM SERPL-SCNC: 139 MEQ/L (ref 135–145)
WBC # BLD AUTO: 11.3 THOU/MM3 (ref 4.8–10.8)

## 2024-11-17 PROCEDURE — 2580000003 HC RX 258: Performed by: NURSE PRACTITIONER

## 2024-11-17 PROCEDURE — 85027 COMPLETE CBC AUTOMATED: CPT

## 2024-11-17 PROCEDURE — 96376 TX/PRO/DX INJ SAME DRUG ADON: CPT

## 2024-11-17 PROCEDURE — 6360000002 HC RX W HCPCS: Performed by: NURSE PRACTITIONER

## 2024-11-17 PROCEDURE — 1200000000 HC SEMI PRIVATE

## 2024-11-17 PROCEDURE — 2700000000 HC OXYGEN THERAPY PER DAY

## 2024-11-17 PROCEDURE — 2580000003 HC RX 258

## 2024-11-17 PROCEDURE — 99239 HOSP IP/OBS DSCHRG MGMT >30: CPT | Performed by: NURSE PRACTITIONER

## 2024-11-17 PROCEDURE — 6370000000 HC RX 637 (ALT 250 FOR IP): Performed by: NURSE PRACTITIONER

## 2024-11-17 PROCEDURE — 94640 AIRWAY INHALATION TREATMENT: CPT

## 2024-11-17 PROCEDURE — 71275 CT ANGIOGRAPHY CHEST: CPT

## 2024-11-17 PROCEDURE — 93005 ELECTROCARDIOGRAM TRACING: CPT | Performed by: NURSE PRACTITIONER

## 2024-11-17 PROCEDURE — 83735 ASSAY OF MAGNESIUM: CPT

## 2024-11-17 PROCEDURE — 94761 N-INVAS EAR/PLS OXIMETRY MLT: CPT

## 2024-11-17 PROCEDURE — 6360000004 HC RX CONTRAST MEDICATION: Performed by: NURSE PRACTITIONER

## 2024-11-17 PROCEDURE — 6370000000 HC RX 637 (ALT 250 FOR IP)

## 2024-11-17 PROCEDURE — 36415 COLL VENOUS BLD VENIPUNCTURE: CPT

## 2024-11-17 PROCEDURE — 93010 ELECTROCARDIOGRAM REPORT: CPT | Performed by: INTERNAL MEDICINE

## 2024-11-17 PROCEDURE — 80048 BASIC METABOLIC PNL TOTAL CA: CPT

## 2024-11-17 RX ORDER — SENNA AND DOCUSATE SODIUM 50; 8.6 MG/1; MG/1
2 TABLET, FILM COATED ORAL 2 TIMES DAILY
DISCHARGE
Start: 2024-11-17

## 2024-11-17 RX ORDER — BISACODYL 10 MG
10 SUPPOSITORY, RECTAL RECTAL DAILY PRN
DISCHARGE
Start: 2024-11-17

## 2024-11-17 RX ORDER — LIDOCAINE 4 G/G
1 PATCH TOPICAL DAILY
DISCHARGE
Start: 2024-11-17

## 2024-11-17 RX ORDER — IPRATROPIUM BROMIDE AND ALBUTEROL SULFATE 2.5; .5 MG/3ML; MG/3ML
3 SOLUTION RESPIRATORY (INHALATION)
DISCHARGE
Start: 2024-11-17

## 2024-11-17 RX ORDER — IOPAMIDOL 755 MG/ML
80 INJECTION, SOLUTION INTRAVASCULAR
Status: COMPLETED | OUTPATIENT
Start: 2024-11-17 | End: 2024-11-17

## 2024-11-17 RX ORDER — SODIUM PHOSPHATE,MONO-DIBASIC 19G-7G/197
1 ENEMA (ML) RECTAL DAILY PRN
DISCHARGE
Start: 2024-11-17

## 2024-11-17 RX ORDER — CEFDINIR 300 MG/1
300 CAPSULE ORAL 2 TIMES DAILY
DISCHARGE
Start: 2024-11-17 | End: 2024-11-18 | Stop reason: HOSPADM

## 2024-11-17 RX ORDER — ALBUTEROL SULFATE 90 UG/1
2 INHALANT RESPIRATORY (INHALATION) 4 TIMES DAILY
DISCHARGE
Start: 2024-11-17 | End: 2024-12-17

## 2024-11-17 RX ORDER — MIDODRINE HYDROCHLORIDE 10 MG/1
10 TABLET ORAL EVERY 8 HOURS PRN
Status: DISCONTINUED | OUTPATIENT
Start: 2024-11-17 | End: 2024-11-18 | Stop reason: HOSPADM

## 2024-11-17 RX ORDER — SODIUM CHLORIDE 9 MG/ML
INJECTION, SOLUTION INTRAVENOUS CONTINUOUS
Status: DISCONTINUED | OUTPATIENT
Start: 2024-11-17 | End: 2024-11-18

## 2024-11-17 RX ORDER — SODIUM CHLORIDE 9 MG/ML
INJECTION, SOLUTION INTRAVENOUS CONTINUOUS
Status: DISCONTINUED | OUTPATIENT
Start: 2024-11-17 | End: 2024-11-17

## 2024-11-17 RX ORDER — ATORVASTATIN CALCIUM 40 MG/1
40 TABLET, FILM COATED ORAL NIGHTLY
DISCHARGE
Start: 2024-11-17

## 2024-11-17 RX ORDER — ONDANSETRON 4 MG/1
4 TABLET, FILM COATED ORAL EVERY 6 HOURS PRN
DISCHARGE
Start: 2024-11-17

## 2024-11-17 RX ORDER — MIRTAZAPINE 15 MG/1
15 TABLET, ORALLY DISINTEGRATING ORAL DAILY
DISCHARGE
Start: 2024-11-17

## 2024-11-17 RX ORDER — MIDODRINE HYDROCHLORIDE 10 MG/1
10 TABLET ORAL EVERY 8 HOURS PRN
DISCHARGE
Start: 2024-11-17

## 2024-11-17 RX ORDER — PANTOPRAZOLE SODIUM 40 MG/1
40 TABLET, DELAYED RELEASE ORAL
DISCHARGE
Start: 2024-11-18

## 2024-11-17 RX ORDER — M-VIT,TX,IRON,MINS/CALC/FOLIC 27MG-0.4MG
1 TABLET ORAL DAILY
DISCHARGE
Start: 2024-11-17

## 2024-11-17 RX ORDER — MEGESTROL ACETATE 40 MG/1
40 TABLET ORAL 2 TIMES DAILY
DISCHARGE
Start: 2024-11-17

## 2024-11-17 RX ORDER — CEFDINIR 300 MG/1
300 CAPSULE ORAL EVERY 12 HOURS SCHEDULED
Status: DISCONTINUED | OUTPATIENT
Start: 2024-11-17 | End: 2024-11-18 | Stop reason: HOSPADM

## 2024-11-17 RX ORDER — MIDODRINE HYDROCHLORIDE 5 MG/1
5 TABLET ORAL EVERY 8 HOURS PRN
DISCHARGE
Start: 2024-11-17

## 2024-11-17 RX ORDER — FUROSEMIDE 40 MG/1
40 TABLET ORAL DAILY PRN
DISCHARGE
Start: 2024-11-17

## 2024-11-17 RX ORDER — PREDNISONE 20 MG/1
40 TABLET ORAL DAILY
DISCHARGE
Start: 2024-11-17 | End: 2024-11-20

## 2024-11-17 RX ORDER — ACETAMINOPHEN 325 MG/1
650 TABLET ORAL EVERY 6 HOURS PRN
DISCHARGE
Start: 2024-11-17

## 2024-11-17 RX ORDER — POLYETHYLENE GLYCOL 3350 17 G/17G
17 POWDER, FOR SOLUTION ORAL DAILY
DISCHARGE
Start: 2024-11-17

## 2024-11-17 RX ORDER — MIDODRINE HYDROCHLORIDE 5 MG/1
5 TABLET ORAL EVERY 8 HOURS PRN
Status: DISCONTINUED | OUTPATIENT
Start: 2024-11-17 | End: 2024-11-18 | Stop reason: HOSPADM

## 2024-11-17 RX ORDER — FLUTICASONE PROPIONATE AND SALMETEROL 500; 50 UG/1; UG/1
1 POWDER RESPIRATORY (INHALATION) DAILY
DISCHARGE
Start: 2024-11-17

## 2024-11-17 RX ADMIN — PREDNISONE 40 MG: 20 TABLET ORAL at 09:33

## 2024-11-17 RX ADMIN — IOPAMIDOL 80 ML: 755 INJECTION, SOLUTION INTRAVENOUS at 13:49

## 2024-11-17 RX ADMIN — SODIUM CHLORIDE: 9 INJECTION, SOLUTION INTRAVENOUS at 15:22

## 2024-11-17 RX ADMIN — BUDESONIDE AND FORMOTEROL FUMARATE DIHYDRATE 2 PUFF: 160; 4.5 AEROSOL RESPIRATORY (INHALATION) at 09:04

## 2024-11-17 RX ADMIN — IPRATROPIUM BROMIDE AND ALBUTEROL SULFATE 1 DOSE: .5; 3 SOLUTION RESPIRATORY (INHALATION) at 08:58

## 2024-11-17 RX ADMIN — AZITHROMYCIN DIHYDRATE 500 MG: 250 TABLET ORAL at 09:33

## 2024-11-17 RX ADMIN — CEFDINIR 300 MG: 300 CAPSULE ORAL at 19:52

## 2024-11-17 RX ADMIN — BUDESONIDE AND FORMOTEROL FUMARATE DIHYDRATE 2 PUFF: 160; 4.5 AEROSOL RESPIRATORY (INHALATION) at 22:37

## 2024-11-17 RX ADMIN — MEGESTROL ACETATE 40 MG: 40 TABLET ORAL at 09:34

## 2024-11-17 RX ADMIN — SODIUM CHLORIDE, PRESERVATIVE FREE 10 ML: 5 INJECTION INTRAVENOUS at 10:00

## 2024-11-17 RX ADMIN — ASPIRIN 81 MG: 81 TABLET, COATED ORAL at 09:32

## 2024-11-17 RX ADMIN — PANTOPRAZOLE SODIUM 40 MG: 40 TABLET, DELAYED RELEASE ORAL at 06:00

## 2024-11-17 RX ADMIN — ATORVASTATIN CALCIUM 40 MG: 40 TABLET, FILM COATED ORAL at 19:52

## 2024-11-17 RX ADMIN — IPRATROPIUM BROMIDE AND ALBUTEROL SULFATE 1 DOSE: .5; 3 SOLUTION RESPIRATORY (INHALATION) at 22:37

## 2024-11-17 RX ADMIN — MIRTAZAPINE 15 MG: 15 TABLET, FILM COATED ORAL at 19:52

## 2024-11-17 RX ADMIN — WATER 1000 MG: 1 INJECTION INTRAMUSCULAR; INTRAVENOUS; SUBCUTANEOUS at 09:32

## 2024-11-17 RX ADMIN — MEGESTROL ACETATE 40 MG: 40 TABLET ORAL at 19:52

## 2024-11-17 NOTE — PLAN OF CARE
Patient developed Tachycardia with heart rate in the 120s, when she initially presented to the hospital her heart rate was high and then resolved, will check stat EKG, hold discharge  I called and updated patient's daughter Mary regarding the heart rate and canceling discharge today

## 2024-11-17 NOTE — DISCHARGE INSTR - COC
Continuity of Care Form    Patient Name: Queenie Abreu   :  1945  MRN:  [id number]    Admit date:  11/15/2024  Discharge date:  2024    Code Status Order: Limited   Advance Directives:   Advance Care Flowsheet Documentation             Admitting Physician:  No admitting provider for patient encounter.  PCP: Rudy San MD    Discharging Nurse: Aye Antunez  Discharging Hospital Unit/Room#: 8B-36/036-A  Discharging Unit Phone Number: [phone]    Emergency Contact:   Extended Emergency Contact Information  Primary Emergency Contact: SHERRI LEVIN  Home Phone: [phone]  Mobile Phone: [phone]  Relation: Child   needed? No  Secondary Emergency Contact: Mary Levin (POA)   [facility]  Home Phone: [phone]  Mobile Phone: [phone]  Relation: Child  Hearing or visual needs: None  Other needs: None  Preferred language: English   needed? No    Past Surgical History:  Past Surgical History:   Procedure Laterality Date    COLONOSCOPY      GASTROSTOMY TUBE PLACEMENT N/A 2022    EGD PEG TUBE PLACEMENT performed by Huy Villanueva MD at [facility]    TONSILLECTOMY      UPPER GASTROINTESTINAL ENDOSCOPY N/A 2022    EGD BIOPSY performed by Huy Villanueva MD at [facility]       Immunization History:   Immunization History   Administered Date(s) Administered    COVID-19, PFIZER Bivalent, DO NOT Dilute, (age 12y+), IM, 30 mcg/0.3 mL 10/14/2022    COVID-19, PFIZER PURPLE top, DILUTE for use, (age 12 y+), 30mcg/0.3mL 2021, 2021, 2021    Influenza Virus Vaccine 10/04/2018    Pneumococcal, PPSV23, PNEUMOVAX 23, (age 2y+), SC/IM, 0.5mL 2020       Active Problems:  Patient Active Problem List   Diagnosis Code    Pneumonia of right upper lobe due to infectious organism J18.9    Bandemia D72.825    Acute on chronic respiratory failure with hypoxia and hypercapnia J96.21, J96.22    Weight loss, non-intentional R63.4

## 2024-11-17 NOTE — PROCEDURES
PROCEDURE NOTE  Date: 11/17/2024   Name: Queenie Abreu  YOB: 1945    Procedures  EKG completed, given to RN

## 2024-11-17 NOTE — PLAN OF CARE
Problem: Skin/Tissue Integrity  Goal: Absence of new skin breakdown  Description: 1.  Monitor for areas of redness and/or skin breakdown  2.  Assess vascular access sites hourly  3.  Every 4-6 hours minimum:  Change oxygen saturation probe site  4.  Every 4-6 hours:  If on nasal continuous positive airway pressure, respiratory therapy assess nares and determine need for appliance change or resting period.  11/16/2024 2127 by Isabela Trotter RN  Outcome: Progressing  11/16/2024 0954 by Gisella Bray RN  Outcome: Progressing     Problem: Chronic Conditions and Co-morbidities  Goal: Patient's chronic conditions and co-morbidity symptoms are monitored and maintained or improved  11/16/2024 2127 by Isabela Trotter RN  Outcome: Progressing  Flowsheets (Taken 11/16/2024 2000)  Care Plan - Patient's Chronic Conditions and Co-Morbidity Symptoms are Monitored and Maintained or Improved: Monitor and assess patient's chronic conditions and comorbid symptoms for stability, deterioration, or improvement  11/16/2024 0954 by Gisella Bray RN  Outcome: Progressing  Flowsheets (Taken 11/16/2024 0745)  Care Plan - Patient's Chronic Conditions and Co-Morbidity Symptoms are Monitored and Maintained or Improved:   Monitor and assess patient's chronic conditions and comorbid symptoms for stability, deterioration, or improvement   Collaborate with multidisciplinary team to address chronic and comorbid conditions and prevent exacerbation or deterioration   Update acute care plan with appropriate goals if chronic or comorbid symptoms are exacerbated and prevent overall improvement and discharge     Problem: Discharge Planning  Goal: Discharge to home or other facility with appropriate resources  11/16/2024 2127 by Isabela Trotter RN  Outcome: Progressing  Flowsheets (Taken 11/16/2024 2000)  Discharge to home or other facility with appropriate resources: Identify barriers to discharge with patient and caregiver  11/16/2024 0954

## 2024-11-17 NOTE — DISCHARGE INSTRUCTIONS
PT/OT; pt needs to be evaluated for a more comfortable mask for her Bi-pap that she needs to use with naps and at night with sleeping; DuoNebs twice daily, good lung hygiene with incentive spirometry, Acapella and deep breathing and cough  Needs follow-up with PCP in 1 week

## 2024-11-18 VITALS
TEMPERATURE: 98.1 F | DIASTOLIC BLOOD PRESSURE: 57 MMHG | HEART RATE: 102 BPM | BODY MASS INDEX: 14.56 KG/M2 | HEIGHT: 62 IN | RESPIRATION RATE: 18 BRPM | SYSTOLIC BLOOD PRESSURE: 106 MMHG | WEIGHT: 79.14 LBS | OXYGEN SATURATION: 97 %

## 2024-11-18 PROCEDURE — 97166 OT EVAL MOD COMPLEX 45 MIN: CPT

## 2024-11-18 PROCEDURE — 99239 HOSP IP/OBS DSCHRG MGMT >30: CPT | Performed by: NURSE PRACTITIONER

## 2024-11-18 PROCEDURE — 2580000003 HC RX 258: Performed by: NURSE PRACTITIONER

## 2024-11-18 PROCEDURE — 6370000000 HC RX 637 (ALT 250 FOR IP): Performed by: NURSE PRACTITIONER

## 2024-11-18 PROCEDURE — 94669 MECHANICAL CHEST WALL OSCILL: CPT

## 2024-11-18 PROCEDURE — 94761 N-INVAS EAR/PLS OXIMETRY MLT: CPT

## 2024-11-18 PROCEDURE — 94640 AIRWAY INHALATION TREATMENT: CPT

## 2024-11-18 PROCEDURE — 2700000000 HC OXYGEN THERAPY PER DAY

## 2024-11-18 PROCEDURE — 97535 SELF CARE MNGMENT TRAINING: CPT

## 2024-11-18 PROCEDURE — 2580000003 HC RX 258

## 2024-11-18 PROCEDURE — 6360000002 HC RX W HCPCS

## 2024-11-18 PROCEDURE — 6370000000 HC RX 637 (ALT 250 FOR IP)

## 2024-11-18 RX ORDER — CEFDINIR 300 MG/1
300 CAPSULE ORAL EVERY 12 HOURS SCHEDULED
DISCHARGE
Start: 2024-11-18 | End: 2024-11-21

## 2024-11-18 RX ADMIN — CEFDINIR 300 MG: 300 CAPSULE ORAL at 10:24

## 2024-11-18 RX ADMIN — SODIUM CHLORIDE: 9 INJECTION, SOLUTION INTRAVENOUS at 05:17

## 2024-11-18 RX ADMIN — MEGESTROL ACETATE 40 MG: 40 TABLET ORAL at 10:23

## 2024-11-18 RX ADMIN — ASPIRIN 81 MG: 81 TABLET, COATED ORAL at 10:24

## 2024-11-18 RX ADMIN — BUDESONIDE AND FORMOTEROL FUMARATE DIHYDRATE 2 PUFF: 160; 4.5 AEROSOL RESPIRATORY (INHALATION) at 08:00

## 2024-11-18 RX ADMIN — IPRATROPIUM BROMIDE AND ALBUTEROL SULFATE 1 DOSE: .5; 3 SOLUTION RESPIRATORY (INHALATION) at 08:00

## 2024-11-18 RX ADMIN — SODIUM CHLORIDE, PRESERVATIVE FREE 10 ML: 5 INJECTION INTRAVENOUS at 10:26

## 2024-11-18 RX ADMIN — ENOXAPARIN SODIUM 30 MG: 100 INJECTION SUBCUTANEOUS at 10:24

## 2024-11-18 RX ADMIN — PREDNISONE 40 MG: 20 TABLET ORAL at 10:25

## 2024-11-18 NOTE — CARE COORDINATION
11/16/24 1109   Readmission Assessment   Number of Days since last admission? 8-30 days   Previous Disposition SNF  ([facility])   Who is being Interviewed Patient  (n/a, was discharged to SNF)   What was the patient's/caregiver's perception as to why they think they needed to return back to the hospital? Other (Comment)  (sent in by staff)   Did you visit your Primary Care Physician after you left the hospital, before you returned this time? Yes   Did you see a specialist, such as Cardiac, Pulmonary, Orthopedic Physician, etc. after you left the hospital? Yes   Who advised the patient to return to the hospital? Skilled Unit   Does the patient report anything that got in the way of taking their medications? No  (snf admin)   In our efforts to provide the best possible care to you and others like you, can you think of anything that we could have done to help you after you left the hospital the first time, so that you might not have needed to return so soon? Other (Comment)  (n/a was discharged to [facility])       
   11/16/24 1316   Service Assessment   Patient Orientation Alert and Oriented  (disoriented at times per nursing staff)   Cognition Other (see comment)  (see above)   History Provided By Medical Record;Patient;Other (see comment)  ([facility])   Primary Caregiver Other (Comment)  ([facility] staff)   Accompanied By/Relationship n/a   Support Systems Children;Family Members   Patient's Healthcare Decision Maker is: Named in Scanned ACP Document   PCP Verified by CM Yes   Last Visit to PCP Within last 3 months  ([facility] med director)   Prior Functional Level Assistance with the following:;Bathing;Dressing;Toileting;Cooking;Housework;Shopping;Mobility   Current Functional Level Assistance with the following:;Bathing;Dressing;Toileting;Cooking;Housework;Shopping;Mobility   Can patient return to prior living arrangement Yes   Ability to make needs known: Fair   Family able to assist with home care needs: Yes   Would you like for me to discuss the discharge plan with any other family members/significant others, and if so, who? Yes  (ok to call daughter bashir)   Financial Resources Medicare;Medicaid   Community Resources ECF/Home Care;Transportation   Social/Functional History   Active  No   Patient's  Info daughter assists or facility arranges   Discharge Planning   Type of Residence Long-Term Care   Living Arrangements Other (Comment)  ([facility])   Current Services Prior To Admission Other (Comment);Durable Medical Equipment  (long term bed at [facility];)   Current DME Prior to Arrival Walker;Oxygen Therapy (Comment);Shower Chair   Potential Assistance Needed Other (Comment)  (return to [facility])   DME Ordered? No   Potential Assistance Purchasing Medications No   Type of Home Care Services None   Patient expects to be discharged to: Long-term care   Services At/After Discharge   Mode of Transport at Discharge Other (see comment)  (family vs transport)   Confirm Follow Up Transport Family     Patient 
Case Management Assessment Initial Evaluation    Date/Time of Evaluation: 2024 12:29 PM  Assessment Completed by: Char Garcia RN    If patient is discharged prior to next notation, then this note serves as note for discharge by case management.    Patient Name: Queenie Abreu                   YOB: 1945  Diagnosis: UTI (urinary tract infection) [N39.0]  Septicemia (HCC) [A41.9]  Acute cystitis without hematuria [N30.00]  COPD with acute exacerbation (HCC) [J44.1]                   Date / Time: 11/15/2024  7:45 AM  [facility]     Patient Admission Status: Inpatient   Readmission Risk Low 0-14, Mod 15-19), High > 20: Readmission Risk Score: 25.5    Current PCP: Rudy San MD  Health Care Decision Makers:   Primary Decision Maker: Mary Levin (LILAA) - Child - [phone]    Secondary Decision Maker: SHERRI LEVIN - Child - [phone]    Secondary Decision Maker: Lian Fu - Child  [phone]    Additional Case Management Notes: Admitted with SOB. From [facility] facility. WBC's 13.3. Noted Cystitis. Zithromax. Rocephin.     Procedures: No    Imagin24 CTA Chest    IMPRESSION:  1.  No pulmonary emboli are seen.  2. Mild atelectasis/pneumonia both posterior costophrenic angles.    Patient Goals/Plan/Treatment Preferences: From [facility] group home. Plans return at discharge. SW following.          
discharge.

## 2024-11-18 NOTE — PROGRESS NOTES
Hospitalist Progress Note    Patient:  Queenie Abreu      Unit/Bed:8B-36/036-A    YOB: 1945    MRN: [id number]       Acct: [id number]     PCP: Rudy San MD    Date of Admission: 11/15/2024    Assessment/Plan:    Acute COPD exacerbation--Zithromax from 11/15-11/17, on Symbicort, on DuoNebs every 12 hours while awake, prednisone 40 mg daily from 11/15; on home oxygen at 4 L  Tachycardia--this had resolved at approximately 930 this morning became tachycardic again in the 120s, stat EKG ordered that showed a lot of artifact, on telemetry and also seen PVCs so BMP and magnesium ordered, discharge canceled and will maintain telemetry  Probable pulmonary cachexia  Acute on chronic hypoxic respiratory failure--on 4 L of oxygen at baseline; upon arrival patient was tachypneic and exhibited increased work of breathing, likely secondary to #1; patient has not been using her BiPAP machine at night which could also exacerbate; I discussed this with patient's daughter via the phone today and she stated the reason she does not wear it at the facility is because the mask is uncomfortable, I advised her the need to try different mask with her from their [employer] company and she did acknowledge, patient has refused to wear the BiPAP here and I explained to the patient's daughter that unfortunately it is the patient's decision if she wants to wear it or not we can only encourage   Leukocytosis--trending down; recheck  Normocytic anemia--trended down slightly, monitor  UTI (POA)--urine culture pending; had 1 dose of Rocephin on 11/15-11/17 and will transition to Omnicef 300 mg twice daily for 3 more days; urine is showing positive nitrites moderate bacteria and small leukocytes; encourage oral hydration  Anxiety/depression  Hyperlipidemia--on statin  GERD--on GERD  History of TIA  Severe malnutrition--on Megace  CODE STATUS--limited x 4, greatly appreciate palliative care input      Expected 
Comprehensive Nutrition Assessment    Type and Reason for Visit:  Initial, Consult, Positive nutrition screen (poor appetite; wt. loss; malnutrition assessment; MST 2)    Nutrition Recommendations/Plan:   Continue Regular diet  Begin ONS - Ensure Plus TID and magic cup TID  Recommend MVI - continue megace and remeron - consider increase   Recommend checking phosphorus and Mg - may need replaced  Pt. Does not want a PEG again  Encouraged pt. To select any extra items she may want to keep off of her trays for snacks between meals     Malnutrition Assessment:  Malnutrition Status:  Severe malnutrition (11/16/24 1421)    Context:  Chronic Illness     Findings of the 6 clinical characteristics of malnutrition:  Energy Intake:  Mild decrease in energy intake (per pt. she has a good appetite - did eat 75% of lunch tray 11/16)  Weight Loss:  Greater than 7.5% over 3 months (9# or 12% past 3 months)     Body Fat Loss:  Severe body fat loss Orbital, Triceps, Fat Overlying Ribs, Buccal region   Muscle Mass Loss:  Severe muscle mass loss Temples (temporalis), Clavicles (pectoralis & deltoids), Calf (gastrocnemius), Hand (interosseous)  Fluid Accumulation:  No fluid accumulation     Strength:  Not Performed (pt. c/o weakness)    Nutrition Assessment:     Pt. severely malnourished AEB criteria listed above.  At risk for further nutritional compromise r/t admit with exacerbation COPD, UTI, septicemia, advanced age and underlying medical condition  GERD, DM, TIA, Hx severe malnutrition, Hx PEG -9/2022 - removed ? When, COPD, resides at [facility].     Nutrition Related Findings:    Pt. Report/Treatments/Miscellaneous: pt. Seen; cachectic; actually eating well at lunch - no difficulties with regular textures - chicken strips and mac and cheese and fruit and milk; no edema; no answer at ECF this pm; when asked about having the PEG in the past pt. Stated \"Oh I'm not having one of those again\"; note pt. Is a no intubation/no CPR  GI 
Occupational Therapy  [facility]  INPATIENT OCCUPATIONAL THERAPY  [facility] MED SURG 8AB  EVALUATION      Discharge Recommendations: Subacute/Skilled Nursing Facility  Equipment Recommendations: No      Time In: 0924  Time Out: 0957  Timed Code Treatment Minutes: 25 Minutes  Minutes: 33          Date: 2024  Patient Name: Queenie Abreu,   Gender: female      MRN: [id number]  : 1945  (79 y.o.)  Referring Practitioner: Ratna Lindsey DO  Diagnosis: UTI (urinary tract infection)  Additional Pertinent Hx: 79 y.o. female with PMHx of COPD, cachexia, anxiety/depression, HLD, GERD who presents to [facility] with complaints of shortness of breath. Patient on 4 L NC at baseline, currently on baseline dose. Patient does report weakness.  Patient recently admitted for similar complaint, 10/31 to .  Patient reports that she has not been given her BiPAP to wear at SNF, however patient's daughter states that patient's BiPAP is on her nightstand.  Patient also reports recent weight loss.  Patient is that previously she was stable at 74 pounds, however since during and after patient's previous hospitalization her weight has gone down, current weight 68 pounds. MD Impression: COPD exacerbation, UTI    Restrictions/Precautions:  Restrictions/Precautions: Contact Precautions, Fall Risk    Subjective  Chart Reviewed: Yes, Orders, Progress Notes, History and Physical  Patient assessed for rehabilitation services?: Yes  Family / Caregiver Present: No       Pain: 0/10:     Vitals: Vitals not assessed per clinical judgement, see nursing flowsheet  Pt wearing 4 Lts NC.     Social/Functional History:  Lives With: Other (comment) ([place])  Type of Home: Facility              Active : No  Patient's  Info: daughter assists or facility arranges     Additional Comments: Pt reports walking to/from bathroom and completing toileting Mod I. Pt reports staff assist with bathing and 
Paged dr dang mott for the tachycardias of up to 130s with patient having no symptoms and she said no intervention needed if asymptomatic,for now. Likely due to shortness of breath & breathing treatments .will keep monitoring   
Pt transported via [facility] to [facility]. Pt awake, alert, vss at time of discharge.   
Report called to Miya at [facility] [facility]. All questions answered.   
POSITIVE 11/15/2024 09:35 AM    WBCUA 15-25 11/15/2024 09:35 AM    BACTERIA MODERATE 11/15/2024 09:35 AM    RBCUA 0-2 11/15/2024 09:35 AM    BLOODU NEGATIVE 11/15/2024 09:35 AM    GLUCOSEU NEGATIVE 11/15/2024 09:35 AM       Radiology:  XR CHEST PORTABLE    Result Date: 11/15/2024  PROCEDURE: XR CHEST PORTABLE CLINICAL INFORMATION: Shortness of Breath COMPARISON: Chest radiograph 11/4/2024 TECHNIQUE: Single frontal view of the chest performed. FINDINGS: Emphysematous changes are noted. Biapical pleural scarring. The bones appear demineralized. No focal pulmonary consolidation. Cardiac silhouette is not enlarged. No pleural effusion. No pneumothorax. No acute bony abnormality.     1. No acute cardiopulmonary finding.. **This report has been created using voice recognition software.  It may contain minor errors which are inherent in voice recognition technology.** Electronically signed by Dr. Kiki Manning      Code Status: Limited    Tele:   [x] Yes sinus rhythm heart rate 79 however did have some tachycardia in the night             [] no    LDA: []CVC / []PICC / []Midline / []Bethea / []Drains / []Mediport / [x]None  Antibiotics: Zithromax  Steroids: Prednisone  Labs (still needed?): []Yes / [x]No  IVF (still needed?): []Yes / [x]No    Level of care: []Step Down / [x]Med-Surg  Bed Status: []Inpatient / []Observation  PT/OT: []Yes / [x]No    DVT Prophylaxis: [x] Lovenox / [] Heparin / [] SCDs / [] Already on Systemic Anticoagulation / [] None       Active Hospital Problems    Diagnosis Date Noted    UTI (urinary tract infection) [N39.0] 11/15/2024    Septicemia (HCC) [A41.9] 11/20/2019    Chronic obstructive pulmonary disease (HCC) [J44.9] 12/16/2017       Electronically signed by SHARONA Garcia CNP on 11/16/2024 at 7:36 AM

## 2024-11-18 NOTE — PLAN OF CARE
Problem: Respiratory - Adult  Goal: Clear lung sounds  11/18/2024 0805 by Eleni Ko, RCKATEY  Outcome: Progressing    Continue tx's to improve breath sounds, increase aeration and decrease WOB.

## 2024-11-18 NOTE — PLAN OF CARE
Problem: Respiratory - Adult  Goal: Clear lung sounds  Outcome: Progressing     Patient mutually agreed on goals.

## 2024-11-18 NOTE — PLAN OF CARE
Problem: Discharge Planning  Goal: Discharge to home or other facility with appropriate resources  Outcome: Progressing       Consult received. Please see SW note dated 11/18.

## 2024-11-18 NOTE — DISCHARGE SUMMARY
atelectasis/pneumonia both posterior costophrenic angles.               **This report has been created using voice recognition software.  It may contain   minor errors which are inherent in voice recognition technology.**          Electronically signed by Dr. Sylvester Christiansen      XR CHEST PORTABLE   Final Result   1. No acute cardiopulmonary finding..               **This report has been created using voice recognition software.  It may contain   minor errors which are inherent in voice recognition technology.**      Electronically signed by Dr. Kiki Manning             Consults:     IP CONSULT TO SOCIAL WORK  PALLIATIVE CARE EVAL  IP CONSULT TO DIETITIAN    Disposition:    [] Home       [] TCU       [] Rehab       [] Psych       [x] SNF       [] Long Term Care Facility       [] Other-    Condition at Discharge: Stable    Code Status:  Limited     Pending tests at discharge:    None    Patient Instructions:    Discharge lab work: None  Activity: activity as tolerated  Diet: ADULT ORAL NUTRITION SUPPLEMENT; Breakfast, Lunch, Dinner; Standard High Calorie/High Protein Oral Supplement  ADULT ORAL NUTRITION SUPPLEMENT; Breakfast, Lunch, Dinner; Frozen Oral Supplement  ORAL HYDRATION; Electrolyte Solution; 600 ml (20 oz)  ADULT DIET; Dysphagia - Soft and Bite Sized      Follow-up visits:   Rudy San MD  [address] [place] [address]  [phone]    Follow up in 1 week(s)  Office will call patient with an appointment time         Discharge Medications:        Medication List        START taking these medications      cefdinir 300 MG capsule  Commonly known as: OMNICEF  Take 1 capsule by mouth every 12 hours for 5 doses     ipratropium 0.5 mg-albuterol 2.5 mg 0.5-2.5 (3) MG/3ML Soln nebulizer solution  Commonly known as: DUONEB  Inhale 3 mLs into the lungs in the morning and 3 mLs in the evening.     predniSONE 20 MG tablet  Commonly known as: DELTASONE  Take 2 tablets by mouth daily for 3 doses        
the lungs nightly Nightly and prn     polyethylene glycol 17 g Pack packet  Commonly known as: MIRALAX  Take 17 g by mouth daily     psyllium 28.3 % Powd powder  Commonly known as: KONSYL  Take 3.4 g by mouth daily Indications: Constipation     sennosides-docusate sodium 8.6-50 MG tablet  Commonly known as: SENOKOT-S  Take 2 tablets by mouth 2 times daily     therapeutic multivitamin-minerals tablet  Take 1 tablet by mouth daily     * vitamin D 1000 UNIT Tabs tablet  Commonly known as: CHOLECALCIFEROL  Take 2 tablets by mouth daily     * vitamin D 25 MCG (1000 UT) Tabs tablet  Commonly known as: CHOLECALCIFEROL  Take 2 tablets by mouth daily           * This list has 4 medication(s) that are the same as other medications prescribed for you. Read the directions carefully, and ask your doctor or other care provider to review them with you.                STOP taking these medications      HYDROcodone-acetaminophen 5-325 MG per tablet  Commonly known as: NORCO               Where to Get Your Medications        Information about where to get these medications is not yet available    Ask your nurse or doctor about these medications  acetaminophen 325 MG tablet  albuterol sulfate  (90 Base) MCG/ACT inhaler  aspirin 81 MG tablet  atorvastatin 40 MG tablet  bisacodyl 10 MG suppository  cefdinir 300 MG capsule  Fleet Saline Enema 7-19 GM/197ML Enem  fluticasone-salmeterol 500-50 MCG/ACT Aepb diskus inhaler  furosemide 40 MG tablet  ipratropium 0.5 mg-albuterol 2.5 mg 0.5-2.5 (3) MG/3ML Soln nebulizer solution  lidocaine 4 % external patch  magnesium hydroxide 400 MG/5ML suspension  megestrol 40 MG tablet  melatonin 3 MG Tabs tablet  midodrine 10 MG tablet  midodrine 5 MG tablet  mirtazapine 15 MG disintegrating tablet  ondansetron 4 MG tablet  OXYGEN  pantoprazole 40 MG tablet  polyethylene glycol 17 g Pack packet  predniSONE 20 MG tablet  psyllium 28.3 % Powd powder  sennosides-docusate sodium 8.6-50 MG

## 2024-11-20 LAB
BACTERIA BLD AEROBE CULT: NORMAL
BACTERIA BLD AEROBE CULT: NORMAL

## 2025-03-10 NOTE — ED NOTES
Pt able to swallow soda, but states she feels like it stuck.       Ty Bryson RN  07/11/22 1916 Subjective:     CC: Follow-up    HPI:   Dora presents today with the following:    Type 2 diabetes mellitus without complication, without long-term current use of insulin (HCC)  Continues follow-up with pharmacotherapy with next appointment tomorrow and she will complete A1C. Continues Ozempic 1 mg weekly and Synjardy XR  mg daily. Her father has been ill and at the hospital.    Past Medical History:   Diagnosis Date    Anxiety     Diabetes mellitus, type 2 (HCC)     Dyslipidemia     HTN (hypertension)     Hypertension     Obesity 5/22/2019    Sleep apnea        Social History     Tobacco Use    Smoking status: Never    Smokeless tobacco: Never   Vaping Use    Vaping status: Never Used   Substance Use Topics    Alcohol use: Yes     Alcohol/week: 4.2 - 8.4 oz     Types: 7 - 14 Glasses of wine per week     Comment: 2 glasses of wine 2-3 times a week mayb    Drug use: No       Current Outpatient Medications Ordered in Epic   Medication Sig Dispense Refill    TRI-KELLEE 0.01-4-0.05 % Cream APPLY AT BEDTIME TO DISCOLORED SKIN ON FACE FOR 12 WEEKS. AVOID SUNLIGHT.      ketoconazole (NIZORAL) 2 % Cream APPLY TWICE DAILY TO WHITE SPOTS ON BODY FOR 2 WEEKS.      Semaglutide, 1 MG/DOSE, (OZEMPIC, 1 MG/DOSE,) 4 MG/3ML Solution Pen-injector INJECT 1 MG UNDER THE SKIN EVERY 7 DAYS 3 mL 5    lisinopril-hydrochlorothiazide (PRINZIDE) 20-25 MG per tablet Take 1 Tablet by mouth every day. 90 Tablet 3    Empagliflozin-metFORMIN HCl ER (SYNJARDY XR)  MG TABLET SR 24 HR Take 1 Tablet by mouth every day. 90 Tablet 3    atorvastatin (LIPITOR) 10 MG Tab Take 1 Tablet by mouth every day. 90 Tablet 3    Blood Glucose Monitoring Suppl Device Meter: Dispense Device of Insurance Preference. Sig. Use as directed for blood sugar monitoring. #1. NR. 1 Each 0    Blood Glucose Test Strips Test strips order: Test strips for One Touch Ultra 2 meter. Sig: use twice daily and prn ssx high or low sugar #100 RF x 3 100 Strip 3    tretinoin  "(RETIN-A) 0.05 % cream   2    Triamcinolone Acetonide (NASACORT AQ NA) Spray  in nose.      Cholecalciferol (VITAMIN D PO) Take 5,000 Caps by mouth.      Lancets Misc 1 Each by Does not apply route 3 times a day. 100 Each 2     No current Epic-ordered facility-administered medications on file.       Allergies:  Patient has no known allergies.    Health Maintenance: {COMPLETED:674907}      Objective:     ***Vital signs reviewed  Exam:  /68 (BP Location: Right arm, Patient Position: Sitting, BP Cuff Size: Adult)   Pulse 85   Temp 36.8 °C (98.2 °F) (Temporal)   Ht 1.6 m (5' 3\")   Wt 71.2 kg (157 lb)   SpO2 98%   BMI 27.81 kg/m²  Body mass index is 27.81 kg/m².    Gen: Alert and oriented, No apparent distress.  Neck: Neck is supple without lymphadenopathy.  Lungs: Normal effort, CTA bilaterally, no wheezes, rhonchi, or rales  CV: Regular rate and rhythm. No murmurs, rubs, or gallops.  Ext: No clubbing, cyanosis, edema.      Labs: ***    Assessment & Plan:     54 y.o. female with the following -     1. Type 2 diabetes mellitus without complication, without long-term current use of insulin ([facility])  ***      No follow-ups on file.    Please note that this dictation was created using voice recognition software. I have made every reasonable attempt to correct obvious errors, but I expect that there are errors of grammar and possibly content that I did not discover before finalizing the note.        "

## (undated) DEVICE — PEG KIT STD 20 FR PUSH W/ ENFIT ENDOVIVE

## (undated) DEVICE — BIOGUARD A/W CLEANING ADAPTER

## (undated) NOTE — ED AVS SNAPSHOT
Ryan Alisson   MRN: [id number]    Department:  [facility] Emergency Department   Date of Visit:  9/3/2017           Disclosure     Insurance plans vary and the physician(s) referred by the ER may not be covered by your plan.  Please cont If you have been prescribed any medication(s), please fill your prescription right away and begin taking the medication(s) as directed    If the emergency physician has read X-rays, these will be re-interpreted by a radiologist.  If there is a significant